# Patient Record
Sex: MALE | Race: WHITE | NOT HISPANIC OR LATINO | Employment: OTHER | URBAN - NONMETROPOLITAN AREA
[De-identification: names, ages, dates, MRNs, and addresses within clinical notes are randomized per-mention and may not be internally consistent; named-entity substitution may affect disease eponyms.]

---

## 2017-01-27 DIAGNOSIS — M15.9 PRIMARY OSTEOARTHRITIS INVOLVING MULTIPLE JOINTS: Chronic | ICD-10-CM

## 2017-01-27 RX ORDER — HYDROCODONE BITARTRATE AND ACETAMINOPHEN 10; 325 MG/1; MG/1
TABLET ORAL
Qty: 120 TABLET | Refills: 0 | Status: SHIPPED | OUTPATIENT
Start: 2017-01-27 | End: 2017-02-28 | Stop reason: SDUPTHER

## 2017-02-22 ENCOUNTER — LAB (OUTPATIENT)
Dept: LAB | Facility: OTHER | Age: 62
End: 2017-02-22

## 2017-02-22 DIAGNOSIS — I10 ESSENTIAL HYPERTENSION: ICD-10-CM

## 2017-02-22 DIAGNOSIS — Z79.899 DRUG THERAPY: ICD-10-CM

## 2017-02-22 LAB
BACTERIA UR QL AUTO: ABNORMAL /HPF
BILIRUB UR QL STRIP: NEGATIVE
CLARITY UR: CLEAR
COLOR UR: YELLOW
GLUCOSE UR STRIP-MCNC: NEGATIVE MG/DL
HGB UR QL STRIP.AUTO: ABNORMAL
HYALINE CASTS UR QL AUTO: ABNORMAL /LPF
KETONES UR QL STRIP: NEGATIVE
LEUKOCYTE ESTERASE UR QL STRIP.AUTO: NEGATIVE
MUCOUS THREADS URNS QL MICRO: ABNORMAL /HPF
NITRITE UR QL STRIP: NEGATIVE
PH UR STRIP.AUTO: 5.5 [PH] (ref 5.5–8)
PROT UR QL STRIP: ABNORMAL
RBC # UR: ABNORMAL /HPF
REF LAB TEST METHOD: ABNORMAL
SP GR UR STRIP: 1.02 (ref 1–1.03)
SQUAMOUS #/AREA URNS HPF: ABNORMAL /HPF
UROBILINOGEN UR QL STRIP: ABNORMAL
WBC UR QL AUTO: ABNORMAL /HPF

## 2017-02-22 PROCEDURE — 81001 URINALYSIS AUTO W/SCOPE: CPT | Performed by: INTERNAL MEDICINE

## 2017-02-22 PROCEDURE — 80307 DRUG TEST PRSMV CHEM ANLYZR: CPT | Performed by: INTERNAL MEDICINE

## 2017-02-23 ENCOUNTER — LAB (OUTPATIENT)
Dept: LAB | Facility: OTHER | Age: 62
End: 2017-02-23

## 2017-02-23 DIAGNOSIS — E78.5 HYPERLIPIDEMIA: Chronic | ICD-10-CM

## 2017-02-23 DIAGNOSIS — Z12.5 SCREENING FOR PROSTATE CANCER: ICD-10-CM

## 2017-02-23 DIAGNOSIS — I10 ESSENTIAL HYPERTENSION: ICD-10-CM

## 2017-02-23 LAB
ALBUMIN SERPL-MCNC: 4.2 G/DL (ref 3.2–5.5)
ALBUMIN/GLOB SERPL: 1.2 G/DL (ref 1–3)
ALP SERPL-CCNC: 90 U/L (ref 15–121)
ALT SERPL W P-5'-P-CCNC: 21 U/L (ref 10–60)
AMPHET+METHAMPHET UR QL: NEGATIVE
ANION GAP SERPL CALCULATED.3IONS-SCNC: 8 MMOL/L (ref 5–15)
AST SERPL-CCNC: 23 U/L (ref 10–60)
BARBITURATES UR QL SCN: NEGATIVE
BASOPHILS # BLD AUTO: 0.02 10*3/MM3 (ref 0–0.2)
BASOPHILS NFR BLD AUTO: 0.1 % (ref 0–2)
BENZODIAZ UR QL SCN: NEGATIVE
BILIRUB SERPL-MCNC: 0.7 MG/DL (ref 0.2–1)
BUN BLD-MCNC: 16 MG/DL (ref 8–25)
BUN/CREAT SERPL: 17.8 (ref 7–25)
CALCIUM SPEC-SCNC: 9.4 MG/DL (ref 8.4–10.8)
CANNABINOIDS SERPL QL: NEGATIVE
CHLORIDE SERPL-SCNC: 107 MMOL/L (ref 100–112)
CHOLEST SERPL-MCNC: 222 MG/DL (ref 150–200)
CO2 SERPL-SCNC: 26 MMOL/L (ref 20–32)
COCAINE UR QL: NEGATIVE
CREAT BLD-MCNC: 0.9 MG/DL (ref 0.4–1.3)
DEPRECATED RDW RBC AUTO: 45.4 FL (ref 35.1–43.9)
EOSINOPHIL # BLD AUTO: 0.3 10*3/MM3 (ref 0–0.7)
EOSINOPHIL NFR BLD AUTO: 2.2 % (ref 0–7)
ERYTHROCYTE [DISTWIDTH] IN BLOOD BY AUTOMATED COUNT: 14.2 % (ref 11.5–14.5)
GFR SERPL CREATININE-BSD FRML MDRD: 86 ML/MIN/1.73 (ref 49–113)
GLOBULIN UR ELPH-MCNC: 3.4 GM/DL (ref 2.5–4.6)
GLUCOSE BLD-MCNC: 109 MG/DL (ref 70–100)
HCT VFR BLD AUTO: 49.4 % (ref 39–49)
HDLC SERPL-MCNC: 36 MG/DL (ref 35–100)
HGB BLD-MCNC: 16.8 G/DL (ref 13.7–17.3)
LDLC SERPL CALC-MCNC: 154 MG/DL
LDLC/HDLC SERPL: 4.27 {RATIO}
LYMPHOCYTES # BLD AUTO: 4.29 10*3/MM3 (ref 0.6–4.2)
LYMPHOCYTES NFR BLD AUTO: 32.1 % (ref 10–50)
MCH RBC QN AUTO: 30 PG (ref 26.5–34)
MCHC RBC AUTO-ENTMCNC: 34 G/DL (ref 31.5–36.3)
MCV RBC AUTO: 88.2 FL (ref 80–98)
METHADONE UR QL SCN: POSITIVE
MONOCYTES # BLD AUTO: 1 10*3/MM3 (ref 0–0.9)
MONOCYTES NFR BLD AUTO: 7.5 % (ref 0–12)
NEUTROPHILS # BLD AUTO: 7.74 10*3/MM3 (ref 2–8.6)
NEUTROPHILS NFR BLD AUTO: 58.1 % (ref 37–80)
OPIATES UR QL: POSITIVE
OXYCODONE UR QL SCN: NEGATIVE
PLATELET # BLD AUTO: 175 10*3/MM3 (ref 150–450)
PMV BLD AUTO: 12.8 FL (ref 8–12)
POTASSIUM BLD-SCNC: 4.4 MMOL/L (ref 3.4–5.4)
PROT SERPL-MCNC: 7.6 G/DL (ref 6.7–8.2)
RBC # BLD AUTO: 5.6 10*6/MM3 (ref 4.37–5.74)
SODIUM BLD-SCNC: 141 MMOL/L (ref 134–146)
TRIGL SERPL-MCNC: 162 MG/DL (ref 35–160)
VLDLC SERPL-MCNC: 32.4 MG/DL
WBC NRBC COR # BLD: 13.35 10*3/MM3 (ref 3.2–9.8)

## 2017-02-23 PROCEDURE — 80061 LIPID PANEL: CPT | Performed by: INTERNAL MEDICINE

## 2017-02-23 PROCEDURE — 80050 GENERAL HEALTH PANEL: CPT | Performed by: INTERNAL MEDICINE

## 2017-02-23 PROCEDURE — 84439 ASSAY OF FREE THYROXINE: CPT | Performed by: INTERNAL MEDICINE

## 2017-02-23 PROCEDURE — G0103 PSA SCREENING: HCPCS | Performed by: INTERNAL MEDICINE

## 2017-02-23 PROCEDURE — 36415 COLL VENOUS BLD VENIPUNCTURE: CPT | Performed by: INTERNAL MEDICINE

## 2017-02-24 LAB
PSA SERPL-MCNC: 2.4 NG/ML (ref 0–4)
T4 FREE SERPL-MCNC: 1.44 NG/DL (ref 0.78–2.19)
TSH SERPL DL<=0.05 MIU/L-ACNC: 1 MIU/ML (ref 0.46–4.68)

## 2017-02-28 DIAGNOSIS — M15.9 PRIMARY OSTEOARTHRITIS INVOLVING MULTIPLE JOINTS: Chronic | ICD-10-CM

## 2017-02-28 RX ORDER — HYDROCODONE BITARTRATE AND ACETAMINOPHEN 10; 325 MG/1; MG/1
TABLET ORAL
Qty: 120 TABLET | Refills: 0 | Status: SHIPPED | OUTPATIENT
Start: 2017-02-28 | End: 2017-03-28 | Stop reason: SDUPTHER

## 2017-03-28 ENCOUNTER — OFFICE VISIT (OUTPATIENT)
Dept: FAMILY MEDICINE CLINIC | Facility: CLINIC | Age: 62
End: 2017-03-28

## 2017-03-28 VITALS
WEIGHT: 188 LBS | BODY MASS INDEX: 26.92 KG/M2 | HEIGHT: 70 IN | HEART RATE: 80 BPM | SYSTOLIC BLOOD PRESSURE: 156 MMHG | DIASTOLIC BLOOD PRESSURE: 80 MMHG

## 2017-03-28 DIAGNOSIS — L82.0 SEBORRHEIC KERATOSES, INFLAMED: ICD-10-CM

## 2017-03-28 DIAGNOSIS — G90.9 PERIPHERAL AUTONOMIC NEUROPATHY: Chronic | ICD-10-CM

## 2017-03-28 DIAGNOSIS — I10 ESSENTIAL HYPERTENSION: Primary | Chronic | ICD-10-CM

## 2017-03-28 DIAGNOSIS — M15.9 PRIMARY OSTEOARTHRITIS INVOLVING MULTIPLE JOINTS: Chronic | ICD-10-CM

## 2017-03-28 DIAGNOSIS — F41.1 GENERALIZED ANXIETY DISORDER: Chronic | ICD-10-CM

## 2017-03-28 DIAGNOSIS — Z11.59 NEED FOR HEPATITIS C SCREENING TEST: ICD-10-CM

## 2017-03-28 DIAGNOSIS — E78.2 MIXED HYPERLIPIDEMIA: Chronic | ICD-10-CM

## 2017-03-28 DIAGNOSIS — G25.81 RESTLESS LEG SYNDROME: Chronic | ICD-10-CM

## 2017-03-28 PROCEDURE — 17110 DESTRUCTION B9 LES UP TO 14: CPT | Performed by: INTERNAL MEDICINE

## 2017-03-28 PROCEDURE — 96372 THER/PROPH/DIAG INJ SC/IM: CPT | Performed by: INTERNAL MEDICINE

## 2017-03-28 PROCEDURE — 99214 OFFICE O/P EST MOD 30 MIN: CPT | Performed by: INTERNAL MEDICINE

## 2017-03-28 RX ORDER — HYDROCODONE BITARTRATE AND ACETAMINOPHEN 10; 325 MG/1; MG/1
TABLET ORAL
Qty: 120 TABLET | Refills: 0 | Status: SHIPPED | OUTPATIENT
Start: 2017-03-28 | End: 2017-04-28 | Stop reason: SDUPTHER

## 2017-03-28 RX ORDER — LISINOPRIL 10 MG/1
10 TABLET ORAL DAILY
Qty: 30 TABLET | Refills: 11 | Status: SHIPPED | OUTPATIENT
Start: 2017-03-28 | End: 2017-06-23 | Stop reason: DRUGHIGH

## 2017-03-28 RX ORDER — METHYLPREDNISOLONE ACETATE 80 MG/ML
80 INJECTION, SUSPENSION INTRA-ARTICULAR; INTRALESIONAL; INTRAMUSCULAR; SOFT TISSUE ONCE
Status: COMPLETED | OUTPATIENT
Start: 2017-03-28 | End: 2017-03-28

## 2017-03-28 RX ORDER — SIMVASTATIN 40 MG
40 TABLET ORAL NIGHTLY
Qty: 30 TABLET | Refills: 11 | Status: SHIPPED | OUTPATIENT
Start: 2017-03-28 | End: 2018-04-05 | Stop reason: SDUPTHER

## 2017-03-28 RX ORDER — CITALOPRAM 20 MG/1
20 TABLET ORAL DAILY
Qty: 30 TABLET | Refills: 11 | Status: SHIPPED | OUTPATIENT
Start: 2017-03-28 | End: 2018-04-05 | Stop reason: SDUPTHER

## 2017-03-28 RX ORDER — LISINOPRIL AND HYDROCHLOROTHIAZIDE 12.5; 1 MG/1; MG/1
1 TABLET ORAL DAILY
Qty: 30 TABLET | Refills: 5 | Status: SHIPPED | OUTPATIENT
Start: 2017-03-28 | End: 2017-11-04 | Stop reason: SDUPTHER

## 2017-03-28 RX ADMIN — METHYLPREDNISOLONE ACETATE 80 MG: 80 INJECTION, SUSPENSION INTRA-ARTICULAR; INTRALESIONAL; INTRAMUSCULAR; SOFT TISSUE at 09:51

## 2017-03-28 NOTE — PROGRESS NOTES
SANDRA#[id number]

## 2017-03-28 NOTE — PROGRESS NOTES
Subjective   Osvaldo Conley . is a 61 y.o. male who presents to the office for follow-up and review of labs. He has hypertension and his blood pressure has been controlled.  He has hyperlipidemia and takes simvastatin daily.  He has restless leg syndrome which is well controlled with Requip.  He has anxiety and takes Celexa daily.  He has osteoarthritis which causes chronic back and joint pain.  He takes Norco 4 times a day which keeps his pain controlled.  He also takes gabapentin 3 times a day for treatment of the neuropathic pain.  He has also recently noticed some increased edema in his ankles.  This usually resolves overnight he also has a couple of skin lesions, one on his back and one on his left hand, which have been itching and irritated.     History of Present Illness     The following portions of the patient's history were reviewed and updated as appropriate: allergies, current medications, past family history, past medical history, past social history, past surgical history and problem list.    Review of Systems   Constitutional: Negative for chills, fatigue and fever.   HENT: Negative for congestion, sneezing, sore throat and trouble swallowing.    Eyes: Negative for visual disturbance.   Respiratory: Negative for cough, chest tightness, shortness of breath and wheezing.    Cardiovascular: Positive for leg swelling. Negative for chest pain and palpitations.   Gastrointestinal: Negative for abdominal pain, constipation, diarrhea, nausea and vomiting.   Genitourinary: Negative for dysuria, frequency and urgency.   Musculoskeletal: Positive for arthralgias and back pain. Negative for neck pain.   Skin: Negative for rash.   Neurological: Negative for dizziness, weakness and headaches.   Psychiatric/Behavioral:        Patient denies any feelings of depression and has not felt down, hopeless or lost interest in any activities.   All other systems reviewed and are negative.      Objective   Physical Exam    Constitutional: He is oriented to person, place, and time. He appears well-developed and well-nourished. No distress.   HENT:   Head: Normocephalic and atraumatic.   Nose: Nose normal.   Mouth/Throat: Oropharynx is clear and moist. No oropharyngeal exudate.   Eyes: Conjunctivae and EOM are normal. Pupils are equal, round, and reactive to light. No scleral icterus.   Neck: Normal range of motion. Neck supple.   Cardiovascular: Normal rate, regular rhythm and normal heart sounds.  Exam reveals no gallop and no friction rub.    No murmur heard.  Pulmonary/Chest: Effort normal. No respiratory distress. He has decreased breath sounds. He has no wheezes. He has no rales.   Abdominal: Soft. Bowel sounds are normal. He exhibits no distension. There is no tenderness. There is no rebound and no guarding.   Musculoskeletal: Normal range of motion. He exhibits no edema.   Lymphadenopathy:     He has no cervical adenopathy.   Neurological: He is alert and oriented to person, place, and time. No cranial nerve deficit.   Skin: Skin is warm and dry. No rash noted.   He has 2 skin lesions, one on the left hand at the base of his thumb, and the other is midline at the middle part of his back.  These appear irritated and are dark in color.   Psychiatric: He has a normal mood and affect. His behavior is normal. Judgment and thought content normal.   Nursing note and vitals reviewed.      Assessment/Plan   Osvaldo was seen today for hypertension, pain, leg swelling and skin lesion.    Diagnoses and all orders for this visit:    Essential hypertension  -     CBC & Differential; Future  -     Comprehensive Metabolic Panel; Future  -     T4, Free; Future  -     TSH; Future  -     Urinalysis With / Culture If Indicated; Future  -     lisinopril (PRINIVIL,ZESTRIL) 10 MG tablet; Take 1 tablet by mouth Daily.  -     lisinopril-hydrochlorothiazide (PRINZIDE,ZESTORETIC) 10-12.5 MG per tablet; Take 1 tablet by mouth Daily.    Mixed  hyperlipidemia  -     Lipid Panel; Future  -     simvastatin (ZOCOR) 40 MG tablet; Take 1 tablet by mouth Every Night.    Peripheral autonomic neuropathy    Primary osteoarthritis involving multiple joints  -     HYDROcodone-acetaminophen (NORCO)  MG per tablet; 1 tablet(s) by mouth 4 times per day for pain.  -     methylPREDNISolone acetate (DEPO-medrol) injection 80 mg; Inject 1 mL into the shoulder, thigh, or buttocks 1 (One) Time.    Generalized anxiety disorder  -     citalopram (CeleXA) 20 MG tablet; Take 1 tablet by mouth Daily.    Restless leg syndrome    Need for hepatitis C screening test  -     Hepatitis Panel, Acute; Future    Seborrheic keratoses, inflamed  -     Cryotherapy, Skin Lesion         Labs are reviewed with patient. His lipids are controlled and he will continue with simvastatin.  His blood pressure is a little elevated today.  I will change his lisinopril to lisinopril HCT.  This will also help with the peripheral edema.  I used cryotherapy for treatment of the skin lesions.    He will continue with Requip for treatment of the RLS.  He will continue with Celexa for his anxiety.  He will continue with gabapentin for the neuropathic pain.  He will also continue to use Norco 4 times a day for treatment of the arthritic pain.    Patient understands the risks associated with this controlled medication, including tolerance and addiction.  Patient also agrees to only obtain this medication from me, and not from a another provider, unless that provider is covering for me in my absence.  Patient also agrees to be compliant in dosing, and not self adjust the dose of medication.  A signed controlled substance agreement is on file, and the patient has received a controlled substance education sheet at this a previous visit.  The patient has also signed a consent for treatment with a controlled substance as per [facility] policy. SANDRA was obtained.    Patients BMI indicates that he is  overweight.  I discussed the importance of weight loss, and have recommended a diet and exercise regimen.    I advised the patient of the risks in continuing to use tobacco, and I provided this patient with smoking cessation educational materials.  I also discussed how to quit smoking and the patient has expressed the willingness to quit.    Cryotherapy, Skin Lesion  Date/Time: 3/28/2017 9:01 AM  Performed by: TESS MESSER  Authorized by: TESS MESSER   Local anesthesia used: no    Anesthesia:  Local anesthesia used: no  Sedation:  Patient sedated: no    Patient tolerance: Patient tolerated the procedure well with no immediate complications  Comments: Cryo 2 lesions, one on the left hand and one on the back, midline.            PHQ-9 Depression Screening 3/28/2017   Little interest or pleasure in doing things 2   Feeling down, depressed, or hopeless 0   Trouble falling or staying asleep, or sleeping too much 1   Feeling tired or having little energy 3   Poor appetite or overeating 2   Feeling bad about yourself - or that you are a failure or have let yourself or your family down 1   Trouble concentrating on things, such as reading the newspaper or watching television 0   Moving or speaking so slowly that other people could have noticed. Or the opposite - being so fidgety or restless that you have been moving around a lot more than usual 0   Thoughts that you would be better off dead, or of hurting yourself in some way 0   PHQ-9 Total Score 9   If you checked off any problems, how difficult have these problems made it for you to do your work, take care of things at home, or get along with other people? Somewhat difficult         Lab on 02/23/2017   Component Date Value Ref Range Status   • Glucose 02/23/2017 109* 70 - 100 mg/dL Final   • BUN 02/23/2017 16  8 - 25 mg/dL Final   • Creatinine 02/23/2017 0.90  0.40 - 1.30 mg/dL Final   • Sodium 02/23/2017 141  134 - 146 mmol/L Final   • Potassium 02/23/2017 4.4   3.4 - 5.4 mmol/L Final   • Chloride 02/23/2017 107  100 - 112 mmol/L Final   • CO2 02/23/2017 26.0  20.0 - 32.0 mmol/L Final   • Calcium 02/23/2017 9.4  8.4 - 10.8 mg/dL Final   • Total Protein 02/23/2017 7.6  6.7 - 8.2 g/dL Final   • Albumin 02/23/2017 4.20  3.20 - 5.50 g/dL Final   • ALT (SGPT) 02/23/2017 21  10 - 60 U/L Final   • AST (SGOT) 02/23/2017 23  10 - 60 U/L Final   • Alkaline Phosphatase 02/23/2017 90  15 - 121 U/L Final   • Total Bilirubin 02/23/2017 0.7  0.2 - 1.0 mg/dL Final   • eGFR Non  Amer 02/23/2017 86  49 - 113 mL/min/1.73 Final   • Globulin 02/23/2017 3.4  2.5 - 4.6 gm/dL Final   • A/G Ratio 02/23/2017 1.2  1.0 - 3.0 g/dL Final   • BUN/Creatinine Ratio 02/23/2017 17.8  7.0 - 25.0 Final   • Anion Gap 02/23/2017 8.0  5.0 - 15.0 mmol/L Final   • Total Cholesterol 02/23/2017 222* 150 - 200 mg/dL Final   • Triglycerides 02/23/2017 162* 35 - 160 mg/dL Final   • HDL Cholesterol 02/23/2017 36  35 - 100 mg/dL Final   • LDL Cholesterol  02/23/2017 154  mg/dL Final   • VLDL Cholesterol 02/23/2017 32.4  mg/dL Final   • LDL/HDL Ratio 02/23/2017 4.27   Final   • PSA 02/23/2017 2.400  0.000 - 4.000 ng/mL Final   • Free T4 02/23/2017 1.44  0.78 - 2.19 ng/dL Final   • TSH 02/23/2017 1.000  0.460 - 4.680 mIU/mL Final   • WBC 02/23/2017 13.35* 3.20 - 9.80 10*3/mm3 Final   • RBC 02/23/2017 5.60  4.37 - 5.74 10*6/mm3 Final   • Hemoglobin 02/23/2017 16.8  13.7 - 17.3 g/dL Final   • Hematocrit 02/23/2017 49.4* 39.0 - 49.0 % Final   • MCV 02/23/2017 88.2  80.0 - 98.0 fL Final   • MCH 02/23/2017 30.0  26.5 - 34.0 pg Final   • MCHC 02/23/2017 34.0  31.5 - 36.3 g/dL Final   • RDW 02/23/2017 14.2  11.5 - 14.5 % Final   • RDW-SD 02/23/2017 45.4* 35.1 - 43.9 fl Final   • MPV 02/23/2017 12.8* 8.0 - 12.0 fL Final   • Platelets 02/23/2017 175  150 - 450 10*3/mm3 Final   • Neutrophil % 02/23/2017 58.1  37.0 - 80.0 % Final   • Lymphocyte % 02/23/2017 32.1  10.0 - 50.0 % Final   • Monocyte % 02/23/2017 7.5  0.0 - 12.0 %  Final   • Eosinophil % 02/23/2017 2.2  0.0 - 7.0 % Final   • Basophil % 02/23/2017 0.1  0.0 - 2.0 % Final   • Neutrophils, Absolute 02/23/2017 7.74  2.00 - 8.60 10*3/mm3 Final   • Lymphocytes, Absolute 02/23/2017 4.29* 0.60 - 4.20 10*3/mm3 Final   • Monocytes, Absolute 02/23/2017 1.00* 0.00 - 0.90 10*3/mm3 Final   • Eosinophils, Absolute 02/23/2017 0.30  0.00 - 0.70 10*3/mm3 Final   • Basophils, Absolute 02/23/2017 0.02  0.00 - 0.20 10*3/mm3 Final   Lab on 02/22/2017   Component Date Value Ref Range Status   • Color, UA 02/22/2017 Yellow  Yellow, Straw Final   • Appearance, UA 02/22/2017 Clear  Clear Final   • pH, UA 02/22/2017 5.5  5.5 - 8.0 Final   • Specific Gravity, UA 02/22/2017 1.025  1.005 - 1.030 Final   • Glucose, UA 02/22/2017 Negative  Negative Final   • Ketones, UA 02/22/2017 Negative  Negative Final   • Bilirubin, UA 02/22/2017 Negative  Negative Final   • Blood, UA 02/22/2017 Large (3+)* Negative Final   • Protein, UA 02/22/2017 30 mg/dL (1+)* Negative Final   • Leuk Esterase, UA 02/22/2017 Negative  Negative Final   • Nitrite, UA 02/22/2017 Negative  Negative Final   • Urobilinogen, UA 02/22/2017 0.2 E.U./dL  0.2 - 1.0 E.U./dL Final   • Amphetamine Screen, Urine 02/22/2017 Negative  Negative Final   • Barbiturates Screen, Urine 02/22/2017 Negative  Negative Final   • Benzodiazepine Screen, Urine 02/22/2017 Negative  Negative Final   • Cocaine Screen, Urine 02/22/2017 Negative  Negative Final   • Methadone Screen, Urine 02/22/2017 Positive* Negative Final   • Opiate Screen 02/22/2017 Positive* Negative Final   • Oxycodone Screen, Urine 02/22/2017 Negative  Negative Final   • THC, Screen, Urine 02/22/2017 Negative  Negative Final   • RBC, UA 02/22/2017 6-12* None Seen /HPF Final   • WBC, UA 02/22/2017 None Seen  None Seen /HPF Final   • Bacteria, UA 02/22/2017 None Seen  None Seen /HPF Final   • Squamous Epithelial Cells, UA 02/22/2017 None Seen  None Seen, 0-2 /HPF Final   • Hyaline Casts, UA  02/22/2017 None Seen  None Seen /LPF Final   • Mucus, UA 02/22/2017 Small/1+* None Seen, Trace /HPF Final   • Methodology 02/22/2017 Manual Light Microscopy   Final   ]

## 2017-03-28 NOTE — PATIENT INSTRUCTIONS
Steps to Quit Smoking   Smoking tobacco can be harmful to your health and can affect almost every organ in your body. Smoking puts you, and those around you, at risk for developing many serious chronic diseases. Quitting smoking is difficult, but it is one of the best things that you can do for your health. It is never too late to quit.  WHAT ARE THE BENEFITS OF QUITTING SMOKING?  When you quit smoking, you lower your risk of developing serious diseases and conditions, such as:  · Lung cancer or lung disease, such as COPD.  · Heart disease.  · Stroke.  · Heart attack.  · Infertility.  · Osteoporosis and bone fractures.  Additionally, symptoms such as coughing, wheezing, and shortness of breath may get better when you quit. You may also find that you get sick less often because your body is stronger at fighting off colds and infections. If you are pregnant, quitting smoking can help to reduce your chances of having a baby of low birth weight.  HOW DO I GET READY TO QUIT?  When you decide to quit smoking, create a plan to make sure that you are successful. Before you quit:  · Pick a date to quit. Set a date within the next two weeks to give you time to prepare.  · Write down the reasons why you are quitting. Keep this list in places where you will see it often, such as on your bathroom mirror or in your car or wallet.  · Identify the people, places, things, and activities that make you want to smoke (triggers) and avoid them. Make sure to take these actions:    Throw away all cigarettes at home, at work, and in your car.    Throw away smoking accessories, such as ashtrays and lighters.    Clean your car and make sure to empty the ashtray.    Clean your home, including curtains and carpets.  · Tell your family, friends, and coworkers that you are quitting. Support from your loved ones can make quitting easier.  · Talk with your health care provider about your options for quitting smoking.  · Find out what treatment  "options are covered by your health insurance.  WHAT STRATEGIES CAN I USE TO QUIT SMOKING?   Talk with your healthcare provider about different strategies to quit smoking. Some strategies include:  · Quitting smoking altogether instead of gradually lessening how much you smoke over a period of time. Research shows that quitting \"cold turkey\" is more successful than gradually quitting.  · Attending in-person counseling to help you build problem-solving skills. You are more likely to have success in quitting if you attend several counseling sessions. Even short sessions of 10 minutes can be effective.  · Finding resources and support systems that can help you to quit smoking and remain smoke-free after you quit. These resources are most helpful when you use them often. They can include:    Online chats with a counselor.    Telephone quitlines.    Printed self-help materials.    Support groups or group counseling.    Text messaging programs.    Mobile phone applications.  · Taking medicines to help you quit smoking. (If you are pregnant or breastfeeding, talk with your health care provider first.) Some medicines contain nicotine and some do not. Both types of medicines help with cravings, but the medicines that include nicotine help to relieve withdrawal symptoms. Your health care provider may recommend:    Nicotine patches, gum, or lozenges.    Nicotine inhalers or sprays.    Non-nicotine medicine that is taken by mouth.  Talk with your health care provider about combining strategies, such as taking medicines while you are also receiving in-person counseling. Using these two strategies together makes you more likely to succeed in quitting than if you used either strategy on its own.  If you are pregnant or breastfeeding, talk with your health care provider about finding counseling or other support strategies to quit smoking. Do not take medicine to help you quit smoking unless told to do so by your health care " provider.  WHAT THINGS CAN I DO TO MAKE IT EASIER TO QUIT?  Quitting smoking might feel overwhelming at first, but there is a lot that you can do to make it easier. Take these important actions:  · Reach out to your family and friends and ask that they support and encourage you during this time. Call telephone quitlines, reach out to support groups, or work with a counselor for support.  · Ask people who smoke to avoid smoking around you.  · Avoid places that trigger you to smoke, such as bars, parties, or smoke-break areas at work.  · Spend time around people who do not smoke.  · Lessen stress in your life, because stress can be a smoking trigger for some people. To lessen stress, try:    Exercising regularly.    Deep-breathing exercises.    Yoga.    Meditating.    Performing a body scan. This involves closing your eyes, scanning your body from head to toe, and noticing which parts of your body are particularly tense. Purposefully relax the muscles in those areas.  · Download or purchase mobile phone or tablet apps (applications) that can help you stick to your quit plan by providing reminders, tips, and encouragement. There are many free apps, such as QuitGuide from the CDC (Centers for Disease Control and Prevention). You can find other support for quitting smoking (smoking cessation) through smokefree.gov and other websites.  HOW WILL I FEEL WHEN I QUIT SMOKING?  Within the first 24 hours of quitting smoking, you may start to feel some withdrawal symptoms. These symptoms are usually most noticeable 2-3 days after quitting, but they usually do not last beyond 2-3 weeks. Changes or symptoms that you might experience include:  · Mood swings.  · Restlessness, anxiety, or irritation.  · Difficulty concentrating.  · Dizziness.  · Strong cravings for sugary foods in addition to nicotine.  · Mild weight gain.  · Constipation.  · Nausea.  · Coughing or a sore throat.  · Changes in how your medicines work in your  body.  · A depressed mood.  · Difficulty sleeping (insomnia).  After the first 2-3 weeks of quitting, you may start to notice more positive results, such as:  · Improved sense of smell and taste.  · Decreased coughing and sore throat.  · Slower heart rate.  · Lower blood pressure.  · Clearer skin.  · The ability to breathe more easily.  · Fewer sick days.  Quitting smoking is very challenging for most people. Do not get discouraged if you are not successful the first time. Some people need to make many attempts to quit before they achieve long-term success. Do your best to stick to your quit plan, and talk with your health care provider if you have any questions or concerns.     This information is not intended to replace advice given to you by your health care provider. Make sure you discuss any questions you have with your health care provider.     Document Released: 12/12/2002 Document Revised: 05/03/2016 Document Reviewed: 05/03/2016  [employer] Interactive Patient Education ©2016 [employer]  Exercising to Lose Weight  Exercising can help you to lose weight. In order to lose weight through exercise, you need to do vigorous-intensity exercise. You can tell that you are exercising with vigorous intensity if you are breathing very hard and fast and cannot hold a conversation while exercising.  Moderate-intensity exercise helps to maintain your current weight. You can tell that you are exercising at a moderate level if you have a higher heart rate and faster breathing, but you are still able to hold a conversation.  HOW OFTEN SHOULD I EXERCISE?  Choose an activity that you enjoy and set realistic goals. Your health care provider can help you to make an activity plan that works for you. Exercise regularly as directed by your health care provider. This may include:  · Doing resistance training twice each week, such as:    Push-ups.    Sit-ups.    Lifting weights.    Using resistance bands.  · Doing a given intensity  of exercise for a given amount of time. Choose from these options:    150 minutes of moderate-intensity exercise every week.    75 minutes of vigorous-intensity exercise every week.    A mix of moderate-intensity and vigorous-intensity exercise every week.  Children, pregnant women, people who are out of shape, people who are overweight, and older adults may need to consult a health care provider for individual recommendations. If you have any sort of medical condition, be sure to consult your health care provider before starting a new exercise program.  WHAT ARE SOME ACTIVITIES THAT CAN HELP ME TO LOSE WEIGHT?   · Walking at a rate of at least 4.5 miles an hour.  · Jogging or running at a rate of 5 miles per hour.  · Biking at a rate of at least 10 miles per hour.  · Lap swimming.  · Roller-skating or in-line skating.  · Cross-country skiing.  · Vigorous competitive sports, such as football, basketball, and soccer.  · Jumping rope.  · Aerobic dancing.  HOW CAN I BE MORE ACTIVE IN MY DAY-TO-DAY ACTIVITIES?  · Use the stairs instead of the elevator.  · Take a walk during your lunch break.  · If you drive, park your car farther away from work or school.  · If you take public transportation, get off one stop early and walk the rest of the way.  · Make all of your phone calls while standing up and walking around.  · Get up, stretch, and walk around every 30 minutes throughout the day.  WHAT GUIDELINES SHOULD I FOLLOW WHILE EXERCISING?  · Do not exercise so much that you hurt yourself, feel dizzy, or get very short of breath.  · Consult your health care provider prior to starting a new exercise program.  · Wear comfortable clothes and shoes with good support.  · Drink plenty of water while you exercise to prevent dehydration or heat stroke. Body water is lost during exercise and must be replaced.  · Work out until you breathe faster and your heart beats faster.     This information is not intended to replace advice given  to you by your health care provider. Make sure you discuss any questions you have with your health care provider.     Document Released: 01/20/2012 Document Revised: 01/08/2016 Document Reviewed: 05/21/2015  [employer] Interactive Patient Education ©2016 [employer]

## 2017-04-28 DIAGNOSIS — M15.9 PRIMARY OSTEOARTHRITIS INVOLVING MULTIPLE JOINTS: Chronic | ICD-10-CM

## 2017-04-28 RX ORDER — HYDROCODONE BITARTRATE AND ACETAMINOPHEN 10; 325 MG/1; MG/1
TABLET ORAL
Qty: 120 TABLET | Refills: 0 | Status: SHIPPED | OUTPATIENT
Start: 2017-04-28 | End: 2017-05-25 | Stop reason: SDUPTHER

## 2017-04-28 NOTE — TELEPHONE ENCOUNTER
Patient is up to date on controlled medication consent, Laz report, and apt from 03/28/2017. Picking up today at 3pm.

## 2017-05-25 DIAGNOSIS — M15.9 PRIMARY OSTEOARTHRITIS INVOLVING MULTIPLE JOINTS: Chronic | ICD-10-CM

## 2017-05-25 RX ORDER — HYDROCODONE BITARTRATE AND ACETAMINOPHEN 10; 325 MG/1; MG/1
TABLET ORAL
Qty: 120 TABLET | Refills: 0 | Status: SHIPPED | OUTPATIENT
Start: 2017-05-25 | End: 2017-06-23 | Stop reason: SDUPTHER

## 2017-06-12 RX ORDER — GABAPENTIN 300 MG/1
300 CAPSULE ORAL 3 TIMES DAILY
Qty: 90 CAPSULE | Refills: 0 | Status: SHIPPED | OUTPATIENT
Start: 2017-06-12 | End: 2017-06-23 | Stop reason: SDUPTHER

## 2017-06-23 ENCOUNTER — OFFICE VISIT (OUTPATIENT)
Dept: FAMILY MEDICINE CLINIC | Facility: CLINIC | Age: 62
End: 2017-06-23

## 2017-06-23 VITALS
HEART RATE: 86 BPM | SYSTOLIC BLOOD PRESSURE: 128 MMHG | DIASTOLIC BLOOD PRESSURE: 78 MMHG | WEIGHT: 189 LBS | HEIGHT: 70 IN | BODY MASS INDEX: 27.06 KG/M2

## 2017-06-23 DIAGNOSIS — G90.9 PERIPHERAL AUTONOMIC NEUROPATHY: Chronic | ICD-10-CM

## 2017-06-23 DIAGNOSIS — R60.9 PERIPHERAL EDEMA: ICD-10-CM

## 2017-06-23 DIAGNOSIS — F41.1 GENERALIZED ANXIETY DISORDER: Chronic | ICD-10-CM

## 2017-06-23 DIAGNOSIS — M15.9 PRIMARY OSTEOARTHRITIS INVOLVING MULTIPLE JOINTS: Chronic | ICD-10-CM

## 2017-06-23 DIAGNOSIS — R73.02 IMPAIRED GLUCOSE TOLERANCE: ICD-10-CM

## 2017-06-23 DIAGNOSIS — I10 ESSENTIAL HYPERTENSION: Primary | Chronic | ICD-10-CM

## 2017-06-23 PROCEDURE — 96372 THER/PROPH/DIAG INJ SC/IM: CPT | Performed by: INTERNAL MEDICINE

## 2017-06-23 PROCEDURE — 99214 OFFICE O/P EST MOD 30 MIN: CPT | Performed by: INTERNAL MEDICINE

## 2017-06-23 RX ORDER — HYDROCODONE BITARTRATE AND ACETAMINOPHEN 10; 325 MG/1; MG/1
TABLET ORAL
Qty: 120 TABLET | Refills: 0 | Status: SHIPPED | OUTPATIENT
Start: 2017-06-23 | End: 2017-07-20 | Stop reason: SDUPTHER

## 2017-06-23 RX ORDER — GABAPENTIN 300 MG/1
300 CAPSULE ORAL 3 TIMES DAILY
Qty: 90 CAPSULE | Refills: 5 | Status: SHIPPED | OUTPATIENT
Start: 2017-06-23 | End: 2017-09-18 | Stop reason: SDUPTHER

## 2017-06-23 RX ORDER — METHYLPREDNISOLONE ACETATE 80 MG/ML
80 INJECTION, SUSPENSION INTRA-ARTICULAR; INTRALESIONAL; INTRAMUSCULAR; SOFT TISSUE ONCE
Status: COMPLETED | OUTPATIENT
Start: 2017-06-23 | End: 2017-06-23

## 2017-06-23 RX ADMIN — METHYLPREDNISOLONE ACETATE 80 MG: 80 INJECTION, SUSPENSION INTRA-ARTICULAR; INTRALESIONAL; INTRAMUSCULAR; SOFT TISSUE at 09:14

## 2017-06-23 NOTE — PROGRESS NOTES
[facility]#[id number]                          .

## 2017-06-23 NOTE — PROGRESS NOTES
Subjective   Osvaldo Conley Sr. is a 61 y.o. male who presents to the office for follow-up. He has hypertension and his blood pressure has been controlled.  He has anxiety and takes Celexa daily.  He has osteoarthritis which causes chronic back and joint pain.  He takes Norco 4 times a day which keeps his pain controlled.  He also takes gabapentin 3 times a day for treatment of the neuropathic pain.  He has COPD and uses an albuterol inhaler as needed he does not use this on a daily basis. He continues to have episodes of edema and the lower extremities. This usually occurs toward the end of the day, and resolves overnight.      History of Present Illness     The following portions of the patient's history were reviewed and updated as appropriate: allergies, current medications, past family history, past medical history, past social history, past surgical history and problem list.    Review of Systems   Constitutional: Negative for chills, fatigue and fever.   HENT: Negative for congestion, sneezing, sore throat and trouble swallowing.    Eyes: Negative for visual disturbance.   Respiratory: Negative for cough, chest tightness, shortness of breath and wheezing.    Cardiovascular: Positive for leg swelling. Negative for chest pain and palpitations.   Gastrointestinal: Negative for abdominal pain, constipation, diarrhea, nausea and vomiting.   Genitourinary: Negative for dysuria, frequency and urgency.   Musculoskeletal: Positive for arthralgias and back pain. Negative for neck pain.   Skin: Negative for rash.   Neurological: Negative for dizziness, weakness and headaches.   Psychiatric/Behavioral:        Patient denies any feelings of depression and has not felt down, hopeless or lost interest in any activities.   All other systems reviewed and are negative.      Objective   Physical Exam   Constitutional: He is oriented to person, place, and time. He appears well-developed and well-nourished. No distress.    HENT:   Head: Normocephalic and atraumatic.   Nose: Nose normal.   Mouth/Throat: Oropharynx is clear and moist. No oropharyngeal exudate.   Eyes: Conjunctivae and EOM are normal. Pupils are equal, round, and reactive to light. No scleral icterus.   Neck: Normal range of motion. Neck supple.   Cardiovascular: Normal rate, regular rhythm and normal heart sounds.  Exam reveals no gallop and no friction rub.    No murmur heard.  Pulmonary/Chest: Effort normal. No respiratory distress. He has decreased breath sounds. He has no wheezes. He has no rales.   Abdominal: Soft. Bowel sounds are normal. He exhibits no distension. There is no tenderness. There is no rebound and no guarding.   Musculoskeletal: Normal range of motion. He exhibits no edema.   Lymphadenopathy:     He has no cervical adenopathy.   Neurological: He is alert and oriented to person, place, and time. No cranial nerve deficit.   Skin: Skin is warm and dry. No rash noted.   Psychiatric: He has a normal mood and affect. His behavior is normal. Judgment and thought content normal.   Nursing note and vitals reviewed.      Assessment/Plan   Osvaldo was seen today for knee pain and leg swelling.    Diagnoses and all orders for this visit:    Essential hypertension    Primary osteoarthritis involving multiple joints  -     HYDROcodone-acetaminophen (NORCO)  MG per tablet; 1 tablet(s) by mouth 4 times per day for pain.  -     methylPREDNISolone acetate (DEPO-medrol) injection 80 mg; Inject 1 mL into the shoulder, thigh, or buttocks 1 (One) Time.    Peripheral autonomic neuropathy  -     gabapentin (NEURONTIN) 300 MG capsule; Take 1 capsule by mouth 3 (Three) Times a Day.    Generalized anxiety disorder    Peripheral edema  Comments:  recommend compression stockings, knee high, during the day    Impaired glucose tolerance  -     Hemoglobin A1c; Future         His blood pressure is controlled, so he will continue with his current blood pressure medication.   I have recommended wearing some compression stockings during the daytime to help with the edema.  He will keep his legs elevated when not ambulating.  He will continue with Celexa for treatment of his anxiety.    He will continue with gabapentin for treatment of the neuropathy.  He will continue with Norco 4 times a day for treatment of the arthritic pain.  He is given Depo-Medrol 80 mg IM to help with the arthritic pain.    Patient understands the risks associated with this controlled medication, including tolerance and addiction.  Patient also agrees to only obtain this medication from me, and not from a another provider, unless that provider is covering for me in my absence.  Patient also agrees to be compliant in dosing, and not self adjust the dose of medication.  A signed controlled substance agreement is on file, and the patient has received a controlled substance education sheet at this a previous visit.  The patient has also signed a consent for treatment with a controlled substance as per [facility] policy. SANDRA was obtained.

## 2017-07-20 DIAGNOSIS — M15.9 PRIMARY OSTEOARTHRITIS INVOLVING MULTIPLE JOINTS: Chronic | ICD-10-CM

## 2017-07-20 NOTE — TELEPHONE ENCOUNTER
Patient is up to date on controlled medication consent, Laz report, and apt from 06/23/2017.   Picking up Monday morning am.

## 2017-07-24 RX ORDER — HYDROCODONE BITARTRATE AND ACETAMINOPHEN 10; 325 MG/1; MG/1
TABLET ORAL
Qty: 120 TABLET | Refills: 0 | Status: SHIPPED | OUTPATIENT
Start: 2017-07-24 | End: 2017-08-22 | Stop reason: SDUPTHER

## 2017-08-22 DIAGNOSIS — M15.9 PRIMARY OSTEOARTHRITIS INVOLVING MULTIPLE JOINTS: Chronic | ICD-10-CM

## 2017-08-22 RX ORDER — ROPINIROLE 0.5 MG/1
TABLET, FILM COATED ORAL
Qty: 30 TABLET | Refills: 5 | Status: SHIPPED | OUTPATIENT
Start: 2017-08-22 | End: 2017-12-14 | Stop reason: SDUPTHER

## 2017-08-22 RX ORDER — HYDROCODONE BITARTRATE AND ACETAMINOPHEN 10; 325 MG/1; MG/1
TABLET ORAL
Qty: 120 TABLET | Refills: 0 | Status: SHIPPED | OUTPATIENT
Start: 2017-08-22 | End: 2017-09-18 | Stop reason: SDUPTHER

## 2017-08-22 NOTE — TELEPHONE ENCOUNTER
Patient is up to date on controlled medication consent, Laz report, and appt from 06/23/2017. Picking up at the front information window Wednesday.

## 2017-09-15 ENCOUNTER — LAB (OUTPATIENT)
Dept: LAB | Facility: OTHER | Age: 62
End: 2017-09-15

## 2017-09-15 DIAGNOSIS — Z11.59 NEED FOR HEPATITIS C SCREENING TEST: ICD-10-CM

## 2017-09-15 DIAGNOSIS — E87.6 HYPOKALEMIA: Primary | ICD-10-CM

## 2017-09-15 DIAGNOSIS — E78.2 MIXED HYPERLIPIDEMIA: Chronic | ICD-10-CM

## 2017-09-15 DIAGNOSIS — R73.02 IMPAIRED GLUCOSE TOLERANCE: ICD-10-CM

## 2017-09-15 DIAGNOSIS — I10 ESSENTIAL HYPERTENSION: ICD-10-CM

## 2017-09-15 LAB
ALBUMIN SERPL-MCNC: 4.2 G/DL (ref 3.2–5.5)
ALBUMIN/GLOB SERPL: 1.2 G/DL (ref 1–3)
ALP SERPL-CCNC: 83 U/L (ref 15–121)
ALT SERPL W P-5'-P-CCNC: 21 U/L (ref 10–60)
ANION GAP SERPL CALCULATED.3IONS-SCNC: 13 MMOL/L (ref 5–15)
AST SERPL-CCNC: 26 U/L (ref 10–60)
BACTERIA UR QL AUTO: ABNORMAL /HPF
BASOPHILS # BLD AUTO: 0.02 10*3/MM3 (ref 0–0.2)
BASOPHILS NFR BLD AUTO: 0.1 % (ref 0–2)
BILIRUB SERPL-MCNC: 1.4 MG/DL (ref 0.2–1)
BILIRUB UR QL STRIP: NEGATIVE
BUN BLD-MCNC: 15 MG/DL (ref 8–25)
BUN/CREAT SERPL: 16.7 (ref 7–25)
CALCIUM SPEC-SCNC: 9.3 MG/DL (ref 8.4–10.8)
CHLORIDE SERPL-SCNC: 98 MMOL/L (ref 100–112)
CHOLEST SERPL-MCNC: 134 MG/DL (ref 150–200)
CLARITY UR: CLEAR
CO2 SERPL-SCNC: 27 MMOL/L (ref 20–32)
COLOR UR: YELLOW
CREAT BLD-MCNC: 0.9 MG/DL (ref 0.4–1.3)
DEPRECATED RDW RBC AUTO: 41.9 FL (ref 35.1–43.9)
EOSINOPHIL # BLD AUTO: 0.11 10*3/MM3 (ref 0–0.7)
EOSINOPHIL NFR BLD AUTO: 0.6 % (ref 0–7)
ERYTHROCYTE [DISTWIDTH] IN BLOOD BY AUTOMATED COUNT: 13.4 % (ref 11.5–14.5)
GFR SERPL CREATININE-BSD FRML MDRD: 86 ML/MIN/1.73 (ref 49–113)
GLOBULIN UR ELPH-MCNC: 3.5 GM/DL (ref 2.5–4.6)
GLUCOSE BLD-MCNC: 115 MG/DL (ref 70–100)
GLUCOSE UR STRIP-MCNC: NEGATIVE MG/DL
HAV IGM SERPL QL IA: NEGATIVE
HBA1C MFR BLD: 5.8 % (ref 4–5.6)
HBV CORE IGM SERPL QL IA: NEGATIVE
HBV SURFACE AG SERPL QL IA: NEGATIVE
HCT VFR BLD AUTO: 40.9 % (ref 39–49)
HCV AB SER DONR QL: NEGATIVE
HDLC SERPL-MCNC: 48 MG/DL (ref 35–100)
HGB BLD-MCNC: 14.4 G/DL (ref 13.7–17.3)
HGB UR QL STRIP.AUTO: ABNORMAL
HYALINE CASTS UR QL AUTO: ABNORMAL /LPF
KETONES UR QL STRIP: ABNORMAL
LDLC SERPL CALC-MCNC: 64 MG/DL
LDLC/HDLC SERPL: 1.34 {RATIO}
LEUKOCYTE ESTERASE UR QL STRIP.AUTO: NEGATIVE
LYMPHOCYTES # BLD AUTO: 3.27 10*3/MM3 (ref 0.6–4.2)
LYMPHOCYTES NFR BLD AUTO: 16.8 % (ref 10–50)
MCH RBC QN AUTO: 31 PG (ref 26.5–34)
MCHC RBC AUTO-ENTMCNC: 35.2 G/DL (ref 31.5–36.3)
MCV RBC AUTO: 88.1 FL (ref 80–98)
MONOCYTES # BLD AUTO: 1.61 10*3/MM3 (ref 0–0.9)
MONOCYTES NFR BLD AUTO: 8.3 % (ref 0–12)
NEUTROPHILS # BLD AUTO: 14.46 10*3/MM3 (ref 2–8.6)
NEUTROPHILS NFR BLD AUTO: 74.2 % (ref 37–80)
NITRITE UR QL STRIP: POSITIVE
PH UR STRIP.AUTO: 6.5 [PH] (ref 5.5–8)
PLATELET # BLD AUTO: 203 10*3/MM3 (ref 150–450)
PMV BLD AUTO: 12.5 FL (ref 8–12)
POTASSIUM BLD-SCNC: 3.1 MMOL/L (ref 3.4–5.4)
PROT SERPL-MCNC: 7.7 G/DL (ref 6.7–8.2)
PROT UR QL STRIP: ABNORMAL
RBC # BLD AUTO: 4.64 10*6/MM3 (ref 4.37–5.74)
RBC # UR: ABNORMAL /HPF
REF LAB TEST METHOD: ABNORMAL
SODIUM BLD-SCNC: 138 MMOL/L (ref 134–146)
SP GR UR STRIP: 1.01 (ref 1–1.03)
SQUAMOUS #/AREA URNS HPF: ABNORMAL /HPF
T4 FREE SERPL-MCNC: 1.55 NG/DL (ref 0.78–2.19)
TRIGL SERPL-MCNC: 109 MG/DL (ref 35–160)
TSH SERPL DL<=0.05 MIU/L-ACNC: 0.11 MIU/ML (ref 0.46–4.68)
UROBILINOGEN UR QL STRIP: ABNORMAL
VLDLC SERPL-MCNC: 21.8 MG/DL
WBC NRBC COR # BLD: 19.47 10*3/MM3 (ref 3.2–9.8)
WBC UR QL AUTO: ABNORMAL /HPF

## 2017-09-15 PROCEDURE — 80061 LIPID PANEL: CPT | Performed by: INTERNAL MEDICINE

## 2017-09-15 PROCEDURE — 80050 GENERAL HEALTH PANEL: CPT | Performed by: INTERNAL MEDICINE

## 2017-09-15 PROCEDURE — 83036 HEMOGLOBIN GLYCOSYLATED A1C: CPT | Performed by: INTERNAL MEDICINE

## 2017-09-15 PROCEDURE — 80074 ACUTE HEPATITIS PANEL: CPT | Performed by: INTERNAL MEDICINE

## 2017-09-15 PROCEDURE — 87086 URINE CULTURE/COLONY COUNT: CPT | Performed by: INTERNAL MEDICINE

## 2017-09-15 PROCEDURE — 81001 URINALYSIS AUTO W/SCOPE: CPT | Performed by: INTERNAL MEDICINE

## 2017-09-15 PROCEDURE — 84439 ASSAY OF FREE THYROXINE: CPT | Performed by: INTERNAL MEDICINE

## 2017-09-15 RX ORDER — POTASSIUM CHLORIDE 20 MEQ/1
20 TABLET, EXTENDED RELEASE ORAL 2 TIMES DAILY
Qty: 10 TABLET | Refills: 0 | Status: SHIPPED | OUTPATIENT
Start: 2017-09-15 | End: 2017-12-14

## 2017-09-17 LAB — BACTERIA SPEC AEROBE CULT: NORMAL

## 2017-09-18 ENCOUNTER — LAB (OUTPATIENT)
Dept: LAB | Facility: OTHER | Age: 62
End: 2017-09-18

## 2017-09-18 ENCOUNTER — OFFICE VISIT (OUTPATIENT)
Dept: FAMILY MEDICINE CLINIC | Facility: CLINIC | Age: 62
End: 2017-09-18

## 2017-09-18 VITALS
DIASTOLIC BLOOD PRESSURE: 60 MMHG | TEMPERATURE: 98.6 F | WEIGHT: 174 LBS | OXYGEN SATURATION: 94 % | SYSTOLIC BLOOD PRESSURE: 112 MMHG | BODY MASS INDEX: 24.97 KG/M2 | HEART RATE: 88 BPM

## 2017-09-18 DIAGNOSIS — R73.02 IMPAIRED GLUCOSE TOLERANCE: Primary | Chronic | ICD-10-CM

## 2017-09-18 DIAGNOSIS — F41.1 GENERALIZED ANXIETY DISORDER: Chronic | ICD-10-CM

## 2017-09-18 DIAGNOSIS — Z79.899 DRUG THERAPY: ICD-10-CM

## 2017-09-18 DIAGNOSIS — G90.9 PERIPHERAL AUTONOMIC NEUROPATHY: Chronic | ICD-10-CM

## 2017-09-18 DIAGNOSIS — M15.9 PRIMARY OSTEOARTHRITIS INVOLVING MULTIPLE JOINTS: Chronic | ICD-10-CM

## 2017-09-18 DIAGNOSIS — E87.6 HYPOKALEMIA: ICD-10-CM

## 2017-09-18 DIAGNOSIS — Z12.5 SCREENING FOR PROSTATE CANCER: ICD-10-CM

## 2017-09-18 DIAGNOSIS — I10 ESSENTIAL HYPERTENSION: Chronic | ICD-10-CM

## 2017-09-18 DIAGNOSIS — J40 BRONCHITIS: ICD-10-CM

## 2017-09-18 DIAGNOSIS — E78.2 MIXED HYPERLIPIDEMIA: Chronic | ICD-10-CM

## 2017-09-18 LAB — POTASSIUM BLD-SCNC: 3.5 MMOL/L (ref 3.4–5.4)

## 2017-09-18 PROCEDURE — 96372 THER/PROPH/DIAG INJ SC/IM: CPT | Performed by: INTERNAL MEDICINE

## 2017-09-18 PROCEDURE — 84132 ASSAY OF SERUM POTASSIUM: CPT | Performed by: INTERNAL MEDICINE

## 2017-09-18 PROCEDURE — 99214 OFFICE O/P EST MOD 30 MIN: CPT | Performed by: INTERNAL MEDICINE

## 2017-09-18 RX ORDER — CEFDINIR 300 MG/1
300 CAPSULE ORAL 2 TIMES DAILY
Qty: 20 CAPSULE | Refills: 0 | Status: SHIPPED | OUTPATIENT
Start: 2017-09-18 | End: 2017-12-14

## 2017-09-18 RX ORDER — HYDROCODONE BITARTRATE AND ACETAMINOPHEN 10; 325 MG/1; MG/1
TABLET ORAL
Qty: 120 TABLET | Refills: 0 | Status: SHIPPED | OUTPATIENT
Start: 2017-09-18 | End: 2017-10-16 | Stop reason: SDUPTHER

## 2017-09-18 RX ORDER — METHYLPREDNISOLONE ACETATE 80 MG/ML
80 INJECTION, SUSPENSION INTRA-ARTICULAR; INTRALESIONAL; INTRAMUSCULAR; SOFT TISSUE ONCE
Status: COMPLETED | OUTPATIENT
Start: 2017-09-18 | End: 2017-09-18

## 2017-09-18 RX ORDER — ALBUTEROL SULFATE 2.5 MG/3ML
2.5 SOLUTION RESPIRATORY (INHALATION) EVERY 6 HOURS PRN
COMMUNITY
End: 2017-09-18 | Stop reason: SDUPTHER

## 2017-09-18 RX ORDER — ALBUTEROL SULFATE 2.5 MG/3ML
2.5 SOLUTION RESPIRATORY (INHALATION) EVERY 6 HOURS PRN
Qty: 100 VIAL | Refills: 0 | Status: SHIPPED | OUTPATIENT
Start: 2017-09-18 | End: 2019-01-09

## 2017-09-18 RX ORDER — GABAPENTIN 300 MG/1
300 CAPSULE ORAL 3 TIMES DAILY
Qty: 90 CAPSULE | Refills: 2 | Status: SHIPPED | OUTPATIENT
Start: 2017-09-18 | End: 2017-12-14 | Stop reason: SDUPTHER

## 2017-09-18 RX ADMIN — METHYLPREDNISOLONE ACETATE 80 MG: 80 INJECTION, SUSPENSION INTRA-ARTICULAR; INTRALESIONAL; INTRAMUSCULAR; SOFT TISSUE at 09:26

## 2017-09-18 NOTE — PROGRESS NOTES
Chief Complaint   Patient presents with   • Pain     chronic   • Cough     thick white sputum     Subjective   Osvaldo Conley Sr. is a 62 y.o. male who presents to the office for follow-up and review of labs. He has hypertension and his blood pressure has been controlled.  He has hyperlipidemia and takes simvastatin daily.  He has restless leg syndrome which is well controlled with Requip.  He has anxiety and takes Celexa daily.  He has osteoarthritis which causes chronic back and joint pain.  He takes Norco 4 times a day which keeps his pain controlled.  He also takes gabapentin 3 times a day for treatment of the neuropathic pain.    While having his labs last week, his potassium was found to be a little low.  He has been taking potassium through the weekend and have this repeated earlier today.  He has been working outside and sweating quite a bit.  He has also complained of a lot of nasal congestion, and cough productive of thick sputum.  He is asking if these could be contributing to the episode of low potassium.  He has no prior history of hypokalemia.    The following portions of the patient's history were reviewed and updated as appropriate: allergies, current medications, past family history, past medical history, past social history, past surgical history and problem list.    Review of Systems   Constitutional: Negative for chills, fatigue and fever.   HENT: Positive for congestion, sinus pressure and sneezing. Negative for sore throat and trouble swallowing.    Eyes: Negative for visual disturbance.   Respiratory: Positive for cough. Negative for chest tightness, shortness of breath and wheezing.    Cardiovascular: Positive for leg swelling. Negative for chest pain and palpitations.   Gastrointestinal: Negative for abdominal pain, constipation, diarrhea, nausea and vomiting.   Genitourinary: Negative for dysuria, frequency and urgency.   Musculoskeletal: Positive for arthralgias and back pain. Negative  for neck pain.   Skin: Negative for rash.   Neurological: Negative for dizziness, weakness and headaches.   Psychiatric/Behavioral:        Patient denies any feelings of depression and has not felt down, hopeless or lost interest in any activities.   All other systems reviewed and are negative.      Objective   Vitals:    09/18/17 0847   BP: 112/60   BP Location: Right arm   Patient Position: Sitting   Cuff Size: Adult   Pulse: 88   Temp: 98.6 °F (37 °C)   TempSrc: Oral   SpO2: 94%   Weight: 174 lb (78.9 kg)     Physical Exam   Constitutional: He is oriented to person, place, and time. He appears well-developed and well-nourished. No distress.   HENT:   Head: Normocephalic and atraumatic.   Nose: Right sinus exhibits maxillary sinus tenderness. Left sinus exhibits maxillary sinus tenderness.   Mouth/Throat: Posterior oropharyngeal erythema present. No oropharyngeal exudate.   Nose is congested   Eyes: Conjunctivae and EOM are normal. Pupils are equal, round, and reactive to light. No scleral icterus.   Neck: Normal range of motion. Neck supple.   Cardiovascular: Normal rate, regular rhythm and normal heart sounds.  Exam reveals no gallop and no friction rub.    No murmur heard.  Pulmonary/Chest: Effort normal. No respiratory distress. He has decreased breath sounds. He has no wheezes. He has no rales.   Abdominal: Soft. Bowel sounds are normal. He exhibits no distension. There is no tenderness. There is no rebound and no guarding.   Musculoskeletal: Normal range of motion. He exhibits no edema.   Lymphadenopathy:     He has no cervical adenopathy.   Neurological: He is alert and oriented to person, place, and time. No cranial nerve deficit.   Skin: Skin is warm and dry. No rash noted.   Psychiatric: He has a normal mood and affect. His behavior is normal. Judgment and thought content normal.   Nursing note and vitals reviewed.      Assessment/Plan   Osvaldo was seen today for pain and cough.    Diagnoses and all  orders for this visit:    Impaired glucose tolerance  -     Hemoglobin A1c; Future    Essential hypertension  -     CBC & Differential; Future  -     Comprehensive Metabolic Panel; Future  -     T4, Free; Future  -     TSH; Future  -     Urinalysis With / Culture If Indicated; Future    Mixed hyperlipidemia  -     Lipid Panel; Future    Primary osteoarthritis involving multiple joints  -     HYDROcodone-acetaminophen (NORCO)  MG per tablet; 1 tablet(s) by mouth 4 times per day for pain.    Peripheral autonomic neuropathy  -     gabapentin (NEURONTIN) 300 MG capsule; Take 1 capsule by mouth 3 (Three) Times a Day.    Generalized anxiety disorder    Hypokalemia  -     Potassium; Future    Bronchitis  -     albuterol (PROVENTIL) (2.5 MG/3ML) 0.083% nebulizer solution; Take 2.5 mg by nebulization Every 6 (Six) Hours As Needed for Wheezing.  -     cefdinir (OMNICEF) 300 MG capsule; Take 1 capsule by mouth 2 (Two) Times a Day.  -     methylPREDNISolone acetate (DEPO-medrol) injection 80 mg; Inject 1 mL into the shoulder, thigh, or buttocks 1 (One) Time.    Drug therapy  -     ToxASSURE Select 13 Discrete; Future  -      Gabapentin, Urine; Future    Screening for prostate cancer  -     PSA Screen; Future         Labs are reviewed with patient.  His lipids are controlled and he will continue with simvastatin.  His blood pressure is controlled, and he will continue with lisinopril HCT.  His potassium level is normal today on recheck.  The HCTZ could be causing some of the decreased potassium.  I will repeat a potassium level in one month.  If it remains low, he may need to take a daily potassium supplement.  He will continue with Requip for treatment of the RLS.  He will continue with Celexa for his anxiety.  He will continue with gabapentin for the neuropathic pain.  He will also continue to use Norco 4 times a day for treatment of the arthritic pain.  He is given Omnicef for treatment of the bronchitis.  He is also  given albuterol Nebules to use as needed for cough and wheezing and shortness of breath.  He is given Depo-Medrol 80 mg IM.      Patient understands the risks associated with this controlled medication, including tolerance and addiction.  Patient also agrees to only obtain this medication from me, and not from a another provider, unless that provider is covering for me in my absence.  Patient also agrees to be compliant in dosing, and not self adjust the dose of medication.  A signed controlled substance agreement is on file, and the patient has received a controlled substance education sheet at this a previous visit.  The patient has also signed a consent for treatment with a controlled substance as per [facility] policy. SANDRA was obtained.    PHQ-2/PHQ-9 Depression Screening 9/18/2017   Little interest or pleasure in doing things 0   Feeling down, depressed, or hopeless 0   Trouble falling or staying asleep, or sleeping too much 1   Feeling tired or having little energy 3   Poor appetite or overeating 1   Feeling bad about yourself - or that you are a failure or have let yourself or your family down 1   Trouble concentrating on things, such as reading the newspaper or watching television 0   Moving or speaking so slowly that other people could have noticed. Or the opposite - being so fidgety or restless that you have been moving around a lot more than usual 0   Thoughts that you would be better off dead, or of hurting yourself in some way 0   Total Score 6   If you checked off any problems, how difficult have these problems made it for you to do your work, take care of things at home, or get along with other people? Somewhat difficult         Lab on 09/18/2017   Component Date Value Ref Range Status   • Potassium 09/18/2017 3.5  3.4 - 5.4 mmol/L Final   Lab on 09/15/2017   Component Date Value Ref Range Status   • Color,  09/15/2017 Yellow  Yellow, Straw Final   • Appearance,  09/15/2017 Clear  Clear  Final   • pH, UA 09/15/2017 6.5  5.5 - 8.0 Final   • Specific Gravity, UA 09/15/2017 1.015  1.005 - 1.030 Final   • Glucose, UA 09/15/2017 Negative  Negative Final   • Ketones, UA 09/15/2017 Trace* Negative Final   • Bilirubin, UA 09/15/2017 Negative  Negative Final   • Blood, UA 09/15/2017 Large (3+)* Negative Final   • Protein, UA 09/15/2017 30 mg/dL (1+)* Negative Final   • Leuk Esterase, UA 09/15/2017 Negative  Negative Final   • Nitrite, UA 09/15/2017 Positive* Negative Final   • Urobilinogen, UA 09/15/2017 1.0 E.U./dL  0.2 - 1.0 E.U./dL Final   • Glucose 09/15/2017 115* 70 - 100 mg/dL Final   • BUN 09/15/2017 15  8 - 25 mg/dL Final   • Creatinine 09/15/2017 0.90  0.40 - 1.30 mg/dL Final   • Sodium 09/15/2017 138  134 - 146 mmol/L Final   • Potassium 09/15/2017 3.1* 3.4 - 5.4 mmol/L Final   • Chloride 09/15/2017 98* 100 - 112 mmol/L Final   • CO2 09/15/2017 27.0  20.0 - 32.0 mmol/L Final   • Calcium 09/15/2017 9.3  8.4 - 10.8 mg/dL Final   • Total Protein 09/15/2017 7.7  6.7 - 8.2 g/dL Final   • Albumin 09/15/2017 4.20  3.20 - 5.50 g/dL Final   • ALT (SGPT) 09/15/2017 21  10 - 60 U/L Final   • AST (SGOT) 09/15/2017 26  10 - 60 U/L Final   • Alkaline Phosphatase 09/15/2017 83  15 - 121 U/L Final   • Total Bilirubin 09/15/2017 1.4* 0.2 - 1.0 mg/dL Final   • eGFR Non African Amer 09/15/2017 86  49 - 113 mL/min/1.73 Final   • Globulin 09/15/2017 3.5  2.5 - 4.6 gm/dL Final   • A/G Ratio 09/15/2017 1.2  1.0 - 3.0 g/dL Final   • BUN/Creatinine Ratio 09/15/2017 16.7  7.0 - 25.0 Final   • Anion Gap 09/15/2017 13.0  5.0 - 15.0 mmol/L Final   • Hepatitis C Ab 09/15/2017 Negative  Negative Final   • Hep A IgM 09/15/2017 Negative  Negative Final   • Hep B C IgM 09/15/2017 Negative  Negative Final   • Hepatitis B Surface Ag 09/15/2017 Negative  Negative Final   • Total Cholesterol 09/15/2017 134* 150 - 200 mg/dL Final   • Triglycerides 09/15/2017 109  35 - 160 mg/dL Final   • HDL Cholesterol 09/15/2017 48  35 - 100 mg/dL  Final   • LDL Cholesterol  09/15/2017 64  mg/dL Final   • VLDL Cholesterol 09/15/2017 21.8  mg/dL Final   • LDL/HDL Ratio 09/15/2017 1.34   Final   • Free T4 09/15/2017 1.55  0.78 - 2.19 ng/dL Final   • TSH 09/15/2017 0.110* 0.460 - 4.680 mIU/mL Final   • Hemoglobin A1C 09/15/2017 5.8* 4 - 5.6 % Final   • WBC 09/15/2017 19.47* 3.20 - 9.80 10*3/mm3 Final   • RBC 09/15/2017 4.64  4.37 - 5.74 10*6/mm3 Final   • Hemoglobin 09/15/2017 14.4  13.7 - 17.3 g/dL Final   • Hematocrit 09/15/2017 40.9  39.0 - 49.0 % Final   • MCV 09/15/2017 88.1  80.0 - 98.0 fL Final   • MCH 09/15/2017 31.0  26.5 - 34.0 pg Final   • MCHC 09/15/2017 35.2  31.5 - 36.3 g/dL Final   • RDW 09/15/2017 13.4  11.5 - 14.5 % Final   • RDW-SD 09/15/2017 41.9  35.1 - 43.9 fl Final   • MPV 09/15/2017 12.5* 8.0 - 12.0 fL Final   • Platelets 09/15/2017 203  150 - 450 10*3/mm3 Final   • Neutrophil % 09/15/2017 74.2  37.0 - 80.0 % Final   • Lymphocyte % 09/15/2017 16.8  10.0 - 50.0 % Final   • Monocyte % 09/15/2017 8.3  0.0 - 12.0 % Final   • Eosinophil % 09/15/2017 0.6  0.0 - 7.0 % Final   • Basophil % 09/15/2017 0.1  0.0 - 2.0 % Final   • Neutrophils, Absolute 09/15/2017 14.46* 2.00 - 8.60 10*3/mm3 Final   • Lymphocytes, Absolute 09/15/2017 3.27  0.60 - 4.20 10*3/mm3 Final   • Monocytes, Absolute 09/15/2017 1.61* 0.00 - 0.90 10*3/mm3 Final   • Eosinophils, Absolute 09/15/2017 0.11  0.00 - 0.70 10*3/mm3 Final   • Basophils, Absolute 09/15/2017 0.02  0.00 - 0.20 10*3/mm3 Final   • RBC, UA 09/15/2017 Too Numerous to Count* None Seen /HPF Final   • WBC, UA 09/15/2017 3-5* None Seen /HPF Final   • Bacteria, UA 09/15/2017 Trace* None Seen /HPF Final   • Squamous Epithelial Cells, UA 09/15/2017 None Seen  None Seen, 0-2 /HPF Final   • Hyaline Casts, UA 09/15/2017 0-2  None Seen /LPF Final   • Methodology 09/15/2017 Manual Light Microscopy   Final   • Urine Culture 09/15/2017 No growth at 24 hours   Final   ]

## 2017-09-18 NOTE — PROGRESS NOTES
SANDRA#  [id number].                       .

## 2017-09-29 DIAGNOSIS — J44.9 COPD, MILD (HCC): Primary | Chronic | ICD-10-CM

## 2017-10-02 RX ORDER — ALBUTEROL SULFATE 90 UG/1
AEROSOL, METERED RESPIRATORY (INHALATION)
Qty: 18 G | Refills: 5 | Status: SHIPPED | OUTPATIENT
Start: 2017-10-02 | End: 2017-10-06 | Stop reason: SDUPTHER

## 2017-10-06 RX ORDER — ALBUTEROL SULFATE 90 UG/1
2 AEROSOL, METERED RESPIRATORY (INHALATION) EVERY 6 HOURS PRN
Qty: 18 G | Refills: 5 | Status: SHIPPED | OUTPATIENT
Start: 2017-10-06 | End: 2018-05-31 | Stop reason: SDUPTHER

## 2017-10-06 NOTE — TELEPHONE ENCOUNTER
Patient phoned regarding refill on Ventolin Inhaler to [facility] in [place], they are telling patient they have not received it. Will resent to [facility] in [place] as requested.

## 2017-10-16 DIAGNOSIS — M15.9 PRIMARY OSTEOARTHRITIS INVOLVING MULTIPLE JOINTS: Chronic | ICD-10-CM

## 2017-10-16 NOTE — TELEPHONE ENCOUNTER
Patient is up to date on controlled medication consent, Laz report, and appt from 09/18/2017. Picking up Tuesday at 3pm.

## 2017-10-17 RX ORDER — HYDROCODONE BITARTRATE AND ACETAMINOPHEN 10; 325 MG/1; MG/1
TABLET ORAL
Qty: 120 TABLET | Refills: 0 | Status: SHIPPED | OUTPATIENT
Start: 2017-10-17 | End: 2017-11-13 | Stop reason: SDUPTHER

## 2017-11-04 DIAGNOSIS — I10 ESSENTIAL HYPERTENSION: Chronic | ICD-10-CM

## 2017-11-08 RX ORDER — LISINOPRIL AND HYDROCHLOROTHIAZIDE 12.5; 1 MG/1; MG/1
TABLET ORAL
Qty: 30 TABLET | Refills: 5 | Status: SHIPPED | OUTPATIENT
Start: 2017-11-08 | End: 2018-03-06

## 2017-11-10 DIAGNOSIS — M15.9 PRIMARY OSTEOARTHRITIS INVOLVING MULTIPLE JOINTS: Chronic | ICD-10-CM

## 2017-11-10 NOTE — TELEPHONE ENCOUNTER
Patient is up to date on controlled medication consent, Laz report, and appt from 09/18/2017. Picking up Wednesday 11/15/2017 at the front information window.

## 2017-11-14 RX ORDER — HYDROCODONE BITARTRATE AND ACETAMINOPHEN 10; 325 MG/1; MG/1
TABLET ORAL
Qty: 120 TABLET | Refills: 0 | Status: SHIPPED | OUTPATIENT
Start: 2017-11-14 | End: 2017-12-14 | Stop reason: SDUPTHER

## 2017-11-15 ENCOUNTER — OFFICE VISIT (OUTPATIENT)
Dept: OTOLARYNGOLOGY | Facility: CLINIC | Age: 62
End: 2017-11-15

## 2017-11-15 VITALS — WEIGHT: 194 LBS | BODY MASS INDEX: 26.28 KG/M2 | HEIGHT: 72 IN | OXYGEN SATURATION: 98 %

## 2017-11-15 DIAGNOSIS — Z86.018 HISTORY OF INVERTED PAPILLOMA: ICD-10-CM

## 2017-11-15 DIAGNOSIS — Z85.828 PERSONAL HISTORY OF MALIGNANT NEOPLASM OF SKIN: Primary | ICD-10-CM

## 2017-11-15 DIAGNOSIS — J34.89 NASAL SEPTAL PERFORATION: ICD-10-CM

## 2017-11-15 DIAGNOSIS — J31.0 CHRONIC RHINITIS, UNSPECIFIED TYPE: ICD-10-CM

## 2017-11-15 PROCEDURE — 31231 NASAL ENDOSCOPY DX: CPT | Performed by: OTOLARYNGOLOGY

## 2017-11-15 PROCEDURE — 99213 OFFICE O/P EST LOW 20 MIN: CPT | Performed by: OTOLARYNGOLOGY

## 2017-11-17 NOTE — PROGRESS NOTES
Subjective   Osvaldo Conley . is a 62 y.o. male.       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 Has a more recent history of resection of a basal cell carcinoma of the left nose that penetrated full thickness and was closed with a transposition flap. Returns after an absence of approximately 14 months with complaints of chronic nasal congestion.  States at times his face feels full in his left cheek will be swollen.  No bleeding.  Is not having a lot of purulent discharge.  Symptoms are present on a daily basis.    The following portions of the patient's history were reviewed and updated as appropriate: allergies, current medications, past family history, past medical history, past social history, past surgical history and problem list.     reports that he has been smoking.  He has been smoking about 1.50 packs per day. He has never used smokeless tobacco. He reports that he drinks alcohol. He reports that he does not use illicit drugs.   Patient is a tobacco user and has been counseled for use of tobacco products      Review of Systems   Constitutional: Negative for fever.           Objective   Physical Exam  General: Well-developed well-nourished male in no acute distress.  Alert and oriented ×3. Head: Normocephalic. Face: Symmetrical strength and appearance. PERRL. EOMI. Voice:Strong. Speech:Fluent  Ears: External ears no deformity, canals no discharge, tympanic membranes intact clear and mobile bilaterally.  Nose: Nares show external deformity consistent with his previous cutaneous resection.  This resulted in some collapse in the valve area on the left.  Intranasally there is a septal perforation present that is well epithelialized and chronic.  Oral cavity: Lips and gums without lesions.  Tongue and floor of mouth without lesions.  Parotid and submandibular ducts unobstructed.  No mucosal lesions on the buccal  mucosa or vestibule of the mouth.  Pharynx: No erythema exudate mass or ulcer  Neck: No lymphadenopathy.  No thyromegaly.  Trachea and larynx midline.  No masses in the parotid or submandibular glands.    Nasal endoscopy is performed.  Olvin-Synephrine and Xylocaine instilled the nares and 0° scope is passed into the nose.  There is a septal perforation along with a posterior septal spur to the right.  No evidence of mass, polyp, purulence in the right middle meatus.  There is a surgical defect in the left medial wall of the maxilla with no evidence of recurrent tumor and some viscous nonpurulent secretions but no evidence of pus or polyp.    Assessment/Plan   Osvaldo was seen today for nasal congestion.    Diagnoses and all orders for this visit:    Personal history of malignant neoplasm of skin    History of inverted papilloma    Chronic rhinitis, unspecified type    Nasal septal perforation      Plan: Explained to the patient that he has several anatomic factors contributing to his chronic nasal construction and that short of a major reconstructive procedure I think the best he can do is utilize nasal saline spray frequently.  He has no evidence of infection that required treatment with antibiotics at this point.  Smoking cessation is also urged as this is likely contributing to his nasal symptoms.  He is reassured there is no evidence of recurrent tumor either on the skin of the nose or the inverted papilloma.  He is advised return in 6 months call sooner for problems.

## 2017-12-14 ENCOUNTER — OFFICE VISIT (OUTPATIENT)
Dept: FAMILY MEDICINE CLINIC | Facility: CLINIC | Age: 62
End: 2017-12-14

## 2017-12-14 VITALS
WEIGHT: 193 LBS | BODY MASS INDEX: 27.02 KG/M2 | HEIGHT: 71 IN | DIASTOLIC BLOOD PRESSURE: 70 MMHG | SYSTOLIC BLOOD PRESSURE: 130 MMHG | HEART RATE: 80 BPM

## 2017-12-14 DIAGNOSIS — Z23 PNEUMOCOCCAL VACCINATION GIVEN: ICD-10-CM

## 2017-12-14 DIAGNOSIS — I10 ESSENTIAL HYPERTENSION: Primary | Chronic | ICD-10-CM

## 2017-12-14 DIAGNOSIS — G25.81 RESTLESS LEG SYNDROME: Chronic | ICD-10-CM

## 2017-12-14 DIAGNOSIS — Z23 INFLUENZA VACCINATION ADMINISTERED AT CURRENT VISIT: ICD-10-CM

## 2017-12-14 DIAGNOSIS — M15.9 PRIMARY OSTEOARTHRITIS INVOLVING MULTIPLE JOINTS: Chronic | ICD-10-CM

## 2017-12-14 DIAGNOSIS — G90.9 PERIPHERAL AUTONOMIC NEUROPATHY: Chronic | ICD-10-CM

## 2017-12-14 PROCEDURE — 90732 PPSV23 VACC 2 YRS+ SUBQ/IM: CPT | Performed by: INTERNAL MEDICINE

## 2017-12-14 PROCEDURE — 99214 OFFICE O/P EST MOD 30 MIN: CPT | Performed by: INTERNAL MEDICINE

## 2017-12-14 PROCEDURE — 90686 IIV4 VACC NO PRSV 0.5 ML IM: CPT | Performed by: INTERNAL MEDICINE

## 2017-12-14 PROCEDURE — 90472 IMMUNIZATION ADMIN EACH ADD: CPT | Performed by: INTERNAL MEDICINE

## 2017-12-14 PROCEDURE — 90471 IMMUNIZATION ADMIN: CPT | Performed by: INTERNAL MEDICINE

## 2017-12-14 RX ORDER — GABAPENTIN 300 MG/1
300 CAPSULE ORAL 3 TIMES DAILY
Qty: 90 CAPSULE | Refills: 2 | Status: SHIPPED | OUTPATIENT
Start: 2017-12-14 | End: 2018-03-06 | Stop reason: SDUPTHER

## 2017-12-14 RX ORDER — FUROSEMIDE 20 MG/1
20 TABLET ORAL DAILY
COMMUNITY
End: 2018-03-06

## 2017-12-14 RX ORDER — ROPINIROLE 0.5 MG/1
TABLET, FILM COATED ORAL
Qty: 30 TABLET | Refills: 5 | Status: SHIPPED | OUTPATIENT
Start: 2017-12-14 | End: 2018-09-11 | Stop reason: SDUPTHER

## 2017-12-14 RX ORDER — HYDROCODONE BITARTRATE AND ACETAMINOPHEN 10; 325 MG/1; MG/1
TABLET ORAL
Qty: 120 TABLET | Refills: 0 | Status: SHIPPED | OUTPATIENT
Start: 2017-12-14 | End: 2018-01-11 | Stop reason: SDUPTHER

## 2017-12-14 RX ORDER — TAMSULOSIN HYDROCHLORIDE 0.4 MG/1
1 CAPSULE ORAL NIGHTLY
COMMUNITY
End: 2018-08-28

## 2017-12-14 NOTE — PATIENT INSTRUCTIONS

## 2017-12-14 NOTE — PROGRESS NOTES
SANDRA# [id number].                        .

## 2017-12-14 NOTE — PROGRESS NOTES
"Chief Complaint   Patient presents with   • Pain     chronic     Subjective   Osvaldo Conley Sr. is a 62 y.o. male who presents to the office for follow-up. He has hypertension and his blood pressure has been controlled.  He has anxiety and takes Celexa daily.  He has osteoarthritis which causes chronic back and joint pain.  He takes Norco 4 times a day which keeps his pain controlled.  He also takes gabapentin 3 times a day for treatment of the neuropathic pain.  He has COPD and uses an albuterol inhaler as needed. He has restless leg syndrome which is doing well with Requip.    The following portions of the patient's history were reviewed and updated as appropriate: allergies, current medications, past family history, past medical history, past social history, past surgical history and problem list.    Review of Systems   Constitutional: Negative for chills, fatigue and fever.   HENT: Negative for congestion, sneezing, sore throat and trouble swallowing.    Eyes: Negative for visual disturbance.   Respiratory: Negative for cough, chest tightness, shortness of breath and wheezing.    Cardiovascular: Negative for chest pain and palpitations.   Gastrointestinal: Negative for abdominal pain, constipation, diarrhea, nausea and vomiting.   Genitourinary: Negative for dysuria, frequency and urgency.   Musculoskeletal: Positive for arthralgias and back pain. Negative for neck pain.   Skin: Negative for rash.   Neurological: Negative for dizziness, weakness and headaches.   Psychiatric/Behavioral:        Patient denies any feelings of depression and has not felt down, hopeless or lost interest in any activities.   All other systems reviewed and are negative.      Objective   Vitals:    12/14/17 1006   BP: 130/70   BP Location: Left arm   Patient Position: Sitting   Cuff Size: Adult   Pulse: 80   Weight: 87.5 kg (193 lb)   Height: 180.3 cm (71\")   PainSc:   5   PainLoc: Back     Physical Exam   Constitutional: He is " oriented to person, place, and time. He appears well-developed and well-nourished. No distress.   HENT:   Head: Normocephalic and atraumatic.   Nose: Nose normal.   Mouth/Throat: Oropharynx is clear and moist. No oropharyngeal exudate.   Eyes: Conjunctivae and EOM are normal. Pupils are equal, round, and reactive to light. No scleral icterus.   Neck: Normal range of motion. Neck supple.   Cardiovascular: Normal rate, regular rhythm and normal heart sounds.  Exam reveals no gallop and no friction rub.    No murmur heard.  Pulmonary/Chest: Effort normal. No respiratory distress. He has decreased breath sounds. He has no wheezes. He has no rales.   Abdominal: Soft. Bowel sounds are normal. He exhibits no distension. There is no tenderness. There is no rebound and no guarding.   Musculoskeletal: Normal range of motion. He exhibits no edema.   Lymphadenopathy:     He has no cervical adenopathy.   Neurological: He is alert and oriented to person, place, and time. No cranial nerve deficit.   Skin: Skin is warm and dry. No rash noted.   Psychiatric: He has a normal mood and affect. His behavior is normal. Judgment and thought content normal.   Nursing note and vitals reviewed.      Assessment/Plan   Osvaldo was seen today for pain.    Diagnoses and all orders for this visit:    Essential hypertension    Peripheral autonomic neuropathy  -     gabapentin (NEURONTIN) 300 MG capsule; Take 1 capsule by mouth 3 (Three) Times a Day.    Primary osteoarthritis involving multiple joints  -     HYDROcodone-acetaminophen (NORCO)  MG per tablet; 1 tablet(s) by mouth 4 times per day for pain.    Restless leg syndrome  -     rOPINIRole (REQUIP) 0.5 MG tablet; Take 1 tablet(s) by mouth one hour before bedtime, as directed.    Influenza vaccination administered at current visit  -     Flu Vaccine Quad PF 3YR+    Pneumococcal vaccination given  -     Pneumococcal Polysaccharide Vaccine 23-Valent Greater Than or Equal To 3yo  Subcutaneous / IM         His blood pressure is controlled, so he will continue with his current blood pressure medication. He will continue with Celexa for treatment of his anxiety.  He will continue with Requip for treatment of the RLS.  He will continue with gabapentin for treatment of the neuropathy.  He will continue with Norco 4 times a day for treatment of the arthritic pain.     Patient understands the risks associated with this controlled medication, including tolerance and addiction.  Patient also agrees to only obtain this medication from me, and not from a another provider, unless that provider is covering for me in my absence.  Patient also agrees to be compliant in dosing, and not self adjust the dose of medication.  A signed controlled substance agreement is on file, and the patient has received a controlled substance education sheet at this a previous visit.  The patient has also signed a consent for treatment with a controlled substance as per [facility] policy. SANDRA was obtained.    Patient is given a flu shot today.  He is also given a Pneumovax 23.    PHQ-2/PHQ-9 Depression Screening 12/14/2017   Little interest or pleasure in doing things 0   Feeling down, depressed, or hopeless 0   Trouble falling or staying asleep, or sleeping too much 0   Feeling tired or having little energy 3   Poor appetite or overeating 0   Feeling bad about yourself - or that you are a failure or have let yourself or your family down 0   Trouble concentrating on things, such as reading the newspaper or watching television 0   Moving or speaking so slowly that other people could have noticed. Or the opposite - being so fidgety or restless that you have been moving around a lot more than usual 0   Thoughts that you would be better off dead, or of hurting yourself in some way 0   Total Score 3   If you checked off any problems, how difficult have these problems made it for you to do your work, take care of things at  home, or get along with other people? Not difficult at all

## 2018-01-08 DIAGNOSIS — M15.9 PRIMARY OSTEOARTHRITIS INVOLVING MULTIPLE JOINTS: Chronic | ICD-10-CM

## 2018-01-08 NOTE — TELEPHONE ENCOUNTER
Patient is up to date on controlled medication consent, Laz report, and appt from 12/14/2017. Picking up today at 3pm, due to the possible Ice on Friday.

## 2018-01-11 RX ORDER — HYDROCODONE BITARTRATE AND ACETAMINOPHEN 10; 325 MG/1; MG/1
TABLET ORAL
Qty: 120 TABLET | Refills: 0 | Status: SHIPPED | OUTPATIENT
Start: 2018-01-11 | End: 2018-02-06 | Stop reason: SDUPTHER

## 2018-02-06 DIAGNOSIS — M15.9 PRIMARY OSTEOARTHRITIS INVOLVING MULTIPLE JOINTS: Chronic | ICD-10-CM

## 2018-02-06 RX ORDER — HYDROCODONE BITARTRATE AND ACETAMINOPHEN 10; 325 MG/1; MG/1
TABLET ORAL
Qty: 120 TABLET | Refills: 0 | Status: SHIPPED | OUTPATIENT
Start: 2018-02-06 | End: 2018-03-06 | Stop reason: SDUPTHER

## 2018-02-06 NOTE — TELEPHONE ENCOUNTER
Patient is up to date on controlled medication consent, Laz report, and appt from 12/14/2017. Picking up Wednesday at the Front Information Window.

## 2018-02-26 ENCOUNTER — LAB (OUTPATIENT)
Dept: LAB | Facility: OTHER | Age: 63
End: 2018-02-26

## 2018-02-26 DIAGNOSIS — E78.2 MIXED HYPERLIPIDEMIA: Chronic | ICD-10-CM

## 2018-02-26 DIAGNOSIS — Z79.899 DRUG THERAPY: ICD-10-CM

## 2018-02-26 DIAGNOSIS — I10 ESSENTIAL HYPERTENSION: ICD-10-CM

## 2018-02-26 DIAGNOSIS — R73.02 IMPAIRED GLUCOSE TOLERANCE: Chronic | ICD-10-CM

## 2018-02-26 DIAGNOSIS — Z12.5 SCREENING FOR PROSTATE CANCER: ICD-10-CM

## 2018-02-26 LAB
ALBUMIN SERPL-MCNC: 4.7 G/DL (ref 3.2–5.5)
ALBUMIN/GLOB SERPL: 1.4 G/DL (ref 1–3)
ALP SERPL-CCNC: 71 U/L (ref 15–121)
ALT SERPL W P-5'-P-CCNC: 24 U/L (ref 10–60)
ANION GAP SERPL CALCULATED.3IONS-SCNC: 11 MMOL/L (ref 5–15)
AST SERPL-CCNC: 28 U/L (ref 10–60)
BACTERIA UR QL AUTO: ABNORMAL /HPF
BASOPHILS # BLD AUTO: 0.03 10*3/MM3 (ref 0–0.2)
BASOPHILS NFR BLD AUTO: 0.2 % (ref 0–2)
BILIRUB SERPL-MCNC: 1.1 MG/DL (ref 0.2–1)
BILIRUB UR QL STRIP: NEGATIVE
BUN BLD-MCNC: 14 MG/DL (ref 8–25)
BUN/CREAT SERPL: 17.5 (ref 7–25)
CALCIUM SPEC-SCNC: 9.7 MG/DL (ref 8.4–10.8)
CHLORIDE SERPL-SCNC: 102 MMOL/L (ref 100–112)
CHOLEST SERPL-MCNC: 170 MG/DL (ref 150–200)
CLARITY UR: CLEAR
CO2 SERPL-SCNC: 25 MMOL/L (ref 20–32)
COLOR UR: YELLOW
CREAT BLD-MCNC: 0.8 MG/DL (ref 0.4–1.3)
DEPRECATED RDW RBC AUTO: 41.9 FL (ref 35.1–43.9)
EOSINOPHIL # BLD AUTO: 0.12 10*3/MM3 (ref 0–0.7)
EOSINOPHIL NFR BLD AUTO: 0.9 % (ref 0–7)
ERYTHROCYTE [DISTWIDTH] IN BLOOD BY AUTOMATED COUNT: 13.4 % (ref 11.5–14.5)
GFR SERPL CREATININE-BSD FRML MDRD: 98 ML/MIN/1.73 (ref 49–113)
GLOBULIN UR ELPH-MCNC: 3.3 GM/DL (ref 2.5–4.6)
GLUCOSE BLD-MCNC: 119 MG/DL (ref 70–100)
GLUCOSE UR STRIP-MCNC: NEGATIVE MG/DL
HCT VFR BLD AUTO: 47.7 % (ref 39–49)
HDLC SERPL-MCNC: 35 MG/DL (ref 35–100)
HGB BLD-MCNC: 17.1 G/DL (ref 13.7–17.3)
HGB UR QL STRIP.AUTO: ABNORMAL
HYALINE CASTS UR QL AUTO: ABNORMAL /LPF
KETONES UR QL STRIP: NEGATIVE
LDLC SERPL CALC-MCNC: 91 MG/DL
LDLC/HDLC SERPL: 2.6 {RATIO}
LEUKOCYTE ESTERASE UR QL STRIP.AUTO: NEGATIVE
LYMPHOCYTES # BLD AUTO: 3.68 10*3/MM3 (ref 0.6–4.2)
LYMPHOCYTES NFR BLD AUTO: 28.5 % (ref 10–50)
MCH RBC QN AUTO: 30.9 PG (ref 26.5–34)
MCHC RBC AUTO-ENTMCNC: 35.8 G/DL (ref 31.5–36.3)
MCV RBC AUTO: 86.1 FL (ref 80–98)
MONOCYTES # BLD AUTO: 0.99 10*3/MM3 (ref 0–0.9)
MONOCYTES NFR BLD AUTO: 7.7 % (ref 0–12)
NEUTROPHILS # BLD AUTO: 8.08 10*3/MM3 (ref 2–8.6)
NEUTROPHILS NFR BLD AUTO: 62.7 % (ref 37–80)
NITRITE UR QL STRIP: NEGATIVE
PH UR STRIP.AUTO: 8 [PH] (ref 5.5–8)
PLATELET # BLD AUTO: 245 10*3/MM3 (ref 150–450)
PMV BLD AUTO: 12.2 FL (ref 8–12)
POTASSIUM BLD-SCNC: 3.8 MMOL/L (ref 3.4–5.4)
PROT SERPL-MCNC: 8 G/DL (ref 6.7–8.2)
PROT UR QL STRIP: NEGATIVE
PSA SERPL-MCNC: 2.99 NG/ML (ref 0–4)
RBC # BLD AUTO: 5.54 10*6/MM3 (ref 4.37–5.74)
RBC # UR: ABNORMAL /HPF
REF LAB TEST METHOD: ABNORMAL
SODIUM BLD-SCNC: 138 MMOL/L (ref 134–146)
SP GR UR STRIP: 1.02 (ref 1–1.03)
SQUAMOUS #/AREA URNS HPF: ABNORMAL /HPF
T4 FREE SERPL-MCNC: 1.34 NG/DL (ref 0.78–2.19)
TRIGL SERPL-MCNC: 220 MG/DL (ref 35–160)
TSH SERPL DL<=0.05 MIU/L-ACNC: 0.32 MIU/ML (ref 0.46–4.68)
UROBILINOGEN UR QL STRIP: ABNORMAL
VLDLC SERPL-MCNC: 44 MG/DL
WBC NRBC COR # BLD: 12.9 10*3/MM3 (ref 3.2–9.8)
WBC UR QL AUTO: ABNORMAL /HPF

## 2018-02-26 PROCEDURE — G0480 DRUG TEST DEF 1-7 CLASSES: HCPCS | Performed by: INTERNAL MEDICINE

## 2018-02-26 PROCEDURE — 84439 ASSAY OF FREE THYROXINE: CPT | Performed by: INTERNAL MEDICINE

## 2018-02-26 PROCEDURE — 80307 DRUG TEST PRSMV CHEM ANLYZR: CPT | Performed by: INTERNAL MEDICINE

## 2018-02-26 PROCEDURE — G0481 DRUG TEST DEF 8-14 CLASSES: HCPCS | Performed by: INTERNAL MEDICINE

## 2018-02-26 PROCEDURE — 83036 HEMOGLOBIN GLYCOSYLATED A1C: CPT | Performed by: INTERNAL MEDICINE

## 2018-02-26 PROCEDURE — G0103 PSA SCREENING: HCPCS | Performed by: INTERNAL MEDICINE

## 2018-02-26 PROCEDURE — 80061 LIPID PANEL: CPT | Performed by: INTERNAL MEDICINE

## 2018-02-26 PROCEDURE — 80050 GENERAL HEALTH PANEL: CPT | Performed by: INTERNAL MEDICINE

## 2018-02-26 PROCEDURE — 81001 URINALYSIS AUTO W/SCOPE: CPT | Performed by: INTERNAL MEDICINE

## 2018-02-27 LAB — HBA1C MFR BLD: 5.8 % (ref 4–5.6)

## 2018-02-28 LAB — GABAPENTIN UR-MCNC: NEGATIVE UG/ML

## 2018-03-04 LAB
7-AMINOCLONAZEPAM UR: NOT DETECTED NG/MG CREAT
A-OH ALPRAZ/CREAT UR: NOT DETECTED NG/MG CREAT
ALFENTANIL UR QL: NOT DETECTED NG/MG CREAT
ALPHA-HYDROXYTRIAZOLAM, URINE: NOT DETECTED NG/MG CREAT
AMOBARBITAL UR: NOT DETECTED
AMPHET UR QL CFM: NOT DETECTED NG/MG CREAT
AMPHETAMINES UR QL SCN: NEGATIVE
BARBITAL UR QL CFM: NOT DETECTED
BARBITURATES UR QL SCN: NEGATIVE
BENZODIAZ UR QL SCN: NEGATIVE
BENZOYLECGONINE UR: NOT DETECTED NG/MG CREAT
BUPRENORPHINE UR QL: NEGATIVE
BUPRENORPHINE UR QL: NOT DETECTED NG/MG CREAT
BUTABARBITAL UR QL: NOT DETECTED
BUTALBITAL UR QL: NOT DETECTED
CANNABINOIDS UR QL CFM: NEGATIVE
CLONAZEPAM UR QL: NOT DETECTED NG/MG CREAT
COCAETHYLENE UR QL CFM: NOT DETECTED NG/MG CREAT
COCAINE UR QL CFM: NEGATIVE
CODEINE UR QL: NOT DETECTED NG/MG CREAT
CONV COCAINE, UR: NOT DETECTED NG/MG CREAT
CONV REPORT SUMMARY: NORMAL
CREAT 24H UR-MCNC: 116 MG/DL
DESALKYLFLURAZ/CREAT UR: NOT DETECTED NG/MG CREAT
DIAZEPAM UR-MCNC: NOT DETECTED NG/MG CREAT
DIHYDROCODEINE UR: 149 NG/MG CREAT
EDDP SERPL QL: NOT DETECTED NG/MG CREAT
ETHANOL SCREEN URINE (REF): NEGATIVE
ETHANOL UR-MCNC: NOT DETECTED G/DL
FENTANYL UR QL: NEGATIVE
FENTANYL+NORFENTANYL UR QL SCN: NOT DETECTED NG/MG CREAT
HX OF MEDICATION USE: NORMAL
HYDROCODONE UR QL: 239 NG/MG CREAT
HYDROMORPHONE UR QL: 58 NG/MG CREAT
LEVEL OF DETECTION:: NORMAL
LORAZEPAM UR-MCNC: NOT DETECTED NG/MG CREAT
LORAZEPAM/CREAT UR: NOT DETECTED NG/MG CREAT
MDA SERPLBLD-MCNC: NOT DETECTED NG/MG CREAT
MDMA UR QL SCN: NOT DETECTED NG/MG CREAT
MEPHOBARBITAL UR QL CFM: NOT DETECTED
METHADONE BLD QL SCN: NEGATIVE
METHADONE, URINE: NOT DETECTED NG/MG CREAT
METHAMPHETAMINE UR: NOT DETECTED NG/MG CREAT
MIDAZOLAM UR-MCNC: NOT DETECTED NG/MG CREAT
MORPHINE UR QL: NOT DETECTED NG/MG CREAT
N-DESMETHYLTRAMADOL, U: NOT DETECTED
NORBUPRENORPHINE SERPLBLD-MCNC: NOT DETECTED NG/MG CREAT
NORCODEINE UR-MCNC: NOT DETECTED NG/MG CREAT
NORDIAZEPAM SERPL CFM-MCNC: NOT DETECTED NG/MG CREAT
NORFENTANYL UR: NOT DETECTED NG/MG CREAT
NOROXYMORPHONE: NOT DETECTED NG/MG CREAT
O-DESMETHYLTRAMADOL, UR: NOT DETECTED
OPIATES UR QL CFM: NORMAL
OPIATES, URINE, NORHYDROCODONE: 788 NG/MG CREAT
OPIATES, URINE, NOROXYCODONE: NOT DETECTED NG/MG CREAT
OXAZEPAM UR QL: NOT DETECTED NG/MG CREAT
OXYCODONE UR QL: NEGATIVE
OXYCODONE UR: NOT DETECTED NG/MG CREAT
OXYMORPHONE UR: NOT DETECTED NG/MG CREAT
PENTOBARBITAL UR: NOT DETECTED
PHENOBARB UR QL: NOT DETECTED
SECOBARBITAL UR QL: NOT DETECTED
SUFENTANIL UR QL: NOT DETECTED NG/MG CREAT
TAPENTADOL SERPLBLD-MCNC: NEGATIVE NG/ML
TAPENTADOL UR-MCNC: NOT DETECTED NG/MG CREAT
TEMAZEPAM UR QL CFM: NOT DETECTED NG/MG CREAT
THC UR CFM-MCNC: NOT DETECTED NG/MG CREAT
THIOPENTAL UR QL CFM: NOT DETECTED
TRAMADOL & METABOLITE: NEGATIVE
TRAMADOL UR: NOT DETECTED

## 2018-03-06 ENCOUNTER — OFFICE VISIT (OUTPATIENT)
Dept: FAMILY MEDICINE CLINIC | Facility: CLINIC | Age: 63
End: 2018-03-06

## 2018-03-06 VITALS
HEART RATE: 76 BPM | TEMPERATURE: 97.4 F | HEIGHT: 71 IN | BODY MASS INDEX: 28 KG/M2 | DIASTOLIC BLOOD PRESSURE: 74 MMHG | WEIGHT: 200 LBS | SYSTOLIC BLOOD PRESSURE: 136 MMHG

## 2018-03-06 DIAGNOSIS — F41.1 GENERALIZED ANXIETY DISORDER: Chronic | ICD-10-CM

## 2018-03-06 DIAGNOSIS — G90.9 PERIPHERAL AUTONOMIC NEUROPATHY: Chronic | ICD-10-CM

## 2018-03-06 DIAGNOSIS — R73.02 IMPAIRED GLUCOSE TOLERANCE: Primary | Chronic | ICD-10-CM

## 2018-03-06 DIAGNOSIS — Z79.899 DRUG THERAPY: ICD-10-CM

## 2018-03-06 DIAGNOSIS — G25.81 RESTLESS LEG SYNDROME: Chronic | ICD-10-CM

## 2018-03-06 DIAGNOSIS — M15.9 PRIMARY OSTEOARTHRITIS INVOLVING MULTIPLE JOINTS: Chronic | ICD-10-CM

## 2018-03-06 DIAGNOSIS — I10 ESSENTIAL HYPERTENSION: Chronic | ICD-10-CM

## 2018-03-06 DIAGNOSIS — E78.2 MIXED HYPERLIPIDEMIA: Chronic | ICD-10-CM

## 2018-03-06 PROCEDURE — 99214 OFFICE O/P EST MOD 30 MIN: CPT | Performed by: INTERNAL MEDICINE

## 2018-03-06 RX ORDER — LOSARTAN POTASSIUM 50 MG/1
50 TABLET ORAL DAILY
Qty: 30 TABLET | Refills: 5 | Status: SHIPPED | OUTPATIENT
Start: 2018-03-06 | End: 2018-09-11 | Stop reason: SDUPTHER

## 2018-03-06 RX ORDER — HYDROCODONE BITARTRATE AND ACETAMINOPHEN 10; 325 MG/1; MG/1
TABLET ORAL
Qty: 120 TABLET | Refills: 0 | Status: SHIPPED | OUTPATIENT
Start: 2018-03-06 | End: 2018-04-03 | Stop reason: SDUPTHER

## 2018-03-06 RX ORDER — HYDROCHLOROTHIAZIDE 25 MG/1
25 TABLET ORAL DAILY
Qty: 30 TABLET | Refills: 1 | Status: SHIPPED | OUTPATIENT
Start: 2018-03-06 | End: 2018-05-04 | Stop reason: SDUPTHER

## 2018-03-06 RX ORDER — GABAPENTIN 300 MG/1
300 CAPSULE ORAL 3 TIMES DAILY
Qty: 90 CAPSULE | Refills: 2 | Status: SHIPPED | OUTPATIENT
Start: 2018-03-06 | End: 2018-05-29 | Stop reason: SDUPTHER

## 2018-03-06 NOTE — PROGRESS NOTES
Chief Complaint   Patient presents with   • Pain     chronic, area of numbness in upper left leg   • Hypertension   • Hyperlipidemia     Subjective   Osvaldo Conley Sr. is a 62 y.o. male who presents to the office for follow-up and review of labs.  He has hypertension and his blood pressure has been controlled.  He has hyperlipidemia and takes simvastatin daily.  He has restless leg syndrome which is well controlled with Requip.  He has anxiety and takes Celexa daily.  He has osteoarthritis which causes chronic back and joint pain.  He takes Norco 4 times a day which keeps his pain controlled.  He also takes gabapentin 3 times a day for treatment of the neuropathic pain. He has recently been having increased edema in his legs.  The edema usually resolves overnight.  He has also been having increased pain in his legs.    The following portions of the patient's history were reviewed and updated as appropriate: allergies, current medications, past family history, past medical history, past social history, past surgical history and problem list.    Review of Systems   Constitutional: Negative for chills, fatigue and fever.   HENT: Negative for congestion, sneezing, sore throat and trouble swallowing.    Eyes: Negative for visual disturbance.   Respiratory: Negative for cough, chest tightness, shortness of breath and wheezing.    Cardiovascular: Negative for chest pain and palpitations.   Gastrointestinal: Negative for abdominal pain, constipation, diarrhea, nausea and vomiting.   Genitourinary: Negative for dysuria, frequency and urgency.   Musculoskeletal: Positive for arthralgias and back pain. Negative for neck pain.   Skin: Negative for rash.   Neurological: Negative for dizziness, weakness and headaches.   Psychiatric/Behavioral:        Patient denies any feelings of depression and has not felt down, hopeless or lost interest in any activities.   All other systems reviewed and are negative.      Objective  "  Vitals:    03/06/18 0835   BP: 136/74   BP Location: Left arm   Patient Position: Sitting   Cuff Size: Adult   Pulse: 76   Temp: 97.4 °F (36.3 °C)   TempSrc: Oral   Weight: 90.7 kg (200 lb)   Height: 180.3 cm (71\")   PainSc: 5  Comment: legs   PainLoc: Leg     Physical Exam   Constitutional: He is oriented to person, place, and time. He appears well-developed and well-nourished. No distress.   HENT:   Head: Normocephalic and atraumatic.   Nose: Nose normal.   Mouth/Throat: Oropharynx is clear and moist. No oropharyngeal exudate.   Eyes: Conjunctivae and EOM are normal. Pupils are equal, round, and reactive to light. No scleral icterus.   Neck: Normal range of motion. Neck supple.   Cardiovascular: Normal rate, regular rhythm and normal heart sounds.  Exam reveals no gallop and no friction rub.    No murmur heard.  Pulmonary/Chest: Effort normal. No respiratory distress. He has decreased breath sounds. He has no wheezes. He has no rales.   Abdominal: Soft. Bowel sounds are normal. He exhibits no distension. There is no tenderness. There is no rebound and no guarding.   Musculoskeletal: Normal range of motion. He exhibits no edema.   Lymphadenopathy:     He has no cervical adenopathy.   Neurological: He is alert and oriented to person, place, and time. No cranial nerve deficit.   Skin: Skin is warm and dry. No rash noted.   Psychiatric: He has a normal mood and affect. His behavior is normal. Judgment and thought content normal.   Nursing note and vitals reviewed.      Assessment/Plan   Osvaldo was seen today for pain, hypertension and hyperlipidemia.    Diagnoses and all orders for this visit:    Impaired glucose tolerance  -     Hemoglobin A1c; Future    Essential hypertension  -     CBC & Differential; Future  -     Comprehensive Metabolic Panel; Future  -     T4, Free; Future  -     TSH; Future  -     Urinalysis With / Culture If Indicated - Urine, Clean Catch; Future  -     losartan (COZAAR) 50 MG tablet; Take " 1 tablet by mouth Daily.  -     hydrochlorothiazide (HYDRODIURIL) 25 MG tablet; Take 1 tablet by mouth Daily.    Mixed hyperlipidemia  -     Lipid Panel; Future    Peripheral autonomic neuropathy  -     gabapentin (NEURONTIN) 300 MG capsule; Take 1 capsule by mouth 3 (Three) Times a Day.    Primary osteoarthritis involving multiple joints  -     HYDROcodone-acetaminophen (NORCO)  MG per tablet; 1 tablet(s) by mouth 4 times per day for pain.    Restless leg syndrome    Generalized anxiety disorder    Drug therapy  -     Gabapentin, Urine -; Future         Labs are reviewed with patient.  His triglycerides are elevated, but the remainder of his lipids are controlled. He will continue with simvastatin.  His blood pressure is controlled.  Since he is having edema and pain in the legs, I will discontinue lisinopril HCT.  I will start him on losartan 50 mg daily and hydrochlorothiazide 25 mg daily.  He will continue with Requip for treatment of the RLS.  He will continue with Celexa for his anxiety.  He will continue with gabapentin for the neuropathic pain.  He will also continue to use Norco 4 times a day for treatment of the arthritic pain.  In review of his SANDRA, he has been getting his pain medication one or 2 days early each month.  I have advised him that we need to maintain a more strict count on the number of doses that he is taking.    Patient understands the risks associated with this controlled medication, including tolerance and addiction.  Patient also agrees to only obtain this medication from me, and not from a another provider, unless that provider is covering for me in my absence.  Patient also agrees to be compliant in dosing, and not self adjust the dose of medication.  A signed controlled substance agreement is on file, and the patient has received a controlled substance education sheet at this a previous visit.  The patient has also signed a consent for treatment with a controlled substance  as per [facility] policy. SANDRA was obtained.    PHQ-2/PHQ-9 Depression Screening 3/6/2018   Little interest or pleasure in doing things 0   Feeling down, depressed, or hopeless 0   Trouble falling or staying asleep, or sleeping too much 3   Feeling tired or having little energy 3   Poor appetite or overeating 2   Feeling bad about yourself - or that you are a failure or have let yourself or your family down 0   Trouble concentrating on things, such as reading the newspaper or watching television 0   Moving or speaking so slowly that other people could have noticed. Or the opposite - being so fidgety or restless that you have been moving around a lot more than usual 0   Thoughts that you would be better off dead, or of hurting yourself in some way 0   Total Score 8   If you checked off any problems, how difficult have these problems made it for you to do your work, take care of things at home, or get along with other people? Not difficult at all         Lab on 02/26/2018   Component Date Value Ref Range Status   • Glucose 02/26/2018 119* 70 - 100 mg/dL Final   • BUN 02/26/2018 14  8 - 25 mg/dL Final   • Creatinine 02/26/2018 0.80  0.40 - 1.30 mg/dL Final   • Sodium 02/26/2018 138  134 - 146 mmol/L Final   • Potassium 02/26/2018 3.8  3.4 - 5.4 mmol/L Final   • Chloride 02/26/2018 102  100 - 112 mmol/L Final   • CO2 02/26/2018 25.0  20.0 - 32.0 mmol/L Final   • Calcium 02/26/2018 9.7  8.4 - 10.8 mg/dL Final   • Total Protein 02/26/2018 8.0  6.7 - 8.2 g/dL Final   • Albumin 02/26/2018 4.70  3.20 - 5.50 g/dL Final   • ALT (SGPT) 02/26/2018 24  10 - 60 U/L Final   • AST (SGOT) 02/26/2018 28  10 - 60 U/L Final   • Alkaline Phosphatase 02/26/2018 71  15 - 121 U/L Final   • Total Bilirubin 02/26/2018 1.1* 0.2 - 1.0 mg/dL Final   • eGFR Non African Amer 02/26/2018 98  49 - 113 mL/min/1.73 Final   • Globulin 02/26/2018 3.3  2.5 - 4.6 gm/dL Final   • A/G Ratio 02/26/2018 1.4  1.0 - 3.0 g/dL Final   • BUN/Creatinine Ratio  02/26/2018 17.5  7.0 - 25.0 Final   • Anion Gap 02/26/2018 11.0  5.0 - 15.0 mmol/L Final   • Hemoglobin A1C 02/26/2018 5.8* 4 - 5.6 % Final   • Total Cholesterol 02/26/2018 170  150 - 200 mg/dL Final   • Triglycerides 02/26/2018 220* 35 - 160 mg/dL Final   • HDL Cholesterol 02/26/2018 35  35 - 100 mg/dL Final   • LDL Cholesterol  02/26/2018 91  mg/dL Final   • VLDL Cholesterol 02/26/2018 44  mg/dL Final   • LDL/HDL Ratio 02/26/2018 2.60   Final   • Free T4 02/26/2018 1.34  0.78 - 2.19 ng/dL Final   • TSH 02/26/2018 0.320* 0.460 - 4.680 mIU/mL Final   • Report Summary 02/26/2018 FINAL   Final    Comment: ====================================================================  [facility] SELECT 13 JAKE-DIS  ====================================================================  Test                             Result       Flag       Units  Drug Present    Hydrocodone                    239                     ng/mg creat    Hydromorphone                  58                      ng/mg creat    Dihydrocodeine                 149                     ng/mg creat    Norhydrocodone                 788                     ng/mg creat     Sources of hydrocodone include scheduled prescription     medications. Hydromorphone, dihydrocodeine and norhydrocodone are     expected metabolites of hydrocodone. Hydromorphone and     dihydrocodeine are also available as scheduled prescription     medications.  ====================================================================  Test                      Result    Flag   Units      Ref Range    Creatinine              116              mg/dL                                 >=20  ====================================================================  Declared Medications:   Medication list was not provided.  ====================================================================  For clinical consultation, please call [phone].  ====================================================================   • Ethanol Screen Urine (Ref) 02/26/2018 Negative   Final   • Ethanol, Urine 02/26/2018 Not Detected  g/dL Final   • Amphetamine, Urine Qual 02/26/2018 Negative   Final   • Methamphetamine, Urine 02/26/2018 Not Detected  ng/mg creat Final   • Amphetamine, Urine 02/26/2018 Not Detected  ng/mg creat Final   • MDMA URINE 02/26/2018 Not Detected  ng/mg creat Final   • MDA 02/26/2018 Not Detected  ng/mg creat Final   • Benzodiazepine Screen, Urine 02/26/2018 Negative   Final   • DIAZEPAM URINE QUANT (REF) 02/26/2018 Not Detected  ng/mg creat Final   • Desmethyldiazepam 02/26/2018 Not Detected  ng/mg creat Final   • Oxazepam, urine 02/26/2018 Not Detected  ng/mg creat Final   • Temazepam, urine 02/26/2018 Not Detected  ng/mg creat Final    Expected metabolism of benzodiazepine class drugs:   Parent Drug       Detected Metabolites   -----------       --------------------   Diazepam:         Desmethyldiazepam, Temazepam, Oxazepam   Chlordiazepoxide: Desmethyldiazepam, Oxazepam   Clorazepate:      Desmethyldiazepam, Oxazepam   Halazepam:        Desmethyldiazepam, Oxazepam   Temazepam:        Oxazepam   Oxazepam:         None   • ALPRAZOLAM 02/26/2018 Not Detected  ng/mg creat Final   • ALPHA-HYDROXYALPRAZOLAM UR, QT (RE* 02/26/2018 Not Detected  ng/mg creat Final   • DESALKLFLURAZEPAM UR QUANT (REF) 02/26/2018 Not Detected  ng/mg creat Final   • LORAZEPAM URINE QUANT (REF) 02/26/2018 Not Detected  ng/mg creat Final   • Alpha-hydroxytriazolam, Urine 02/26/2018 Not Detected  ng/mg creat Final   • Clonazepam Urine 02/26/2018 Not Detected  ng/mg creat Final   • 7-Aminoclonazepam Urine 02/26/2018 Not Detected  ng/mg creat Final   • MIDAZOLAM UR, QUANT (REF) 02/26/2018 Not Detected  ng/mg creat Final   • Cocaine & Metabolite 02/26/2018 Negative   Final   • Cocaine Screen, Urine 02/26/2018 Not Detected  ng/mg creat Final   • Benzoylecgonine, Urine  02/26/2018 Not Detected  ng/mg creat Final   • Cocaethylene Ur 02/26/2018 Not Detected  ng/mg creat Final   • THC, Urine 02/26/2018 Negative   Final   • Carboxy THC, Urine 02/26/2018 Not Detected  ng/mg creat Final   • Opiate Class 02/26/2018 +POSITIVE+   Final   • Codeine, Urine 02/26/2018 Not Detected  ng/mg creat Final   • Morphine, Urine 02/26/2018 Not Detected  ng/mg creat Final   • Norcodeine Ur 02/26/2018 Not Detected  ng/mg creat Final   • Hydrocodone UR 02/26/2018 239  ng/mg creat Final   • Hydromorphone Urine 02/26/2018 58  ng/mg creat Final   • Dihydrocodeine, Urine 02/26/2018 149  ng/mg creat Final   • Opiates, Norhydrocodone, Urine 02/26/2018 788  ng/mg creat Final    Expected metabolism of opiate class drugs:   Parent Drug       Detected Metabolites   -----------       --------------------   Codeine:          Major:  Morphine, Norcodeine                     Minor:  Hydrocodone, Hydromorphone,                             Dihydrocodeine, Norhydrocodone   Morphine:         Minor:  Hydromorphone   Hydrocodone:      Hydromorphone, Dihydrocodeine,                     Norhydrocodone   Hydromorphone:    None   Dihydrocodeine:   None   Heroin:           6-Acetylmorphine (if included),                     Morphine                     Codeine, in small amounts in comparison                      to morphine, is often detected when                      heroin is the source drug.   • Oxycodone Class Ur 02/26/2018 Negative   Final   • Oxycodone, Confirmation, Urine 02/26/2018 Not Detected  ng/mg creat Final   • Oxymorphone UR 02/26/2018 Not Detected  ng/mg creat Final   • Opiates, Noroxycodone, Urine 02/26/2018 Not Detected  ng/mg creat Final   • Noroxymorphone 02/26/2018 Not Detected  ng/mg creat Final    Expected metabolism of oxycodone class drugs:   Parent Drug       Detected Metabolites   -----------       --------------------   Oxycodone:        Oxymorphone, Noroxycodone, Noroxymorphone   Oxymorphone:       Noroxymorphone   • Methadone 02/26/2018 Negative   Final   • Methadone, Quantitative 02/26/2018 Not Detected  ng/mg creat Final   • EDDP 02/26/2018 Not Detected  ng/mg creat Final   • Fentanyl and Analogues, Ur 02/26/2018 Negative   Final   • Fentanyl, Urine 02/26/2018 Not Detected  ng/mg creat Final   • Norfentanyl Urine 02/26/2018 Not Detected  ng/mg creat Final   • Sufentanil, Ur 02/26/2018 Not Detected  ng/mg creat Final   • Alfentanil, Ur 02/26/2018 Not Detected  ng/mg creat Final   • BUPRENORPHINE 02/26/2018 Negative   Final   • Buprenorphine, Urine 02/26/2018 Not Detected  ng/mg creat Final   • Norbuprenorphine 02/26/2018 Not Detected  ng/mg creat Final   • BARBITURATES, URINE 02/26/2018 Negative   Final   • Amobarbital, Urine 02/26/2018 Not Detected   Final   • Barbital 02/26/2018 Not Detected   Final   • Butabarbital, Ur 02/26/2018 Not Detected   Final   • Butalbital Screen, Urine 02/26/2018 Not Detected   Final   • Mephobarbital Urine 02/26/2018 Not Detected   Final   • Pentobarbital UR 02/26/2018 Not Detected   Final   • Phenobarbital 02/26/2018 Not Detected   Final   • Secobarbital, urine 02/26/2018 Not Detected   Final   • Thiopental, Ur 02/26/2018 Not Detected   Final   • Tapentadol 02/26/2018 Negative   Final   • Tapentadol Ur 02/26/2018 Not Detected  ng/mg creat Final   • Tramadol & Metabolite 02/26/2018 Negative   Final   • Tramadol, Urine 02/26/2018 Not Detected   Final   • O-desmethyltramadol, Ur 02/26/2018 Not Detected   Final   • N-Desmethyltramadol, U 02/26/2018 Not Detected   Final   • Creatinine, Urine 02/26/2018 116  mg/dL Final    REFERENCE RANGE: Ref Range>=20   • Declared Medications: 02/26/2018 Comment   Final    Not Provided  Medication list was not provided.   • Level of Detection: 02/26/2018 Comment   Final    Level of detection:  TESTING THRESHOLDS ARE AS FOLLOWS:  AMPHETAMINES: 50 ng/mL  BENZODIAZEPINES: 50 ng/mL  COCAINE / METABOLITE: 50 ng/mL  ALCOHOL, ETHYL: 0.020 gm/dL  FENTANYL  / ANALOGUES: fentanyl-1.0 ng/mL, others-5.0 ng/mL  BUPRENORPHINE: buprenorphine-1.0 ng/mL,                 norbuprenorphine-5 ng/mL  TAPENTADOL: 50 ng/mL  CANNABINOIDS: total-20 ng/mL, carboxy-THC-2 ng/mL  METHADONE: 50 ng/mL  OPIATE CLASS: 50 ng/mL  OXYCODONE CLASS: 50 ng/mL  BARBITURATES: 200 ng/mL  TRAMADOL: 50 ng/mL  This test was developed and its performance characteristics  determined by [facility].  It has not been cleared or approved  by the Food and Drug Administration.   • Color, UA 02/26/2018 Yellow  Yellow, Straw Final   • Appearance, UA 02/26/2018 Clear  Clear Final   • pH, UA 02/26/2018 8.0  5.5 - 8.0 Final   • Specific Gravity, UA 02/26/2018 1.020  1.005 - 1.030 Final   • Glucose, UA 02/26/2018 Negative  Negative Final   • Ketones, UA 02/26/2018 Negative  Negative Final   • Bilirubin, UA 02/26/2018 Negative  Negative Final   • Blood, UA 02/26/2018 Moderate (2+)* Negative Final   • Protein, UA 02/26/2018 Negative  Negative Final   • Leuk Esterase, UA 02/26/2018 Negative  Negative Final   • Nitrite, UA 02/26/2018 Negative  Negative Final   • Urobilinogen, UA 02/26/2018 0.2 E.U./dL  0.2 - 1.0 E.U./dL Final   • PSA 02/26/2018 2.990  0.000 - 4.000 ng/mL Final   • Gabapentin, Ur 02/26/2018 Negative  ug/mL Final    REFERENCE RANGE: NOT ESTABLISHED   • WBC 02/26/2018 12.90* 3.20 - 9.80 10*3/mm3 Final   • RBC 02/26/2018 5.54  4.37 - 5.74 10*6/mm3 Final   • Hemoglobin 02/26/2018 17.1  13.7 - 17.3 g/dL Final   • Hematocrit 02/26/2018 47.7  39.0 - 49.0 % Final   • MCV 02/26/2018 86.1  80.0 - 98.0 fL Final   • MCH 02/26/2018 30.9  26.5 - 34.0 pg Final   • MCHC 02/26/2018 35.8  31.5 - 36.3 g/dL Final   • RDW 02/26/2018 13.4  11.5 - 14.5 % Final   • RDW-SD 02/26/2018 41.9  35.1 - 43.9 fl Final   • MPV 02/26/2018 12.2* 8.0 - 12.0 fL Final   • Platelets 02/26/2018 245  150 - 450 10*3/mm3 Final   • Neutrophil % 02/26/2018 62.7  37.0 - 80.0 % Final   • Lymphocyte % 02/26/2018 28.5  10.0 - 50.0 % Final   • Monocyte %  02/26/2018 7.7  0.0 - 12.0 % Final   • Eosinophil % 02/26/2018 0.9  0.0 - 7.0 % Final   • Basophil % 02/26/2018 0.2  0.0 - 2.0 % Final   • Neutrophils, Absolute 02/26/2018 8.08  2.00 - 8.60 10*3/mm3 Final   • Lymphocytes, Absolute 02/26/2018 3.68  0.60 - 4.20 10*3/mm3 Final   • Monocytes, Absolute 02/26/2018 0.99* 0.00 - 0.90 10*3/mm3 Final   • Eosinophils, Absolute 02/26/2018 0.12  0.00 - 0.70 10*3/mm3 Final   • Basophils, Absolute 02/26/2018 0.03  0.00 - 0.20 10*3/mm3 Final   • RBC, UA 02/26/2018 3-5* None Seen /HPF Final   • WBC, UA 02/26/2018 0-2* None Seen /HPF Final   • Bacteria, UA 02/26/2018 None Seen  None Seen /HPF Final   • Squamous Epithelial Cells, UA 02/26/2018 None Seen  None Seen, 0-2 /HPF Final   • Hyaline Casts, UA 02/26/2018 None Seen  None Seen /LPF Final   • Methodology 02/26/2018 Manual Light Microscopy   Final   ]

## 2018-04-03 DIAGNOSIS — M15.9 PRIMARY OSTEOARTHRITIS INVOLVING MULTIPLE JOINTS: Chronic | ICD-10-CM

## 2018-04-03 RX ORDER — HYDROCODONE BITARTRATE AND ACETAMINOPHEN 10; 325 MG/1; MG/1
TABLET ORAL
Qty: 120 TABLET | Refills: 0 | Status: SHIPPED | OUTPATIENT
Start: 2018-04-03 | End: 2018-05-01 | Stop reason: SDUPTHER

## 2018-04-05 DIAGNOSIS — E78.2 MIXED HYPERLIPIDEMIA: Chronic | ICD-10-CM

## 2018-04-05 DIAGNOSIS — F41.1 GENERALIZED ANXIETY DISORDER: Chronic | ICD-10-CM

## 2018-04-05 RX ORDER — SIMVASTATIN 40 MG
40 TABLET ORAL NIGHTLY
Qty: 30 TABLET | Refills: 5 | Status: SHIPPED | OUTPATIENT
Start: 2018-04-05 | End: 2018-10-12 | Stop reason: SDUPTHER

## 2018-04-05 RX ORDER — CITALOPRAM 20 MG/1
20 TABLET ORAL DAILY
Qty: 30 TABLET | Refills: 5 | Status: SHIPPED | OUTPATIENT
Start: 2018-04-05 | End: 2018-10-12 | Stop reason: SDUPTHER

## 2018-04-30 DIAGNOSIS — M15.9 PRIMARY OSTEOARTHRITIS INVOLVING MULTIPLE JOINTS: Chronic | ICD-10-CM

## 2018-05-01 RX ORDER — HYDROCODONE BITARTRATE AND ACETAMINOPHEN 10; 325 MG/1; MG/1
TABLET ORAL
Qty: 120 TABLET | Refills: 0 | Status: SHIPPED | OUTPATIENT
Start: 2018-05-01 | End: 2018-05-29 | Stop reason: SDUPTHER

## 2018-05-04 DIAGNOSIS — I10 ESSENTIAL HYPERTENSION: Chronic | ICD-10-CM

## 2018-05-04 RX ORDER — HYDROCHLOROTHIAZIDE 25 MG/1
25 TABLET ORAL DAILY
Qty: 30 TABLET | Refills: 2 | Status: SHIPPED | OUTPATIENT
Start: 2018-05-04 | End: 2018-07-03 | Stop reason: SDUPTHER

## 2018-05-16 ENCOUNTER — OFFICE VISIT (OUTPATIENT)
Dept: OTOLARYNGOLOGY | Facility: CLINIC | Age: 63
End: 2018-05-16

## 2018-05-16 VITALS — HEIGHT: 71 IN | BODY MASS INDEX: 27.3 KG/M2 | WEIGHT: 195 LBS | TEMPERATURE: 97.5 F

## 2018-05-16 DIAGNOSIS — Z86.018 HISTORY OF INVERTED PAPILLOMA: ICD-10-CM

## 2018-05-16 DIAGNOSIS — J34.89 NASAL SEPTAL PERFORATION: ICD-10-CM

## 2018-05-16 DIAGNOSIS — R51.9 LEFT FACIAL PAIN: Primary | ICD-10-CM

## 2018-05-16 DIAGNOSIS — R51.9 LEFT FACIAL PAIN: ICD-10-CM

## 2018-05-16 DIAGNOSIS — Z85.828 PERSONAL HISTORY OF MALIGNANT NEOPLASM OF SKIN: ICD-10-CM

## 2018-05-16 DIAGNOSIS — Z86.018 HISTORY OF INVERTED PAPILLOMA: Primary | ICD-10-CM

## 2018-05-16 PROCEDURE — 31231 NASAL ENDOSCOPY DX: CPT | Performed by: OTOLARYNGOLOGY

## 2018-05-16 PROCEDURE — 99214 OFFICE O/P EST MOD 30 MIN: CPT | Performed by: OTOLARYNGOLOGY

## 2018-05-20 NOTE — PROGRESS NOTES
Subjective   Osvaldo Conley Sr. is a 62 y.o. male.       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  Has a large defect in the left medial maxilla as well as a nasal septal perforation.  Is also status post excision of an extensive basal cell carcinoma of the left nose that required a full-thickness excision into the nasal vestibule that was closed with a flap.  Returns today for reevaluation complaining of persistent left maxillary pain for several months.  This is not been associated with any purulent rhinorrhea or any particular inciting factor.  Is edentulous in this area but says at times the pain feels like a toothache.  Symptoms are present daily and have been for months.      The following portions of the patient's history were reviewed and updated as appropriate: allergies, current medications, past family history, past medical history, past social history, past surgical history and problem list.     reports that he has been smoking.  He has been smoking about 1.50 packs per day. He has never used smokeless tobacco. He reports that he drinks alcohol. He reports that he does not use drugs.   Patient is a tobacco user and has been counseled for use of tobacco products      Review of Systems   Constitutional: Negative for fever.   HENT: Negative for nosebleeds.            Objective   Physical Exam  General: Well-developed well-nourished male in no acute distress.  Alert and oriented ×3. Head: Normocephalic. Face: Symmetrical strength and appearance. PERRL. EOMI. Voice:Strong. Speech:Fluent  Ears: External ears no deformity, canals no discharge, tympanic membranes intact clear and mobile bilaterally.  Nose: Nares show no discharge mass polyp or purulence.  Boggy mucosa is present.  External deformity on the left related to previous resection with no evidence of recurrent skin lesion.  Septum: Midline sizable septal perforation  that is chronic  Oral cavity: Lips without lesions.  Tender on the face of the left maxilla in the area of the canine fossa, although he is edentulous.  Tongue and floor of mouth without lesions.  Parotid and submandibular ducts unobstructed.  No mucosal lesions on the buccal mucosa or vestibule of the mouth.  Pharynx: No erythema exudate mass or ulcer  Neck: No lymphadenopathy.  No thyromegaly.  Trachea and larynx midline.  No masses in the parotid or submandibular glands.    Nasal endoscopy is performed: Olvin-Synephrine and Xylocaine are instilled nares.  0° scope is passed into each nostril.  The inferior, middle, and superior turbinates are examined as is the nasal septum and nasopharynx.  Pertinent findings include no abnormalities on the right.  Large chronic well epithelialized septal perforation is present.  There is a large defect in the medial maxilla on the left.  No evidence of tumor or bleeding however exam of the anterior maxilla is not anatomically possible.    Assessment/Plan   Osvaldo was seen today for follow-up.    Diagnoses and all orders for this visit:    History of inverted papilloma    Left facial pain    Nasal septal perforation    Personal history of malignant neoplasm of skin        Plan: Given the persistent pain and tenderness in the area of the anterior maxilla on the left I think his CT scan is indicated.  It's possible he has recurrent inverted papilloma that cannot be visualized endoscopically due to location.  This of be obtained in the near future and the patient be seen afterwards.

## 2018-05-24 ENCOUNTER — OFFICE VISIT (OUTPATIENT)
Dept: OTOLARYNGOLOGY | Facility: CLINIC | Age: 63
End: 2018-05-24

## 2018-05-24 ENCOUNTER — HOSPITAL ENCOUNTER (OUTPATIENT)
Dept: CT IMAGING | Facility: HOSPITAL | Age: 63
Discharge: HOME | End: 2018-05-24
Admitting: OTOLARYNGOLOGY

## 2018-05-24 VITALS — HEART RATE: 73 BPM | OXYGEN SATURATION: 97 % | WEIGHT: 195 LBS | BODY MASS INDEX: 27.3 KG/M2 | HEIGHT: 71 IN

## 2018-05-24 DIAGNOSIS — Z86.018 HISTORY OF INVERTED PAPILLOMA: ICD-10-CM

## 2018-05-24 DIAGNOSIS — J34.89 NASAL SEPTAL PERFORATION: ICD-10-CM

## 2018-05-24 DIAGNOSIS — R51.9 LEFT FACIAL PAIN: ICD-10-CM

## 2018-05-24 DIAGNOSIS — R51.9 LEFT FACIAL PAIN: Primary | ICD-10-CM

## 2018-05-24 PROCEDURE — 99213 OFFICE O/P EST LOW 20 MIN: CPT | Performed by: OTOLARYNGOLOGY

## 2018-05-24 PROCEDURE — 70486 CT MAXILLOFACIAL W/O DYE: CPT

## 2018-05-24 NOTE — PROGRESS NOTES
Subjective   Osvaldo Conley . is a 62 y.o. male.       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  Also has a septal perforation.  Has a history of cutaneous carcinoma the left nose that was full-thickness and penetrated into the nasal cavity resected and closed with a flap. Was seen recently with a several month history of left-sided facial pain.  Was noted to have a bony prominence of the left alveolus.  CT scan was ordered to evaluate for possible recurrent inverted papilloma or else some sort of bony disease of the alveolus.  CT is personally reviewed (radiologic report is pending).  I see no evidence of recurrent tumor.  There is a bony prominence on the left alveolus but no evidence of retained tooth root or infection in the bone.  Patient reports the pain is still present.      The following portions of the patient's history were reviewed and updated as appropriate: allergies, current medications, past family history, past medical history, past social history, past surgical history and problem list.     reports that he has been smoking.  He has been smoking about 1.50 packs per day. He has never used smokeless tobacco. He reports that he drinks alcohol. He reports that he does not use drugs.   Patient is a tobacco user and has been counseled for use of tobacco products      Review of Systems   Constitutional: Negative for fever.           Objective   Physical Exam  Nose: External deformity from previous resection with depression of the left nasal dorsum.  This is unchanged from previous.  Septal perforation is present.  Oral cavity: Lips without lesions.  Bony prominence on the left maxillary alveolus unchanged from previous.  Subjectively tender along the face of the maxilla.  Tongue and floor of mouth without lesions.  Parotid and submandibular ducts unobstructed.  No mucosal lesions on the buccal mucosa or vestibule of  the mouth.      Assessment/Plan   Osvaldo was seen today for results.    Diagnoses and all orders for this visit:    Left facial pain    History of inverted papilloma    Nasal septal perforation      Plan: I suspect this left-sided facial pain is a manifestation of neuralgia of the infraorbital nerve.  Patient already is utilizing gabapentin for other neurologic pain.  I told the patient to discuss this further with Dr. Mims to see if he had any additional recommendation.  Otherwise just recommend observation as I don't think there is any surgery that indicated at this point.  See me again in 6 months.

## 2018-05-29 ENCOUNTER — OFFICE VISIT (OUTPATIENT)
Dept: FAMILY MEDICINE CLINIC | Facility: CLINIC | Age: 63
End: 2018-05-29

## 2018-05-29 VITALS
HEART RATE: 78 BPM | WEIGHT: 196 LBS | BODY MASS INDEX: 27.44 KG/M2 | DIASTOLIC BLOOD PRESSURE: 72 MMHG | SYSTOLIC BLOOD PRESSURE: 122 MMHG | HEIGHT: 71 IN

## 2018-05-29 DIAGNOSIS — F41.1 GENERALIZED ANXIETY DISORDER: Chronic | ICD-10-CM

## 2018-05-29 DIAGNOSIS — I10 ESSENTIAL HYPERTENSION: Primary | Chronic | ICD-10-CM

## 2018-05-29 DIAGNOSIS — M15.9 PRIMARY OSTEOARTHRITIS INVOLVING MULTIPLE JOINTS: Chronic | ICD-10-CM

## 2018-05-29 DIAGNOSIS — G90.9 PERIPHERAL AUTONOMIC NEUROPATHY: Chronic | ICD-10-CM

## 2018-05-29 PROCEDURE — 96372 THER/PROPH/DIAG INJ SC/IM: CPT | Performed by: INTERNAL MEDICINE

## 2018-05-29 PROCEDURE — 99214 OFFICE O/P EST MOD 30 MIN: CPT | Performed by: INTERNAL MEDICINE

## 2018-05-29 RX ORDER — METHYLPREDNISOLONE ACETATE 80 MG/ML
80 INJECTION, SUSPENSION INTRA-ARTICULAR; INTRALESIONAL; INTRAMUSCULAR; SOFT TISSUE ONCE
Status: COMPLETED | OUTPATIENT
Start: 2018-05-29 | End: 2018-05-29

## 2018-05-29 RX ORDER — HYDROCODONE BITARTRATE AND ACETAMINOPHEN 10; 325 MG/1; MG/1
TABLET ORAL
Qty: 120 TABLET | Refills: 0 | Status: SHIPPED | OUTPATIENT
Start: 2018-05-29 | End: 2018-06-26 | Stop reason: SDUPTHER

## 2018-05-29 RX ORDER — GABAPENTIN 300 MG/1
300 CAPSULE ORAL 3 TIMES DAILY
Qty: 90 CAPSULE | Refills: 2 | Status: SHIPPED | OUTPATIENT
Start: 2018-05-29 | End: 2018-08-28 | Stop reason: SDUPTHER

## 2018-05-29 RX ADMIN — METHYLPREDNISOLONE ACETATE 80 MG: 80 INJECTION, SUSPENSION INTRA-ARTICULAR; INTRALESIONAL; INTRAMUSCULAR; SOFT TISSUE at 11:32

## 2018-05-29 NOTE — PROGRESS NOTES
Chief Complaint   Patient presents with   • Osteoarthritis   • Med Refill     Subjective   Osvaldo Conley Sr. is a 62 y.o. male who presents to the office for follow-up. He has hypertension and his blood pressure has been controlled.  He has anxiety and takes Celexa daily.  He has osteoarthritis which causes chronic back and joint pain.  He takes Norco 4 times a day which keeps his pain controlled.  He also takes gabapentin 3 times a day for treatment of the neuropathic pain.  He has COPD and uses an albuterol inhaler as needed. He has restless leg syndrome which is doing well with Requip. He is requesting a shot of Depo-Medrol to help with the increased arthritic pain in his back.    The following portions of the patient's history were reviewed and updated as appropriate: allergies, current medications, past family history, past medical history, past social history, past surgical history and problem list.    Review of Systems   Constitutional: Negative for chills, fatigue and fever.   HENT: Negative for congestion, sneezing, sore throat and trouble swallowing.    Eyes: Negative for visual disturbance.   Respiratory: Negative for cough, chest tightness, shortness of breath and wheezing.    Cardiovascular: Negative for chest pain and palpitations.   Gastrointestinal: Negative for abdominal pain, constipation, diarrhea, nausea and vomiting.   Genitourinary: Negative for dysuria, frequency and urgency.   Musculoskeletal: Positive for arthralgias and back pain. Negative for neck pain.   Skin: Negative for rash.   Neurological: Negative for dizziness, weakness and headaches.   Psychiatric/Behavioral:        Patient denies any feelings of depression and has not felt down, hopeless or lost interest in any activities.   All other systems reviewed and are negative.      Objective   Vitals:    05/29/18 1102   BP: 122/72   BP Location: Left arm   Patient Position: Sitting   Cuff Size: Adult   Pulse: 78   Weight: 88.9 kg  "(196 lb)   Height: 180.3 cm (71\")   PainSc:   4   PainLoc: Knee  Comment: Bilat     Physical Exam   Constitutional: He is oriented to person, place, and time. He appears well-developed and well-nourished. No distress.   HENT:   Head: Normocephalic and atraumatic.   Nose: Nose normal.   Mouth/Throat: Oropharynx is clear and moist. No oropharyngeal exudate.   Eyes: Conjunctivae and EOM are normal. Pupils are equal, round, and reactive to light. No scleral icterus.   Neck: Normal range of motion. Neck supple.   Cardiovascular: Normal rate, regular rhythm and normal heart sounds.  Exam reveals no gallop and no friction rub.    No murmur heard.  Pulmonary/Chest: Effort normal. No respiratory distress. He has decreased breath sounds. He has no wheezes. He has no rales.   Abdominal: Soft. Bowel sounds are normal. He exhibits no distension. There is no tenderness. There is no rebound and no guarding.   Musculoskeletal: Normal range of motion. He exhibits no edema.   Lymphadenopathy:     He has no cervical adenopathy.   Neurological: He is alert and oriented to person, place, and time. No cranial nerve deficit.   Skin: Skin is warm and dry. No rash noted.   Psychiatric: He has a normal mood and affect. His behavior is normal. Judgment and thought content normal.   Nursing note and vitals reviewed.      Assessment/Plan   Osvaldo was seen today for osteoarthritis and med refill.    Diagnoses and all orders for this visit:    Essential hypertension    Primary osteoarthritis involving multiple joints  -     HYDROcodone-acetaminophen (NORCO)  MG per tablet; 1 tablet(s) by mouth 4 times per day for pain.  -     methylPREDNISolone acetate (DEPO-medrol) injection 80 mg; Inject 1 mL into the shoulder, thigh, or buttocks 1 (One) Time.    Peripheral autonomic neuropathy  -     gabapentin (NEURONTIN) 300 MG capsule; Take 1 capsule by mouth 3 (Three) Times a Day.    Generalized anxiety disorder         His blood pressure is " controlled, so he will continue with his current blood pressure medication. He will continue with Celexa for treatment of his anxiety.  He will continue with Requip for treatment of the RLS.  He will continue with gabapentin for treatment of the neuropathy.  He will continue with Norco 4 times a day for treatment of the arthritic pain.  He is given Depo-Medrol 80 mg IM.    Patient understands the risks associated with this controlled medication, including tolerance and addiction.  Patient also agrees to only obtain this medication from me, and not from a another provider, unless that provider is covering for me in my absence.  Patient also agrees to be compliant in dosing, and not self adjust the dose of medication.  A signed controlled substance agreement is on file, and the patient has received a controlled substance education sheet at this a previous visit.  The patient has also signed a consent for treatment with a controlled substance as per [facility] policy. SANDRA was obtained.      PHQ-2/PHQ-9 Depression Screening 5/29/2018   Little interest or pleasure in doing things 0   Feeling down, depressed, or hopeless 0   Trouble falling or staying asleep, or sleeping too much -   Feeling tired or having little energy -   Poor appetite or overeating -   Feeling bad about yourself - or that you are a failure or have let yourself or your family down -   Trouble concentrating on things, such as reading the newspaper or watching television -   Moving or speaking so slowly that other people could have noticed. Or the opposite - being so fidgety or restless that you have been moving around a lot more than usual -   Thoughts that you would be better off dead, or of hurting yourself in some way -   Total Score 0   If you checked off any problems, how difficult have these problems made it for you to do your work, take care of things at home, or get along with other people? -

## 2018-05-31 DIAGNOSIS — J44.9 COPD, MILD (HCC): Chronic | ICD-10-CM

## 2018-05-31 RX ORDER — ALBUTEROL SULFATE 90 UG/1
AEROSOL, METERED RESPIRATORY (INHALATION)
Qty: 18 G | Refills: 5 | Status: SHIPPED | OUTPATIENT
Start: 2018-05-31 | End: 2019-01-09

## 2018-06-25 DIAGNOSIS — M15.9 PRIMARY OSTEOARTHRITIS INVOLVING MULTIPLE JOINTS: Chronic | ICD-10-CM

## 2018-06-25 NOTE — TELEPHONE ENCOUNTER
Patient is up to date on controlled medication consent, Laz report, and appt from 05/29/2018. Will  Wednesday at 3pm at [facility] in [place].

## 2018-06-26 RX ORDER — HYDROCODONE BITARTRATE AND ACETAMINOPHEN 10; 325 MG/1; MG/1
TABLET ORAL
Qty: 120 TABLET | Refills: 0 | Status: SHIPPED | OUTPATIENT
Start: 2018-06-26 | End: 2018-07-24 | Stop reason: SDUPTHER

## 2018-07-03 DIAGNOSIS — I10 ESSENTIAL HYPERTENSION: Chronic | ICD-10-CM

## 2018-07-03 RX ORDER — HYDROCHLOROTHIAZIDE 25 MG/1
25 TABLET ORAL DAILY
Qty: 30 TABLET | Refills: 2 | Status: SHIPPED | OUTPATIENT
Start: 2018-07-03 | End: 2018-11-07 | Stop reason: SDUPTHER

## 2018-07-23 DIAGNOSIS — M15.9 PRIMARY OSTEOARTHRITIS INVOLVING MULTIPLE JOINTS: Chronic | ICD-10-CM

## 2018-07-23 NOTE — TELEPHONE ENCOUNTER
Patient is up to date on controlled medication consent, Laz report, and appt from 05/29/2018. Will  at [facility] in [place] on Wednesday.

## 2018-07-24 RX ORDER — HYDROCODONE BITARTRATE AND ACETAMINOPHEN 10; 325 MG/1; MG/1
TABLET ORAL
Qty: 120 TABLET | Refills: 0 | Status: SHIPPED | OUTPATIENT
Start: 2018-07-24 | End: 2018-08-28 | Stop reason: SDUPTHER

## 2018-08-20 ENCOUNTER — LAB (OUTPATIENT)
Dept: LAB | Facility: OTHER | Age: 63
End: 2018-08-20

## 2018-08-20 DIAGNOSIS — I10 ESSENTIAL HYPERTENSION: ICD-10-CM

## 2018-08-20 DIAGNOSIS — E78.2 MIXED HYPERLIPIDEMIA: Chronic | ICD-10-CM

## 2018-08-20 DIAGNOSIS — R73.02 IMPAIRED GLUCOSE TOLERANCE: Chronic | ICD-10-CM

## 2018-08-20 DIAGNOSIS — Z79.899 DRUG THERAPY: ICD-10-CM

## 2018-08-20 LAB
ALBUMIN SERPL-MCNC: 4.4 G/DL (ref 3.5–5)
ALBUMIN/GLOB SERPL: 1.3 G/DL (ref 1.1–1.8)
ALP SERPL-CCNC: 91 U/L (ref 38–126)
ALT SERPL W P-5'-P-CCNC: 39 U/L
ANION GAP SERPL CALCULATED.3IONS-SCNC: 6 MMOL/L (ref 5–15)
AST SERPL-CCNC: 43 U/L (ref 17–59)
BACTERIA UR QL AUTO: ABNORMAL /HPF
BASOPHILS # BLD AUTO: 0.03 10*3/MM3 (ref 0–0.2)
BASOPHILS NFR BLD AUTO: 0.3 % (ref 0–2)
BILIRUB SERPL-MCNC: 0.8 MG/DL (ref 0.2–1.3)
BILIRUB UR QL STRIP: NEGATIVE
BUN BLD-MCNC: 13 MG/DL (ref 9–20)
BUN/CREAT SERPL: 15.7 (ref 7–25)
CALCIUM SPEC-SCNC: 9.4 MG/DL (ref 8.4–10.2)
CHLORIDE SERPL-SCNC: 110 MMOL/L (ref 98–107)
CHOLEST SERPL-MCNC: 195 MG/DL (ref 150–200)
CLARITY UR: CLEAR
CO2 SERPL-SCNC: 27 MMOL/L (ref 22–30)
COLOR UR: YELLOW
CREAT BLD-MCNC: 0.83 MG/DL (ref 0.66–1.25)
DEPRECATED RDW RBC AUTO: 44.9 FL (ref 35.1–43.9)
EOSINOPHIL # BLD AUTO: 0.33 10*3/MM3 (ref 0–0.7)
EOSINOPHIL NFR BLD AUTO: 2.8 % (ref 0–7)
ERYTHROCYTE [DISTWIDTH] IN BLOOD BY AUTOMATED COUNT: 13.6 % (ref 11.5–14.5)
GFR SERPL CREATININE-BSD FRML MDRD: 94 ML/MIN/1.73 (ref 49–113)
GLOBULIN UR ELPH-MCNC: 3.3 GM/DL (ref 2.3–3.5)
GLUCOSE BLD-MCNC: 127 MG/DL (ref 74–99)
GLUCOSE UR STRIP-MCNC: NEGATIVE MG/DL
HBA1C MFR BLD: 5.8 % (ref 4–5.6)
HCT VFR BLD AUTO: 47.9 % (ref 39–49)
HDLC SERPL-MCNC: 33 MG/DL (ref 40–59)
HGB BLD-MCNC: 16.6 G/DL (ref 13.7–17.3)
HGB UR QL STRIP.AUTO: ABNORMAL
HYALINE CASTS UR QL AUTO: ABNORMAL /LPF
KETONES UR QL STRIP: NEGATIVE
LDLC SERPL CALC-MCNC: 83 MG/DL
LDLC/HDLC SERPL: 2.5 {RATIO} (ref 0–3.55)
LEUKOCYTE ESTERASE UR QL STRIP.AUTO: NEGATIVE
LYMPHOCYTES # BLD AUTO: 3.68 10*3/MM3 (ref 0.6–4.2)
LYMPHOCYTES NFR BLD AUTO: 31.3 % (ref 10–50)
MCH RBC QN AUTO: 31.1 PG (ref 26.5–34)
MCHC RBC AUTO-ENTMCNC: 34.7 G/DL (ref 31.5–36.3)
MCV RBC AUTO: 89.9 FL (ref 80–98)
MONOCYTES # BLD AUTO: 0.76 10*3/MM3 (ref 0–0.9)
MONOCYTES NFR BLD AUTO: 6.5 % (ref 0–12)
NEUTROPHILS # BLD AUTO: 6.97 10*3/MM3 (ref 2–8.6)
NEUTROPHILS NFR BLD AUTO: 59.1 % (ref 37–80)
NITRITE UR QL STRIP: NEGATIVE
PH UR STRIP.AUTO: 8.5 [PH] (ref 5.5–8)
PLATELET # BLD AUTO: 194 10*3/MM3 (ref 150–450)
PMV BLD AUTO: 12.5 FL (ref 8–12)
POTASSIUM BLD-SCNC: 3.8 MMOL/L (ref 3.4–5)
PROT SERPL-MCNC: 7.7 G/DL (ref 6.3–8.2)
PROT UR QL STRIP: NEGATIVE
RBC # BLD AUTO: 5.33 10*6/MM3 (ref 4.37–5.74)
RBC # UR: ABNORMAL /HPF
REF LAB TEST METHOD: ABNORMAL
SODIUM BLD-SCNC: 143 MMOL/L (ref 137–145)
SP GR UR STRIP: 1.01 (ref 1–1.03)
SQUAMOUS #/AREA URNS HPF: ABNORMAL /HPF
T4 FREE SERPL-MCNC: 1.06 NG/DL (ref 0.78–2.19)
TRIGL SERPL-MCNC: 397 MG/DL
TSH SERPL DL<=0.05 MIU/L-ACNC: 0.27 MIU/ML (ref 0.46–4.68)
UROBILINOGEN UR QL STRIP: ABNORMAL
VLDLC SERPL-MCNC: 79.4 MG/DL
WBC NRBC COR # BLD: 11.77 10*3/MM3 (ref 3.2–9.8)
WBC UR QL AUTO: ABNORMAL /HPF

## 2018-08-20 PROCEDURE — 83036 HEMOGLOBIN GLYCOSYLATED A1C: CPT | Performed by: INTERNAL MEDICINE

## 2018-08-20 PROCEDURE — 84439 ASSAY OF FREE THYROXINE: CPT | Performed by: INTERNAL MEDICINE

## 2018-08-20 PROCEDURE — 80050 GENERAL HEALTH PANEL: CPT | Performed by: INTERNAL MEDICINE

## 2018-08-20 PROCEDURE — 80061 LIPID PANEL: CPT | Performed by: INTERNAL MEDICINE

## 2018-08-20 PROCEDURE — 81001 URINALYSIS AUTO W/SCOPE: CPT | Performed by: INTERNAL MEDICINE

## 2018-08-20 PROCEDURE — G0480 DRUG TEST DEF 1-7 CLASSES: HCPCS | Performed by: INTERNAL MEDICINE

## 2018-08-23 LAB — GABAPENTIN UR-MCNC: 183.2 UG/ML

## 2018-08-28 ENCOUNTER — OFFICE VISIT (OUTPATIENT)
Dept: FAMILY MEDICINE CLINIC | Facility: CLINIC | Age: 63
End: 2018-08-28

## 2018-08-28 VITALS
WEIGHT: 191 LBS | HEART RATE: 89 BPM | OXYGEN SATURATION: 98 % | BODY MASS INDEX: 26.74 KG/M2 | DIASTOLIC BLOOD PRESSURE: 68 MMHG | TEMPERATURE: 97.9 F | HEIGHT: 71 IN | SYSTOLIC BLOOD PRESSURE: 128 MMHG

## 2018-08-28 DIAGNOSIS — F41.1 GENERALIZED ANXIETY DISORDER: Chronic | ICD-10-CM

## 2018-08-28 DIAGNOSIS — G90.9 PERIPHERAL AUTONOMIC NEUROPATHY: Chronic | ICD-10-CM

## 2018-08-28 DIAGNOSIS — E78.2 MIXED HYPERLIPIDEMIA: Chronic | ICD-10-CM

## 2018-08-28 DIAGNOSIS — R73.02 IMPAIRED GLUCOSE TOLERANCE: Primary | Chronic | ICD-10-CM

## 2018-08-28 DIAGNOSIS — Z12.5 SCREENING FOR PROSTATE CANCER: ICD-10-CM

## 2018-08-28 DIAGNOSIS — I10 ESSENTIAL HYPERTENSION: Chronic | ICD-10-CM

## 2018-08-28 DIAGNOSIS — M15.9 PRIMARY OSTEOARTHRITIS INVOLVING MULTIPLE JOINTS: Chronic | ICD-10-CM

## 2018-08-28 DIAGNOSIS — Z79.899 DRUG THERAPY: ICD-10-CM

## 2018-08-28 PROCEDURE — 99214 OFFICE O/P EST MOD 30 MIN: CPT | Performed by: INTERNAL MEDICINE

## 2018-08-28 RX ORDER — GABAPENTIN 300 MG/1
300 CAPSULE ORAL 3 TIMES DAILY
Qty: 90 CAPSULE | Refills: 2 | Status: SHIPPED | OUTPATIENT
Start: 2018-08-28 | End: 2018-11-26 | Stop reason: SDUPTHER

## 2018-08-28 RX ORDER — HYDROCODONE BITARTRATE AND ACETAMINOPHEN 10; 325 MG/1; MG/1
TABLET ORAL
Qty: 120 TABLET | Refills: 0 | Status: SHIPPED | OUTPATIENT
Start: 2018-08-28 | End: 2018-09-25 | Stop reason: SDUPTHER

## 2018-08-28 NOTE — PROGRESS NOTES
Chief Complaint   Patient presents with   • Osteoarthritis   • Peripheral Neuropathy     Subjective   Osvaldo Conley Sr. is a 63 y.o. male who presents to the office for follow-up and review of labs.  He has hypertension and his blood pressure has been controlled.  He has hyperlipidemia and takes simvastatin daily.  He has restless leg syndrome which is well controlled with Requip.  He has anxiety and takes Celexa daily.  He has osteoarthritis which causes chronic back and joint pain.  He takes Norco 4 times a day which keeps his pain controlled.  He also takes gabapentin 3 times a day for treatment of the neuropathic pain.  He has been having increased discomfort in his upper legs.    The following portions of the patient's history were reviewed and updated as appropriate: allergies, current medications, past family history, past medical history, past social history, past surgical history and problem list.    Review of Systems   Constitutional: Negative for chills, fatigue and fever.   HENT: Negative for congestion, sneezing, sore throat and trouble swallowing.    Eyes: Negative for visual disturbance.   Respiratory: Negative for cough, chest tightness, shortness of breath and wheezing.    Cardiovascular: Negative for chest pain and palpitations.   Gastrointestinal: Negative for abdominal pain, constipation, diarrhea, nausea and vomiting.   Genitourinary: Negative for dysuria, frequency and urgency.   Musculoskeletal: Positive for arthralgias and back pain. Negative for neck pain.   Skin: Negative for rash.   Neurological: Negative for dizziness, weakness and headaches.   Psychiatric/Behavioral:        Patient denies any feelings of depression and has not felt down, hopeless or lost interest in any activities.   All other systems reviewed and are negative.      Objective   Vitals:    08/28/18 0922   BP: 128/68   BP Location: Left arm   Patient Position: Sitting   Cuff Size: Adult   Pulse: 89   Temp: 97.9 °F  "(36.6 °C)   TempSrc: Oral   SpO2: 98%   Weight: 86.6 kg (191 lb)   Height: 180.3 cm (71\")   PainSc: 5  Comment: left leg   PainLoc: Leg     Physical Exam   Constitutional: He is oriented to person, place, and time. He appears well-developed and well-nourished. No distress.   HENT:   Head: Normocephalic and atraumatic.   Nose: Nose normal.   Mouth/Throat: Oropharynx is clear and moist. No oropharyngeal exudate.   Eyes: Pupils are equal, round, and reactive to light. Conjunctivae and EOM are normal. No scleral icterus.   Neck: Normal range of motion. Neck supple.   Cardiovascular: Normal rate, regular rhythm and normal heart sounds.  Exam reveals no gallop and no friction rub.    No murmur heard.  Pulmonary/Chest: Effort normal. No respiratory distress. He has decreased breath sounds. He has no wheezes. He has no rales.   Abdominal: Soft. Bowel sounds are normal. He exhibits no distension. There is no tenderness. There is no rebound and no guarding.   Musculoskeletal: Normal range of motion. He exhibits no edema.   Lymphadenopathy:     He has no cervical adenopathy.   Neurological: He is alert and oriented to person, place, and time. No cranial nerve deficit.   Skin: Skin is warm and dry. No rash noted.   Psychiatric: He has a normal mood and affect. His behavior is normal. Judgment and thought content normal.   Nursing note and vitals reviewed.      Assessment/Plan   Osvaldo was seen today for osteoarthritis and peripheral neuropathy.    Diagnoses and all orders for this visit:    Impaired glucose tolerance  -     Hemoglobin A1c; Future    Essential hypertension  -     CBC & Differential; Future  -     Comprehensive Metabolic Panel; Future  -     T4, Free; Future  -     TSH; Future  -     Urinalysis With Culture If Indicated - Urine, Clean Catch; Future    Mixed hyperlipidemia  -     LDL Cholesterol, Direct; Future  -     Triglycerides; Future    Primary osteoarthritis involving multiple joints  -     " HYDROcodone-acetaminophen (NORCO)  MG per tablet; 1 tablet(s) by mouth 4 times per day for pain.    Peripheral autonomic neuropathy  -     gabapentin (NEURONTIN) 300 MG capsule; Take 1 capsule by mouth 3 (Three) Times a Day.    Generalized anxiety disorder    Drug therapy  -     ToxASSURE Select 13 Discrete -; Future    Screening for prostate cancer  -     PSA Screen; Future         Labs are reviewed with patient. Patient's glucose is slightly elevated.  Patient will continue to watch diet to help maintain control of the glucose.  Patient understands that there is an increased risk of developing diabetes in the future. His triglycerides are elevated, but the remainder of his lipids are controlled. He will continue with simvastatin.  His blood pressure is controlled and he will continue with his current blood pressure medication. He will continue with Requip for treatment of the RLS.  He will continue with Celexa for his anxiety.  He will continue with gabapentin for the neuropathic pain.  He will also continue to use Norco 4 times a day for treatment of the arthritic pain.  I have recommended adding over-the-counter coenzyme every 10 to see if this will help with the leg discomfort.    Patient understands the risks associated with this controlled medication, including tolerance and addiction.  Patient also agrees to only obtain this medication from me, and not from a another provider, unless that provider is covering for me in my absence.  Patient also agrees to be compliant in dosing, and not self adjust the dose of medication.  A signed controlled substance agreement is on file, and the patient has received a controlled substance education sheet at this a previous visit.  The patient has also signed a consent for treatment with a controlled substance as per [facility] policy. SANDRA was obtained.    PHQ-2/PHQ-9 Depression Screening 8/28/2018   Little interest or pleasure in doing things 0   Feeling down,  depressed, or hopeless 0   Trouble falling or staying asleep, or sleeping too much -   Feeling tired or having little energy -   Poor appetite or overeating -   Feeling bad about yourself - or that you are a failure or have let yourself or your family down -   Trouble concentrating on things, such as reading the newspaper or watching television -   Moving or speaking so slowly that other people could have noticed. Or the opposite - being so fidgety or restless that you have been moving around a lot more than usual -   Thoughts that you would be better off dead, or of hurting yourself in some way -   Total Score 0   If you checked off any problems, how difficult have these problems made it for you to do your work, take care of things at home, or get along with other people? -         Lab on 08/20/2018   Component Date Value Ref Range Status   • Color, UA 08/20/2018 Yellow  Yellow, Straw Final   • Appearance, UA 08/20/2018 Clear  Clear Final   • pH, UA 08/20/2018 8.5* 5.5 - 8.0 Final   • Specific Gravity, UA 08/20/2018 1.015  1.005 - 1.030 Final   • Glucose, UA 08/20/2018 Negative  Negative Final   • Ketones, UA 08/20/2018 Negative  Negative Final   • Bilirubin, UA 08/20/2018 Negative  Negative Final   • Blood, UA 08/20/2018 Moderate (2+)* Negative Final   • Protein, UA 08/20/2018 Negative  Negative Final   • Leuk Esterase, UA 08/20/2018 Negative  Negative Final   • Nitrite, UA 08/20/2018 Negative  Negative Final   • Urobilinogen, UA 08/20/2018 1.0 E.U./dL  0.2 - 1.0 E.U./dL Final   • Gabapentin, Ur 08/20/2018 183.2  ug/mL Final    REFERENCE RANGE: NOT ESTABLISHED   • Glucose 08/20/2018 127* 74 - 99 mg/dL Final   • BUN 08/20/2018 13  9 - 20 mg/dL Final   • Creatinine 08/20/2018 0.83  0.66 - 1.25 mg/dL Final   • Sodium 08/20/2018 143  137 - 145 mmol/L Final   • Potassium 08/20/2018 3.8  3.4 - 5.0 mmol/L Final   • Chloride 08/20/2018 110* 98 - 107 mmol/L Final   • CO2 08/20/2018 27.0  22.0 - 30.0 mmol/L Final   •  Calcium 08/20/2018 9.4  8.4 - 10.2 mg/dL Final   • Total Protein 08/20/2018 7.7  6.3 - 8.2 g/dL Final   • Albumin 08/20/2018 4.40  3.50 - 5.00 g/dL Final   • ALT (SGPT) 08/20/2018 39  <=50 U/L Final   • AST (SGOT) 08/20/2018 43  17 - 59 U/L Final   • Alkaline Phosphatase 08/20/2018 91  38 - 126 U/L Final   • Total Bilirubin 08/20/2018 0.8  0.2 - 1.3 mg/dL Final   • eGFR Non  Amer 08/20/2018 94  49 - 113 mL/min/1.73 Final   • Globulin 08/20/2018 3.3  2.3 - 3.5 gm/dL Final   • A/G Ratio 08/20/2018 1.3  1.1 - 1.8 g/dL Final   • BUN/Creatinine Ratio 08/20/2018 15.7  7.0 - 25.0 Final   • Anion Gap 08/20/2018 6.0  5.0 - 15.0 mmol/L Final   • Hemoglobin A1C 08/20/2018 5.8* 4 - 5.6 % Final   • Total Cholesterol 08/20/2018 195  150 - 200 mg/dL Final   • Triglycerides 08/20/2018 397* <=150 mg/dL Final   • HDL Cholesterol 08/20/2018 33* 40 - 59 mg/dL Final   • LDL Cholesterol  08/20/2018 83  <=100 mg/dL Final   • VLDL Cholesterol 08/20/2018 79.4  mg/dL Final   • LDL/HDL Ratio 08/20/2018 2.50  0.00 - 3.55 Final   • Free T4 08/20/2018 1.06  0.78 - 2.19 ng/dL Final   • TSH 08/20/2018 0.270* 0.460 - 4.680 mIU/mL Final   • WBC 08/20/2018 11.77* 3.20 - 9.80 10*3/mm3 Final   • RBC 08/20/2018 5.33  4.37 - 5.74 10*6/mm3 Final   • Hemoglobin 08/20/2018 16.6  13.7 - 17.3 g/dL Final   • Hematocrit 08/20/2018 47.9  39.0 - 49.0 % Final   • MCV 08/20/2018 89.9  80.0 - 98.0 fL Final   • MCH 08/20/2018 31.1  26.5 - 34.0 pg Final   • MCHC 08/20/2018 34.7  31.5 - 36.3 g/dL Final   • RDW 08/20/2018 13.6  11.5 - 14.5 % Final   • RDW-SD 08/20/2018 44.9* 35.1 - 43.9 fl Final   • MPV 08/20/2018 12.5* 8.0 - 12.0 fL Final   • Platelets 08/20/2018 194  150 - 450 10*3/mm3 Final   • Neutrophil % 08/20/2018 59.1  37.0 - 80.0 % Final   • Lymphocyte % 08/20/2018 31.3  10.0 - 50.0 % Final   • Monocyte % 08/20/2018 6.5  0.0 - 12.0 % Final   • Eosinophil % 08/20/2018 2.8  0.0 - 7.0 % Final   • Basophil % 08/20/2018 0.3  0.0 - 2.0 % Final   • Neutrophils,  Absolute 08/20/2018 6.97  2.00 - 8.60 10*3/mm3 Final   • Lymphocytes, Absolute 08/20/2018 3.68  0.60 - 4.20 10*3/mm3 Final   • Monocytes, Absolute 08/20/2018 0.76  0.00 - 0.90 10*3/mm3 Final   • Eosinophils, Absolute 08/20/2018 0.33  0.00 - 0.70 10*3/mm3 Final   • Basophils, Absolute 08/20/2018 0.03  0.00 - 0.20 10*3/mm3 Final   • RBC, UA 08/20/2018 6-12* None Seen /HPF Final   • WBC, UA 08/20/2018 None Seen  None Seen /HPF Final   • Bacteria, UA 08/20/2018 Trace* None Seen /HPF Final   • Squamous Epithelial Cells, UA 08/20/2018 None Seen  None Seen, 0-2 /HPF Final   • Hyaline Casts, UA 08/20/2018 None Seen  None Seen /LPF Final   • Methodology 08/20/2018 Manual Light Microscopy   Final   ]

## 2018-08-28 NOTE — PROGRESS NOTES
[facility]# [id number].

## 2018-09-11 DIAGNOSIS — I10 ESSENTIAL HYPERTENSION: Chronic | ICD-10-CM

## 2018-09-11 DIAGNOSIS — G25.81 RESTLESS LEG SYNDROME: Chronic | ICD-10-CM

## 2018-09-11 RX ORDER — LOSARTAN POTASSIUM 50 MG/1
50 TABLET ORAL DAILY
Qty: 30 TABLET | Refills: 1 | Status: SHIPPED | OUTPATIENT
Start: 2018-09-11 | End: 2018-11-07 | Stop reason: SDUPTHER

## 2018-09-11 RX ORDER — ROPINIROLE 0.5 MG/1
TABLET, FILM COATED ORAL
Qty: 30 TABLET | Refills: 1 | Status: SHIPPED | OUTPATIENT
Start: 2018-09-11 | End: 2018-11-07 | Stop reason: SDUPTHER

## 2018-09-18 ENCOUNTER — OFFICE VISIT (OUTPATIENT)
Dept: OTOLARYNGOLOGY | Facility: CLINIC | Age: 63
End: 2018-09-18

## 2018-09-18 VITALS — BODY MASS INDEX: 27.35 KG/M2 | WEIGHT: 195.4 LBS | HEIGHT: 71 IN | RESPIRATION RATE: 18 BRPM

## 2018-09-18 DIAGNOSIS — Z85.828 PERSONAL HISTORY OF MALIGNANT NEOPLASM OF SKIN: ICD-10-CM

## 2018-09-18 DIAGNOSIS — L71.8: Primary | ICD-10-CM

## 2018-09-18 PROCEDURE — 99213 OFFICE O/P EST LOW 20 MIN: CPT | Performed by: OTOLARYNGOLOGY

## 2018-09-18 RX ORDER — METRONIDAZOLE 10 MG/G
GEL TOPICAL
Qty: 60 G | Refills: 1 | Status: SHIPPED | OUTPATIENT
Start: 2018-09-18 | End: 2021-06-16

## 2018-09-20 NOTE — PROGRESS NOTES
Subjective   Osvaldo Conley . is a 63 y.o. male.       History of Present Illness   Patient has a history of resection of a basal cell carcinoma the left nose that was full-thickness and penetrated into the nasal cavity and was resected and closed with a flap.  Also has a history of left-sided medial maxillectomy due to inverted papilloma.  Returns today stating that his nose looks somewhat different on the left side any wanted to make sure this is not a cancer.  He is noted for about the last 2-3 weeks.  Is not bleeding or painful.  Nothing in particular brought this on.      The following portions of the patient's history were reviewed and updated as appropriate: allergies, current medications, past family history, past medical history, past social history, past surgical history and problem list.     reports that he has been smoking.  He has been smoking about 1.50 packs per day. He has never used smokeless tobacco. He reports that he drinks alcohol. He reports that he does not use drugs.   Patient is a tobacco user and has been counseled for use of tobacco products      Review of Systems   Constitutional: Negative for fever.           Objective   Physical Exam  Nose: Evidence of previous surgery but nothing that looks like recurrent neoplasm is present.  The area of concern is the left nasal tip which appears to have sebaceous glandular hyperplasia and erythema consistent with acne rosacea.      Assessment/Plan   Osvaldo was seen today for skin lesion.    Diagnoses and all orders for this visit:    Acne rosacea, glandular hyperplastic type    Personal history of malignant neoplasm of skin    Other orders  -     metroNIDAZOLE (METROGEL) 1 % gel; Apply to nose twice a day        Plan: Prescribed MetroGel to be used twice a day.  Reevaluate in 3 weeks, call sooner for problems.

## 2018-09-25 DIAGNOSIS — M15.9 PRIMARY OSTEOARTHRITIS INVOLVING MULTIPLE JOINTS: Chronic | ICD-10-CM

## 2018-09-25 RX ORDER — HYDROCODONE BITARTRATE AND ACETAMINOPHEN 10; 325 MG/1; MG/1
TABLET ORAL
Qty: 120 TABLET | Refills: 0 | Status: SHIPPED | OUTPATIENT
Start: 2018-09-25 | End: 2018-10-25 | Stop reason: SDUPTHER

## 2018-09-25 NOTE — TELEPHONE ENCOUNTER
Patient is up to date on controlled medication consent, Laz report, and appt from 08/28/2018. Refill on Hydrocodone to [facility].

## 2018-10-09 ENCOUNTER — OFFICE VISIT (OUTPATIENT)
Dept: OTOLARYNGOLOGY | Facility: CLINIC | Age: 63
End: 2018-10-09

## 2018-10-09 VITALS — RESPIRATION RATE: 18 BRPM | BODY MASS INDEX: 27.3 KG/M2 | HEIGHT: 71 IN | WEIGHT: 195 LBS

## 2018-10-09 DIAGNOSIS — Z86.018 HISTORY OF INVERTED PAPILLOMA: ICD-10-CM

## 2018-10-09 DIAGNOSIS — L71.8: Primary | ICD-10-CM

## 2018-10-09 DIAGNOSIS — Z85.828 PERSONAL HISTORY OF MALIGNANT NEOPLASM OF SKIN: ICD-10-CM

## 2018-10-09 DIAGNOSIS — J34.89 NASAL SEPTAL PERFORATION: ICD-10-CM

## 2018-10-09 PROCEDURE — 31231 NASAL ENDOSCOPY DX: CPT | Performed by: OTOLARYNGOLOGY

## 2018-10-09 PROCEDURE — 99213 OFFICE O/P EST LOW 20 MIN: CPT | Performed by: OTOLARYNGOLOGY

## 2018-10-11 NOTE — PROGRESS NOTES
Subjective   Osvaldo Conley . is a 63 y.o. male.       History of Present Illness   Patient has a history of resection of basal cell carcinoma the left nose that was a full-thickness lesion penetrated into the nasal cavity and was resected and closed with a flap.  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  Has left-sided facial pain that stopped be neuropathic in origin.  Most recently had been noted to have cutaneous changes of the tip of his nose there were thought to likely represent acne rosacea.  Patient was treated with topical metronidazole and returns for reevaluation stating that he thinks his nose is better.  He is not having any nasal bleeding.  Still has some nasal congestion.  Unfortunately continues to smoke.      The following portions of the patient's history were reviewed and updated as appropriate: allergies, current medications, past family history, past medical history, past social history, past surgical history and problem list.     reports that he has been smoking.  He has been smoking about 1.50 packs per day. He has never used smokeless tobacco. He reports that he drinks alcohol. He reports that he does not use drugs.   Patient is not a tobacco user and has not been counseled for use of tobacco products      Review of Systems   Constitutional: Negative for fever.           Objective   Physical Exam  Nose: The skin of the nose shows resolution of the previously noted erythema.  Intranasally there is crusting anteriorly.  There is an external nasal defect that is unchanged from previous consistent with his previous resection.    Nasal endoscopy is performed: Olvin-Synephrine and Xylocaine are instilled the nares bilaterally.  0° scope is passed into each nostril.  The inferior, middle, and superior turbinates as well as nasal septum and nasopharynx are examined.  Pertinent findings include: Large septal perforation with  well epithelialized edges and no evidence of recurrent lesion.  Left medial maxillectomy defect with no evidence of recurrent tumor, no blood, no purulence.      Assessment/Plan   Osvaldo was seen today for follow-up.    Diagnoses and all orders for this visit:    Acne rosacea, glandular hyperplastic type    Personal history of malignant neoplasm of skin    History of inverted papilloma    Nasal septal perforation      Plan: Reassured the patient he appeared to be at baseline.  He may use the metronidazole topically as needed when he notices changes in the skin of his nose.  Otherwise he is to continue saline nasal spray frequently.  I have again strongly urged the patient to discontinue cigarette smoking.  He may cancel his scheduled 6 month appointment for November and rescheduled for 6 months from now and call sooner for problems.

## 2018-10-12 DIAGNOSIS — F41.1 GENERALIZED ANXIETY DISORDER: Chronic | ICD-10-CM

## 2018-10-12 DIAGNOSIS — E78.2 MIXED HYPERLIPIDEMIA: Chronic | ICD-10-CM

## 2018-10-12 RX ORDER — CITALOPRAM 20 MG/1
20 TABLET ORAL DAILY
Qty: 30 TABLET | Refills: 1 | Status: SHIPPED | OUTPATIENT
Start: 2018-10-12 | End: 2018-12-07 | Stop reason: SDUPTHER

## 2018-10-12 RX ORDER — SIMVASTATIN 40 MG
40 TABLET ORAL NIGHTLY
Qty: 30 TABLET | Refills: 1 | Status: SHIPPED | OUTPATIENT
Start: 2018-10-12 | End: 2018-12-07 | Stop reason: SDUPTHER

## 2018-10-22 DIAGNOSIS — M15.9 PRIMARY OSTEOARTHRITIS INVOLVING MULTIPLE JOINTS: Chronic | ICD-10-CM

## 2018-10-22 NOTE — TELEPHONE ENCOUNTER
Rachael Armstrong Quin'Dee Marie, LPN  Phone Number: [phone]         Needs refill on  HYDROcodone-acetaminophen (NORCO)  [facility], [facility] [place].

## 2018-10-25 RX ORDER — HYDROCODONE BITARTRATE AND ACETAMINOPHEN 10; 325 MG/1; MG/1
TABLET ORAL
Qty: 120 TABLET | Refills: 0 | Status: SHIPPED | OUTPATIENT
Start: 2018-10-25 | End: 2018-11-26 | Stop reason: SDUPTHER

## 2018-11-07 DIAGNOSIS — G25.81 RESTLESS LEG SYNDROME: Chronic | ICD-10-CM

## 2018-11-07 DIAGNOSIS — I10 ESSENTIAL HYPERTENSION: Chronic | ICD-10-CM

## 2018-11-08 RX ORDER — ROPINIROLE 0.5 MG/1
TABLET, FILM COATED ORAL
Qty: 30 TABLET | Refills: 0 | Status: SHIPPED | OUTPATIENT
Start: 2018-11-08 | End: 2018-12-07 | Stop reason: SDUPTHER

## 2018-11-08 RX ORDER — HYDROCHLOROTHIAZIDE 25 MG/1
25 TABLET ORAL DAILY
Qty: 30 TABLET | Refills: 0 | Status: SHIPPED | OUTPATIENT
Start: 2018-11-08 | End: 2018-12-07 | Stop reason: SDUPTHER

## 2018-11-08 RX ORDER — LOSARTAN POTASSIUM 50 MG/1
50 TABLET ORAL DAILY
Qty: 30 TABLET | Refills: 0 | Status: SHIPPED | OUTPATIENT
Start: 2018-11-08 | End: 2018-12-07 | Stop reason: SDUPTHER

## 2018-11-26 ENCOUNTER — OFFICE VISIT (OUTPATIENT)
Dept: FAMILY MEDICINE CLINIC | Facility: CLINIC | Age: 63
End: 2018-11-26

## 2018-11-26 VITALS
WEIGHT: 197 LBS | BODY MASS INDEX: 27.58 KG/M2 | TEMPERATURE: 97.4 F | HEART RATE: 81 BPM | SYSTOLIC BLOOD PRESSURE: 122 MMHG | DIASTOLIC BLOOD PRESSURE: 70 MMHG | HEIGHT: 71 IN

## 2018-11-26 DIAGNOSIS — M15.9 PRIMARY OSTEOARTHRITIS INVOLVING MULTIPLE JOINTS: Chronic | ICD-10-CM

## 2018-11-26 DIAGNOSIS — I10 ESSENTIAL HYPERTENSION: Primary | Chronic | ICD-10-CM

## 2018-11-26 DIAGNOSIS — J44.9 COPD, MILD (HCC): ICD-10-CM

## 2018-11-26 DIAGNOSIS — M17.0 PRIMARY OSTEOARTHRITIS OF BOTH KNEES: Chronic | ICD-10-CM

## 2018-11-26 DIAGNOSIS — G90.9 PERIPHERAL AUTONOMIC NEUROPATHY: Chronic | ICD-10-CM

## 2018-11-26 DIAGNOSIS — F41.1 GENERALIZED ANXIETY DISORDER: Chronic | ICD-10-CM

## 2018-11-26 PROCEDURE — 99214 OFFICE O/P EST MOD 30 MIN: CPT | Performed by: INTERNAL MEDICINE

## 2018-11-26 PROCEDURE — 96372 THER/PROPH/DIAG INJ SC/IM: CPT | Performed by: INTERNAL MEDICINE

## 2018-11-26 RX ORDER — METHYLPREDNISOLONE ACETATE 80 MG/ML
80 INJECTION, SUSPENSION INTRA-ARTICULAR; INTRALESIONAL; INTRAMUSCULAR; SOFT TISSUE ONCE
Status: COMPLETED | OUTPATIENT
Start: 2018-11-26 | End: 2018-11-26

## 2018-11-26 RX ORDER — GABAPENTIN 300 MG/1
300 CAPSULE ORAL 3 TIMES DAILY
Qty: 90 CAPSULE | Refills: 2 | Status: SHIPPED | OUTPATIENT
Start: 2018-11-26 | End: 2019-01-24 | Stop reason: SDUPTHER

## 2018-11-26 RX ORDER — HYDROCODONE BITARTRATE AND ACETAMINOPHEN 10; 325 MG/1; MG/1
TABLET ORAL
Qty: 120 TABLET | Refills: 0 | Status: SHIPPED | OUTPATIENT
Start: 2018-11-26 | End: 2018-12-21 | Stop reason: SDUPTHER

## 2018-11-26 RX ADMIN — METHYLPREDNISOLONE ACETATE 80 MG: 80 INJECTION, SUSPENSION INTRA-ARTICULAR; INTRALESIONAL; INTRAMUSCULAR; SOFT TISSUE at 09:41

## 2018-11-26 NOTE — PROGRESS NOTES
[facility]#  [id number].

## 2018-11-26 NOTE — PROGRESS NOTES
Chief Complaint   Patient presents with   • Osteoarthritis     Subjective   Osvaldo Conley . is a 63 y.o. male who presents to the office for follow-up. He has hypertension and his blood pressure has been controlled.  He has anxiety and takes Celexa daily.  He has osteoarthritis which causes chronic back and joint pain.  He takes Norco 4 times a day which keeps his pain controlled.  He also takes gabapentin 3 times a day for treatment of the neuropathic pain.  He also complains of progressive arthritic pain in his knees.  We have discussed orthopedic referral in the past, and he is ready to proceed with this.  He has COPD and uses an albuterol inhaler as needed.  He has recently been having increased cough and congestion.  He started using Mucinex a couple of days ago, and this has helped with his symptoms.    The following portions of the patient's history were reviewed and updated as appropriate: allergies, current medications, past family history, past medical history, past social history, past surgical history and problem list.    Review of Systems   Constitutional: Negative for chills, fatigue and fever.   HENT: Positive for congestion. Negative for sneezing, sore throat and trouble swallowing.    Eyes: Negative for visual disturbance.   Respiratory: Positive for cough. Negative for chest tightness, shortness of breath and wheezing.    Cardiovascular: Negative for chest pain and palpitations.   Gastrointestinal: Negative for abdominal pain, constipation, diarrhea, nausea and vomiting.   Genitourinary: Negative for dysuria, frequency and urgency.   Musculoskeletal: Positive for arthralgias and back pain. Negative for neck pain.   Skin: Negative for rash.   Neurological: Negative for dizziness, weakness and headaches.   Psychiatric/Behavioral:        Patient denies any feelings of depression and has not felt down, hopeless or lost interest in any activities.   All other systems reviewed and are  "negative.      Objective   Vitals:    11/26/18 0922   BP: 122/70   BP Location: Left arm   Patient Position: Sitting   Cuff Size: Adult   Pulse: 81   Temp: 97.4 °F (36.3 °C)   TempSrc: Oral   Weight: 89.4 kg (197 lb)   Height: 180.3 cm (71\")   PainSc:   5   PainLoc: Knee  Comment: Bilat knee     Physical Exam   Constitutional: He is oriented to person, place, and time. He appears well-developed and well-nourished. No distress.   HENT:   Head: Normocephalic and atraumatic.   Nose: Nose normal.   Mouth/Throat: Oropharynx is clear and moist. No oropharyngeal exudate.   Eyes: Conjunctivae and EOM are normal. Pupils are equal, round, and reactive to light. No scleral icterus.   Neck: Normal range of motion. Neck supple.   Cardiovascular: Normal rate, regular rhythm and normal heart sounds. Exam reveals no gallop and no friction rub.   No murmur heard.  Pulmonary/Chest: Effort normal. No respiratory distress. He has decreased breath sounds. He has no wheezes. He has no rales.   Abdominal: Soft. Bowel sounds are normal. He exhibits no distension. There is no tenderness. There is no rebound and no guarding.   Musculoskeletal: Normal range of motion. He exhibits no edema.   Lymphadenopathy:     He has no cervical adenopathy.   Neurological: He is alert and oriented to person, place, and time. No cranial nerve deficit.   Skin: Skin is warm and dry. No rash noted.   Psychiatric: He has a normal mood and affect. His behavior is normal. Judgment and thought content normal.   Nursing note and vitals reviewed.      Assessment/Plan   Osvaldo was seen today for osteoarthritis.    Diagnoses and all orders for this visit:    Essential hypertension    Primary osteoarthritis involving multiple joints  -     HYDROcodone-acetaminophen (NORCO)  MG per tablet; 1 tablet(s) by mouth 4 times per day for pain.  -     methylPREDNISolone acetate (DEPO-medrol) injection 80 mg; Inject 1 mL into the appropriate muscle as directed by prescriber " 1 (One) Time.    Peripheral autonomic neuropathy  -     gabapentin (NEURONTIN) 300 MG capsule; Take 1 capsule by mouth 3 (Three) Times a Day.    Generalized anxiety disorder    COPD, mild (CMS/HCC)  -     methylPREDNISolone acetate (DEPO-medrol) injection 80 mg; Inject 1 mL into the appropriate muscle as directed by prescriber 1 (One) Time.    Primary osteoarthritis of both knees  -     Ambulatory Referral to Orthopedic Surgery         His blood pressure is controlled, so he will continue with his current blood pressure medication. He will continue with Celexa for treatment of his anxiety. He will continue with gabapentin for treatment of the neuropathy.  He will continue with Norco 4 times a day for treatment of the arthritic pain.  I will refer him to orthopedics for evaluation of the osteoarthritis in his knees.  He is given Depo-Medrol 80 mg IM to help with the COPD/bronchitis symptoms.  This will also help with his increased arthritic pain.  He will continue with the albuterol inhaler as needed.  He will also continue with Mucinex for the congestion.  No antibiotics are currently indicated, but he will let me know if symptoms worsen or fail to improve.    Patient understands the risks associated with this controlled medication, including tolerance and addiction.  Patient also agrees to only obtain this medication from me, and not from a another provider, unless that provider is covering for me in my absence.  Patient also agrees to be compliant in dosing, and not self adjust the dose of medication.  A signed controlled substance agreement is on file, and the patient has received a controlled substance education sheet at this a previous visit.  The patient has also signed a consent for treatment with a controlled substance as per [facility] policy. SANDRA was obtained.      PHQ-2/PHQ-9 Depression Screening 11/26/2018   Little interest or pleasure in doing things 0   Feeling down, depressed, or hopeless 0    Trouble falling or staying asleep, or sleeping too much -   Feeling tired or having little energy -   Poor appetite or overeating -   Feeling bad about yourself - or that you are a failure or have let yourself or your family down -   Trouble concentrating on things, such as reading the newspaper or watching television -   Moving or speaking so slowly that other people could have noticed. Or the opposite - being so fidgety or restless that you have been moving around a lot more than usual -   Thoughts that you would be better off dead, or of hurting yourself in some way -   Total Score 0   If you checked off any problems, how difficult have these problems made it for you to do your work, take care of things at home, or get along with other people? -

## 2018-12-07 DIAGNOSIS — F41.1 GENERALIZED ANXIETY DISORDER: Chronic | ICD-10-CM

## 2018-12-07 DIAGNOSIS — G25.81 RESTLESS LEG SYNDROME: Chronic | ICD-10-CM

## 2018-12-07 DIAGNOSIS — E78.2 MIXED HYPERLIPIDEMIA: Chronic | ICD-10-CM

## 2018-12-07 DIAGNOSIS — I10 ESSENTIAL HYPERTENSION: Chronic | ICD-10-CM

## 2018-12-07 RX ORDER — HYDROCHLOROTHIAZIDE 25 MG/1
TABLET ORAL
Qty: 30 TABLET | Refills: 2 | Status: SHIPPED | OUTPATIENT
Start: 2018-12-07 | End: 2019-01-09

## 2018-12-07 RX ORDER — ROPINIROLE 0.5 MG/1
TABLET, FILM COATED ORAL
Qty: 30 TABLET | Refills: 2 | Status: SHIPPED | OUTPATIENT
Start: 2018-12-07 | End: 2019-02-22 | Stop reason: SDUPTHER

## 2018-12-07 RX ORDER — CITALOPRAM 20 MG/1
20 TABLET ORAL DAILY
Qty: 30 TABLET | Refills: 2 | Status: SHIPPED | OUTPATIENT
Start: 2018-12-07 | End: 2019-02-22 | Stop reason: SDUPTHER

## 2018-12-07 RX ORDER — LOSARTAN POTASSIUM 50 MG/1
TABLET ORAL
Qty: 30 TABLET | Refills: 2 | Status: SHIPPED | OUTPATIENT
Start: 2018-12-07 | End: 2019-02-22 | Stop reason: SDUPTHER

## 2018-12-07 RX ORDER — SIMVASTATIN 40 MG
40 TABLET ORAL NIGHTLY
Qty: 30 TABLET | Refills: 2 | Status: SHIPPED | OUTPATIENT
Start: 2018-12-07 | End: 2019-02-22 | Stop reason: SDUPTHER

## 2018-12-21 DIAGNOSIS — M15.9 PRIMARY OSTEOARTHRITIS INVOLVING MULTIPLE JOINTS: Chronic | ICD-10-CM

## 2018-12-21 RX ORDER — HYDROCODONE BITARTRATE AND ACETAMINOPHEN 10; 325 MG/1; MG/1
TABLET ORAL
Qty: 120 TABLET | Refills: 0 | Status: SHIPPED | OUTPATIENT
Start: 2018-12-21 | End: 2019-01-22 | Stop reason: SDUPTHER

## 2019-01-08 DIAGNOSIS — R52 PAIN: Primary | ICD-10-CM

## 2019-01-09 ENCOUNTER — OFFICE VISIT (OUTPATIENT)
Dept: ORTHOPEDIC SURGERY | Facility: CLINIC | Age: 64
End: 2019-01-09

## 2019-01-09 VITALS — BODY MASS INDEX: 26.28 KG/M2 | WEIGHT: 194 LBS | HEIGHT: 72 IN

## 2019-01-09 DIAGNOSIS — G89.29 CHRONIC PAIN OF BOTH KNEES: Primary | ICD-10-CM

## 2019-01-09 DIAGNOSIS — F17.210 HEAVY SMOKER (MORE THAN 20 CIGARETTES PER DAY): ICD-10-CM

## 2019-01-09 DIAGNOSIS — J44.9 COPD, MILD (HCC): ICD-10-CM

## 2019-01-09 DIAGNOSIS — M25.562 CHRONIC PAIN OF BOTH KNEES: Primary | ICD-10-CM

## 2019-01-09 DIAGNOSIS — F41.1 GENERALIZED ANXIETY DISORDER: ICD-10-CM

## 2019-01-09 DIAGNOSIS — I10 ESSENTIAL HYPERTENSION: ICD-10-CM

## 2019-01-09 DIAGNOSIS — M25.561 CHRONIC PAIN OF BOTH KNEES: Primary | ICD-10-CM

## 2019-01-09 PROCEDURE — 99203 OFFICE O/P NEW LOW 30 MIN: CPT | Performed by: ORTHOPAEDIC SURGERY

## 2019-01-09 PROCEDURE — 99406 BEHAV CHNG SMOKING 3-10 MIN: CPT | Performed by: ORTHOPAEDIC SURGERY

## 2019-01-09 PROCEDURE — 20610 DRAIN/INJ JOINT/BURSA W/O US: CPT | Performed by: ORTHOPAEDIC SURGERY

## 2019-01-09 RX ADMIN — TRIAMCINOLONE ACETONIDE 40 MG: 40 INJECTION, SUSPENSION INTRA-ARTICULAR; INTRAMUSCULAR at 11:04

## 2019-01-09 RX ADMIN — LIDOCAINE HYDROCHLORIDE 2 ML: 20 INJECTION, SOLUTION INFILTRATION; PERINEURAL at 11:05

## 2019-01-09 RX ADMIN — TRIAMCINOLONE ACETONIDE 40 MG: 40 INJECTION, SUSPENSION INTRA-ARTICULAR; INTRAMUSCULAR at 11:05

## 2019-01-09 RX ADMIN — LIDOCAINE HYDROCHLORIDE 2 ML: 20 INJECTION, SOLUTION INFILTRATION; PERINEURAL at 11:04

## 2019-01-09 NOTE — PROGRESS NOTES
Osvaldo Conley . is a 63 y.o. male   Primary provider:  Ruiz Ayon MD       Chief Complaint   Patient presents with   • Right Knee - Knee Pain   • Left Knee - Knee Pain       HISTORY OF PRESENT ILLNESS: mk knee pain started about 2 years ago has gotten worse over the last year. xrays done today. No previous treatment.   No specific injury.  Has pain and swelling in both knees.  Mostly dull ache, some sharp pains.  Pain with activity  Worsening pain over last several months.  No numbness or tingling.  No steroid injections in knees  No NSAIDS    Knee Pain    The incident occurred more than 1 week ago. There was no injury mechanism. The pain is present in the left knee and right knee. The quality of the pain is described as aching, burning and stabbing. The pain is moderate. The pain has been intermittent since onset. Associated symptoms comments: Redness, clicking, swelling. He reports no foreign bodies present. Exacerbated by: standing, walking, driving, sitting,  He has tried NSAIDs for the symptoms.        CONCURRENT MEDICAL HISTORY:    Past Medical History:   Diagnosis Date   • Actinic keratosis    • Basal cell carcinoma     nose   • Chronic pain    • Essential hypertension    • Generalized anxiety disorder    • Hyperlipidemia    • Kidney stone    • Malignant melanoma of skin (CMS/HCC)    • Peripheral autonomic neuropathy    • Primary osteoarthritis involving multiple joints    • Prostatitis    • Restless leg syndrome    • Skin lesion    • Thyroid nodule    • Tick bite     without infection          Allergies   Allergen Reactions   • Codeine GI Intolerance         Current Outpatient Medications:   •  citalopram (CeleXA) 20 MG tablet, TAKE 1 TABLET BY MOUTH DAILY., Disp: 30 tablet, Rfl: 2  •  gabapentin (NEURONTIN) 300 MG capsule, Take 1 capsule by mouth 3 (Three) Times a Day., Disp: 90 capsule, Rfl: 2  •  HYDROcodone-acetaminophen (NORCO)  MG per tablet, 1 tablet(s) by mouth 4 times per day for  pain., Disp: 120 tablet, Rfl: 0  •  losartan (COZAAR) 50 MG tablet, TAKE 1 TABLET BY MOUTH EVERY DAY, Disp: 30 tablet, Rfl: 2  •  metroNIDAZOLE (METROGEL) 1 % gel, Apply to nose twice a day, Disp: 60 g, Rfl: 1  •  rOPINIRole (REQUIP) 0.5 MG tablet, TAKE 1 TABLET BY MOUTH 1 HOUR BEFORE BEDTIME AS DIRECTED, Disp: 30 tablet, Rfl: 2  •  simvastatin (ZOCOR) 40 MG tablet, TAKE 1 TABLET BY MOUTH EVERY NIGHT., Disp: 30 tablet, Rfl: 2    Past Surgical History:   Procedure Laterality Date   • BASAL CELL CARCINOMA EXCISION  07/25/2016    left nose, 1.4 cm margin with rhombic transposition flap closure.   • COLONOSCOPY     • CRYOTHERAPY  03/03/2016    Destruction of Premalignant Lesion (1st) 21821 (Actinic keratosis)    • KIDNEY STONE SURGERY     • NOSE SURGERY      Revision of nose    • OTHER SURGICAL HISTORY      Reconstruction of midface    • SKIN CANCER DESTRUCTION     • SKIN CANCER EXCISION      melanoma from back   • STEROID INJECTION  03/03/2016    Depo Medrol (Methylprednisone) 80mg (Osteoarthritis) (10)       Family History   Problem Relation Age of Onset   • Coronary artery disease Mother    • Lung cancer Father    • Cancer Father         bone   • Hypertension Sister    • COPD Sister    • Hypertension Sister        Social History     Socioeconomic History   • Marital status:      Spouse name: Not on file   • Number of children: Not on file   • Years of education: Not on file   • Highest education level: Not on file   Social Needs   • Financial resource strain: Not on file   • Food insecurity - worry: Not on file   • Food insecurity - inability: Not on file   • Transportation needs - medical: Not on file   • Transportation needs - non-medical: Not on file   Occupational History   • Not on file   Tobacco Use   • Smoking status: Current Every Day Smoker     Packs/day: 1.00   • Smokeless tobacco: Never Used   Substance and Sexual Activity   • Alcohol use: Yes     Comment: occasional beer- 2or 3 per month   • Drug  "use: No   • Sexual activity: Defer   Other Topics Concern   • Not on file   Social History Narrative   • Not on file        Review of Systems   Respiratory: Positive for cough.    Skin: Positive for rash.   Neurological: Positive for headaches.   Psychiatric/Behavioral: Positive for sleep disturbance.   All other systems reviewed and are negative.      PHYSICAL EXAMINATION:       Ht 181.6 cm (71.5\")   Wt 88 kg (194 lb)   BMI 26.68 kg/m²     Physical Exam   Constitutional: He is oriented to person, place, and time. He appears well-developed and well-nourished.   Neurological: He is alert and oriented to person, place, and time.   Psychiatric: He has a normal mood and affect. His behavior is normal. Judgment and thought content normal.         GAIT:     []  Normal  [x]  Antalgic    Assistive device: [x]  None  []  Walker     []  Crutches  []  Cane     []  Wheelchair  []  Stretcher    Right Knee Exam     Muscle Strength   The patient has normal right knee strength.    Tenderness   Right knee tenderness location: diffuse.    Range of Motion   Extension: -5   Flexion: 110     Tests   Varus: negative Valgus: negative  Drawer:  Anterior - negative    Posterior - negative    Other   Sensation: normal  Pulse: present  Swelling: mild    Comments:  Crepitation on motion.  Mild to moderate pain through arc of motion      Left Knee Exam     Muscle Strength   The patient has normal left knee strength.    Tenderness   Left knee tenderness location: diffuse.    Range of Motion   Extension: -5   Flexion: 110     Tests   Varus: negative Valgus: negative  Drawer:  Anterior - negative     Posterior - negative    Other   Sensation: normal  Pulse: present  Swelling: none    Comments:  Crepitation with motion  Mild to moderate pain through arc of motion                  Xr Knee Bilateral Ap Standing    Result Date: 1/9/2019  Narrative: Ordering Provider:  Jamarcus Weems MD Ordering Diagnosis/Indication:  Pain Procedure:  XR " KNEE BILATERAL AP STANDING Exam Date:  1/9/19 COMPARISON:  Not applicable, no relevant images available.     Impression:  AP bilateral standing of the knees with lateral of each knee shows moderate to severe osteophytic change in the right knee with varus positioning and essentially bone on bone collapse of the medial compartment.  There are marginal osteophytes in both medial and lateral aspects of the knee.  There appears to be an arthritic cyst just under the tibial spine of the proximal tibia as well.  No acute findings are noted.  Left knee shows mild to moderate arthritic change with medial joint space narrowing and marginal osteophytes on both sides of the knee.  Each lateral view shows moderate to severe patellofemoral arthritic change and mild posterior osteophyte formation as well. Jamarcus Weems MD 1/9/19     Xr Knee 1 Or 2 View Bilateral    Result Date: 1/9/2019  Narrative: Ordering Provider:  Jamarcus Weems MD Ordering Diagnosis/Indication:  Pain Procedure:  XR KNEE 1 OR 2 VW BILATERAL Exam Date:  1/9/19 COMPARISON:  Not applicable, no relevant images available.     Impression:  AP bilateral standing of the knees with lateral of each knee shows moderate to severe osteophytic change in the right knee with varus positioning and essentially bone on bone collapse of the medial compartment.  There are marginal osteophytes in both medial and lateral aspects of the knee.  There appears to be an arthritic cyst just under the tibial spine of the proximal tibia as well.  No acute findings are noted.  Left knee shows mild to moderate arthritic change with medial joint space narrowing and marginal osteophytes on both sides of the knee.  Each lateral view shows moderate to severe patellofemoral arthritic change and mild posterior osteophyte formation as well. Jamarcus Weems MD 1/9/19           ASSESSMENT:    Diagnoses and all orders for this visit:    Chronic pain of both knees  -     Large  Joint Arthrocentesis: R knee  -     Large Joint Arthrocentesis: L knee    Essential hypertension  -     Large Joint Arthrocentesis: R knee  -     Large Joint Arthrocentesis: L knee    COPD, mild (CMS/HCC)  -     Large Joint Arthrocentesis: R knee  -     Large Joint Arthrocentesis: L knee    Generalized anxiety disorder  -     Large Joint Arthrocentesis: R knee  -     Large Joint Arthrocentesis: L knee    Heavy smoker (more than 20 cigarettes per day)  -     Large Joint Arthrocentesis: R knee  -     Large Joint Arthrocentesis: L knee          PLAN    Discussed steroid injection in both knees  Mobilize as tolerated.  Strength and conditioning exercises.  Discussed eventual TKA as possibility.    I advised Osvaldo of the risks of continuing to use tobacco.     During this visit, I spent 5 minutes counseling the patient regarding tobacco cessation.      Large Joint Arthrocentesis: R knee  Date/Time: 1/9/2019 11:04 AM  Consent given by: patient  Site marked: site marked  Timeout: Immediately prior to procedure a time out was called to verify the correct patient, procedure, equipment, support staff and site/side marked as required   Supporting Documentation  Indications: pain   Procedure Details  Location: knee - R knee  Preparation: Patient was prepped and draped in the usual sterile fashion  Needle size: 22 G  Approach: anteromedial  Medications administered: 40 mg triamcinolone acetonide 40 MG/ML; 2 mL lidocaine 2%  Patient tolerance: patient tolerated the procedure well with no immediate complications    Large Joint Arthrocentesis: L knee  Date/Time: 1/9/2019 11:05 AM  Consent given by: patient  Site marked: site marked  Timeout: Immediately prior to procedure a time out was called to verify the correct patient, procedure, equipment, support staff and site/side marked as required   Supporting Documentation  Indications: pain   Procedure Details  Location: knee - L knee  Preparation: Patient was prepped and draped in  the usual sterile fashion  Needle size: 22 G  Approach: anteromedial  Medications administered: 40 mg triamcinolone acetonide 40 MG/ML; 2 mL lidocaine 2%  Patient tolerance: patient tolerated the procedure well with no immediate complications          Return in about 3 months (around 4/9/2019) for recheck.    Jamarcus Weems MD

## 2019-01-10 RX ORDER — LIDOCAINE HYDROCHLORIDE 20 MG/ML
2 INJECTION, SOLUTION INFILTRATION; PERINEURAL
Status: COMPLETED | OUTPATIENT
Start: 2019-01-09 | End: 2019-01-09

## 2019-01-10 RX ORDER — TRIAMCINOLONE ACETONIDE 40 MG/ML
40 INJECTION, SUSPENSION INTRA-ARTICULAR; INTRAMUSCULAR
Status: COMPLETED | OUTPATIENT
Start: 2019-01-09 | End: 2019-01-09

## 2019-01-22 DIAGNOSIS — M15.9 PRIMARY OSTEOARTHRITIS INVOLVING MULTIPLE JOINTS: Chronic | ICD-10-CM

## 2019-01-22 RX ORDER — HYDROCODONE BITARTRATE AND ACETAMINOPHEN 10; 325 MG/1; MG/1
TABLET ORAL
Qty: 120 TABLET | Refills: 0 | Status: SHIPPED | OUTPATIENT
Start: 2019-01-22 | End: 2019-02-22 | Stop reason: SDUPTHER

## 2019-01-22 NOTE — TELEPHONE ENCOUNTER
Rachael Armstrong Quin'Dee Marie, LPN  Phone Number: [phone]          Needs refill on HYDROcodone-acetaminophen (NORCO)  [facility], [facility] [place]

## 2019-01-24 DIAGNOSIS — G90.9 PERIPHERAL AUTONOMIC NEUROPATHY: Chronic | ICD-10-CM

## 2019-01-24 RX ORDER — GABAPENTIN 300 MG/1
CAPSULE ORAL
Qty: 90 CAPSULE | Refills: 0 | Status: SHIPPED | OUTPATIENT
Start: 2019-01-24 | End: 2019-02-22 | Stop reason: SDUPTHER

## 2019-01-24 NOTE — TELEPHONE ENCOUNTER
Last office visit, SANDRA and Controlled Consent was 11-26-18.  Patient has an appointment scheduled for 2-22-19.  Prescription was last sent on 11-26-18 with 2 refills.

## 2019-02-11 ENCOUNTER — LAB (OUTPATIENT)
Dept: LAB | Facility: OTHER | Age: 64
End: 2019-02-11

## 2019-02-11 DIAGNOSIS — R73.02 IMPAIRED GLUCOSE TOLERANCE: Chronic | ICD-10-CM

## 2019-02-11 DIAGNOSIS — E78.2 MIXED HYPERLIPIDEMIA: Chronic | ICD-10-CM

## 2019-02-11 DIAGNOSIS — Z79.899 DRUG THERAPY: ICD-10-CM

## 2019-02-11 DIAGNOSIS — Z12.5 SCREENING FOR PROSTATE CANCER: ICD-10-CM

## 2019-02-11 DIAGNOSIS — I10 ESSENTIAL HYPERTENSION: ICD-10-CM

## 2019-02-11 LAB
ALBUMIN SERPL-MCNC: 5 G/DL (ref 3.5–5)
ALBUMIN/GLOB SERPL: 1.5 G/DL (ref 1.1–1.8)
ALP SERPL-CCNC: 96 U/L (ref 38–126)
ALT SERPL W P-5'-P-CCNC: 50 U/L
ANION GAP SERPL CALCULATED.3IONS-SCNC: 13 MMOL/L (ref 5–15)
ARTICHOKE IGE QN: 117 MG/DL (ref 1–129)
AST SERPL-CCNC: 37 U/L (ref 17–59)
BACTERIA UR QL AUTO: ABNORMAL /HPF
BASOPHILS # BLD AUTO: 0.02 10*3/MM3 (ref 0–0.2)
BASOPHILS NFR BLD AUTO: 0.1 % (ref 0–2)
BILIRUB SERPL-MCNC: 1 MG/DL (ref 0.2–1.3)
BILIRUB UR QL STRIP: ABNORMAL
BUN BLD-MCNC: 15 MG/DL (ref 9–20)
BUN/CREAT SERPL: 15.8 (ref 7–25)
CALCIUM SPEC-SCNC: 9.5 MG/DL (ref 8.4–10.2)
CHLORIDE SERPL-SCNC: 104 MMOL/L (ref 98–107)
CLARITY UR: ABNORMAL
CO2 SERPL-SCNC: 25 MMOL/L (ref 22–30)
COLOR UR: ABNORMAL
CREAT BLD-MCNC: 0.95 MG/DL (ref 0.66–1.25)
DEPRECATED RDW RBC AUTO: 46.1 FL (ref 35.1–43.9)
EOSINOPHIL # BLD AUTO: 0.12 10*3/MM3 (ref 0–0.7)
EOSINOPHIL NFR BLD AUTO: 0.7 % (ref 0–7)
ERYTHROCYTE [DISTWIDTH] IN BLOOD BY AUTOMATED COUNT: 14.7 % (ref 11.5–14.5)
GFR SERPL CREATININE-BSD FRML MDRD: 80 ML/MIN/1.73 (ref 49–113)
GLOBULIN UR ELPH-MCNC: 3.3 GM/DL (ref 2.3–3.5)
GLUCOSE BLD-MCNC: 127 MG/DL (ref 74–99)
GLUCOSE UR STRIP-MCNC: NEGATIVE MG/DL
HBA1C MFR BLD: 5.9 % (ref 4–5.6)
HCT VFR BLD AUTO: 50.9 % (ref 39–49)
HGB BLD-MCNC: 17.7 G/DL (ref 13.7–17.3)
HGB UR QL STRIP.AUTO: ABNORMAL
HYALINE CASTS UR QL AUTO: ABNORMAL /LPF
KETONES UR QL STRIP: ABNORMAL
LEUKOCYTE ESTERASE UR QL STRIP.AUTO: NEGATIVE
LYMPHOCYTES # BLD AUTO: 4.82 10*3/MM3 (ref 0.6–4.2)
LYMPHOCYTES NFR BLD AUTO: 28.8 % (ref 10–50)
MCH RBC QN AUTO: 30.2 PG (ref 26.5–34)
MCHC RBC AUTO-ENTMCNC: 34.8 G/DL (ref 31.5–36.3)
MCV RBC AUTO: 86.9 FL (ref 80–98)
MONOCYTES # BLD AUTO: 0.81 10*3/MM3 (ref 0–0.9)
MONOCYTES NFR BLD AUTO: 4.8 % (ref 0–12)
MUCOUS THREADS URNS QL MICRO: ABNORMAL /HPF
NEUTROPHILS # BLD AUTO: 10.96 10*3/MM3 (ref 2–8.6)
NEUTROPHILS NFR BLD AUTO: 65.6 % (ref 37–80)
NITRITE UR QL STRIP: NEGATIVE
PH UR STRIP.AUTO: 8 [PH] (ref 5.5–8)
PLATELET # BLD AUTO: 260 10*3/MM3 (ref 150–450)
PMV BLD AUTO: 11.5 FL (ref 8–12)
POTASSIUM BLD-SCNC: 3.8 MMOL/L (ref 3.4–5)
PROT SERPL-MCNC: 8.3 G/DL (ref 6.3–8.2)
PROT UR QL STRIP: ABNORMAL
PSA SERPL-MCNC: 2.81 NG/ML (ref 0–4)
RBC # BLD AUTO: 5.86 10*6/MM3 (ref 4.37–5.74)
RBC # UR: ABNORMAL /HPF
REF LAB TEST METHOD: ABNORMAL
SODIUM BLD-SCNC: 142 MMOL/L (ref 137–145)
SP GR UR STRIP: 1.01 (ref 1–1.03)
SQUAMOUS #/AREA URNS HPF: ABNORMAL /HPF
T4 FREE SERPL-MCNC: 1.18 NG/DL (ref 0.78–2.19)
TRIGL SERPL-MCNC: 297 MG/DL
TSH SERPL DL<=0.05 MIU/L-ACNC: 0.41 MIU/ML (ref 0.46–4.68)
UROBILINOGEN UR QL STRIP: ABNORMAL
WBC NRBC COR # BLD: 16.73 10*3/MM3 (ref 3.2–9.8)
WBC UR QL AUTO: ABNORMAL /HPF

## 2019-02-11 PROCEDURE — 83036 HEMOGLOBIN GLYCOSYLATED A1C: CPT | Performed by: INTERNAL MEDICINE

## 2019-02-11 PROCEDURE — 83721 ASSAY OF BLOOD LIPOPROTEIN: CPT | Performed by: INTERNAL MEDICINE

## 2019-02-11 PROCEDURE — 80050 GENERAL HEALTH PANEL: CPT | Performed by: INTERNAL MEDICINE

## 2019-02-11 PROCEDURE — 84439 ASSAY OF FREE THYROXINE: CPT | Performed by: INTERNAL MEDICINE

## 2019-02-11 PROCEDURE — 84478 ASSAY OF TRIGLYCERIDES: CPT | Performed by: INTERNAL MEDICINE

## 2019-02-11 PROCEDURE — G0481 DRUG TEST DEF 8-14 CLASSES: HCPCS | Performed by: INTERNAL MEDICINE

## 2019-02-11 PROCEDURE — G0103 PSA SCREENING: HCPCS | Performed by: INTERNAL MEDICINE

## 2019-02-11 PROCEDURE — 80307 DRUG TEST PRSMV CHEM ANLYZR: CPT | Performed by: INTERNAL MEDICINE

## 2019-02-11 PROCEDURE — 81001 URINALYSIS AUTO W/SCOPE: CPT | Performed by: INTERNAL MEDICINE

## 2019-02-17 LAB
7-AMINOCLONAZEPAM UR: NOT DETECTED NG/MG CREAT
A-OH ALPRAZ/CREAT UR: NOT DETECTED NG/MG CREAT
ALFENTANIL UR QL: NOT DETECTED NG/MG CREAT
ALPHA-HYDROXYTRIAZOLAM, URINE: NOT DETECTED NG/MG CREAT
AMOBARBITAL UR: NOT DETECTED
AMPHET UR QL CFM: NOT DETECTED NG/MG CREAT
AMPHETAMINES UR QL SCN: NEGATIVE
BARBITAL UR QL CFM: NOT DETECTED
BARBITURATES UR QL SCN: NEGATIVE
BENZODIAZ UR QL SCN: NEGATIVE
BENZOYLECGONINE UR: NOT DETECTED NG/MG CREAT
BUPRENORPHINE UR QL: NEGATIVE
BUPRENORPHINE UR QL: NOT DETECTED NG/MG CREAT
BUTABARBITAL UR QL: NOT DETECTED
BUTALBITAL UR QL: NOT DETECTED
CANNABINOIDS UR QL CFM: NEGATIVE
CLONAZEPAM UR QL: NOT DETECTED NG/MG CREAT
COCAETHYLENE UR QL CFM: NOT DETECTED NG/MG CREAT
COCAINE UR QL CFM: NEGATIVE
CODEINE UR QL: NOT DETECTED NG/MG CREAT
CONV COCAINE, UR: NOT DETECTED NG/MG CREAT
CONV REPORT SUMMARY: NORMAL
CREAT 24H UR-MCNC: 403 MG/DL
DESALKYLFLURAZ/CREAT UR: NOT DETECTED NG/MG CREAT
DIAZEPAM UR-MCNC: NOT DETECTED NG/MG CREAT
DIHYDROCODEINE UR: 95 NG/MG CREAT
EDDP SERPL QL: NOT DETECTED NG/MG CREAT
ETHANOL SCREEN URINE (REF): NEGATIVE
ETHANOL UR-MCNC: NOT DETECTED G/DL
FENTANYL UR QL: NEGATIVE
FENTANYL+NORFENTANYL UR QL SCN: NOT DETECTED NG/MG CREAT
HX OF MEDICATION USE: NORMAL
HYDROCODONE UR QL: 115 NG/MG CREAT
HYDROMORPHONE UR QL: 17 NG/MG CREAT
LEVEL OF DETECTION:: NORMAL
LORAZEPAM UR-MCNC: NOT DETECTED NG/MG CREAT
LORAZEPAM/CREAT UR: NOT DETECTED NG/MG CREAT
MDA SERPLBLD-MCNC: NOT DETECTED NG/MG CREAT
MDMA UR QL SCN: NOT DETECTED NG/MG CREAT
MEPHOBARBITAL UR QL CFM: NOT DETECTED
METHADONE BLD QL SCN: NEGATIVE
METHADONE, URINE: NOT DETECTED NG/MG CREAT
METHAMPHETAMINE UR: NOT DETECTED NG/MG CREAT
MIDAZOLAM UR-MCNC: NOT DETECTED NG/MG CREAT
MORPHINE UR QL: NOT DETECTED NG/MG CREAT
N-DESMETHYLTRAMADOL, U: NOT DETECTED NG/MG CREAT
NARCOTICS UR: NEGATIVE
NORBUPRENORPHINE SERPLBLD-MCNC: NOT DETECTED NG/MG CREAT
NORCODEINE UR-MCNC: NOT DETECTED NG/MG CREAT
NORDIAZEPAM SERPL CFM-MCNC: NOT DETECTED NG/MG CREAT
NORFENTANYL UR: NOT DETECTED NG/MG CREAT
NOROXYMORPHONE: NOT DETECTED NG/MG CREAT
O-DESMETHYLTRAMADOL, UR: NOT DETECTED NG/MG CREAT
OPIATES UR QL CFM: NORMAL
OPIATES, URINE, NORHYDROCODONE: 1130 NG/MG CREAT
OPIATES, URINE, NOROXYCODONE: NOT DETECTED NG/MG CREAT
OXAZEPAM UR QL: NOT DETECTED NG/MG CREAT
OXYCODONE UR QL: NEGATIVE
OXYCODONE UR: NOT DETECTED NG/MG CREAT
OXYMORPHONE UR: NOT DETECTED NG/MG CREAT
PENTOBARBITAL UR: NOT DETECTED
PHENOBARB UR QL: NOT DETECTED
SECOBARBITAL UR QL: NOT DETECTED
SUFENTANIL UR QL: NOT DETECTED NG/MG CREAT
TAPENTADOL SERPLBLD-MCNC: NEGATIVE NG/ML
TAPENTADOL UR-MCNC: NOT DETECTED NG/MG CREAT
TEMAZEPAM UR QL CFM: NOT DETECTED NG/MG CREAT
THC UR CFM-MCNC: NOT DETECTED NG/MG CREAT
THIOPENTAL UR QL CFM: NOT DETECTED
TRAMADOL UR: NOT DETECTED NG/MG CREAT

## 2019-02-22 ENCOUNTER — OFFICE VISIT (OUTPATIENT)
Dept: FAMILY MEDICINE CLINIC | Facility: CLINIC | Age: 64
End: 2019-02-22

## 2019-02-22 VITALS
SYSTOLIC BLOOD PRESSURE: 130 MMHG | DIASTOLIC BLOOD PRESSURE: 76 MMHG | HEIGHT: 72 IN | HEART RATE: 64 BPM | WEIGHT: 189 LBS | TEMPERATURE: 98 F | BODY MASS INDEX: 25.6 KG/M2

## 2019-02-22 DIAGNOSIS — E78.2 MIXED HYPERLIPIDEMIA: Chronic | ICD-10-CM

## 2019-02-22 DIAGNOSIS — G25.81 RESTLESS LEG SYNDROME: Chronic | ICD-10-CM

## 2019-02-22 DIAGNOSIS — L02.429 BOIL, LEG: ICD-10-CM

## 2019-02-22 DIAGNOSIS — M15.9 PRIMARY OSTEOARTHRITIS INVOLVING MULTIPLE JOINTS: Chronic | ICD-10-CM

## 2019-02-22 DIAGNOSIS — F41.1 GENERALIZED ANXIETY DISORDER: Chronic | ICD-10-CM

## 2019-02-22 DIAGNOSIS — R73.02 IMPAIRED GLUCOSE TOLERANCE: Primary | Chronic | ICD-10-CM

## 2019-02-22 DIAGNOSIS — G90.9 PERIPHERAL AUTONOMIC NEUROPATHY: Chronic | ICD-10-CM

## 2019-02-22 DIAGNOSIS — I10 ESSENTIAL HYPERTENSION: Chronic | ICD-10-CM

## 2019-02-22 PROCEDURE — 99214 OFFICE O/P EST MOD 30 MIN: CPT | Performed by: INTERNAL MEDICINE

## 2019-02-22 RX ORDER — HYDROCODONE BITARTRATE AND ACETAMINOPHEN 10; 325 MG/1; MG/1
TABLET ORAL
Qty: 120 TABLET | Refills: 0 | Status: SHIPPED | OUTPATIENT
Start: 2019-02-22 | End: 2019-03-21 | Stop reason: SDUPTHER

## 2019-02-22 RX ORDER — SULFAMETHOXAZOLE AND TRIMETHOPRIM 800; 160 MG/1; MG/1
1 TABLET ORAL 2 TIMES DAILY
Qty: 20 TABLET | Refills: 0 | Status: SHIPPED | OUTPATIENT
Start: 2019-02-22 | End: 2019-03-04

## 2019-02-22 RX ORDER — GABAPENTIN 300 MG/1
300 CAPSULE ORAL 3 TIMES DAILY
Qty: 90 CAPSULE | Refills: 2 | Status: SHIPPED | OUTPATIENT
Start: 2019-02-22 | End: 2019-05-21 | Stop reason: SDUPTHER

## 2019-02-22 RX ORDER — LOSARTAN POTASSIUM 50 MG/1
50 TABLET ORAL DAILY
Qty: 30 TABLET | Refills: 11 | Status: SHIPPED | OUTPATIENT
Start: 2019-02-22 | End: 2020-02-14 | Stop reason: SDUPTHER

## 2019-02-22 RX ORDER — CITALOPRAM 20 MG/1
20 TABLET ORAL DAILY
Qty: 30 TABLET | Refills: 11 | Status: SHIPPED | OUTPATIENT
Start: 2019-02-22 | End: 2020-02-14 | Stop reason: SDUPTHER

## 2019-02-22 RX ORDER — SIMVASTATIN 40 MG
40 TABLET ORAL NIGHTLY
Qty: 30 TABLET | Refills: 11 | Status: SHIPPED | OUTPATIENT
Start: 2019-02-22 | End: 2020-02-14 | Stop reason: SDUPTHER

## 2019-02-22 RX ORDER — ROPINIROLE 0.5 MG/1
0.5 TABLET, FILM COATED ORAL NIGHTLY
Qty: 30 TABLET | Refills: 11 | Status: SHIPPED | OUTPATIENT
Start: 2019-02-22 | End: 2020-02-14 | Stop reason: SDUPTHER

## 2019-02-22 NOTE — PROGRESS NOTES
Chief Complaint   Patient presents with   • Osteoarthritis     3 mo med refill     Subjective   Osvaldo Conley . is a 63 y.o. male who presents to the office for follow-up and review of labs.  He has impaired glucose tolerance, and has been monitoring his dietary intake of sugar.  He has hypertension and his blood pressure has been controlled.  He has hyperlipidemia and takes simvastatin daily.  He has restless leg syndrome which is well controlled with Requip.  He has anxiety and takes Celexa daily.  He has osteoarthritis which causes chronic back and joint pain.  He takes Norco 4 times a day which keeps his pain controlled.  He also takes gabapentin 3 times a day for treatment of the neuropathic pain.  At the last visit I referred him to orthopedics secondary to increased pain in his knees.  He continues to follow with orthopedics.    He complains of a boil on the left hip/thigh area.  This has been present for the past week.  It has been increasing in size and causing quite a bit of discomfort.  There has been no drainage.    The following portions of the patient's history were reviewed and updated as appropriate: allergies, current medications, past family history, past medical history, past social history, past surgical history and problem list.    Review of Systems   Constitutional: Negative for chills, fatigue and fever.   HENT: Negative for congestion, sneezing, sore throat and trouble swallowing.    Eyes: Negative for visual disturbance.   Respiratory: Negative for cough, chest tightness, shortness of breath and wheezing.    Cardiovascular: Negative for chest pain and palpitations.   Gastrointestinal: Negative for abdominal pain, constipation, diarrhea, nausea and vomiting.   Genitourinary: Negative for dysuria, frequency and urgency.   Musculoskeletal: Positive for arthralgias and back pain. Negative for neck pain.   Skin: Negative for rash.   Neurological: Negative for dizziness, weakness and  "headaches.   Psychiatric/Behavioral:        Patient denies any feelings of depression and has not felt down, hopeless or lost interest in any activities.   All other systems reviewed and are negative.      Objective   Vitals:    02/22/19 0902   BP: 130/76   BP Location: Left arm   Patient Position: Sitting   Cuff Size: Adult   Pulse: 64   Temp: 98 °F (36.7 °C)   TempSrc: Oral   Weight: 85.7 kg (189 lb)   Height: 181.6 cm (71.5\")   PainSc:   6   PainLoc: Knee  Comment: bilat     Physical Exam   Constitutional: He is oriented to person, place, and time. He appears well-developed and well-nourished. No distress.   HENT:   Head: Normocephalic and atraumatic.   Nose: Nose normal.   Mouth/Throat: Oropharynx is clear and moist. No oropharyngeal exudate.   Eyes: Conjunctivae and EOM are normal. Pupils are equal, round, and reactive to light. No scleral icterus.   Neck: Normal range of motion. Neck supple.   Cardiovascular: Normal rate, regular rhythm and normal heart sounds. Exam reveals no gallop and no friction rub.   No murmur heard.  Pulmonary/Chest: Effort normal. No respiratory distress. He has decreased breath sounds. He has no wheezes. He has no rales.   Abdominal: Soft. Bowel sounds are normal. He exhibits no distension. There is no tenderness. There is no rebound and no guarding.   Musculoskeletal: Normal range of motion. He exhibits no edema.   Lymphadenopathy:     He has no cervical adenopathy.   Neurological: He is alert and oriented to person, place, and time. No cranial nerve deficit.   Skin: Skin is warm and dry. No rash noted.   He has a quarter sized erythematous cystic appearing area present on the left outer upper thigh/hip area.  There is increased warmth.  There is no drainage noted.   Psychiatric: He has a normal mood and affect. His behavior is normal. Judgment and thought content normal.   Nursing note and vitals reviewed.      Assessment/Plan   Osvaldo was seen today for " osteoarthritis.    Diagnoses and all orders for this visit:    Impaired glucose tolerance  -     Hemoglobin A1c; Future    Essential hypertension  -     CBC & Differential; Future  -     Comprehensive Metabolic Panel; Future  -     T4, Free; Future  -     TSH; Future  -     Urinalysis With Culture If Indicated - Urine, Clean Catch; Future  -     losartan (COZAAR) 50 MG tablet; Take 1 tablet by mouth Daily.    Mixed hyperlipidemia  -     Lipid Panel; Future  -     simvastatin (ZOCOR) 40 MG tablet; Take 1 tablet by mouth Every Night.    Peripheral autonomic neuropathy  -     gabapentin (NEURONTIN) 300 MG capsule; Take 1 capsule by mouth 3 (Three) Times a Day.    Primary osteoarthritis involving multiple joints  -     HYDROcodone-acetaminophen (NORCO)  MG per tablet; 1 tablet(s) by mouth 4 times per day for pain.    Restless leg syndrome  -     rOPINIRole (REQUIP) 0.5 MG tablet; Take 1 tablet by mouth Every Night. Take 1 hour before bedtime.    Generalized anxiety disorder  -     citalopram (CeleXA) 20 MG tablet; Take 1 tablet by mouth Daily.    Boil, leg  Comments:  left thigh area  Orders:  -     sulfamethoxazole-trimethoprim (BACTRIM DS,SEPTRA DS) 800-160 MG per tablet; Take 1 tablet by mouth 2 (Two) Times a Day for 10 days.         Labs are reviewed with patient. Patient's glucose is slightly elevated.  Patient will continue to watch diet to help maintain control of the glucose.  Patient understands that there is an increased risk of developing diabetes in the future. His triglycerides are elevated, but have improved from the previous visit.  His LDL is slightly above goal at 117. He will continue with simvastatin.  His blood pressure is controlled and he will continue with his current blood pressure medication. He will continue with Requip for treatment of the RLS.  He will continue with Celexa for his anxiety.  He will continue with gabapentin for the neuropathic pain.  He will also continue to use Norco 4  times a day for treatment of the arthritic pain.      He is given Bactrim DS for treatment of the boil on his left leg.  If this does not resolve after completion of the antibiotic, he will need surgical referral for removal.    Patient understands the risks associated with this controlled medication, including tolerance and addiction.  Patient also agrees to only obtain this medication from me, and not from a another provider, unless that provider is covering for me in my absence.  Patient also agrees to be compliant in dosing, and not self adjust the dose of medication.  A signed controlled substance agreement is on file, and the patient has received a controlled substance education sheet at this a previous visit.  The patient has also signed a consent for treatment with a controlled substance as per [facility] policy. SANDRA was obtained.    PHQ-2/PHQ-9 Depression Screening 2/22/2019   Little interest or pleasure in doing things 0   Feeling down, depressed, or hopeless 0   Trouble falling or staying asleep, or sleeping too much -   Feeling tired or having little energy -   Poor appetite or overeating -   Feeling bad about yourself - or that you are a failure or have let yourself or your family down -   Trouble concentrating on things, such as reading the newspaper or watching television -   Moving or speaking so slowly that other people could have noticed. Or the opposite - being so fidgety or restless that you have been moving around a lot more than usual -   Thoughts that you would be better off dead, or of hurting yourself in some way -   Total Score 0   If you checked off any problems, how difficult have these problems made it for you to do your work, take care of things at home, or get along with other people? -         Lab on 02/11/2019   Component Date Value Ref Range Status   • Glucose 02/11/2019 127* 74 - 99 mg/dL Final   • BUN 02/11/2019 15  9 - 20 mg/dL Final   • Creatinine 02/11/2019 0.95  0.66 -  1.25 mg/dL Final   • Sodium 02/11/2019 142  137 - 145 mmol/L Final   • Potassium 02/11/2019 3.8  3.4 - 5.0 mmol/L Final   • Chloride 02/11/2019 104  98 - 107 mmol/L Final   • CO2 02/11/2019 25.0  22.0 - 30.0 mmol/L Final   • Calcium 02/11/2019 9.5  8.4 - 10.2 mg/dL Final   • Total Protein 02/11/2019 8.3* 6.3 - 8.2 g/dL Final   • Albumin 02/11/2019 5.00  3.50 - 5.00 g/dL Final   • ALT (SGPT) 02/11/2019 50  <=50 U/L Final   • AST (SGOT) 02/11/2019 37  17 - 59 U/L Final   • Alkaline Phosphatase 02/11/2019 96  38 - 126 U/L Final   • Total Bilirubin 02/11/2019 1.0  0.2 - 1.3 mg/dL Final   • eGFR Non African Amer 02/11/2019 80  49 - 113 mL/min/1.73 Final   • Globulin 02/11/2019 3.3  2.3 - 3.5 gm/dL Final   • A/G Ratio 02/11/2019 1.5  1.1 - 1.8 g/dL Final   • BUN/Creatinine Ratio 02/11/2019 15.8  7.0 - 25.0 Final   • Anion Gap 02/11/2019 13.0  5.0 - 15.0 mmol/L Final   • Hemoglobin A1C 02/11/2019 5.9* 4 - 5.6 % Final   • LDL Cholesterol  02/11/2019 117  1 - 129 mg/dL Final   • Triglycerides 02/11/2019 297* <=150 mg/dL Final   • PSA 02/11/2019 2.810  0.000 - 4.000 ng/mL Final   • Free T4 02/11/2019 1.18  0.78 - 2.19 ng/dL Final   • TSH 02/11/2019 0.410* 0.460 - 4.680 mIU/mL Final   • Color, UA 02/11/2019 Dark Yellow* Yellow, Straw Final   • Appearance, UA 02/11/2019 Slightly Cloudy* Clear Final   • pH, UA 02/11/2019 8.0  5.5 - 8.0 Final   • Specific Gravity, UA 02/11/2019 1.015  1.005 - 1.030 Final   • Glucose, UA 02/11/2019 Negative  Negative Final   • Ketones, UA 02/11/2019 Trace* Negative Final   • Bilirubin, UA 02/11/2019 Small (1+)* Negative Final   • Blood, UA 02/11/2019 Small (1+)* Negative Final   • Protein, UA 02/11/2019 30 mg/dL (1+)* Negative Final   • Leuk Esterase, UA 02/11/2019 Negative  Negative Final   • Nitrite, UA 02/11/2019 Negative  Negative Final   • Urobilinogen, UA 02/11/2019 0.2 E.U./dL  0.2 - 1.0 E.U./dL Final   • Report Summary 02/11/2019 FINAL   Final    Comment:  ====================================================================  TOXASSURE SELECT 13 JAKE-DIS  ====================================================================  Test                             Result       Flag       Units  Drug Present    Hydrocodone                    115                     ng/mg creat    Hydromorphone                  17                      ng/mg creat    Dihydrocodeine                 95                      ng/mg creat    Norhydrocodone                 1130                    ng/mg creat     Sources of hydrocodone include scheduled prescription     medications. Hydromorphone, dihydrocodeine and norhydrocodone are     expected metabolites of hydrocodone. Hydromorphone and     dihydrocodeine are also available as scheduled prescription     medications.  ====================================================================  Test                      Result    Flag   Units      Ref Range    Creatinine              403              mg/dL                                 >=20  ====================================================================  Declared Medications:   Medication list was not provided.  ====================================================================  For clinical consultation, please call [phone].  ====================================================================   • Ethanol Screen Urine (Ref) 02/11/2019 Negative   Final   • Ethanol, Urine 02/11/2019 Not Detected  g/dL Final   • Amphetamine, Urine Qual 02/11/2019 Negative   Final   • Methamphetamine, Urine 02/11/2019 Not Detected  ng/mg creat Final   • Amphetamine, Urine 02/11/2019 Not Detected  ng/mg creat Final   • MDMA URINE 02/11/2019 Not Detected  ng/mg creat Final   • MDA 02/11/2019 Not Detected  ng/mg creat Final   • Benzodiazepine Screen, Urine 02/11/2019 Negative   Final   • DIAZEPAM URINE QUANT (REF) 02/11/2019 Not Detected  ng/mg creat Final   • Desmethyldiazepam 02/11/2019 Not Detected  ng/mg creat  Final   • Oxazepam, urine 02/11/2019 Not Detected  ng/mg creat Final   • Temazepam, urine 02/11/2019 Not Detected  ng/mg creat Final    Expected metabolism of benzodiazepine class drugs:   Parent Drug       Detected Metabolites   -----------       --------------------   Diazepam:         Desmethyldiazepam, Temazepam, Oxazepam   Chlordiazepoxide: Desmethyldiazepam, Oxazepam   Clorazepate:      Desmethyldiazepam, Oxazepam   Halazepam:        Desmethyldiazepam, Oxazepam   Temazepam:        Oxazepam   Oxazepam:         None   • ALPRAZOLAM 02/11/2019 Not Detected  ng/mg creat Final   • ALPHA-HYDROXYALPRAZOLAM UR, QT (RE* 02/11/2019 Not Detected  ng/mg creat Final   • DESALKLFLURAZEPAM UR QUANT (REF) 02/11/2019 Not Detected  ng/mg creat Final   • LORAZEPAM URINE QUANT (REF) 02/11/2019 Not Detected  ng/mg creat Final   • Alpha-hydroxytriazolam, Urine 02/11/2019 Not Detected  ng/mg creat Final   • Clonazepam Urine 02/11/2019 Not Detected  ng/mg creat Final   • 7-Aminoclonazepam Urine 02/11/2019 Not Detected  ng/mg creat Final   • MIDAZOLAM UR, QUANT (REF) 02/11/2019 Not Detected  ng/mg creat Final   • Cocaine & Metabolite 02/11/2019 Negative   Final   • Cocaine Screen, Urine 02/11/2019 Not Detected  ng/mg creat Final   • Benzoylecgonine, Urine 02/11/2019 Not Detected  ng/mg creat Final   • Cocaethylene Ur 02/11/2019 Not Detected  ng/mg creat Final   • THC, Urine 02/11/2019 Negative   Final   • Carboxy THC, Urine 02/11/2019 Not Detected  ng/mg creat Final   • Opiate Class 02/11/2019 +POSITIVE+   Final   • Codeine, Urine 02/11/2019 Not Detected  ng/mg creat Final   • Morphine, Urine 02/11/2019 Not Detected  ng/mg creat Final   • Norcodeine Ur 02/11/2019 Not Detected  ng/mg creat Final   • Hydrocodone UR 02/11/2019 115  ng/mg creat Final   • Hydromorphone Urine 02/11/2019 17  ng/mg creat Final   • Dihydrocodeine, Urine 02/11/2019 95  ng/mg creat Final   • Opiates, Norhydrocodone, Urine 02/11/2019 1130  ng/mg creat Final     Expected metabolism of opiate class drugs:   Parent Drug       Detected Metabolites   -----------       --------------------   Codeine:          Major:  Morphine, Norcodeine                     Minor:  Hydrocodone, Hydromorphone,                             Dihydrocodeine, Norhydrocodone   Morphine:         Minor:  Hydromorphone   Hydrocodone:      Hydromorphone, Dihydrocodeine,                     Norhydrocodone   Hydromorphone:    None   Dihydrocodeine:   None   Heroin:           6-Acetylmorphine (if included),                     Morphine                     Codeine, in small amounts in comparison                      to morphine, is often detected when                      heroin is the source drug.   • Oxycodone Class Ur 02/11/2019 Negative   Final   • Oxycodone, Confirmation, Urine 02/11/2019 Not Detected  ng/mg creat Final   • Oxymorphone UR 02/11/2019 Not Detected  ng/mg creat Final   • Opiates, Noroxycodone, Urine 02/11/2019 Not Detected  ng/mg creat Final   • Noroxymorphone 02/11/2019 Not Detected  ng/mg creat Final    Expected metabolism of oxycodone class drugs:   Parent Drug       Detected Metabolites   -----------       --------------------   Oxycodone:        Oxymorphone, Noroxycodone, Noroxymorphone   Oxymorphone:      Noroxymorphone   • Methadone 02/11/2019 Negative   Final   • Methadone, Quantitative 02/11/2019 Not Detected  ng/mg creat Final   • EDDP 02/11/2019 Not Detected  ng/mg creat Final   • Fentanyl and Analogues, Ur 02/11/2019 Negative   Final   • Fentanyl, Urine 02/11/2019 Not Detected  ng/mg creat Final   • Norfentanyl Urine 02/11/2019 Not Detected  ng/mg creat Final   • Sufentanil, Ur 02/11/2019 Not Detected  ng/mg creat Final   • Alfentanil, Ur 02/11/2019 Not Detected  ng/mg creat Final   • BUPRENORPHINE 02/11/2019 Negative   Final   • Buprenorphine, Urine 02/11/2019 Not Detected  ng/mg creat Final   • Norbuprenorphine 02/11/2019 Not Detected  ng/mg creat Final   • BARBITURATES, URINE  02/11/2019 Negative   Final   • Amobarbital, Urine 02/11/2019 Not Detected   Final   • Barbital 02/11/2019 Not Detected   Final   • Butabarbital, Ur 02/11/2019 Not Detected   Final   • Butalbital Screen, Urine 02/11/2019 Not Detected   Final   • Mephobarbital Urine 02/11/2019 Not Detected   Final   • Pentobarbital UR 02/11/2019 Not Detected   Final   • Phenobarbital 02/11/2019 Not Detected   Final   • Secobarbital, urine 02/11/2019 Not Detected   Final   • Thiopental, Ur 02/11/2019 Not Detected   Final   • Tapentadol 02/11/2019 Negative   Final   • Tapentadol Ur 02/11/2019 Not Detected  ng/mg creat Final   • Opoidss other, UR 02/11/2019 Negative   Final   • Tramadol, Urine 02/11/2019 Not Detected  ng/mg creat Final   • O-desmethyltramadol, Ur 02/11/2019 Not Detected  ng/mg creat Final   • N-Desmethyltramadol, U 02/11/2019 Not Detected  ng/mg creat Final   • Creatinine, Urine 02/11/2019 403  mg/dL Final    REFERENCE RANGE: Ref Range>=20   • Declared Medications: 02/11/2019 Comment   Final    Not Provided  Medication list was not provided.   • Level of Detection: 02/11/2019 Comment   Final    Level of detection:  TESTING THRESHOLDS ARE AS FOLLOWS:  AMPHETAMINES: 50 ng/mL  BENZODIAZEPINES: 20 ng/mL  COCAINE / METABOLITE: 50 ng/mL  ALCOHOL, ETHYL: 0.020 gm/dL  FENTANYL / ANALOGUES: fentanyl-1.0 ng/mL, others-5.0 ng/mL  BUPRENORPHINE: buprenorphine-1.0 ng/mL,                 norbuprenorphine-5 ng/mL  TAPENTADOL: 50 ng/mL  CANNABINOIDS: total-20 ng/mL, carboxy-THC-2 ng/mL  METHADONE: 50 ng/mL  OPIATE CLASS: 50 ng/mL  OXYCODONE CLASS: 50 ng/mL  BARBITURATES: 200 ng/mL  TRAMADOL: 50 ng/mL  This test was developed and its performance characteristics  determined by [facility].  It has not been cleared or approved  by the Food and Drug Administration.   • WBC 02/11/2019 16.73* 3.20 - 9.80 10*3/mm3 Final   • RBC 02/11/2019 5.86* 4.37 - 5.74 10*6/mm3 Final   • Hemoglobin 02/11/2019 17.7* 13.7 - 17.3 g/dL Final   • Hematocrit  02/11/2019 50.9* 39.0 - 49.0 % Final   • MCV 02/11/2019 86.9  80.0 - 98.0 fL Final   • MCH 02/11/2019 30.2  26.5 - 34.0 pg Final   • MCHC 02/11/2019 34.8  31.5 - 36.3 g/dL Final   • RDW 02/11/2019 14.7* 11.5 - 14.5 % Final   • RDW-SD 02/11/2019 46.1* 35.1 - 43.9 fl Final   • MPV 02/11/2019 11.5  8.0 - 12.0 fL Final   • Platelets 02/11/2019 260  150 - 450 10*3/mm3 Final   • Neutrophil % 02/11/2019 65.6  37.0 - 80.0 % Final   • Lymphocyte % 02/11/2019 28.8  10.0 - 50.0 % Final   • Monocyte % 02/11/2019 4.8  0.0 - 12.0 % Final   • Eosinophil % 02/11/2019 0.7  0.0 - 7.0 % Final   • Basophil % 02/11/2019 0.1  0.0 - 2.0 % Final   • Neutrophils, Absolute 02/11/2019 10.96* 2.00 - 8.60 10*3/mm3 Final   • Lymphocytes, Absolute 02/11/2019 4.82* 0.60 - 4.20 10*3/mm3 Final   • Monocytes, Absolute 02/11/2019 0.81  0.00 - 0.90 10*3/mm3 Final   • Eosinophils, Absolute 02/11/2019 0.12  0.00 - 0.70 10*3/mm3 Final   • Basophils, Absolute 02/11/2019 0.02  0.00 - 0.20 10*3/mm3 Final   • RBC, UA 02/11/2019 6-12* None Seen /HPF Final   • WBC, UA 02/11/2019 3-5* None Seen /HPF Final   • Bacteria, UA 02/11/2019 Trace* None Seen /HPF Final   • Squamous Epithelial Cells, UA 02/11/2019 0-2  None Seen, 0-2 /HPF Final   • Hyaline Casts, UA 02/11/2019 None Seen  None Seen /LPF Final   • Mucus, UA 02/11/2019 Small/1+* None Seen, Trace /HPF Final   • Methodology 02/11/2019 Manual Light Microscopy   Final   ]

## 2019-02-22 NOTE — PROGRESS NOTES
[facility]# [id number].

## 2019-03-21 DIAGNOSIS — M15.9 PRIMARY OSTEOARTHRITIS INVOLVING MULTIPLE JOINTS: Chronic | ICD-10-CM

## 2019-03-21 RX ORDER — HYDROCODONE BITARTRATE AND ACETAMINOPHEN 10; 325 MG/1; MG/1
TABLET ORAL
Qty: 120 TABLET | Refills: 0 | Status: SHIPPED | OUTPATIENT
Start: 2019-03-21 | End: 2019-04-18 | Stop reason: SDUPTHER

## 2019-03-23 DIAGNOSIS — I10 ESSENTIAL HYPERTENSION: Chronic | ICD-10-CM

## 2019-03-25 RX ORDER — HYDROCHLOROTHIAZIDE 25 MG/1
TABLET ORAL
Qty: 30 TABLET | Refills: 5 | Status: SHIPPED | OUTPATIENT
Start: 2019-03-25 | End: 2019-09-11

## 2019-04-18 DIAGNOSIS — M15.9 PRIMARY OSTEOARTHRITIS INVOLVING MULTIPLE JOINTS: Chronic | ICD-10-CM

## 2019-04-18 NOTE — TELEPHONE ENCOUNTER
Patient is UTD from office visit, SANDRA, and controlled consent from 2-22-19. Last refill was on 3-21-19. Next office visit is scheduled for 5-21-19.

## 2019-04-18 NOTE — TELEPHONE ENCOUNTER
----- Message from Rachael Armstrong sent at 4/16/2019 10:05 AM CDT -----  Regarding: med refill  Contact: [phone]  Needs refill on HYDROcodone-acetaminophen (NORCO)  [facility], [facility] [place].

## 2019-04-19 ENCOUNTER — OFFICE VISIT (OUTPATIENT)
Dept: OTOLARYNGOLOGY | Facility: CLINIC | Age: 64
End: 2019-04-19

## 2019-04-19 VITALS — HEIGHT: 72 IN | RESPIRATION RATE: 18 BRPM | WEIGHT: 185 LBS | BODY MASS INDEX: 25.06 KG/M2

## 2019-04-19 DIAGNOSIS — Z85.828 PERSONAL HISTORY OF MALIGNANT NEOPLASM OF SKIN: Primary | ICD-10-CM

## 2019-04-19 DIAGNOSIS — Z86.018 HISTORY OF INVERTED PAPILLOMA: ICD-10-CM

## 2019-04-19 DIAGNOSIS — J34.89 NASAL SEPTAL PERFORATION: ICD-10-CM

## 2019-04-19 PROCEDURE — 99213 OFFICE O/P EST LOW 20 MIN: CPT | Performed by: OTOLARYNGOLOGY

## 2019-04-19 PROCEDURE — 31575 DIAGNOSTIC LARYNGOSCOPY: CPT | Performed by: OTOLARYNGOLOGY

## 2019-04-19 RX ORDER — HYDROCODONE BITARTRATE AND ACETAMINOPHEN 10; 325 MG/1; MG/1
TABLET ORAL
Qty: 120 TABLET | Refills: 0 | Status: SHIPPED | OUTPATIENT
Start: 2019-04-19 | End: 2019-05-21 | Stop reason: SDUPTHER

## 2019-04-22 NOTE — PROGRESS NOTES
Subjective   Osvaldo Conley . is a 63 y.o. male.       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  Has a resultant large nasal septal perforation.  Also has a history of a basal cell carcinoma of the left nose that was full-thickness and penetrated into the nasal cavity and was closed with a flap.  Has left-sided facial pain fault to be neuropathic in origin.  Returns today for reevaluation.  States he has not noticed any new skin lesions and the previously noted area that was thought to be acne rosacea has improved.  No bleeding from the nose.      The following portions of the patient's history were reviewed and updated as appropriate: allergies, current medications, past family history, past medical history, past social history, past surgical history and problem list.     reports that he has been smoking.  He has been smoking about 1.00 pack per day. He has never used smokeless tobacco. He reports that he drinks alcohol. He reports that he does not use drugs.   Patient is a tobacco user and has been counseled for use of tobacco products      Review of Systems   Constitutional: Negative for fever.           Objective   Physical Exam  General: Well-developed well-nourished male in no acute distress.  Alert and oriented x3. H Voice:Strong. Speech:Fluent  Ears: External ears no deformity, canals no discharge, tympanic membranes intact clear and mobile bilaterally.  Nose: Nares show external deformity consistent with previous resection.  However the skin shows no evidence of recurrent lesion.  Intranasally there is a large septal perforation present.  Oral cavity: Lips and gums without lesions.  Tongue and floor of mouth without lesions.  Parotid and submandibular ducts unobstructed.  No mucosal lesions on the buccal mucosa or vestibule of the mouth.  Pharynx: No erythema exudate mass or  ulcer  Neck: No lymphadenopathy.  No thyromegaly.  Trachea and larynx midline.  No masses in the parotid or submandibular glands.  Nasal endoscopy is performed: Olvin-Synephrine and Xylocaine are instilled the nares bilaterally.  0° scope is passed into each nostril.  The inferior, middle, and superior turbinates as well as nasal septum and nasopharynx are examined.  Pertinent findings include: Large septal perforation with well epithelialized edges.  Large defect in the medial maxilla with no evidence of recurrent neoplasm.  No crusting or purulence to any significant extent.    Assessment/Plan   Osvaldo was seen today for follow-up.    Diagnoses and all orders for this visit:    Personal history of malignant neoplasm of skin    History of inverted papilloma    Nasal septal perforation        Plan: Advised continued use of nasal saline spray.  Again recommended smoking cessation.  Return in 6 months, call sooner for problems.

## 2019-04-24 ENCOUNTER — OFFICE VISIT (OUTPATIENT)
Dept: ORTHOPEDIC SURGERY | Facility: CLINIC | Age: 64
End: 2019-04-24

## 2019-04-24 VITALS — WEIGHT: 182 LBS | HEIGHT: 72 IN | BODY MASS INDEX: 24.65 KG/M2

## 2019-04-24 DIAGNOSIS — M25.562 CHRONIC PAIN OF BOTH KNEES: Primary | ICD-10-CM

## 2019-04-24 DIAGNOSIS — M25.561 CHRONIC PAIN OF BOTH KNEES: Primary | ICD-10-CM

## 2019-04-24 DIAGNOSIS — M17.0 PRIMARY OSTEOARTHRITIS OF BOTH KNEES: ICD-10-CM

## 2019-04-24 DIAGNOSIS — G89.29 CHRONIC PAIN OF BOTH KNEES: Primary | ICD-10-CM

## 2019-04-24 PROCEDURE — 20610 DRAIN/INJ JOINT/BURSA W/O US: CPT | Performed by: ORTHOPAEDIC SURGERY

## 2019-04-24 RX ADMIN — LIDOCAINE HYDROCHLORIDE 2 ML: 20 INJECTION, SOLUTION INFILTRATION; PERINEURAL at 09:23

## 2019-04-24 RX ADMIN — LIDOCAINE HYDROCHLORIDE 2 ML: 20 INJECTION, SOLUTION INFILTRATION; PERINEURAL at 09:22

## 2019-04-24 RX ADMIN — TRIAMCINOLONE ACETONIDE 40 MG: 40 INJECTION, SUSPENSION INTRA-ARTICULAR; INTRAMUSCULAR at 09:22

## 2019-04-24 RX ADMIN — TRIAMCINOLONE ACETONIDE 40 MG: 40 INJECTION, SUSPENSION INTRA-ARTICULAR; INTRAMUSCULAR at 09:23

## 2019-04-24 NOTE — PROGRESS NOTES
"Osvaldo Conley Sr. is a 63 y.o. male returns for     Chief Complaint   Patient presents with   • Right Knee - Follow-up, Pain   • Left Knee - Follow-up, Pain       HISTORY OF PRESENT ILLNESS: f/u bilateral knee pain. Steroid injections done on 1/9/2019 helped, patient states injections is beginning to wear off.        CONCURRENT MEDICAL HISTORY:    The following portions of the patient's history were reviewed and updated as appropriate: allergies, current medications, past family history, past medical history, past social history, past surgical history and problem list.     ROS  No fevers or chills.  No chest pain or shortness of air.  No GI or  disturbances.    PHYSICAL EXAMINATION:       Ht 181.6 cm (71.5\")   Wt 82.6 kg (182 lb)   BMI 25.03 kg/m²     Physical Exam   Constitutional: He is oriented to person, place, and time. He appears well-developed and well-nourished.   Neurological: He is alert and oriented to person, place, and time.   Psychiatric: He has a normal mood and affect. His behavior is normal. Judgment and thought content normal.           Large Joint Arthrocentesis: R knee  Date/Time: 4/24/2019 9:22 AM  Consent given by: patient  Site marked: site marked  Timeout: Immediately prior to procedure a time out was called to verify the correct patient, procedure, equipment, support staff and site/side marked as required   Supporting Documentation  Indications: pain   Procedure Details  Location: knee - R knee  Preparation: Patient was prepped and draped in the usual sterile fashion  Needle size: 22 G  Approach: anteromedial  Medications administered: 40 mg triamcinolone acetonide 40 MG/ML; 2 mL lidocaine 2%  Patient tolerance: patient tolerated the procedure well with no immediate complications    Large Joint Arthrocentesis: L knee  Date/Time: 4/24/2019 9:23 AM  Consent given by: patient  Site marked: site marked  Timeout: Immediately prior to procedure a time out was called to verify the correct " patient, procedure, equipment, support staff and site/side marked as required   Supporting Documentation  Indications: pain   Procedure Details  Location: knee - L knee  Preparation: Patient was prepped and draped in the usual sterile fashion  Needle size: 22 G  Approach: anteromedial  Medications administered: 40 mg triamcinolone acetonide 40 MG/ML; 2 mL lidocaine 2%  Patient tolerance: patient tolerated the procedure well with no immediate complications              ASSESSMENT:    Diagnoses and all orders for this visit:    Chronic pain of both knees  -     Large Joint Arthrocentesis: R knee  -     Large Joint Arthrocentesis: L knee    Primary osteoarthritis of both knees  -     Large Joint Arthrocentesis: R knee  -     Large Joint Arthrocentesis: L knee          PLAN    Repeat steroid injection in both knees today.  Activity as tolerated.  Slowly progress motion and activity as pain allows.    I advised Osvaldo of the risks of continuing to use tobacco    During this visit, I spent 2 minutes counseling the patient regarding tobacco cessation.      Patient's Body mass index is 25.03 kg/m². BMI is within normal parameters. No follow-up required..      Return if symptoms worsen or fail to improve, for recheck.    Jamarcus Weems MD

## 2019-04-25 RX ORDER — LIDOCAINE HYDROCHLORIDE 20 MG/ML
2 INJECTION, SOLUTION INFILTRATION; PERINEURAL
Status: COMPLETED | OUTPATIENT
Start: 2019-04-24 | End: 2019-04-24

## 2019-04-25 RX ORDER — TRIAMCINOLONE ACETONIDE 40 MG/ML
40 INJECTION, SUSPENSION INTRA-ARTICULAR; INTRAMUSCULAR
Status: COMPLETED | OUTPATIENT
Start: 2019-04-24 | End: 2019-04-24

## 2019-05-21 ENCOUNTER — OFFICE VISIT (OUTPATIENT)
Dept: FAMILY MEDICINE CLINIC | Facility: CLINIC | Age: 64
End: 2019-05-21

## 2019-05-21 VITALS
SYSTOLIC BLOOD PRESSURE: 116 MMHG | TEMPERATURE: 97.5 F | HEART RATE: 88 BPM | WEIGHT: 176 LBS | HEIGHT: 72 IN | BODY MASS INDEX: 23.84 KG/M2 | DIASTOLIC BLOOD PRESSURE: 74 MMHG

## 2019-05-21 DIAGNOSIS — G63 POLYNEUROPATHY ASSOCIATED WITH UNDERLYING DISEASE (HCC): Chronic | ICD-10-CM

## 2019-05-21 DIAGNOSIS — F41.1 GENERALIZED ANXIETY DISORDER: Chronic | ICD-10-CM

## 2019-05-21 DIAGNOSIS — I10 ESSENTIAL HYPERTENSION: Primary | Chronic | ICD-10-CM

## 2019-05-21 DIAGNOSIS — Z72.0 TOBACCO USE: ICD-10-CM

## 2019-05-21 DIAGNOSIS — L98.9 SKIN LESIONS: ICD-10-CM

## 2019-05-21 DIAGNOSIS — M15.9 PRIMARY OSTEOARTHRITIS INVOLVING MULTIPLE JOINTS: Chronic | ICD-10-CM

## 2019-05-21 PROBLEM — J44.9 COPD, MILD (HCC): Chronic | Status: ACTIVE | Noted: 2018-11-26

## 2019-05-21 PROCEDURE — 99214 OFFICE O/P EST MOD 30 MIN: CPT | Performed by: INTERNAL MEDICINE

## 2019-05-21 RX ORDER — GABAPENTIN 300 MG/1
300 CAPSULE ORAL 3 TIMES DAILY
Qty: 90 CAPSULE | Refills: 2 | Status: SHIPPED | OUTPATIENT
Start: 2019-05-21 | End: 2019-08-19 | Stop reason: SDUPTHER

## 2019-05-21 RX ORDER — HYDROCODONE BITARTRATE AND ACETAMINOPHEN 10; 325 MG/1; MG/1
TABLET ORAL
Qty: 120 TABLET | Refills: 0 | Status: SHIPPED | OUTPATIENT
Start: 2019-05-21 | End: 2019-06-21 | Stop reason: SDUPTHER

## 2019-05-21 NOTE — PROGRESS NOTES
Chief Complaint   Patient presents with   • Osteoarthritis     3 mo f/u     Subjective   Osvaldo Conley Sr. is a 63 y.o. male who presents to the office for follow-up. He has hypertension and his blood pressure has been controlled. He has restless leg syndrome which is well controlled with Requip.  He has anxiety and takes Celexa daily.  He has osteoarthritis which causes chronic back and joint pain.  His arthritis also affects his knees, and causes chronic pain in the knees.  He is seeing orthopedics for this.  He takes Norco 4 times a day which keeps his pain controlled.  He takes gabapentin 3 times a day for treatment of the neuropathic pain.     He has several skin lesions on his face and neck which are itching and have been bothering him for the past several months.  He also has a small knot/lesion on his posterior right shoulder which has been present for a few months, and he is wanting to see someone to have this removed.  It does not bother him.  There has been no drainage from the lesion.    He continues to smoke cigarettes and would like to try Chantix for smoking cessation.    The following portions of the patient's history were reviewed and updated as appropriate: allergies, current medications, past family history, past medical history, past social history, past surgical history and problem list.    Review of Systems   Constitutional: Negative for chills, fatigue and fever.   HENT: Negative for congestion, sneezing, sore throat and trouble swallowing.    Eyes: Negative for visual disturbance.   Respiratory: Negative for cough, chest tightness, shortness of breath and wheezing.    Cardiovascular: Negative for chest pain and palpitations.   Gastrointestinal: Negative for abdominal pain, constipation, diarrhea, nausea and vomiting.   Genitourinary: Negative for dysuria, frequency and urgency.   Musculoskeletal: Positive for arthralgias and back pain. Negative for neck pain.   Skin: Negative for rash.  "  Neurological: Negative for dizziness, weakness and headaches.   Psychiatric/Behavioral:        Patient denies any feelings of depression and has not felt down, hopeless or lost interest in any activities.   All other systems reviewed and are negative.      Objective   Vitals:    05/21/19 0903   BP: 116/74   BP Location: Left arm   Patient Position: Sitting   Cuff Size: Adult   Pulse: 88   Temp: 97.5 °F (36.4 °C)   TempSrc: Oral   Weight: 79.8 kg (176 lb)   Height: 181.6 cm (71.5\")   PainSc:   5   PainLoc: Knee  Comment: Bilat knee     Physical Exam   Constitutional: He is oriented to person, place, and time. He appears well-developed and well-nourished. No distress.   HENT:   Head: Normocephalic and atraumatic.   Nose: Nose normal.   Mouth/Throat: Oropharynx is clear and moist. No oropharyngeal exudate.   Eyes: Conjunctivae and EOM are normal. Pupils are equal, round, and reactive to light. No scleral icterus.   Neck: Normal range of motion. Neck supple.   Cardiovascular: Normal rate, regular rhythm and normal heart sounds. Exam reveals no gallop and no friction rub.   No murmur heard.  Pulmonary/Chest: Effort normal. No respiratory distress. He has decreased breath sounds. He has no wheezes. He has no rales.   Abdominal: Soft. Bowel sounds are normal. He exhibits no distension. There is no tenderness. There is no rebound and no guarding.   Musculoskeletal: Normal range of motion. He exhibits no edema.   Lymphadenopathy:     He has no cervical adenopathy.   Neurological: He is alert and oriented to person, place, and time. No cranial nerve deficit.   Skin: Skin is warm and dry. No rash noted.   He has a small papular cystic appearing lesion on the posterior right shoulder.  This is approximately 2 cm in diameter.  It is nontender.  There is no erythema noted.    He has several flat erythematous appearing skin lesions present on his face and neck.   Psychiatric: He has a normal mood and affect. His behavior is " normal. Judgment and thought content normal.   Nursing note and vitals reviewed.      Assessment/Plan   Osvaldo was seen today for osteoarthritis.    Diagnoses and all orders for this visit:    Essential hypertension    Primary osteoarthritis involving multiple joints  -     HYDROcodone-acetaminophen (NORCO)  MG per tablet; 1 tablet(s) by mouth 4 times per day for pain.    Generalized anxiety disorder    Polyneuropathy associated with underlying disease (CMS/HCC)  -     gabapentin (NEURONTIN) 300 MG capsule; Take 1 capsule by mouth 3 (Three) Times a Day.    Skin lesions  Comments:  face and R shoulder  Orders:  -     Ambulatory Referral to Dermatology    Tobacco use  -     varenicline (CHANTIX STARTING MONTH PAK) 0.5 MG X 11 & 1 MG X 42 tablet; Take 0.5 mg one daily on days 1-2 and 0.5 mg twice daily on days 4-7. Then 1 mg twice daily for a total of 12 weeks.         His blood pressure is controlled and he will continue with his current blood pressure medication. He will continue with Requip for treatment of the RLS.  He will continue with Celexa for his anxiety.  He will continue with gabapentin for the neuropathic pain.  He will also continue to use Norco 4 times a day for treatment of the arthritic pain.      I will refer him to dermatology for a skin examination and evaluation of the lesions described above.    He is given Chantix for smoking cessation.    Patient understands the risks associated with this controlled medication, including tolerance and addiction.  Patient also agrees to only obtain this medication from me, and not from a another provider, unless that provider is covering for me in my absence.  Patient also agrees to be compliant in dosing, and not self adjust the dose of medication.  A signed controlled substance agreement is on file, and the patient has received a controlled substance education sheet at this a previous visit.  The patient has also signed a consent for treatment with a  controlled substance as per [facility] policy. SANDRA was obtained.    PHQ-2/PHQ-9 Depression Screening 5/21/2019   Little interest or pleasure in doing things 0   Feeling down, depressed, or hopeless 0   Trouble falling or staying asleep, or sleeping too much -   Feeling tired or having little energy -   Poor appetite or overeating -   Feeling bad about yourself - or that you are a failure or have let yourself or your family down -   Trouble concentrating on things, such as reading the newspaper or watching television -   Moving or speaking so slowly that other people could have noticed. Or the opposite - being so fidgety or restless that you have been moving around a lot more than usual -   Thoughts that you would be better off dead, or of hurting yourself in some way -   Total Score 0   If you checked off any problems, how difficult have these problems made it for you to do your work, take care of things at home, or get along with other people? -

## 2019-06-20 DIAGNOSIS — M15.9 PRIMARY OSTEOARTHRITIS INVOLVING MULTIPLE JOINTS: Chronic | ICD-10-CM

## 2019-06-20 NOTE — TELEPHONE ENCOUNTER
Patient is UTD from office visit, SANDRA and controlled consent on 5-21-19. Next office visit is scheduled for 8-19-19. Last refill on Hydrocodone was on 5-21-19.

## 2019-06-20 NOTE — TELEPHONE ENCOUNTER
----- Message from Rachael Armstrong sent at 6/20/2019  9:17 AM CDT -----  Regarding: MED REFILL  Contact: [phone]   Needs refill on HYDROcodone-acetaminophen (NORCO)  MG, due tomorrow to [facility] [place].

## 2019-06-21 ENCOUNTER — TELEPHONE (OUTPATIENT)
Dept: FAMILY MEDICINE CLINIC | Facility: CLINIC | Age: 64
End: 2019-06-21

## 2019-06-21 RX ORDER — HYDROCODONE BITARTRATE AND ACETAMINOPHEN 10; 325 MG/1; MG/1
TABLET ORAL
Qty: 120 TABLET | Refills: 0 | Status: SHIPPED | OUTPATIENT
Start: 2019-06-21 | End: 2019-07-18 | Stop reason: SDUPTHER

## 2019-07-18 DIAGNOSIS — M15.9 PRIMARY OSTEOARTHRITIS INVOLVING MULTIPLE JOINTS: Chronic | ICD-10-CM

## 2019-07-18 NOTE — TELEPHONE ENCOUNTER
Rachael Armstrong Quin'Dee Marie, LPN  Phone Number: [phone]          Needs refill on HYDROcodone-acetaminophen (NORCO)  MG, due Friday to [facility] [place].

## 2019-07-19 RX ORDER — HYDROCODONE BITARTRATE AND ACETAMINOPHEN 10; 325 MG/1; MG/1
TABLET ORAL
Qty: 120 TABLET | Refills: 0 | Status: SHIPPED | OUTPATIENT
Start: 2019-07-19 | End: 2019-08-19 | Stop reason: SDUPTHER

## 2019-08-14 ENCOUNTER — LAB (OUTPATIENT)
Dept: LAB | Facility: OTHER | Age: 64
End: 2019-08-14

## 2019-08-14 DIAGNOSIS — I10 ESSENTIAL HYPERTENSION: ICD-10-CM

## 2019-08-14 DIAGNOSIS — E78.2 MIXED HYPERLIPIDEMIA: Chronic | ICD-10-CM

## 2019-08-14 DIAGNOSIS — R73.02 IMPAIRED GLUCOSE TOLERANCE: Chronic | ICD-10-CM

## 2019-08-14 LAB
ALBUMIN SERPL-MCNC: 4.4 G/DL (ref 3.5–5)
ALBUMIN/GLOB SERPL: 1.2 G/DL (ref 1.1–1.8)
ALP SERPL-CCNC: 116 U/L (ref 38–126)
ALT SERPL W P-5'-P-CCNC: 28 U/L
ANION GAP SERPL CALCULATED.3IONS-SCNC: 10 MMOL/L (ref 5–15)
AST SERPL-CCNC: 35 U/L (ref 17–59)
BACTERIA UR QL AUTO: ABNORMAL /HPF
BASOPHILS # BLD AUTO: 0.03 10*3/MM3 (ref 0–0.2)
BASOPHILS NFR BLD AUTO: 0.2 % (ref 0–1.5)
BILIRUB SERPL-MCNC: 0.8 MG/DL (ref 0.2–1.3)
BILIRUB UR QL STRIP: NEGATIVE
BUN BLD-MCNC: 10 MG/DL (ref 7–23)
BUN/CREAT SERPL: 12.5 (ref 7–25)
CALCIUM SPEC-SCNC: 9.8 MG/DL (ref 8.4–10.2)
CHLORIDE SERPL-SCNC: 104 MMOL/L (ref 101–112)
CHOLEST SERPL-MCNC: 175 MG/DL (ref 150–200)
CLARITY UR: CLEAR
CO2 SERPL-SCNC: 29 MMOL/L (ref 22–30)
COLOR UR: YELLOW
CREAT BLD-MCNC: 0.8 MG/DL (ref 0.7–1.3)
DEPRECATED RDW RBC AUTO: 42.2 FL (ref 37–54)
EOSINOPHIL # BLD AUTO: 0.18 10*3/MM3 (ref 0–0.4)
EOSINOPHIL NFR BLD AUTO: 0.9 % (ref 0.3–6.2)
ERYTHROCYTE [DISTWIDTH] IN BLOOD BY AUTOMATED COUNT: 13.6 % (ref 12.3–15.4)
GFR SERPL CREATININE-BSD FRML MDRD: 97 ML/MIN/1.73 (ref 49–113)
GLOBULIN UR ELPH-MCNC: 3.7 GM/DL (ref 2.3–3.5)
GLUCOSE BLD-MCNC: 126 MG/DL (ref 70–99)
GLUCOSE UR STRIP-MCNC: NEGATIVE MG/DL
HCT VFR BLD AUTO: 48.5 % (ref 37.5–51)
HDLC SERPL-MCNC: 52 MG/DL (ref 40–59)
HGB BLD-MCNC: 17.4 G/DL (ref 13–17.7)
HGB UR QL STRIP.AUTO: ABNORMAL
HYALINE CASTS UR QL AUTO: ABNORMAL /LPF
KETONES UR QL STRIP: NEGATIVE
LDLC SERPL CALC-MCNC: 86 MG/DL
LDLC/HDLC SERPL: 1.66 {RATIO} (ref 0–3.55)
LEUKOCYTE ESTERASE UR QL STRIP.AUTO: NEGATIVE
LYMPHOCYTES # BLD AUTO: 4.75 10*3/MM3 (ref 0.7–3.1)
LYMPHOCYTES NFR BLD AUTO: 24.9 % (ref 19.6–45.3)
MCH RBC QN AUTO: 30.9 PG (ref 26.6–33)
MCHC RBC AUTO-ENTMCNC: 35.9 G/DL (ref 31.5–35.7)
MCV RBC AUTO: 86.1 FL (ref 79–97)
MONOCYTES # BLD AUTO: 0.94 10*3/MM3 (ref 0.1–0.9)
MONOCYTES NFR BLD AUTO: 4.9 % (ref 5–12)
NEUTROPHILS # BLD AUTO: 13.18 10*3/MM3 (ref 1.7–7)
NEUTROPHILS NFR BLD AUTO: 69.1 % (ref 42.7–76)
NITRITE UR QL STRIP: NEGATIVE
PH UR STRIP.AUTO: 8.5 [PH] (ref 5.5–8)
PLATELET # BLD AUTO: 394 10*3/MM3 (ref 140–450)
PMV BLD AUTO: 10.9 FL (ref 6–12)
POTASSIUM BLD-SCNC: 3.6 MMOL/L (ref 3.4–5)
PROT SERPL-MCNC: 8.1 G/DL (ref 6.3–8.6)
PROT UR QL STRIP: NEGATIVE
RBC # BLD AUTO: 5.63 10*6/MM3 (ref 4.14–5.8)
RBC # UR: ABNORMAL /HPF
REF LAB TEST METHOD: ABNORMAL
SODIUM BLD-SCNC: 143 MMOL/L (ref 137–145)
SP GR UR STRIP: 1.01 (ref 1–1.03)
SQUAMOUS #/AREA URNS HPF: ABNORMAL /HPF
TRIGL SERPL-MCNC: 183 MG/DL
UROBILINOGEN UR QL STRIP: ABNORMAL
VLDLC SERPL-MCNC: 36.6 MG/DL
WBC NRBC COR # BLD: 19.08 10*3/MM3 (ref 3.4–10.8)
WBC UR QL AUTO: ABNORMAL /HPF

## 2019-08-14 PROCEDURE — 80061 LIPID PANEL: CPT | Performed by: INTERNAL MEDICINE

## 2019-08-14 PROCEDURE — 80050 GENERAL HEALTH PANEL: CPT | Performed by: INTERNAL MEDICINE

## 2019-08-14 PROCEDURE — 83036 HEMOGLOBIN GLYCOSYLATED A1C: CPT | Performed by: INTERNAL MEDICINE

## 2019-08-14 PROCEDURE — 84439 ASSAY OF FREE THYROXINE: CPT | Performed by: INTERNAL MEDICINE

## 2019-08-14 PROCEDURE — 81001 URINALYSIS AUTO W/SCOPE: CPT | Performed by: INTERNAL MEDICINE

## 2019-08-15 LAB
HBA1C MFR BLD: 5.5 % (ref 4.8–5.6)
T4 FREE SERPL-MCNC: 1.59 NG/DL (ref 0.93–1.7)
TSH SERPL DL<=0.05 MIU/L-ACNC: 0.45 MIU/ML (ref 0.27–4.2)

## 2019-08-19 ENCOUNTER — OFFICE VISIT (OUTPATIENT)
Dept: FAMILY MEDICINE CLINIC | Facility: CLINIC | Age: 64
End: 2019-08-19

## 2019-08-19 VITALS
SYSTOLIC BLOOD PRESSURE: 132 MMHG | TEMPERATURE: 97.8 F | BODY MASS INDEX: 25.19 KG/M2 | HEART RATE: 94 BPM | HEIGHT: 72 IN | WEIGHT: 186 LBS | DIASTOLIC BLOOD PRESSURE: 74 MMHG

## 2019-08-19 DIAGNOSIS — E78.2 MIXED HYPERLIPIDEMIA: Chronic | ICD-10-CM

## 2019-08-19 DIAGNOSIS — Z12.5 SCREENING FOR PROSTATE CANCER: ICD-10-CM

## 2019-08-19 DIAGNOSIS — R73.02 IMPAIRED GLUCOSE TOLERANCE: Primary | Chronic | ICD-10-CM

## 2019-08-19 DIAGNOSIS — M15.9 PRIMARY OSTEOARTHRITIS INVOLVING MULTIPLE JOINTS: Chronic | ICD-10-CM

## 2019-08-19 DIAGNOSIS — G25.81 RESTLESS LEG SYNDROME: Chronic | ICD-10-CM

## 2019-08-19 DIAGNOSIS — J44.9 COPD, MILD (HCC): Chronic | ICD-10-CM

## 2019-08-19 DIAGNOSIS — D72.829 LEUKOCYTOSIS, UNSPECIFIED TYPE: ICD-10-CM

## 2019-08-19 DIAGNOSIS — G63 POLYNEUROPATHY ASSOCIATED WITH UNDERLYING DISEASE (HCC): Chronic | ICD-10-CM

## 2019-08-19 DIAGNOSIS — F41.1 GENERALIZED ANXIETY DISORDER: Chronic | ICD-10-CM

## 2019-08-19 DIAGNOSIS — Z79.899 DRUG THERAPY: ICD-10-CM

## 2019-08-19 DIAGNOSIS — I10 ESSENTIAL HYPERTENSION: Chronic | ICD-10-CM

## 2019-08-19 PROCEDURE — 99214 OFFICE O/P EST MOD 30 MIN: CPT | Performed by: INTERNAL MEDICINE

## 2019-08-19 RX ORDER — HYDROCODONE BITARTRATE AND ACETAMINOPHEN 10; 325 MG/1; MG/1
TABLET ORAL
Qty: 120 TABLET | Refills: 0 | Status: SHIPPED | OUTPATIENT
Start: 2019-08-19 | End: 2019-09-19 | Stop reason: SDUPTHER

## 2019-08-19 RX ORDER — GABAPENTIN 300 MG/1
300 CAPSULE ORAL 3 TIMES DAILY
Qty: 90 CAPSULE | Refills: 2 | Status: SHIPPED | OUTPATIENT
Start: 2019-08-19 | End: 2019-11-19 | Stop reason: SDUPTHER

## 2019-09-06 ENCOUNTER — APPOINTMENT (OUTPATIENT)
Dept: ONCOLOGY | Facility: CLINIC | Age: 64
End: 2019-09-06

## 2019-09-06 ENCOUNTER — APPOINTMENT (OUTPATIENT)
Dept: ONCOLOGY | Facility: HOSPITAL | Age: 64
End: 2019-09-06

## 2019-09-10 ENCOUNTER — LAB (OUTPATIENT)
Dept: ONCOLOGY | Facility: HOSPITAL | Age: 64
End: 2019-09-10

## 2019-09-10 ENCOUNTER — CONSULT (OUTPATIENT)
Dept: ONCOLOGY | Facility: CLINIC | Age: 64
End: 2019-09-10

## 2019-09-10 VITALS
BODY MASS INDEX: 24.95 KG/M2 | DIASTOLIC BLOOD PRESSURE: 65 MMHG | RESPIRATION RATE: 20 BRPM | SYSTOLIC BLOOD PRESSURE: 137 MMHG | OXYGEN SATURATION: 98 % | HEART RATE: 62 BPM | TEMPERATURE: 98 F | HEIGHT: 71 IN | WEIGHT: 178.2 LBS

## 2019-09-10 DIAGNOSIS — G63 POLYNEUROPATHY ASSOCIATED WITH UNDERLYING DISEASE (HCC): Chronic | ICD-10-CM

## 2019-09-10 DIAGNOSIS — D75.1 POLYCYTHEMIA: ICD-10-CM

## 2019-09-10 DIAGNOSIS — Z71.6 ENCOUNTER FOR TOBACCO USE CESSATION COUNSELING: ICD-10-CM

## 2019-09-10 DIAGNOSIS — D72.829 LEUKOCYTOSIS, UNSPECIFIED TYPE: ICD-10-CM

## 2019-09-10 DIAGNOSIS — I10 ESSENTIAL HYPERTENSION: Chronic | ICD-10-CM

## 2019-09-10 DIAGNOSIS — D72.829 LEUKOCYTOSIS, UNSPECIFIED TYPE: Primary | ICD-10-CM

## 2019-09-10 LAB
BASOPHILS # BLD AUTO: 0.04 10*3/MM3 (ref 0–0.2)
BASOPHILS NFR BLD AUTO: 0.4 % (ref 0–1.5)
DEPRECATED RDW RBC AUTO: 39.8 FL (ref 37–54)
EOSINOPHIL # BLD AUTO: 0.27 10*3/MM3 (ref 0–0.4)
EOSINOPHIL NFR BLD AUTO: 2.7 % (ref 0.3–6.2)
ERYTHROCYTE [DISTWIDTH] IN BLOOD BY AUTOMATED COUNT: 13 % (ref 12.3–15.4)
HCT VFR BLD AUTO: 42.9 % (ref 37.5–51)
HGB BLD-MCNC: 14.9 G/DL (ref 13–17.7)
IMM GRANULOCYTES # BLD AUTO: 0.02 10*3/MM3 (ref 0–0.05)
IMM GRANULOCYTES NFR BLD AUTO: 0.2 % (ref 0–0.5)
LYMPHOCYTES # BLD AUTO: 3.23 10*3/MM3 (ref 0.7–3.1)
LYMPHOCYTES NFR BLD AUTO: 32.1 % (ref 19.6–45.3)
MCH RBC QN AUTO: 29.4 PG (ref 26.6–33)
MCHC RBC AUTO-ENTMCNC: 34.7 G/DL (ref 31.5–35.7)
MCV RBC AUTO: 84.6 FL (ref 79–97)
MONOCYTES # BLD AUTO: 0.69 10*3/MM3 (ref 0.1–0.9)
MONOCYTES NFR BLD AUTO: 6.9 % (ref 5–12)
NEUTROPHILS # BLD AUTO: 5.8 10*3/MM3 (ref 1.7–7)
NEUTROPHILS NFR BLD AUTO: 57.7 % (ref 42.7–76)
NRBC BLD AUTO-RTO: 0 /100 WBC (ref 0–0.2)
PLATELET # BLD AUTO: 209 10*3/MM3 (ref 140–450)
PMV BLD AUTO: 11.5 FL (ref 6–12)
RBC # BLD AUTO: 5.07 10*6/MM3 (ref 4.14–5.8)
WBC NRBC COR # BLD: 10.05 10*3/MM3 (ref 3.4–10.8)

## 2019-09-10 PROCEDURE — G0463 HOSPITAL OUTPT CLINIC VISIT: HCPCS | Performed by: INTERNAL MEDICINE

## 2019-09-10 PROCEDURE — 99204 OFFICE O/P NEW MOD 45 MIN: CPT | Performed by: INTERNAL MEDICINE

## 2019-09-10 PROCEDURE — 85025 COMPLETE CBC W/AUTO DIFF WBC: CPT | Performed by: INTERNAL MEDICINE

## 2019-09-10 PROCEDURE — 99406 BEHAV CHNG SMOKING 3-10 MIN: CPT | Performed by: INTERNAL MEDICINE

## 2019-09-10 PROCEDURE — 82668 ASSAY OF ERYTHROPOIETIN: CPT | Performed by: INTERNAL MEDICINE

## 2019-09-10 NOTE — PROGRESS NOTES
Osvaldo Martino Hope Sr.  [id number]  1955      REASON FOR CONSULTATION: Leukocytosis, polycythemia  Provide an opinion on any further workup or treatment                             REQUESTING PHYSICIAN:  Ruiz Ayon MD     RECORDS OBTAINED:  Records of the patients history including those obtained from the referring provider were reviewed and summarized in detail.      History of Present Illness     This is a pleasant 64-year-old male who was seen in consultation at the request of Dr Ayon for evaluation of leukocytosis and polycythemia.  Patient has past medical history of tobacco abuse, hypertension, osteoarthritis, peripheral neuropathy.  Patient unfortunately is a very poor historian and most of the history was obtained by reviewing old medical records.  Patient had routine laboratory testing performed on August 14, 2019 which incidentally showed leukocytosis with white blood cell count of 19.08.  He also had neutrophilia, lymphocytosis and monocytosis.  His eosinophils and basophils were within normal limits.  In addition he was also noticed to have polycythemia with hemoglobin of 17.4.  I have reviewed his old medical records.  It does appear that patient has chronic leukocytosis which certainly has not gotten worse recently.  In addition, his polycythemia is also been new issue as in the past his hemoglobin has been normal.    He does smoke 1 PPD. Smoked for 50 years.   Denies diagnosis of COPD/Emphysema, sleep apnea. Denies use of hormonal supplements.   Denies any unintentional weight loss, drenching night sweats or high fever.   Patient denies any prior history of hematological disorder.   No prior history of anemia.   No prior history of arterial or venous thrombosis.   No family history of hematological disorder.   No new medications.   No history of autoimmune disease.      Past Medical History:   Diagnosis Date   • Actinic keratosis    • Basal cell carcinoma     nose   • Chronic pain    •  Essential hypertension    • Generalized anxiety disorder    • Hyperlipidemia    • Kidney stone    • Malignant melanoma of skin (CMS/HCC)    • Peripheral autonomic neuropathy    • Primary osteoarthritis involving multiple joints    • Prostatitis    • Restless leg syndrome    • Skin lesion    • Thyroid nodule    • Tick bite     without infection           Past Surgical History:   Procedure Laterality Date   • BASAL CELL CARCINOMA EXCISION  07/25/2016    left nose, 1.4 cm margin with rhombic transposition flap closure.   • COLONOSCOPY     • CRYOTHERAPY  03/03/2016    Destruction of Premalignant Lesion (1st) 81435 (Actinic keratosis)    • KIDNEY STONE SURGERY     • NOSE SURGERY      Revision of nose    • OTHER SURGICAL HISTORY      Reconstruction of midface    • SKIN CANCER DESTRUCTION     • SKIN CANCER EXCISION      melanoma from back   • STEROID INJECTION  03/03/2016    Depo Medrol (Methylprednisone) 80mg (Osteoarthritis) (10)        Current Outpatient Medications on File Prior to Visit   Medication Sig Dispense Refill   • gabapentin (NEURONTIN) 300 MG capsule Take 1 capsule by mouth 3 (Three) Times a Day. 90 capsule 2   • HYDROcodone-acetaminophen (NORCO)  MG per tablet 1 tablet(s) by mouth 4 times per day for pain. 120 tablet 0   • losartan (COZAAR) 50 MG tablet Take 1 tablet by mouth Daily. 30 tablet 11   • metroNIDAZOLE (METROGEL) 1 % gel Apply to nose twice a day 60 g 1   • rOPINIRole (REQUIP) 0.5 MG tablet Take 1 tablet by mouth Every Night. Take 1 hour before bedtime. 30 tablet 11   • simvastatin (ZOCOR) 40 MG tablet Take 1 tablet by mouth Every Night. 30 tablet 11   • citalopram (CeleXA) 20 MG tablet Take 1 tablet by mouth Daily. 30 tablet 11   • hydrochlorothiazide (HYDRODIURIL) 25 MG tablet TAKE 1 TABLET BY MOUTH EVERY DAY 30 tablet 5   • varenicline (CHANTIX STARTING MONTH PAK) 0.5 MG X 11 & 1 MG X 42 tablet Take 0.5 mg one daily on days 1-2 and 0.5 mg twice daily on days 4-7. Then 1 mg twice daily  for a total of 12 weeks. 53 tablet 0     No current facility-administered medications on file prior to visit.         ALLERGIES:    Allergies   Allergen Reactions   • Codeine GI Intolerance        Social History     Socioeconomic History   • Marital status:      Spouse name: Not on file   • Number of children: Not on file   • Years of education: Not on file   • Highest education level: Not on file   Tobacco Use   • Smoking status: Current Every Day Smoker     Packs/day: 1.00   • Smokeless tobacco: Never Used   • Tobacco comment: pt stated he has chantix at home, but hasn't taken it; resources offered   Substance and Sexual Activity   • Alcohol use: Yes     Comment: occasional beer- 2or 3 per month   • Drug use: No   • Sexual activity: Defer        Family History   Problem Relation Age of Onset   • Coronary artery disease Mother    • Lung cancer Father    • Cancer Father         bone   • Hypertension Sister    • COPD Sister    • Hypertension Sister         Review of Systems       CONSTITUTIONAL: fatigue + weakness + No weight loss, fever, chills.  HEENT: Eyes: No visual loss, blurred vision, double vision or yellow sclerae. Ears, Nose, Throat: No hearing loss, sneezing, congestion, runny nose or sore throat.  SKIN: No rash or itching.  CARDIOVASCULAR: No chest pain, chest pressure or chest discomfort. No palpitations or edema.  RESPIRATORY: No shortness of breath, cough or sputum.  GASTROINTESTINAL: No anorexia, nausea, vomiting or diarrhea. No abdominal pain or blood.  GENITOURINARY: Negative for urgency, frequency or dysuria.   NEUROLOGICAL: numbness or tingling in the extremities + No headache, dizziness, syncope, paralysis, ataxia. No change in bowel or bladder control.  MUSCULOSKELETAL: chronic joint pain + back pain +   HEMATOLOGIC: No anemia, bleeding or bruising.  LYMPHATICS: No enlarged nodes. No history of splenectomy.  PSYCHIATRIC: No history of depression or anxiety.  ENDOCRINOLOGIC: No reports of  "sweating, cold or heat intolerance. No polyuria or polydipsia.  ALLERGIES: No history of asthma, hives, eczema or rhinitis.      Objective     Vitals:    09/10/19 0909   BP: 137/65   Pulse: 62   Resp: 20   Temp: 98 °F (36.7 °C)   TempSrc: Temporal   SpO2: 98%   Weight: 80.8 kg (178 lb 3.2 oz)   Height: 180.3 cm (71\")   PainSc: 0-No pain     Current Status 9/10/2019   ECOG score 0       Physical Exam      GENERAL: Alert, awake, oriented.  Well dressed.  Not in apparent distress. Vitals as above.   HEAD: Normocephalic, atraumatic.   EYES: PERRL, EOMI.vision is grossly intact.  NECK: Supple, no adenopathy or thyromegaly.   THROAT: Normal oral cavity and pharynx. No inflammation, swelling, exudate, or lesions.  CARDIAC: Normal S1 and S2. No S3, S4 or murmurs. Rhythm is regular.  Extremities are warm and well perfused.   LUNGS: Clear to auscultation and percussion without rales, rhonchi, wheezing or diminished breath sounds.  ABDOMEN: Positive bowel sounds. Soft, nondistended, nontender. No guarding or rebound. No masses.  BACK:  No bony tenderness.   EXTREMITIES: Osteoarthritis involving multiple joints +  Peripheral pulses intact. No varicosities.  SKIN: No rash or bruising.  NEUROLOGICAL: Grossly non-focal exam. No focal weakness. Gait: Normal.   PSYCH: Mood and affect normal. No hallucination or suicidal thoughts.   LYMPHATIC: No cervical, supraclavicular or axillary adenopathy.       RECENT LABS: Independently reviewed and summarized  Hematology WBC   Date Value Ref Range Status   08/14/2019 19.08 (H) 3.40 - 10.80 10*3/mm3 Final     RBC   Date Value Ref Range Status   08/14/2019 5.63 4.14 - 5.80 10*6/mm3 Final     Hemoglobin   Date Value Ref Range Status   08/14/2019 17.4 13.0 - 17.7 g/dL Final     Hematocrit   Date Value Ref Range Status   08/14/2019 48.5 37.5 - 51.0 % Final     Platelets   Date Value Ref Range Status   08/14/2019 394 140 - 450 10*3/mm3 Final        Imaging (independently reviewed and summarized): "   CT abdomen pelvis from June 30, 2016 reviewed.  Showed no evidence of hepatosplenomegaly or lymphadenopathy.  No prior comparison available.    I have reviewed old records and summarized them in HPI as well as assessment and plan section of this note.     Diagnosis:   (1) leukocytosis  (2) polycythemia  (3) hypertension  (4) peripheral neuropathy  (5) encounter for tobacco use cessation counseling    All are new diagnosis/problems for me.     Assessment/Plan     (1) leukocytosis: Patient with chronic leukocytosis going back to at least 2014. This was detected incidentally. No B symptoms - no unintentional weight loss or high fevers or drenching night sweats. No recurrent infections requiring antibiotics. No lymphadenopathy or hepatosplenomegaly. No anemia.No thrombocytopenia or thrombocytosis. Overall suspicious for primary hematological disorder is low and likely these abnormalities are reactive in nature. However, we will review peripheral smear and check for flow cytometry.        (2) polycythemia: Likely secondary.  He does not have any history of arterial venous thrombosis.  No splenomegaly palpable.  We will check epo level to confirm.     (3) hypertension: Blood pressure is well controlled on 50 mg of losartan.    (4) peripheral neuropathy: Stable on Neurontin.    (5) encounter for tobacco use cessation counseling: I had 5-7 minutes discussion with the patient about smoking cessation. Problems with continued smoking including respiratory, cardiovascular and oncological problems were discussion. Advice on smoking cessation was reiterated including methods of quitting and different medications and support system available for smoking cessation was discussed.     Thank you for involving me in Mr Conley's care.     Please call us if any questions/concerns.     Jonathan Cruz MD   Hematology Oncology

## 2019-09-11 ENCOUNTER — OFFICE VISIT (OUTPATIENT)
Dept: ORTHOPEDIC SURGERY | Facility: CLINIC | Age: 64
End: 2019-09-11

## 2019-09-11 VITALS — BODY MASS INDEX: 24.92 KG/M2 | HEIGHT: 71 IN | WEIGHT: 178 LBS

## 2019-09-11 DIAGNOSIS — M25.561 CHRONIC PAIN OF BOTH KNEES: Primary | ICD-10-CM

## 2019-09-11 DIAGNOSIS — M25.562 CHRONIC PAIN OF BOTH KNEES: Primary | ICD-10-CM

## 2019-09-11 DIAGNOSIS — M23.92 INTERNAL DERANGEMENT OF LEFT KNEE: ICD-10-CM

## 2019-09-11 DIAGNOSIS — G89.29 CHRONIC PAIN OF BOTH KNEES: Primary | ICD-10-CM

## 2019-09-11 DIAGNOSIS — I10 ESSENTIAL HYPERTENSION: ICD-10-CM

## 2019-09-11 DIAGNOSIS — M17.0 PRIMARY OSTEOARTHRITIS OF BOTH KNEES: ICD-10-CM

## 2019-09-11 DIAGNOSIS — F17.210 HEAVY CIGARETTE SMOKER (20-39 PER DAY): ICD-10-CM

## 2019-09-11 LAB — ETHNIC BACKGROUND STATED: 8.8 MIU/ML (ref 2.6–18.5)

## 2019-09-11 PROCEDURE — 99213 OFFICE O/P EST LOW 20 MIN: CPT | Performed by: ORTHOPAEDIC SURGERY

## 2019-09-11 NOTE — PROGRESS NOTES
"Osvaldo Conley Sr. is a 64 y.o. male returns for     Chief Complaint   Patient presents with   • Right Knee - Follow-up, Pain   • Left Knee - Follow-up, Pain       HISTORY OF PRESENT ILLNESS: f/u bilateral knee pain. Steroid injections done on 4/24/2019 helped but did not last long, lasted about one week.   He is complaining of pain that is worse in his left knee than his right.  He has pain with pivoting and twisting and pain with deep knee bends.  No new injury.  No numbness or tingling.  Mostly it is a dull ache except for occasional sharp stabbing pains with pivoting and twisting.       CONCURRENT MEDICAL HISTORY:    The following portions of the patient's history were reviewed and updated as appropriate: allergies, current medications, past family history, past medical history, past social history, past surgical history and problem list.     ROS  No fevers or chills.  No chest pain or shortness of air.  No GI or  disturbances.    PHYSICAL EXAMINATION:       Ht 180.3 cm (71\")   Wt 80.7 kg (178 lb)   BMI 24.83 kg/m²     Physical Exam   Constitutional: He is oriented to person, place, and time. He appears well-developed and well-nourished.   Neurological: He is alert and oriented to person, place, and time.   Psychiatric: He has a normal mood and affect. His behavior is normal. Judgment and thought content normal.       GAIT:     [x]  Normal  []  Antalgic    Assistive device: [x]  None  []  Walker     []  Crutches  []  Cane     []  Wheelchair  []  Stretcher    Right Knee Exam     Muscle Strength   The patient has normal right knee strength.    Tenderness   Right knee tenderness location: diffuse.    Range of Motion   Extension: -5   Flexion: 120     Tests   Varus: negative Valgus: negative  Drawer:  Anterior - negative    Posterior - negative    Other   Sensation: normal  Pulse: present  Swelling: mild    Comments:  Crepitation on motion.  Mild to moderate pain through arc of motion      Left Knee Exam "     Muscle Strength   The patient has normal left knee strength.    Tenderness   The patient is experiencing tenderness in the medial joint line.    Range of Motion   Extension: 0   Flexion: 120     Tests   Varus: negative Valgus: negative  Drawer:  Anterior - negative         Other   Erythema: absent  Sensation: normal  Pulse: present  Swelling: none                Ordering Provider:  Jamarcus Weems MD  Ordering Diagnosis/Indication:  Pain     Procedure:  XR KNEE 1 OR 2 VW BILATERAL  Exam Date:  1/9/19     COMPARISON:  Not applicable, no relevant images available.     IMPRESSION: AP bilateral standing of the knees with lateral of each knee shows moderate to severe osteophytic change in the right knee with varus positioning and essentially bone on bone collapse of the medial compartment.  There are marginal osteophytes in both medial and lateral aspects of the knee.  There appears to be an arthritic cyst just under the tibial spine of the proximal tibia as well.  No acute findings are noted.  Left knee shows mild to moderate arthritic change with medial joint space narrowing and marginal osteophytes on both sides of the knee.  Each lateral view shows moderate to severe patellofemoral arthritic change and mild posterior osteophyte formation as well.        Jamarcus Weems MD  1/9/19        ASSESSMENT:    Diagnoses and all orders for this visit:    Chronic pain of both knees    Primary osteoarthritis of both knees    Internal derangement of left knee  -     MRI Knee Left Without Contrast; Future    Essential hypertension    Heavy cigarette smoker (20-39 per day)          NORBERT    Osvaldo Martino Yael Zandra is a current cigarettes user.  He currently smokes 1 pack of cigarettes per day.    I advised him to quit and he is not willing to quit.     I spent 2 minutes counseling the patient.      Patient has also arthritic change in the right knee and the eventual intervention will be a total knee arthroplasty at  some point.  We can continue with injections and possibly Visco supplementation intermittently as effective.  His left knee has mild arthritic change on x-ray but he is having mechanical symptoms.  We discussed proceeding with an MRI of his left knee to assess for internal derangement.  He has previously had home exercise program and activity modification.  He has had multiple injections in the left knee.  Follow-up after the MRI to discuss further treatment options.      Patient's Body mass index is 24.83 kg/m². BMI is within normal parameters. No follow-up required..    Return for recheck for MRI results.    Jamarcus Weems MD

## 2019-09-16 LAB — REF LAB TEST METHOD: NORMAL

## 2019-09-19 DIAGNOSIS — M15.9 PRIMARY OSTEOARTHRITIS INVOLVING MULTIPLE JOINTS: Chronic | ICD-10-CM

## 2019-09-19 RX ORDER — HYDROCODONE BITARTRATE AND ACETAMINOPHEN 10; 325 MG/1; MG/1
TABLET ORAL
Qty: 120 TABLET | Refills: 0 | Status: SHIPPED | OUTPATIENT
Start: 2019-09-19 | End: 2019-10-17 | Stop reason: SDUPTHER

## 2019-09-19 NOTE — TELEPHONE ENCOUNTER
----- Message from Rachael Armstrong sent at 9/17/2019  1:21 PM CDT -----  Regarding: MED REFILL  Contact: [phone]   Needs refill on HYDROcodone-acetaminophen (NORCO)  MG, [facility] [place].

## 2019-09-19 NOTE — TELEPHONE ENCOUNTER
Patient is UTD from office visit, SANDRA and controlled consent on 8-19-19. Next office visit isscheduled for 11-19-19. Last refill of Hydrocodone wasa on 8-19-19.

## 2019-09-25 ENCOUNTER — APPOINTMENT (OUTPATIENT)
Dept: MRI IMAGING | Facility: HOSPITAL | Age: 64
End: 2019-09-25

## 2019-10-01 ENCOUNTER — HOSPITAL ENCOUNTER (OUTPATIENT)
Dept: MRI IMAGING | Facility: HOSPITAL | Age: 64
Discharge: HOME | End: 2019-10-01
Admitting: ORTHOPAEDIC SURGERY

## 2019-10-01 DIAGNOSIS — M23.92 INTERNAL DERANGEMENT OF LEFT KNEE: ICD-10-CM

## 2019-10-01 PROCEDURE — 73721 MRI JNT OF LWR EXTRE W/O DYE: CPT

## 2019-10-12 DIAGNOSIS — I10 ESSENTIAL HYPERTENSION: Chronic | ICD-10-CM

## 2019-10-14 RX ORDER — HYDROCHLOROTHIAZIDE 25 MG/1
TABLET ORAL
Qty: 30 TABLET | Refills: 5 | Status: SHIPPED | OUTPATIENT
Start: 2019-10-14 | End: 2020-02-14 | Stop reason: SDUPTHER

## 2019-10-14 NOTE — TELEPHONE ENCOUNTER
Showing this medication was D/C on 9- at Dr. Bazzi's office due to Therapy Completed. No proof of this in the chart note.   Please advise.

## 2019-10-16 ENCOUNTER — TELEPHONE (OUTPATIENT)
Dept: ONCOLOGY | Facility: CLINIC | Age: 64
End: 2019-10-16

## 2019-10-17 DIAGNOSIS — M15.9 PRIMARY OSTEOARTHRITIS INVOLVING MULTIPLE JOINTS: Chronic | ICD-10-CM

## 2019-10-17 RX ORDER — HYDROCODONE BITARTRATE AND ACETAMINOPHEN 10; 325 MG/1; MG/1
TABLET ORAL
Qty: 120 TABLET | Refills: 0 | Status: SHIPPED | OUTPATIENT
Start: 2019-10-17 | End: 2019-11-19 | Stop reason: SDUPTHER

## 2019-10-17 NOTE — TELEPHONE ENCOUNTER
----- Message from Rachael Armstrong sent at 10/15/2019  1:55 PM CDT -----  Regarding: MED REFILL  Contact: [phone]   Needs refill on HYDROcodone-acetaminophen (NORCO)  MG, [facility] Friday [facility] [place].

## 2019-10-17 NOTE — TELEPHONE ENCOUNTER
Patient is UTD from office visit, SANDRA and controlled consent on 8-19-19. Next office visit is scheduled for 11-19-19.

## 2019-10-18 ENCOUNTER — OFFICE VISIT (OUTPATIENT)
Dept: OTOLARYNGOLOGY | Facility: CLINIC | Age: 64
End: 2019-10-18

## 2019-10-18 VITALS — WEIGHT: 179 LBS | OXYGEN SATURATION: 98 % | BODY MASS INDEX: 25.06 KG/M2 | HEIGHT: 71 IN

## 2019-10-18 DIAGNOSIS — M95.0 ACQUIRED NASAL DEFORMITY: ICD-10-CM

## 2019-10-18 DIAGNOSIS — Z86.018 HISTORY OF INVERTED PAPILLOMA: ICD-10-CM

## 2019-10-18 DIAGNOSIS — R51.9 LEFT FACIAL PAIN: ICD-10-CM

## 2019-10-18 DIAGNOSIS — R22.0 INTRANASAL MASS: Primary | ICD-10-CM

## 2019-10-18 DIAGNOSIS — J34.89 NASAL SEPTAL PERFORATION: ICD-10-CM

## 2019-10-18 DIAGNOSIS — Z85.828 PERSONAL HISTORY OF MALIGNANT NEOPLASM OF SKIN: ICD-10-CM

## 2019-10-18 PROCEDURE — 99214 OFFICE O/P EST MOD 30 MIN: CPT | Performed by: OTOLARYNGOLOGY

## 2019-10-18 PROCEDURE — 31231 NASAL ENDOSCOPY DX: CPT | Performed by: OTOLARYNGOLOGY

## 2019-10-18 NOTE — PROGRESS NOTES
Subjective   Osvaldo Conley Sr. is a 64 y.o. male.       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  Also has a large nasal septal perforation as result of that surgery.  More recently (2016) underwent excision of a basal cell carcinoma of the left nose that extended full-thickness into the nasal cavity.  This was reconstructed with a flap.  Subsequently was left with external nasal deformity due to loss of alar cartilage.  Furthermore has left-sided facial pain thought to be neuropathic.  Pain is still present despite taking gabapentin.  Returns for reevaluation stating he feels like his left nostril is more obstructed than it has been previously.  He is not having any bleeding or purulent rhinorrhea.  Nothing in particular brought this on.      The following portions of the patient's history were reviewed and updated as appropriate: allergies, current medications, past family history, past medical history, past social history, past surgical history and problem list.     reports that he has been smoking.  He has been smoking about 1.00 pack per day. He has never used smokeless tobacco. He reports that he drinks alcohol. He reports that he does not use drugs.   Patient is a tobacco user and has been counseled for use of tobacco products      Review of Systems   Constitutional: Negative for fever.   Neurological: Positive for headaches.           Objective   Physical Exam  The nasal dorsum shows collapse of the left lateral nose but with no obvious external lesion that would suggest recurrent skin cancer.  Intranasally the septal perforation is noted.  Nasal endoscopy is performed: Olvin-Synephrine and Xylocaine are instilled the nares bilaterally.  0° scope is passed into each nostril.  The inferior, middle, and superior turbinates as well as nasal septum and nasopharynx are examined.  Pertinent  findings include: Crusted secretions on a granular appearing mass in the left naris.  This appears to be in the area of what should be the middle turbinate but appears larger than I recall on previous exams and more granular in appearance.  This is worrisome for neoplasm.  Large defect in the medial maxilla with no evidence of overt recurrence within the maxillary cavity.  Large septal perforation with well epithelialized margin      Assessment/Plan   Osvaldo was seen today for follow-up.    Diagnoses and all orders for this visit:    Intranasal mass    History of inverted papilloma    Acquired nasal deformity    Nasal septal perforation    Personal history of malignant neoplasm of skin    Left facial pain      Plan: I am concerned that this may be recurrence of his inverted papilloma or possibly even a new neoplasm.  I have recommended intranasal biopsy under local anesthesia to be performed at my office next week.  Risks include bleeding, nondiagnostic result, and possible need for further treatment.  Benefit is establishing a definitive diagnosis.  The alternative is observation which may leave a neoplasm undiagnosed.  Patient voices understanding and agreement.  This will be done in the office next week.  If this is negative and just inflamed but otherwise normal mucosa, I discussed with him the possibility of getting a consultation from Dr. Patel at the [facility] to see if something could be done about his nasal anatomy.

## 2019-10-23 ENCOUNTER — PROCEDURE VISIT (OUTPATIENT)
Dept: OTOLARYNGOLOGY | Facility: CLINIC | Age: 64
End: 2019-10-23

## 2019-10-23 VITALS — BODY MASS INDEX: 25.06 KG/M2 | WEIGHT: 179 LBS | HEIGHT: 71 IN | RESPIRATION RATE: 18 BRPM

## 2019-10-23 DIAGNOSIS — R22.0 INTRANASAL MASS: Primary | ICD-10-CM

## 2019-10-23 PROCEDURE — 88305 TISSUE EXAM BY PATHOLOGIST: CPT | Performed by: OTOLARYNGOLOGY

## 2019-10-23 PROCEDURE — 31237 NSL/SINS NDSC SURG BX POLYPC: CPT | Performed by: OTOLARYNGOLOGY

## 2019-10-23 PROCEDURE — 88305 TISSUE EXAM BY PATHOLOGIST: CPT | Performed by: PATHOLOGY

## 2019-10-23 NOTE — PROGRESS NOTES
Procedure note    Preop diagnosis: Left intranasal mass    Postop diagnosis: Same    Procedure: Nasal endoscopy with biopsy of left intranasal mass    Surgeon: Dr. Fontanez    Anesthesia: 1% Xylocaine plain    Description of procedure: After again explaining the nature of the procedure to the patient including risks of bleeding and possible need for further treatment depending on pathology as well as the benefit of establishing a definitive diagnosis and the alternative of observation informed consent was confirmed.  Olvin-Synephrine and Xylocaine were instilled in the nares first Synephrine and Xylocaine were placed in the left naris on cotton and allowed to remain for approximately 5 minutes.  Upon removal the 0 degree scope was passed into the left naris and the mass along the anterior aspect of the middle turbinate remnant was infiltrated with 1% Xylocaine plain.  A 3 mm punch was used to obtain 2 cores of tissue both of which were sent for permanent pathology.  Hemostasis was obtained at the biopsy site using topical application of silver nitrate.  Once hemostasis was obtained the procedure was terminated and the patient tolerated the procedure well.  He is advised to use nasal saline spray frequently and call Monday for biopsy results and further treatment discussion.

## 2019-10-24 LAB
LAB AP CASE REPORT: NORMAL
PATH REPORT.FINAL DX SPEC: NORMAL
PATH REPORT.GROSS SPEC: NORMAL

## 2019-10-25 ENCOUNTER — OFFICE VISIT (OUTPATIENT)
Dept: ORTHOPEDIC SURGERY | Facility: CLINIC | Age: 64
End: 2019-10-25

## 2019-10-25 VITALS — BODY MASS INDEX: 24.92 KG/M2 | WEIGHT: 178 LBS | HEIGHT: 71 IN

## 2019-10-25 DIAGNOSIS — M17.11 PRIMARY OSTEOARTHRITIS OF RIGHT KNEE: ICD-10-CM

## 2019-10-25 DIAGNOSIS — M25.562 CHRONIC PAIN OF BOTH KNEES: ICD-10-CM

## 2019-10-25 DIAGNOSIS — M23.252 DEGENERATIVE TEAR OF POSTERIOR HORN OF LATERAL MENISCUS OF LEFT KNEE: Primary | ICD-10-CM

## 2019-10-25 DIAGNOSIS — G89.29 CHRONIC PAIN OF BOTH KNEES: ICD-10-CM

## 2019-10-25 DIAGNOSIS — M25.561 CHRONIC PAIN OF BOTH KNEES: ICD-10-CM

## 2019-10-25 DIAGNOSIS — M23.92 INTERNAL DERANGEMENT OF LEFT KNEE: ICD-10-CM

## 2019-10-25 DIAGNOSIS — I10 ESSENTIAL HYPERTENSION: ICD-10-CM

## 2019-10-25 PROCEDURE — 20610 DRAIN/INJ JOINT/BURSA W/O US: CPT | Performed by: ORTHOPAEDIC SURGERY

## 2019-10-25 PROCEDURE — 99213 OFFICE O/P EST LOW 20 MIN: CPT | Performed by: ORTHOPAEDIC SURGERY

## 2019-10-25 RX ADMIN — TRIAMCINOLONE ACETONIDE 40 MG: 40 INJECTION, SUSPENSION INTRA-ARTICULAR; INTRAMUSCULAR at 08:38

## 2019-10-25 RX ADMIN — LIDOCAINE HYDROCHLORIDE 2 ML: 10 INJECTION, SOLUTION EPIDURAL; INFILTRATION; INTRACAUDAL; PERINEURAL at 08:38

## 2019-10-27 RX ORDER — TRIAMCINOLONE ACETONIDE 40 MG/ML
40 INJECTION, SUSPENSION INTRA-ARTICULAR; INTRAMUSCULAR
Status: COMPLETED | OUTPATIENT
Start: 2019-10-25 | End: 2019-10-25

## 2019-10-27 RX ORDER — LIDOCAINE HYDROCHLORIDE 10 MG/ML
2 INJECTION, SOLUTION EPIDURAL; INFILTRATION; INTRACAUDAL; PERINEURAL
Status: COMPLETED | OUTPATIENT
Start: 2019-10-25 | End: 2019-10-25

## 2019-10-29 ENCOUNTER — TELEPHONE (OUTPATIENT)
Dept: OTOLARYNGOLOGY | Facility: CLINIC | Age: 64
End: 2019-10-29

## 2019-10-30 ENCOUNTER — DOCUMENTATION (OUTPATIENT)
Dept: OTOLARYNGOLOGY | Facility: CLINIC | Age: 64
End: 2019-10-30

## 2019-10-30 ENCOUNTER — TELEPHONE (OUTPATIENT)
Dept: OTOLARYNGOLOGY | Facility: CLINIC | Age: 64
End: 2019-10-30

## 2019-10-30 DIAGNOSIS — M95.0 ACQUIRED NASAL DEFORMITY: Primary | ICD-10-CM

## 2019-10-30 NOTE — TELEPHONE ENCOUNTER
----- Message from Grazyna Peralta sent at 10/29/2019  1:34 PM CDT -----  Contact: [phone]  CALLING FOR BX RESULTS    Inform patient of benign biopsy results, just granulation tissue no evidence of recurrent inverted papilloma.  This is likely due to dryness and cigarette smoking.  He would like to proceed with a consultation with  to see if there are any options for reconstructing his nose to improve his nasal airflow and appearance.  We will request this evaluation.  Patient will also continue to see me at six-month intervals for surveillance.

## 2019-10-30 NOTE — PROGRESS NOTES
Biopsy of the patient's middle turbinate was consistent with granulation tissue.  Therefore with no evidence of recurrent inverted papilloma I think it would be reasonable to seek a consultation from Dr. Patel at the [facility] for possible nasal reconstruction.  This will be requested and I will continue to see the patient at 6-month intervals.

## 2019-11-14 ENCOUNTER — TELEPHONE (OUTPATIENT)
Dept: OTOLARYNGOLOGY | Facility: CLINIC | Age: 64
End: 2019-11-14

## 2019-11-19 ENCOUNTER — OFFICE VISIT (OUTPATIENT)
Dept: FAMILY MEDICINE CLINIC | Facility: CLINIC | Age: 64
End: 2019-11-19

## 2019-11-19 VITALS
HEIGHT: 71 IN | HEART RATE: 71 BPM | SYSTOLIC BLOOD PRESSURE: 118 MMHG | DIASTOLIC BLOOD PRESSURE: 72 MMHG | TEMPERATURE: 97.8 F | WEIGHT: 186 LBS | BODY MASS INDEX: 26.04 KG/M2

## 2019-11-19 DIAGNOSIS — F41.1 GENERALIZED ANXIETY DISORDER: Chronic | ICD-10-CM

## 2019-11-19 DIAGNOSIS — Z23 INFLUENZA VACCINATION ADMINISTERED AT CURRENT VISIT: ICD-10-CM

## 2019-11-19 DIAGNOSIS — I10 ESSENTIAL HYPERTENSION: Primary | Chronic | ICD-10-CM

## 2019-11-19 DIAGNOSIS — D75.1 POLYCYTHEMIA: Chronic | ICD-10-CM

## 2019-11-19 DIAGNOSIS — G25.81 RESTLESS LEG SYNDROME: Chronic | ICD-10-CM

## 2019-11-19 DIAGNOSIS — G63 POLYNEUROPATHY ASSOCIATED WITH UNDERLYING DISEASE (HCC): Chronic | ICD-10-CM

## 2019-11-19 DIAGNOSIS — M15.9 PRIMARY OSTEOARTHRITIS INVOLVING MULTIPLE JOINTS: Chronic | ICD-10-CM

## 2019-11-19 DIAGNOSIS — D72.829 LEUKOCYTOSIS, UNSPECIFIED TYPE: Chronic | ICD-10-CM

## 2019-11-19 PROCEDURE — 90674 CCIIV4 VAC NO PRSV 0.5 ML IM: CPT | Performed by: INTERNAL MEDICINE

## 2019-11-19 PROCEDURE — 90471 IMMUNIZATION ADMIN: CPT | Performed by: INTERNAL MEDICINE

## 2019-11-19 PROCEDURE — 99214 OFFICE O/P EST MOD 30 MIN: CPT | Performed by: INTERNAL MEDICINE

## 2019-11-19 RX ORDER — GABAPENTIN 300 MG/1
300 CAPSULE ORAL 3 TIMES DAILY
Qty: 90 CAPSULE | Refills: 2 | Status: SHIPPED | OUTPATIENT
Start: 2019-11-19 | End: 2020-02-14 | Stop reason: SDUPTHER

## 2019-11-19 RX ORDER — HYDROCODONE BITARTRATE AND ACETAMINOPHEN 10; 325 MG/1; MG/1
TABLET ORAL
Qty: 120 TABLET | Refills: 0 | Status: SHIPPED | OUTPATIENT
Start: 2019-11-19 | End: 2019-12-17 | Stop reason: SDUPTHER

## 2019-11-19 NOTE — PROGRESS NOTES
Chief Complaint   Patient presents with   • Osteoarthritis     3 mo f/u     Subjective   Osvaldo Conley Sr. is a 64 y.o. male who presents to the office for follow-up. He has hypertension and his blood pressure has been controlled. He has restless leg syndrome which is well controlled with Requip.  He has anxiety and takes Celexa daily.  He has osteoarthritis which causes chronic back and joint pain.  He takes Norco 10/325 mg 4 times a day for treatment of the chronic pain.  He also takes gabapentin 3 times a day for treatment of the neuropathic pain.  At the last visit, he was having increased joint pain and was asking about changing his Norco to Percocet.  I discussed pain management referral, but he wanted to hold off at that time.  He has continued to take Norco over the past few months.    Also at the last visit, I referred him to hematology for evaluation of an elevated white blood cell count.  It was determined that he had a chronic leukocytosis and polycythemia, but no pathologic diagnosis was found.  This is being monitored for now, and he has a follow-up with hematology in March 2020.    The following portions of the patient's history were reviewed and updated as appropriate: allergies, current medications, past family history, past medical history, past social history, past surgical history and problem list.    Review of Systems   Constitutional: Negative for chills, fatigue and fever.   HENT: Negative for congestion, sneezing, sore throat and trouble swallowing.    Eyes: Negative for visual disturbance.   Respiratory: Negative for cough, chest tightness, shortness of breath and wheezing.    Cardiovascular: Negative for chest pain and palpitations.   Gastrointestinal: Negative for abdominal pain, constipation, diarrhea, nausea and vomiting.   Genitourinary: Negative for dysuria, frequency and urgency.   Musculoskeletal: Positive for arthralgias and back pain. Negative for neck pain.   Skin: Negative  "for rash.   Neurological: Negative for dizziness, weakness and headaches.   Psychiatric/Behavioral:        Patient denies any feelings of depression and has not felt down, hopeless or lost interest in any activities.   All other systems reviewed and are negative.      Objective   Vitals:    11/19/19 0826   BP: 118/72   BP Location: Left arm   Patient Position: Sitting   Cuff Size: Adult   Pulse: 71   Temp: 97.8 °F (36.6 °C)   TempSrc: Oral   Weight: 84.4 kg (186 lb)   Height: 180.3 cm (71\")   PainSc:   4   PainLoc: Knee  Comment: bilat knee      Body mass index is 25.94 kg/m².    Physical Exam   Constitutional: He is oriented to person, place, and time. He appears well-developed and well-nourished. No distress.   HENT:   Head: Normocephalic and atraumatic.   Nose: Nose normal.   Mouth/Throat: Oropharynx is clear and moist. No oropharyngeal exudate.   Eyes: Conjunctivae and EOM are normal. Pupils are equal, round, and reactive to light. No scleral icterus.   Neck: Normal range of motion. Neck supple.   Cardiovascular: Normal rate, regular rhythm and normal heart sounds. Exam reveals no gallop and no friction rub.   No murmur heard.  Pulmonary/Chest: Effort normal. No respiratory distress. He has decreased breath sounds. He has no wheezes. He has no rales.   Abdominal: Soft. Bowel sounds are normal. He exhibits no distension. There is no tenderness. There is no rebound and no guarding.   Musculoskeletal: Normal range of motion. He exhibits no edema.   Lymphadenopathy:     He has no cervical adenopathy.   Neurological: He is alert and oriented to person, place, and time. No cranial nerve deficit.   Skin: Skin is warm and dry. No rash noted.   Psychiatric: He has a normal mood and affect. His behavior is normal. Judgment and thought content normal.   Nursing note and vitals reviewed.      Assessment/Plan   Osvaldo was seen today for osteoarthritis.    Diagnoses and all orders for this visit:    Essential " hypertension    Primary osteoarthritis involving multiple joints  -     HYDROcodone-acetaminophen (NORCO)  MG per tablet; 1 tablet(s) by mouth 4 times per day for pain.    Generalized anxiety disorder    Restless leg syndrome    Polycythemia  Comments:  Chronic    Leukocytosis, unspecified type    Polyneuropathy associated with underlying disease (CMS/HCC)  -     gabapentin (NEURONTIN) 300 MG capsule; Take 1 capsule by mouth 3 (Three) Times a Day.    Influenza vaccination administered at current visit  -     Flucelvax Quad=>4Years (PFS)         He will follow-up with hematology as scheduled secondary to the chronic leukocytosis and polycythemia.  His blood pressure is controlled and he will continue with his current blood pressure medication. He will continue with Requip for treatment of the RLS.  He will continue with Celexa for his anxiety.  He will continue with gabapentin for the neuropathic pain.  He will also continue to use Norco 10/325 mg 4 times a day for treatment of the arthritic pain.    Patient understands the risks associated with this controlled medication, including tolerance and addiction.  Patient also agrees to only obtain this medication from me, and not from a another provider, unless that provider is covering for me in my absence.  Patient also agrees to be compliant in dosing, and not self adjust the dose of medication.  A signed controlled substance agreement is on file, and the patient has received a controlled substance education sheet at this a previous visit.  The patient has also signed a consent for treatment with a controlled substance as per [facility] policy. SANDRA was obtained.    Patient is given a flu shot today.    Patient's Body mass index is 25.94 kg/m². BMI is above normal parameters. Recommendations include: educational material.    CONTROLLED SUBSTANCE TRACKING 8/19/2019 11/19/2019   Burke Nesbitt 8/19/2019 11/19/2019   Report Number [id number]   Last  Controlled Substance Agreement 8/19/2019 11/19/2019       PHQ-2/PHQ-9 Depression Screening 9/10/2019   Little interest or pleasure in doing things 2   Feeling down, depressed, or hopeless 1   Trouble falling or staying asleep, or sleeping too much 0   Feeling tired or having little energy 3   Poor appetite or overeating 1   Feeling bad about yourself - or that you are a failure or have let yourself or your family down 1   Trouble concentrating on things, such as reading the newspaper or watching television 0   Moving or speaking so slowly that other people could have noticed. Or the opposite - being so fidgety or restless that you have been moving around a lot more than usual 0   Thoughts that you would be better off dead, or of hurting yourself in some way 0   Total Score 8   If you checked off any problems, how difficult have these problems made it for you to do your work, take care of things at home, or get along with other people? Somewhat difficult

## 2019-11-19 NOTE — PATIENT INSTRUCTIONS

## 2019-11-20 ENCOUNTER — TELEPHONE (OUTPATIENT)
Dept: OTOLARYNGOLOGY | Facility: CLINIC | Age: 64
End: 2019-11-20

## 2019-11-20 NOTE — TELEPHONE ENCOUNTER
this # is not reacheable, also shelly contact # is not reacheable called to get in touch with pt about his appt in [place]

## 2019-12-02 ENCOUNTER — TELEPHONE (OUTPATIENT)
Dept: OTOLARYNGOLOGY | Facility: CLINIC | Age: 64
End: 2019-12-02

## 2019-12-17 DIAGNOSIS — M15.9 PRIMARY OSTEOARTHRITIS INVOLVING MULTIPLE JOINTS: Chronic | ICD-10-CM

## 2019-12-17 RX ORDER — HYDROCODONE BITARTRATE AND ACETAMINOPHEN 10; 325 MG/1; MG/1
TABLET ORAL
Qty: 120 TABLET | Refills: 0 | Status: SHIPPED | OUTPATIENT
Start: 2019-12-17 | End: 2020-01-17 | Stop reason: SDUPTHER

## 2019-12-17 NOTE — TELEPHONE ENCOUNTER
Patient states his Hydrocodone will be due Thursday the 19th. States he will be going out of town Wednesday to visit family for [place] and is wanting to know if he may pick his prescription up one day early.  Please advise.      CONTROLLED SUBSTANCE TRACKING 8/19/2019 11/19/2019   Last Laz 8/19/2019 11/19/2019   Report Number [id number]   Last Controlled Substance Agreement 8/19/2019 11/19/2019       Hydrocodone script it attached.

## 2020-01-15 DIAGNOSIS — M25.562 CHRONIC PAIN OF BOTH KNEES: Primary | ICD-10-CM

## 2020-01-15 DIAGNOSIS — M25.561 CHRONIC PAIN OF BOTH KNEES: Primary | ICD-10-CM

## 2020-01-15 DIAGNOSIS — G89.29 CHRONIC PAIN OF BOTH KNEES: Primary | ICD-10-CM

## 2020-01-16 ENCOUNTER — OFFICE VISIT (OUTPATIENT)
Dept: ORTHOPEDIC SURGERY | Facility: CLINIC | Age: 65
End: 2020-01-16

## 2020-01-16 VITALS — WEIGHT: 191 LBS | HEIGHT: 71 IN | BODY MASS INDEX: 26.74 KG/M2

## 2020-01-16 DIAGNOSIS — G89.29 CHRONIC PAIN OF BOTH KNEES: ICD-10-CM

## 2020-01-16 DIAGNOSIS — F17.210 HEAVY CIGARETTE SMOKER (20-39 PER DAY): ICD-10-CM

## 2020-01-16 DIAGNOSIS — M23.252 DEGENERATIVE TEAR OF POSTERIOR HORN OF LATERAL MENISCUS OF LEFT KNEE: ICD-10-CM

## 2020-01-16 DIAGNOSIS — M23.92 INTERNAL DERANGEMENT OF LEFT KNEE: ICD-10-CM

## 2020-01-16 DIAGNOSIS — M25.561 CHRONIC PAIN OF BOTH KNEES: ICD-10-CM

## 2020-01-16 DIAGNOSIS — M25.562 CHRONIC PAIN OF BOTH KNEES: ICD-10-CM

## 2020-01-16 DIAGNOSIS — M17.0 PRIMARY OSTEOARTHRITIS OF BOTH KNEES: Primary | ICD-10-CM

## 2020-01-16 PROCEDURE — 20610 DRAIN/INJ JOINT/BURSA W/O US: CPT | Performed by: ORTHOPAEDIC SURGERY

## 2020-01-16 PROCEDURE — 99213 OFFICE O/P EST LOW 20 MIN: CPT | Performed by: ORTHOPAEDIC SURGERY

## 2020-01-16 RX ADMIN — LIDOCAINE HYDROCHLORIDE 2 ML: 10 INJECTION, SOLUTION EPIDURAL; INFILTRATION; INTRACAUDAL; PERINEURAL at 11:50

## 2020-01-16 RX ADMIN — TRIAMCINOLONE ACETONIDE 40 MG: 40 INJECTION, SUSPENSION INTRA-ARTICULAR; INTRAMUSCULAR at 11:50

## 2020-01-16 NOTE — PROGRESS NOTES
"Osvaldo Conley . is a 64 y.o. male returns for     Chief Complaint   Patient presents with   • Left Knee - Follow-up, Pain   • Right Knee - Pain, Follow-up       HISTORY OF PRESENT ILLNESS: f/u bilateral knee pain, steroid injection left knee done on 10/25/2019.   Bilateral knee xrays done today.      CONCURRENT MEDICAL HISTORY:    The following portions of the patient's history were reviewed and updated as appropriate: allergies, current medications, past family history, past medical history, past social history, past surgical history and problem list.     ROS  No fevers or chills.  No chest pain or shortness of air.  No GI or  disturbances.    PHYSICAL EXAMINATION:       Ht 180.3 cm (71\")   Wt 86.6 kg (191 lb)   BMI 26.64 kg/m²     Physical Exam   Constitutional: He is oriented to person, place, and time. He appears well-developed and well-nourished.   Neurological: He is alert and oriented to person, place, and time.   Psychiatric: He has a normal mood and affect. His behavior is normal. Judgment and thought content normal.       GAIT:     []  Normal  [x]  Antalgic    Assistive device: []  None  []  Walker     []  Crutches  [x]  Cane     []  Wheelchair  []  Stretcher    Right Knee Exam     Muscle Strength   The patient has normal right knee strength.    Tenderness   Right knee tenderness location: diffuse.    Range of Motion   Extension: 0   Flexion: 120     Tests   Varus: negative Valgus: negative  Drawer:  Anterior - negative    Posterior - negative    Other   Sensation: normal  Pulse: present  Swelling: mild    Comments:  Crepitation on motion.  Mild pain through arc of motion      Left Knee Exam     Muscle Strength   The patient has normal left knee strength.    Tenderness   Left knee tenderness location: diffuse.    Range of Motion   Extension: 0   Flexion: 120     Tests   Varus: negative Valgus: negative  Drawer:  Anterior - negative     Posterior - negative    Other   Sensation: normal  Pulse: " present  Swelling: none    Comments:  Crepitation with motion  Mild pain through arc of motion              Xr Knee Bilateral Ap Standing    Result Date: 1/16/2020  Narrative: Ordering Provider:  Jamarcus Weems MD Ordering Diagnosis/Indication:  Chronic pain of both knees Procedure:  XR KNEE BILATERAL AP STANDING Exam Date:  1/16/20 COMPARISON:  Todays X-rays were compared to previous images dated January 9, 2019.     Impression:  AP bilateral standing of the knees with lateral of each knee shows medial joint space narrowing with bone-on-bone findings and complete joint space collapse on the right knee.  Marginal osteophytes are present on both sides of the knee.  Mild lateral subluxation of the tibia is noted with varus positioning.  Moderate patellofemoral joint arthritic change noted on the lateral view.  Left knee shows tricompartmental osteoarthritic change with mild medial joint space narrowing and irregularity of the joint surface.  Mild to moderate osteoarthritic change noted in the patellofemoral compartment as well.  Overall, mild progressive worsening in comparison to prior x-ray. Jaamrcus Weems MD 1/16/20     Xr Knee 1 Or 2 View Bilateral    Result Date: 1/16/2020  Narrative: Ordering Provider:  Jamarcus Weems MD Ordering Diagnosis/Indication:  Chronic pain of both knees Procedure:  XR KNEE 1 OR 2 VW BILATERAL Exam Date:  1/16/20 COMPARISON:  Todays X-rays were compared to previous images dated January 9, 2019.     Impression:  AP bilateral standing of the knees with lateral of each knee shows medial joint space narrowing with bone-on-bone findings and complete joint space collapse on the right knee.  Marginal osteophytes are present on both sides of the knee.  Mild lateral subluxation of the tibia is noted with varus positioning.  Moderate patellofemoral joint arthritic change noted on the lateral view.  Left knee shows tricompartmental osteoarthritic change with mild medial  joint space narrowing and irregularity of the joint surface.  Mild to moderate osteoarthritic change noted in the patellofemoral compartment as well.  Overall, mild progressive worsening in comparison to prior x-ray. Jamarcus Weems MD 1/16/20         Large Joint Arthrocentesis: R knee  Date/Time: 1/16/2020 11:50 AM  Consent given by: patient  Site marked: site marked  Timeout: Immediately prior to procedure a time out was called to verify the correct patient, procedure, equipment, support staff and site/side marked as required   Supporting Documentation  Indications: pain   Procedure Details  Location: knee - R knee  Preparation: Patient was prepped and draped in the usual sterile fashion  Needle size: 22 G  Approach: anteromedial  Medications administered: 40 mg triamcinolone acetonide 40 MG/ML; 2 mL lidocaine PF 1% 1 %  Patient tolerance: patient tolerated the procedure well with no immediate complications          ASSESSMENT:    Diagnoses and all orders for this visit:    Primary osteoarthritis of both knees  -     Large Joint Arthrocentesis: R knee    Chronic pain of both knees  -     Large Joint Arthrocentesis: R knee    Internal derangement of left knee    Degenerative tear of posterior horn of lateral meniscus of left knee    Heavy cigarette smoker (20-39 per day)          PLAN    We disussed eventual TKA.  Strength and conditioning exercises as tolerated  Steroid injection today  We also discussed decreased tobacco usage and decreasing narcotic usage.  We discussed difficulty of TKA with chronic narcotic usage and improved outcome with decreased narcotics.    Osvaldo Martino Yael Sr.  reports that he has been smoking. He has been smoking about 1.50 packs per day. He has never used smokeless tobacco.     I advised him to quit and he is not willing to quit.    I spent 2 minutes counseling the patient.           Patient's Body mass index is 26.64 kg/m². BMI is above normal parameters. Recommendations  include: exercise counseling and nutrition counseling.    Return in about 6 weeks (around 2/27/2020) for recheck.    Jamarcus Weems MD

## 2020-01-17 DIAGNOSIS — M15.9 PRIMARY OSTEOARTHRITIS INVOLVING MULTIPLE JOINTS: Chronic | ICD-10-CM

## 2020-01-17 RX ORDER — HYDROCODONE BITARTRATE AND ACETAMINOPHEN 10; 325 MG/1; MG/1
TABLET ORAL
Qty: 120 TABLET | Refills: 0 | Status: SHIPPED | OUTPATIENT
Start: 2020-01-17 | End: 2020-02-14 | Stop reason: SDUPTHER

## 2020-01-17 NOTE — TELEPHONE ENCOUNTER
CONTROLLED SUBSTANCE TRACKING 8/19/2019 11/19/2019   Last Laz 8/19/2019 11/19/2019   Report Number [id number]   Last Controlled Substance Agreement 8/19/2019 11/19/2019     Last office visit 11-19-19. Next office visit is scheduled for 2-14-20.

## 2020-01-17 NOTE — TELEPHONE ENCOUNTER
----- Message from Rachael Armstrong sent at 1/16/2020  2:47 PM CST -----  Regarding: MED REFILL  Contact: [phone]   Needs refill on HYDROcodone-acetaminophen (NORCO)  [facility], due [facility] to [facility] [place].

## 2020-01-18 RX ORDER — LIDOCAINE HYDROCHLORIDE 10 MG/ML
2 INJECTION, SOLUTION EPIDURAL; INFILTRATION; INTRACAUDAL; PERINEURAL
Status: COMPLETED | OUTPATIENT
Start: 2020-01-16 | End: 2020-01-16

## 2020-01-18 RX ORDER — TRIAMCINOLONE ACETONIDE 40 MG/ML
40 INJECTION, SUSPENSION INTRA-ARTICULAR; INTRAMUSCULAR
Status: COMPLETED | OUTPATIENT
Start: 2020-01-16 | End: 2020-01-16

## 2020-02-04 ENCOUNTER — LAB (OUTPATIENT)
Dept: LAB | Facility: OTHER | Age: 65
End: 2020-02-04

## 2020-02-04 DIAGNOSIS — Z79.899 DRUG THERAPY: ICD-10-CM

## 2020-02-04 DIAGNOSIS — R73.02 IMPAIRED GLUCOSE TOLERANCE: Chronic | ICD-10-CM

## 2020-02-04 DIAGNOSIS — Z12.5 SCREENING FOR PROSTATE CANCER: ICD-10-CM

## 2020-02-04 DIAGNOSIS — E78.2 MIXED HYPERLIPIDEMIA: Chronic | ICD-10-CM

## 2020-02-04 DIAGNOSIS — I10 ESSENTIAL HYPERTENSION: ICD-10-CM

## 2020-02-04 LAB
ALBUMIN SERPL-MCNC: 4.6 G/DL (ref 3.5–5)
ALBUMIN/GLOB SERPL: 1.2 G/DL (ref 1.1–1.8)
ALP SERPL-CCNC: 112 U/L (ref 38–126)
ALT SERPL W P-5'-P-CCNC: 28 U/L
ANION GAP SERPL CALCULATED.3IONS-SCNC: 6 MMOL/L (ref 5–15)
ARTICHOKE IGE QN: 107 MG/DL (ref 0–100)
AST SERPL-CCNC: 34 U/L (ref 17–59)
BACTERIA UR QL AUTO: ABNORMAL /HPF
BASOPHILS # BLD AUTO: 0.04 10*3/MM3 (ref 0–0.2)
BASOPHILS NFR BLD AUTO: 0.4 % (ref 0–1.5)
BILIRUB SERPL-MCNC: 0.9 MG/DL (ref 0.2–1.3)
BILIRUB UR QL STRIP: ABNORMAL
BUN BLD-MCNC: 16 MG/DL (ref 7–23)
BUN/CREAT SERPL: 19.5 (ref 7–25)
CALCIUM SPEC-SCNC: 9.7 MG/DL (ref 8.4–10.2)
CHLORIDE SERPL-SCNC: 108 MMOL/L (ref 101–112)
CLARITY UR: ABNORMAL
CO2 SERPL-SCNC: 27 MMOL/L (ref 22–30)
COLOR UR: ABNORMAL
CREAT BLD-MCNC: 0.82 MG/DL (ref 0.7–1.3)
DEPRECATED RDW RBC AUTO: 41.4 FL (ref 37–54)
EOSINOPHIL # BLD AUTO: 0.14 10*3/MM3 (ref 0–0.4)
EOSINOPHIL NFR BLD AUTO: 1.3 % (ref 0.3–6.2)
ERYTHROCYTE [DISTWIDTH] IN BLOOD BY AUTOMATED COUNT: 13.4 % (ref 12.3–15.4)
GFR SERPL CREATININE-BSD FRML MDRD: 95 ML/MIN/1.73 (ref 49–113)
GLOBULIN UR ELPH-MCNC: 3.9 GM/DL (ref 2.3–3.5)
GLUCOSE BLD-MCNC: 123 MG/DL (ref 70–99)
GLUCOSE UR STRIP-MCNC: NEGATIVE MG/DL
HBA1C MFR BLD: 6 % (ref 4.8–5.6)
HCT VFR BLD AUTO: 46.2 % (ref 37.5–51)
HGB BLD-MCNC: 16 G/DL (ref 13–17.7)
HGB UR QL STRIP.AUTO: ABNORMAL
HYALINE CASTS UR QL AUTO: ABNORMAL /LPF
KETONES UR QL STRIP: ABNORMAL
LEUKOCYTE ESTERASE UR QL STRIP.AUTO: NEGATIVE
LYMPHOCYTES # BLD AUTO: 2.79 10*3/MM3 (ref 0.7–3.1)
LYMPHOCYTES NFR BLD AUTO: 26.1 % (ref 19.6–45.3)
MCH RBC QN AUTO: 30 PG (ref 26.6–33)
MCHC RBC AUTO-ENTMCNC: 34.6 G/DL (ref 31.5–35.7)
MCV RBC AUTO: 86.5 FL (ref 79–97)
MONOCYTES # BLD AUTO: 0.8 10*3/MM3 (ref 0.1–0.9)
MONOCYTES NFR BLD AUTO: 7.5 % (ref 5–12)
MUCOUS THREADS URNS QL MICRO: ABNORMAL /HPF
NEUTROPHILS # BLD AUTO: 6.91 10*3/MM3 (ref 1.7–7)
NEUTROPHILS NFR BLD AUTO: 64.7 % (ref 42.7–76)
NITRITE UR QL STRIP: NEGATIVE
PH UR STRIP.AUTO: 5.5 [PH] (ref 5.5–8)
PLATELET # BLD AUTO: 309 10*3/MM3 (ref 140–450)
PMV BLD AUTO: 10.9 FL (ref 6–12)
POTASSIUM BLD-SCNC: 4.4 MMOL/L (ref 3.4–5)
PROT SERPL-MCNC: 8.5 G/DL (ref 6.3–8.6)
PROT UR QL STRIP: ABNORMAL
PSA SERPL-MCNC: 3.5 NG/ML (ref 0–4)
RBC # BLD AUTO: 5.34 10*6/MM3 (ref 4.14–5.8)
RBC # UR: ABNORMAL /HPF
REF LAB TEST METHOD: ABNORMAL
SODIUM BLD-SCNC: 141 MMOL/L (ref 137–145)
SP GR UR STRIP: 1.02 (ref 1–1.03)
SQUAMOUS #/AREA URNS HPF: ABNORMAL /HPF
T4 FREE SERPL-MCNC: 1.34 NG/DL (ref 0.93–1.7)
TRIGL SERPL-MCNC: 225 MG/DL
TSH SERPL DL<=0.05 MIU/L-ACNC: 0.35 UIU/ML (ref 0.27–4.2)
UROBILINOGEN UR QL STRIP: ABNORMAL
WBC NRBC COR # BLD: 10.68 10*3/MM3 (ref 3.4–10.8)
WBC UR QL AUTO: ABNORMAL /HPF

## 2020-02-04 PROCEDURE — 83721 ASSAY OF BLOOD LIPOPROTEIN: CPT | Performed by: INTERNAL MEDICINE

## 2020-02-04 PROCEDURE — 81001 URINALYSIS AUTO W/SCOPE: CPT | Performed by: INTERNAL MEDICINE

## 2020-02-04 PROCEDURE — 84439 ASSAY OF FREE THYROXINE: CPT | Performed by: INTERNAL MEDICINE

## 2020-02-04 PROCEDURE — 84478 ASSAY OF TRIGLYCERIDES: CPT | Performed by: INTERNAL MEDICINE

## 2020-02-04 PROCEDURE — 80050 GENERAL HEALTH PANEL: CPT | Performed by: INTERNAL MEDICINE

## 2020-02-04 PROCEDURE — G0481 DRUG TEST DEF 8-14 CLASSES: HCPCS | Performed by: INTERNAL MEDICINE

## 2020-02-04 PROCEDURE — G0103 PSA SCREENING: HCPCS | Performed by: INTERNAL MEDICINE

## 2020-02-04 PROCEDURE — 80307 DRUG TEST PRSMV CHEM ANLYZR: CPT | Performed by: INTERNAL MEDICINE

## 2020-02-04 PROCEDURE — 83036 HEMOGLOBIN GLYCOSYLATED A1C: CPT | Performed by: INTERNAL MEDICINE

## 2020-02-09 LAB
6-ACETYLMORPHINE: NEGATIVE
6MAM SERPLBLD-MCNC: NOT DETECTED NG/MG CREAT
7-AMINOCLONAZEPAM UR: NOT DETECTED NG/MG CREAT
A-OH ALPRAZ/CREAT UR: NOT DETECTED NG/MG CREAT
ALFENTANIL UR QL: NOT DETECTED NG/MG CREAT
ALPHA-HYDROXYMIDAZOLAM, URINE: NOT DETECTED NG/MG CREAT
ALPHA-HYDROXYTRIAZOLAM, URINE: NOT DETECTED NG/MG CREAT
AMOBARBITAL UR: NOT DETECTED
AMPHET UR QL CFM: NOT DETECTED NG/MG CREAT
AMPHETAMINES UR QL SCN: NORMAL
BARBITAL UR QL CFM: NOT DETECTED
BARBITURATES UR QL SCN: NEGATIVE
BENZODIAZ UR QL SCN: NEGATIVE
BENZOYLECGONINE UR: NOT DETECTED NG/MG CREAT
BUPRENORPHINE UR QL: 4 NG/MG CREAT
BUPRENORPHINE UR QL: NORMAL
BUTABARBITAL UR QL: NOT DETECTED
BUTALBITAL UR QL: NOT DETECTED
CANNABINOIDS UR QL CFM: NEGATIVE
CLONAZEPAM UR QL: NOT DETECTED NG/MG CREAT
COCAETHYLENE UR QL CFM: NOT DETECTED NG/MG CREAT
COCAINE UR QL CFM: NEGATIVE
CODEINE UR QL: NOT DETECTED NG/MG CREAT
CONV COCAINE, UR: NOT DETECTED NG/MG CREAT
CONV REPORT SUMMARY: NORMAL
CREAT 24H UR-MCNC: 375 MG/DL
DESALKYLFLURAZ/CREAT UR: NOT DETECTED NG/MG CREAT
DESMETHYLFLUNITRAZEPAM: NOT DETECTED NG/MG CREAT
DIAZEPAM UR-MCNC: NOT DETECTED NG/MG CREAT
DIHYDROCODEINE UR: 334 NG/MG CREAT
EDDP SERPL QL: NOT DETECTED NG/MG CREAT
ETHANOL SCREEN URINE (REF): NEGATIVE
ETHANOL UR-MCNC: NOT DETECTED G/DL
FENTANYL UR QL: NEGATIVE
FENTANYL+NORFENTANYL UR QL SCN: NOT DETECTED NG/MG CREAT
FLUNITRAZEPAM SERPLBLD-MCNC: NOT DETECTED NG/MG CREAT
HYDROCODONE UR QL: >2667 NG/MG CREAT
HYDROMORPHONE UR QL: 480 NG/MG CREAT
LEVEL OF DETECTION:: NORMAL
LORAZEPAM UR-MCNC: NOT DETECTED NG/MG CREAT
LORAZEPAM/CREAT UR: NOT DETECTED NG/MG CREAT
MDA SERPLBLD-MCNC: NOT DETECTED NG/MG CREAT
MDMA UR QL SCN: NOT DETECTED NG/MG CREAT
MEPHOBARBITAL UR QL CFM: NOT DETECTED
METHADONE BLD QL SCN: NEGATIVE
METHADONE, URINE: NOT DETECTED NG/MG CREAT
METHAMPHETAMINE UR: 14 NG/MG CREAT
MIDAZOLAM UR-MCNC: NOT DETECTED NG/MG CREAT
MORPHINE UR QL: NOT DETECTED NG/MG CREAT
N-DESMETHYLTRAMADOL, U: NOT DETECTED NG/MG CREAT
NARCOTICS UR: NEGATIVE
NORBUPRENORPHINE SERPLBLD-MCNC: 11 NG/MG CREAT
NORCODEINE UR-MCNC: NOT DETECTED NG/MG CREAT
NORDIAZEPAM SERPL CFM-MCNC: NOT DETECTED NG/MG CREAT
NORFENTANYL UR: NOT DETECTED NG/MG CREAT
NORMORPHINE: NOT DETECTED NG/MG CREAT
NOROXYMORPHONE: 16 NG/MG CREAT
O-DESMETHYLTRAMADOL, UR: NOT DETECTED NG/MG CREAT
OPIATES UR QL CFM: NORMAL
OPIATES, URINE, NORHYDROCODONE: >1333 NG/MG CREAT
OPIATES, URINE, NOROXYCODONE: NOT DETECTED NG/MG CREAT
OXAZEPAM UR QL: NOT DETECTED NG/MG CREAT
OXYCODONE UR QL: NORMAL
OXYCODONE UR: 22 NG/MG CREAT
OXYMORPHONE UR: 29 NG/MG CREAT
PENTOBARBITAL UR: NOT DETECTED
PHENOBARB UR QL: NOT DETECTED
SECOBARBITAL UR QL: NOT DETECTED
SUFENTANIL UR QL: NOT DETECTED NG/MG CREAT
TAPENTADOL SERPLBLD-MCNC: NEGATIVE NG/ML
TAPENTADOL UR-MCNC: NOT DETECTED NG/MG CREAT
TEMAZEPAM UR QL CFM: NOT DETECTED NG/MG CREAT
THC UR CFM-MCNC: NOT DETECTED NG/MG CREAT
THIOPENTAL UR QL CFM: NOT DETECTED
TRAMADOL UR: NOT DETECTED NG/MG CREAT

## 2020-02-14 ENCOUNTER — OFFICE VISIT (OUTPATIENT)
Dept: FAMILY MEDICINE CLINIC | Facility: CLINIC | Age: 65
End: 2020-02-14

## 2020-02-14 VITALS
WEIGHT: 187.8 LBS | OXYGEN SATURATION: 97 % | TEMPERATURE: 97.3 F | DIASTOLIC BLOOD PRESSURE: 78 MMHG | HEIGHT: 71 IN | SYSTOLIC BLOOD PRESSURE: 120 MMHG | HEART RATE: 71 BPM | BODY MASS INDEX: 26.29 KG/M2

## 2020-02-14 DIAGNOSIS — M17.0 PRIMARY OSTEOARTHRITIS OF BOTH KNEES: Chronic | ICD-10-CM

## 2020-02-14 DIAGNOSIS — Z87.891 PERSONAL HISTORY OF TOBACCO USE, PRESENTING HAZARDS TO HEALTH: ICD-10-CM

## 2020-02-14 DIAGNOSIS — J44.9 COPD, MILD (HCC): Chronic | ICD-10-CM

## 2020-02-14 DIAGNOSIS — I10 ESSENTIAL HYPERTENSION: Chronic | ICD-10-CM

## 2020-02-14 DIAGNOSIS — Z72.0 TOBACCO ABUSE: ICD-10-CM

## 2020-02-14 DIAGNOSIS — G25.81 RESTLESS LEG SYNDROME: Chronic | ICD-10-CM

## 2020-02-14 DIAGNOSIS — E78.2 MIXED HYPERLIPIDEMIA: Chronic | ICD-10-CM

## 2020-02-14 DIAGNOSIS — G63 POLYNEUROPATHY ASSOCIATED WITH UNDERLYING DISEASE (HCC): Chronic | ICD-10-CM

## 2020-02-14 DIAGNOSIS — R73.02 IMPAIRED GLUCOSE TOLERANCE: Primary | Chronic | ICD-10-CM

## 2020-02-14 DIAGNOSIS — M15.9 PRIMARY OSTEOARTHRITIS INVOLVING MULTIPLE JOINTS: Chronic | ICD-10-CM

## 2020-02-14 DIAGNOSIS — F41.1 GENERALIZED ANXIETY DISORDER: Chronic | ICD-10-CM

## 2020-02-14 PROCEDURE — 99214 OFFICE O/P EST MOD 30 MIN: CPT | Performed by: INTERNAL MEDICINE

## 2020-02-14 RX ORDER — CITALOPRAM 20 MG/1
20 TABLET ORAL DAILY
Qty: 30 TABLET | Refills: 11 | Status: SHIPPED | OUTPATIENT
Start: 2020-02-14 | End: 2021-02-02 | Stop reason: SDUPTHER

## 2020-02-14 RX ORDER — LOSARTAN POTASSIUM 50 MG/1
50 TABLET ORAL DAILY
Qty: 30 TABLET | Refills: 11 | Status: SHIPPED | OUTPATIENT
Start: 2020-02-14 | End: 2021-03-04

## 2020-02-14 RX ORDER — GABAPENTIN 300 MG/1
300 CAPSULE ORAL 3 TIMES DAILY
Qty: 90 CAPSULE | Refills: 0 | Status: SHIPPED | OUTPATIENT
Start: 2020-02-14 | End: 2020-03-12

## 2020-02-14 RX ORDER — SIMVASTATIN 40 MG
40 TABLET ORAL NIGHTLY
Qty: 30 TABLET | Refills: 11 | Status: SHIPPED | OUTPATIENT
Start: 2020-02-14 | End: 2021-03-04

## 2020-02-14 RX ORDER — ROPINIROLE 0.5 MG/1
0.5 TABLET, FILM COATED ORAL NIGHTLY
Qty: 30 TABLET | Refills: 11 | Status: SHIPPED | OUTPATIENT
Start: 2020-02-14 | End: 2021-03-04

## 2020-02-14 RX ORDER — HYDROCODONE BITARTRATE AND ACETAMINOPHEN 10; 325 MG/1; MG/1
TABLET ORAL
Qty: 120 TABLET | Refills: 0 | Status: SHIPPED | OUTPATIENT
Start: 2020-02-14 | End: 2020-03-12 | Stop reason: SDUPTHER

## 2020-02-14 RX ORDER — HYDROCHLOROTHIAZIDE 25 MG/1
25 TABLET ORAL DAILY
Qty: 30 TABLET | Refills: 11 | Status: SHIPPED | OUTPATIENT
Start: 2020-02-14 | End: 2021-03-04

## 2020-02-14 NOTE — PROGRESS NOTES
Chief Complaint   Patient presents with   • Hypertension     3 month FU with labs   • Hyperlipidemia     Subjective   Osvaldo Conley Sr. is a 64 y.o. male who presents to the office for follow-up and review of labs.  He has impaired glucose tolerance, and has been monitoring his dietary intake of sugar.  He has hypertension and his blood pressure has been controlled.  He has hyperlipidemia and takes simvastatin daily.  He has restless leg syndrome which is well controlled with Requip.  He has anxiety and takes Celexa daily.  He has osteoarthritis which causes chronic back and joint pain.  He takes Norco 10/325 mg 4 times a day for treatment of the chronic pain.  He also takes gabapentin 3 times a day for treatment of the neuropathic pain.  He sees orthopedics related to his knee pain.  He has received steroid injections in both knees.      The following portions of the patient's history were reviewed and updated as appropriate: allergies, current medications, past family history, past medical history, past social history, past surgical history and problem list.    Review of Systems   Constitutional: Negative for chills, fatigue and fever.   HENT: Negative for congestion, sneezing, sore throat and trouble swallowing.    Eyes: Negative for visual disturbance.   Respiratory: Negative for cough, chest tightness, shortness of breath and wheezing.    Cardiovascular: Negative for chest pain and palpitations.   Gastrointestinal: Negative for abdominal pain, constipation, diarrhea, nausea and vomiting.   Genitourinary: Negative for dysuria, frequency and urgency.   Musculoskeletal: Positive for arthralgias and back pain. Negative for neck pain.   Skin: Negative for rash.   Neurological: Negative for dizziness, weakness and headaches.   Psychiatric/Behavioral:        Patient denies any feelings of depression and has not felt down, hopeless or lost interest in any activities.   All other systems reviewed and are  "negative.      Objective   Vitals:    02/14/20 0837   BP: 120/78   BP Location: Left arm   Patient Position: Sitting   Cuff Size: Adult   Pulse: 71   Temp: 97.3 °F (36.3 °C)   TempSrc: Oral   SpO2: 97%   Weight: 85.2 kg (187 lb 12.8 oz)   Height: 180.3 cm (71\")   PainSc:   5   PainLoc: Knee  Comment: right      Body mass index is 26.19 kg/m².    Physical Exam   Constitutional: He is oriented to person, place, and time. He appears well-developed and well-nourished. No distress.   HENT:   Head: Normocephalic and atraumatic.   Nose: Nose normal.   Mouth/Throat: Oropharynx is clear and moist. No oropharyngeal exudate.   Eyes: Pupils are equal, round, and reactive to light. Conjunctivae and EOM are normal. No scleral icterus.   Neck: Normal range of motion. Neck supple.   Cardiovascular: Normal rate, regular rhythm and normal heart sounds. Exam reveals no gallop and no friction rub.   No murmur heard.  Pulmonary/Chest: Effort normal. No respiratory distress. He has decreased breath sounds. He has no wheezes. He has no rales.   Abdominal: Soft. Bowel sounds are normal. He exhibits no distension. There is no tenderness. There is no rebound and no guarding.   Musculoskeletal: Normal range of motion. He exhibits no edema.   Lymphadenopathy:     He has no cervical adenopathy.   Neurological: He is alert and oriented to person, place, and time. No cranial nerve deficit.   Skin: Skin is warm and dry. No rash noted.   Psychiatric: He has a normal mood and affect. His behavior is normal. Judgment and thought content normal.   Nursing note and vitals reviewed.      Assessment/Plan   Osvaldo was seen today for hypertension and hyperlipidemia.    Diagnoses and all orders for this visit:    Impaired glucose tolerance  -     Hemoglobin A1c; Future    Essential hypertension  -     CBC & Differential; Future  -     Comprehensive Metabolic Panel; Future  -     T4, Free; Future  -     TSH; Future  -     Urinalysis With Culture If " Indicated -; Future  -     hydroCHLOROthiazide (HYDRODIURIL) 25 MG tablet; Take 1 tablet by mouth Daily.  -     losartan (COZAAR) 50 MG tablet; Take 1 tablet by mouth Daily.    Mixed hyperlipidemia  -     Lipid Panel; Future  -     simvastatin (ZOCOR) 40 MG tablet; Take 1 tablet by mouth Every Night.    COPD, mild (CMS/HCC)    Polyneuropathy associated with underlying disease (CMS/HCC)  -     gabapentin (NEURONTIN) 300 MG capsule; Take 1 capsule by mouth 3 (Three) Times a Day.    Primary osteoarthritis involving multiple joints  -     HYDROcodone-acetaminophen (NORCO)  MG per tablet; 1 tablet(s) by mouth 4 times per day for pain.    Restless leg syndrome  -     rOPINIRole (REQUIP) 0.5 MG tablet; Take 1 tablet by mouth Every Night. Take 1 hour before bedtime.    Generalized anxiety disorder  -     citalopram (CeleXA) 20 MG tablet; Take 1 tablet by mouth Daily.    Primary osteoarthritis of both knees    Tobacco abuse  -     CT Chest Low Dose Wo; Future    Personal history of tobacco use, presenting hazards to health  -     CT Chest Low Dose Wo; Future         Labs are reviewed with patient. Patient's glucose is slightly elevated at 123.  His hemoglobin A1c shows a nondiabetic state at 6.0.  Patient will continue to watch diet to help maintain control of the glucose.  Patient understands that there is an increased risk of developing diabetes in the future. His triglycerides are slightly elevated at 225.  His LDL is 107.  He will continue with simvastatin.  His blood pressure is controlled and he will continue with his current blood pressure medication. He will continue with Requip for treatment of the RLS.  He will continue with Celexa for his anxiety.  He will continue with gabapentin for the neuropathic pain.  He will also continue to use Norco 10/325 mg 4 times a day for treatment of the arthritic pain.    His urine drug screen is positive for methamphetamine, hydrocodone, oxycodone, and buprenorphine.  He  admits to taking a couple of his brothers Percocet due to increased shoulder pain.  He denies the use of any amphetamines.  He denies any recent use of Suboxone, although he did take a couple of Suboxone over a year ago because a friend asked him to try it.  I had a detailed discussion with him today regarding his controlled substance contract and the fact that this constitutes a violation of the contract.  I have seen him long-term, and he has never had any aberrant behavior in the past.  He does have significant chronic pain, and I am hesitant to abruptly discontinue his pain medication.  He will likely be having a knee replacement surgery in the future.  I will continue to prescribe Norco for now.  I informed him that he will have to be seen for an office visit on a monthly basis.  He will have to bring his Norco and gabapentin with him to each appointment for pill count.  He is also subject to random urine drug screens.  Any further aberrancies on the drug screen will result in discontinuation of his controlled substance medications.  He voices understanding of this.    He has a 50-pack-year history of smoking cigarettes.  I discussed low-dose CT lung cancer screening with him today.  He would like to have this done and participated in shared decision making.  He continues to smoke 1 pack of cigarettes per day.  I will order a low-dose CT lung cancer screening.    Low-Dose CT: Lung Cancer Screening  Criteria:  • Age 55-77 years of age (CMS) , 55-80 years of age (Commercial) and;  o Patient Age: 64 y.o.  • 30 pack-year smoking history (# yrs smoked X avg #ppd = pack/yrs); if  pt has quit smoking it must be within <15yrs and;  • Asymptomatic for lung cancer  ICD-10 Codes:  • History of smoking  • Tobacco abuse/addiction  ? Z72.0 (current smoker)  ? Z87.891 (personal history of smoking/nicotine dependence) use with CMS   • Patient Smoking History  Social History     Tobacco Use   Smoking Status Current Every Day  Smoker   • Packs/day: 1.00   • Years: 50.00   • Pack years: 50.00   Tobacco Comment    pt stated he has chantix at home, but hasn't taken it; resources offered     CPT Codes:  • 45495 low dose (must say low dose otherwise is CT without contrast)  / (for patients with [facility] and CMS)    Patient understands the risks associated with this controlled medication, including tolerance and addiction.  Patient also agrees to only obtain this medication from me, and not from a another provider, unless that provider is covering for me in my absence.  Patient also agrees to be compliant in dosing, and not self adjust the dose of medication.  A signed controlled substance agreement is on file, and the patient has received a controlled substance education sheet at this a previous visit.  The patient has also signed a consent for treatment with a controlled substance as per [facility] policy. SANDRA was obtained.    ACP discussion was held with the patient during this visit. Patient does not have an advance directive, declines further assistance.      CONTROLLED SUBSTANCE TRACKING 8/19/2019 11/19/2019 2/14/2020   Last Sandra 8/19/2019 11/19/2019 2/14/2020   Report Number [id number]   Last UDS - - 2/4/2020   Last Controlled Substance Agreement 8/19/2019 11/19/2019 2/14/2020   Prior UDT result - - Aberrant   Comments - - Visits changed to monthly due to urine drug screen       PHQ-2/PHQ-9 Depression Screening 2/14/2020   Little interest or pleasure in doing things 0   Feeling down, depressed, or hopeless 0   Trouble falling or staying asleep, or sleeping too much -   Feeling tired or having little energy -   Poor appetite or overeating -   Feeling bad about yourself - or that you are a failure or have let yourself or your family down -   Trouble concentrating on things, such as reading the newspaper or watching television -   Moving or speaking so slowly that other people could have noticed.  Or the opposite - being so fidgety or restless that you have been moving around a lot more than usual -   Thoughts that you would be better off dead, or of hurting yourself in some way -   Total Score 0   If you checked off any problems, how difficult have these problems made it for you to do your work, take care of things at home, or get along with other people? -         Lab on 02/04/2020   Component Date Value Ref Range Status   • Glucose 02/04/2020 123* 70 - 99 mg/dL Final   • BUN 02/04/2020 16  7 - 23 mg/dL Final   • Creatinine 02/04/2020 0.82  0.70 - 1.30 mg/dL Final   • Sodium 02/04/2020 141  137 - 145 mmol/L Final   • Potassium 02/04/2020 4.4  3.4 - 5.0 mmol/L Final   • Chloride 02/04/2020 108  101 - 112 mmol/L Final   • CO2 02/04/2020 27.0  22.0 - 30.0 mmol/L Final   • Calcium 02/04/2020 9.7  8.4 - 10.2 mg/dL Final   • Total Protein 02/04/2020 8.5  6.3 - 8.6 g/dL Final   • Albumin 02/04/2020 4.60  3.50 - 5.00 g/dL Final   • ALT (SGPT) 02/04/2020 28  <=50 U/L Final   • AST (SGOT) 02/04/2020 34  17 - 59 U/L Final   • Alkaline Phosphatase 02/04/2020 112  38 - 126 U/L Final   • Total Bilirubin 02/04/2020 0.9  0.2 - 1.3 mg/dL Final   • eGFR Non African Amer 02/04/2020 95  49 - 113 mL/min/1.73 Final   • Globulin 02/04/2020 3.9* 2.3 - 3.5 gm/dL Final   • A/G Ratio 02/04/2020 1.2  1.1 - 1.8 g/dL Final   • BUN/Creatinine Ratio 02/04/2020 19.5  7.0 - 25.0 Final   • Anion Gap 02/04/2020 6.0  5.0 - 15.0 mmol/L Final   • Free T4 02/04/2020 1.34  0.93 - 1.70 ng/dL Final   • TSH 02/04/2020 0.352  0.270 - 4.200 uIU/mL Final   • Color, UA 02/04/2020 Carmela* Yellow, Straw Final   • Appearance, UA 02/04/2020 Slightly Cloudy* Clear Final   • pH, UA 02/04/2020 5.5  5.5 - 8.0 Final   • Specific Gravity, UA 02/04/2020 1.025  1.005 - 1.030 Final   • Glucose, UA 02/04/2020 Negative  Negative Final   • Ketones, UA 02/04/2020 Trace* Negative Final   • Bilirubin, UA 02/04/2020 Small (1+)* Negative Final   • Blood, UA 02/04/2020 Large  (3+)* Negative Final   • Protein, UA 02/04/2020 30 mg/dL (1+)* Negative Final   • Leuk Esterase, UA 02/04/2020 Negative  Negative Final   • Nitrite, UA 02/04/2020 Negative  Negative Final   • Urobilinogen, UA 02/04/2020 1.0 E.U./dL  0.2 - 1.0 E.U./dL Final   • Hemoglobin A1C 02/04/2020 6.00* 4.80 - 5.60 % Final   • LDL Cholesterol  02/04/2020 107* 0 - 100 mg/dL Final   • Triglycerides 02/04/2020 225* <=150 mg/dL Final   • PSA 02/04/2020 3.500  0.000 - 4.000 ng/mL Final   • Report Summary 02/04/2020 FINAL   Final    Comment: ====================================================================  [facility] SELECT 13 JAKE-DIS  ====================================================================  Test                             Result       Flag       Units  Drug Present    Methamphetamine                14                      ng/mg creat     Sources of methamphetamine include illicit sources, as a     scheduled prescription medication, as a metabolite of some     prescription drugs, or use of an l-methamphetamine inhaler.    Hydrocodone                    >2667                   ng/mg creat    Hydromorphone                  480                     ng/mg creat    Dihydrocodeine                 334                     ng/mg creat    Norhydrocodone                 >1333                   ng/mg creat     Sources of hydrocodone include scheduled prescription     medications. Hydromorphone, dihydrocodeine and norhydrocodone are     expected metabolites of hydrocodone. Hydromorphone and     dihydrocodeine are also                            available as scheduled prescription     medications.    Oxycodone                      22                      ng/mg creat    Oxymorphone                    29                      ng/mg creat    Noroxymorphone                 16                      ng/mg creat     Sources of oxycodone include scheduled prescription medications.     Oxymorphone and noroxymorphone are expected metabolites  of     oxycodone. Oxymorphone is also available as a scheduled     prescription medication.    Buprenorphine                  4                       ng/mg creat    Norbuprenorphine               11                      ng/mg creat     Source of buprenorphine is a scheduled prescription medication.     Norbuprenorphine is an expected metabolite of buprenorphine.  ====================================================================  Test                      Result    Flag   Units      Ref Range    Creatinine              375              mg/dL                                 >=20  ====================================================================  Declared Medications:   Medication list was not provided.  ====================================================================  For clinical consultation, please call [phone].  ====================================================================   • Ethanol Screen Urine (Ref) 02/04/2020 Negative   Final   • Ethanol, Urine 02/04/2020 Not Detected  g/dL Final   • Amphetamine, Urine Qual 02/04/2020 +POSITIVE+   Final   • Methamphetamine, Urine 02/04/2020 14  ng/mg creat Final   • Amphetamine, Urine 02/04/2020 Not Detected  ng/mg creat Final   • MDMA URINE 02/04/2020 Not Detected  ng/mg creat Final   • MDA 02/04/2020 Not Detected  ng/mg creat Final   • Benzodiazepine Screen, Urine 02/04/2020 Negative   Final   • DIAZEPAM URINE QUANT (REF) 02/04/2020 Not Detected  ng/mg creat Final   • Desmethyldiazepam 02/04/2020 Not Detected  ng/mg creat Final   • Oxazepam, urine 02/04/2020 Not Detected  ng/mg creat Final   • Temazepam, urine 02/04/2020 Not Detected  ng/mg creat Final    Expected metabolism of benzodiazepine class drugs:   Parent Drug       Detected Metabolites   -----------       --------------------   Diazepam:         Desmethyldiazepam, Temazepam, Oxazepam   Chlordiazepoxide: Desmethyldiazepam, Oxazepam   Clorazepate:      Desmethyldiazepam, Oxazepam   Halazepam:         Desmethyldiazepam, Oxazepam   Temazepam:        Oxazepam   Oxazepam:         None   • ALPRAZOLAM 02/04/2020 Not Detected  ng/mg creat Final   • ALPHA-HYDROXYALPRAZOLAM UR, QT (RE* 02/04/2020 Not Detected  ng/mg creat Final   • DESALKLFLURAZEPAM UR QUANT (REF) 02/04/2020 Not Detected  ng/mg creat Final   • LORAZEPAM URINE QUANT (REF) 02/04/2020 Not Detected  ng/mg creat Final   • Alpha-hydroxytriazolam, Urine 02/04/2020 Not Detected  ng/mg creat Final   • Clonazepam Urine 02/04/2020 Not Detected  ng/mg creat Final   • 7-Aminoclonazepam Urine 02/04/2020 Not Detected  ng/mg creat Final   • MIDAZOLAM UR, QUANT (REF) 02/04/2020 Not Detected  ng/mg creat Final   • Alpha-hydroxymidazolam, Urine 02/04/2020 Not Detected  ng/mg creat Final   • Flunitrazepam 02/04/2020 Not Detected  ng/mg creat Final   • DESMETHYLFLUNITRAZEPAM 02/04/2020 Not Detected  ng/mg creat Final   • Cocaine & Metabolite 02/04/2020 Negative   Final   • Cocaine Screen, Urine 02/04/2020 Not Detected  ng/mg creat Final   • Benzoylecgonine, Urine 02/04/2020 Not Detected  ng/mg creat Final   • Cocaethylene Ur 02/04/2020 Not Detected  ng/mg creat Final   • THC, Urine 02/04/2020 Negative   Final   • Carboxy THC, Urine 02/04/2020 Not Detected  ng/mg creat Final   • 6-ACETYLMORPHINE 02/04/2020 Negative   Final   • 6-acetylmorphine 02/04/2020 Not Detected  ng/mg creat Final   • Opiate Class 02/04/2020 +POSITIVE+   Final   • Codeine, Urine 02/04/2020 Not Detected  ng/mg creat Final   • Morphine, Urine 02/04/2020 Not Detected  ng/mg creat Final   • normorphine 02/04/2020 Not Detected  ng/mg creat Final   • Norcodeine Ur 02/04/2020 Not Detected  ng/mg creat Final   • Hydrocodone UR 02/04/2020 >2667  ng/mg creat Final   • Hydromorphone Urine 02/04/2020 480  ng/mg creat Final   • Dihydrocodeine, Urine 02/04/2020 334  ng/mg creat Final   • Opiates, Norhydrocodone, Urine 02/04/2020 >1333  ng/mg creat Final    Expected metabolism of opiate class drugs:  Parent Drug        Detected Metabolites   -----------       --------------------   Codeine:          Major:  Morphine, Norcodeine                     Minor:  Hydrocodone, Hydromorphone,                             Dihydrocodeine, Norhydrocodone,                             Normorphine   Morphine:         Major:  Normorphine                     Minor:  Hydromorphone   Hydrocodone:      Hydromorphone, Dihydrocodeine,                     Norhydrocodone   Hydromorphone:    None   Dihydrocodeine:   None   Heroin:           6-Acetylmorphine (if included),                     Morphine, Normorphine                     Codeine, in small amounts in comparison                      to morphine, is often detected when                      heroin is the source drug.   • Oxycodone Class Ur 02/04/2020 +POSITIVE+   Final   • Oxycodone, Confirmation, Urine 02/04/2020 22  ng/mg creat Final   • Oxymorphone UR 02/04/2020 29  ng/mg creat Final   • Opiates, Noroxycodone, Urine 02/04/2020 Not Detected  ng/mg creat Final   • Noroxymorphone 02/04/2020 16  ng/mg creat Final    Expected metabolism of oxycodone class drugs:   Parent Drug       Detected Metabolites   -----------       --------------------   Oxycodone:        Oxymorphone, Noroxycodone, Noroxymorphone   Oxymorphone:      Noroxymorphone   • Methadone 02/04/2020 Negative   Final   • Methadone, Quantitative 02/04/2020 Not Detected  ng/mg creat Final   • EDDP 02/04/2020 Not Detected  ng/mg creat Final   • Fentanyl and Analogues, Ur 02/04/2020 Negative   Final   • Fentanyl, Urine 02/04/2020 Not Detected  ng/mg creat Final   • Norfentanyl Urine 02/04/2020 Not Detected  ng/mg creat Final   • Sufentanil, Ur 02/04/2020 Not Detected  ng/mg creat Final   • Alfentanil, Ur 02/04/2020 Not Detected  ng/mg creat Final   • BUPRENORPHINE 02/04/2020 +POSITIVE+   Final   • Buprenorphine, Urine 02/04/2020 4  ng/mg creat Final   • Norbuprenorphine 02/04/2020 11  ng/mg creat Final   • Tapentadol 02/04/2020 Negative    Final   • Tapentadol Ur 02/04/2020 Not Detected  ng/mg creat Final   • Opoidss other, UR 02/04/2020 Negative   Final   • Tramadol, Urine 02/04/2020 Not Detected  ng/mg creat Final   • O-desmethyltramadol, Ur 02/04/2020 Not Detected  ng/mg creat Final   • N-Desmethyltramadol, U 02/04/2020 Not Detected  ng/mg creat Final   • BARBITURATES, URINE 02/04/2020 Negative   Final   • Amobarbital, Urine 02/04/2020 Not Detected   Final   • Barbital 02/04/2020 Not Detected   Final   • Butabarbital, Ur 02/04/2020 Not Detected   Final   • Butalbital Screen, Urine 02/04/2020 Not Detected   Final   • Mephobarbital Urine 02/04/2020 Not Detected   Final   • Pentobarbital UR 02/04/2020 Not Detected   Final   • Phenobarbital 02/04/2020 Not Detected   Final   • Secobarbital, urine 02/04/2020 Not Detected   Final   • Thiopental, Ur 02/04/2020 Not Detected   Final   • Creatinine, Urine 02/04/2020 375  mg/dL Final    REFERENCE RANGE: Ref Range>=20   • Level of Detection: 02/04/2020 Comment   Final    TESTING THRESHOLDS ARE AS FOLLOWS:  AMPHETAMINES: 50 ng/mL  BENZODIAZEPINES: 20 ng/mL  COCAINE / METABOLITE: 50 ng/mL  ALCOHOL, ETHYL: 0.020 gm/dL  CANNABINOIDS: total-20 ng/mL, carboxy-THC-2 ng/mL  6-ACETYLMORPHINE: 10 ng/mL  OPIATE CLASS: 50 ng/mL/mL  OXYCODONE CLASS: 50 ng/mL  METHADONE: 50 ng/mL/mL  BUPRENORPHINE: buprenorphine-1.0 ng/mL,                 norbuprenorphine-5 ng/mL  FENTANYL / ANALOGUES: fentanyl-1.0 ng/mL, others-5.0 ng/mL  TAPENTADOL: 50 ng/mL  TRAMADOL: 50 ng/mL  BARBITURATES: 200 ng/mL  This test was developed and its performance characteristics  determined by [facility].  It has not been cleared or approved  by the Food and Drug Administration.   • WBC 02/04/2020 10.68  3.40 - 10.80 10*3/mm3 Final   • RBC 02/04/2020 5.34  4.14 - 5.80 10*6/mm3 Final   • Hemoglobin 02/04/2020 16.0  13.0 - 17.7 g/dL Final   • Hematocrit 02/04/2020 46.2  37.5 - 51.0 % Final   • MCV 02/04/2020 86.5  79.0 - 97.0 fL Final   • MCH 02/04/2020 30.0   26.6 - 33.0 pg Final   • MCHC 02/04/2020 34.6  31.5 - 35.7 g/dL Final   • RDW 02/04/2020 13.4  12.3 - 15.4 % Final   • RDW-SD 02/04/2020 41.4  37.0 - 54.0 fl Final   • MPV 02/04/2020 10.9  6.0 - 12.0 fL Final   • Platelets 02/04/2020 309  140 - 450 10*3/mm3 Final   • Neutrophil % 02/04/2020 64.7  42.7 - 76.0 % Final   • Lymphocyte % 02/04/2020 26.1  19.6 - 45.3 % Final   • Monocyte % 02/04/2020 7.5  5.0 - 12.0 % Final   • Eosinophil % 02/04/2020 1.3  0.3 - 6.2 % Final   • Basophil % 02/04/2020 0.4  0.0 - 1.5 % Final   • Neutrophils, Absolute 02/04/2020 6.91  1.70 - 7.00 10*3/mm3 Final   • Lymphocytes, Absolute 02/04/2020 2.79  0.70 - 3.10 10*3/mm3 Final   • Monocytes, Absolute 02/04/2020 0.80  0.10 - 0.90 10*3/mm3 Final   • Eosinophils, Absolute 02/04/2020 0.14  0.00 - 0.40 10*3/mm3 Final   • Basophils, Absolute 02/04/2020 0.04  0.00 - 0.20 10*3/mm3 Final   • RBC, UA 02/04/2020 21-30* None Seen /HPF Final   • WBC, UA 02/04/2020 0-2* None Seen /HPF Final   • Bacteria, UA 02/04/2020 Trace* None Seen /HPF Final   • Squamous Epithelial Cells, UA 02/04/2020 0-2  None Seen, 0-2 /HPF Final   • Hyaline Casts, UA 02/04/2020 None Seen  None Seen /LPF Final   • Mucus, UA 02/04/2020 Small/1+* None Seen, Trace /HPF Final   • Methodology 02/04/2020 Manual Light Microscopy   Final   ]

## 2020-02-20 DIAGNOSIS — R91.8 LUNG INFILTRATE: Primary | ICD-10-CM

## 2020-02-27 ENCOUNTER — OFFICE VISIT (OUTPATIENT)
Dept: ORTHOPEDIC SURGERY | Facility: CLINIC | Age: 65
End: 2020-02-27

## 2020-02-27 VITALS — BODY MASS INDEX: 26.33 KG/M2 | HEIGHT: 71 IN | WEIGHT: 188.1 LBS

## 2020-02-27 DIAGNOSIS — M25.562 CHRONIC PAIN OF BOTH KNEES: ICD-10-CM

## 2020-02-27 DIAGNOSIS — G89.29 CHRONIC PAIN OF BOTH KNEES: ICD-10-CM

## 2020-02-27 DIAGNOSIS — I10 ESSENTIAL HYPERTENSION: ICD-10-CM

## 2020-02-27 DIAGNOSIS — M23.92 INTERNAL DERANGEMENT OF LEFT KNEE: ICD-10-CM

## 2020-02-27 DIAGNOSIS — M25.561 CHRONIC PAIN OF BOTH KNEES: ICD-10-CM

## 2020-02-27 DIAGNOSIS — M17.0 PRIMARY OSTEOARTHRITIS OF BOTH KNEES: Primary | ICD-10-CM

## 2020-02-27 DIAGNOSIS — M23.252 DEGENERATIVE TEAR OF POSTERIOR HORN OF LATERAL MENISCUS OF LEFT KNEE: ICD-10-CM

## 2020-02-27 PROCEDURE — 99214 OFFICE O/P EST MOD 30 MIN: CPT | Performed by: ORTHOPAEDIC SURGERY

## 2020-02-27 RX ORDER — OXYCODONE HCL 10 MG/1
10 TABLET, FILM COATED, EXTENDED RELEASE ORAL ONCE
Status: CANCELLED | OUTPATIENT
Start: 2020-02-27 | End: 2020-02-27

## 2020-02-27 RX ORDER — ACETAMINOPHEN 325 MG/1
1000 TABLET ORAL ONCE
Status: CANCELLED | OUTPATIENT
Start: 2020-02-27 | End: 2020-02-27

## 2020-02-27 RX ORDER — BUPIVACAINE HCL/0.9 % NACL/PF 0.1 %
2 PLASTIC BAG, INJECTION (ML) EPIDURAL ONCE
Status: CANCELLED | OUTPATIENT
Start: 2020-06-01 | End: 2020-02-27

## 2020-02-27 RX ORDER — PREGABALIN 75 MG/1
75 CAPSULE ORAL ONCE
Status: CANCELLED | OUTPATIENT
Start: 2020-02-27 | End: 2020-02-27

## 2020-02-27 RX ORDER — MELOXICAM 7.5 MG/1
15 TABLET ORAL ONCE
Status: CANCELLED | OUTPATIENT
Start: 2020-02-27 | End: 2020-02-27

## 2020-02-27 NOTE — PROGRESS NOTES
Osvaldo Conley . is a 64 y.o. male returns for     Chief Complaint   Patient presents with   • Left Knee - Follow-up   • Right Knee - Follow-up       HISTORY OF PRESENT ILLNESS: Patient being seen for bilateral knee follow up. Left knee injection given 10/25/2019. Right knee injection given 01/16/2020. Reports right knee is worse than left. Right knee injection helped for a couple of days.   He has pain with activities of daily living.  Mostly has dull aches but he has occasional sharp stabbing pains as well.  Pain has been slowly worsening over the last 6 months to a year.  He has had previous steroid injection as well as Visco supplementation.  He has tried discal therapy exercises and home exercise program.  He occasionally walks with a cane without significant improvement.  He is unhappy with his quality of life and wants to pursue other treatment options.       CONCURRENT MEDICAL HISTORY:    Past Medical History:   Diagnosis Date   • Actinic keratosis    • Basal cell carcinoma     nose   • Chronic pain    • Essential hypertension    • Generalized anxiety disorder    • Hyperlipidemia    • Kidney stone    • Malignant melanoma of skin (CMS/HCC)    • Peripheral autonomic neuropathy    • Primary osteoarthritis involving multiple joints    • Prostatitis    • Restless leg syndrome    • Skin lesion    • Thyroid nodule    • Tick bite     without infection          Allergies   Allergen Reactions   • Codeine GI Intolerance       Current Outpatient Medications on File Prior to Visit   Medication Sig   • citalopram (CeleXA) 20 MG tablet Take 1 tablet by mouth Daily.   • gabapentin (NEURONTIN) 300 MG capsule Take 1 capsule by mouth 3 (Three) Times a Day.   • hydroCHLOROthiazide (HYDRODIURIL) 25 MG tablet Take 1 tablet by mouth Daily.   • HYDROcodone-acetaminophen (NORCO)  MG per tablet 1 tablet(s) by mouth 4 times per day for pain.   • losartan (COZAAR) 50 MG tablet Take 1 tablet by mouth Daily.   • metroNIDAZOLE  "(METROGEL) 1 % gel Apply to nose twice a day   • rOPINIRole (REQUIP) 0.5 MG tablet Take 1 tablet by mouth Every Night. Take 1 hour before bedtime.   • simvastatin (ZOCOR) 40 MG tablet Take 1 tablet by mouth Every Night.     No current facility-administered medications on file prior to visit.        Past Surgical History:   Procedure Laterality Date   • BASAL CELL CARCINOMA EXCISION  07/25/2016    left nose, 1.4 cm margin with rhombic transposition flap closure.   • COLONOSCOPY     • CRYOTHERAPY  03/03/2016    Destruction of Premalignant Lesion (1st) 31005 (Actinic keratosis)    • KIDNEY STONE SURGERY     • NOSE SURGERY      Revision of nose    • OTHER SURGICAL HISTORY      Reconstruction of midface    • SKIN CANCER DESTRUCTION     • SKIN CANCER EXCISION      melanoma from back   • STEROID INJECTION  03/03/2016    Depo Medrol (Methylprednisone) 80mg (Osteoarthritis) (10)       Family History   Problem Relation Age of Onset   • Coronary artery disease Mother    • Lung cancer Father    • Cancer Father         bone   • Hypertension Sister    • COPD Sister    • Hypertension Sister        Social History     Socioeconomic History   • Marital status:      Spouse name: Not on file   • Number of children: Not on file   • Years of education: Not on file   • Highest education level: Not on file   Tobacco Use   • Smoking status: Current Every Day Smoker     Packs/day: 1.00     Years: 50.00     Pack years: 50.00     Types: Cigarettes   • Smokeless tobacco: Never Used   Substance and Sexual Activity   • Alcohol use: Yes     Comment: occasional beer- 2or 3 per month   • Drug use: No   • Sexual activity: Defer           ROS  No fevers or chills.  No chest pain or shortness of air.  No GI or  disturbances.  Right knee pain, otherwise all systems reviewed as negative.    PHYSICAL EXAMINATION:       Ht 180.3 cm (71\")   Wt 85.3 kg (188 lb 1.6 oz)   BMI 26.23 kg/m²     Physical Exam   Constitutional: He is oriented to person, " place, and time. He appears well-developed and well-nourished. No distress.   Cardiovascular: Normal rate, regular rhythm and normal heart sounds.   Pulmonary/Chest: Effort normal and breath sounds normal.   Abdominal: Soft. Bowel sounds are normal.   Neurological: He is alert and oriented to person, place, and time.   Psychiatric: He has a normal mood and affect. His behavior is normal. Judgment and thought content normal.   Vitals reviewed.      GAIT:     []  Normal  [x]  Antalgic    Assistive device: [x]  None  []  Walker     []  Crutches  []  Cane     []  Wheelchair  []  Stretcher    Right Knee Exam     Muscle Strength   The patient has normal right knee strength.    Tenderness   Right knee tenderness location: diffuse.    Range of Motion   Extension: -5   Flexion: 110     Tests   Varus: negative Valgus: negative  Drawer:  Anterior - negative    Posterior - negative    Other   Sensation: normal  Pulse: present  Swelling: mild    Comments:  Crepitation on motion.  Moderate pain through arc of motion      Left Knee Exam     Muscle Strength   The patient has normal left knee strength.    Tenderness   Left knee tenderness location: diffuse.    Range of Motion   Extension: 0   Flexion: 120     Tests   Varus: negative Valgus: negative  Drawer:  Anterior - negative     Posterior - negative    Other   Erythema: absent  Sensation: normal  Pulse: present  Swelling: none    Comments:  Crepitation with motion  Mild pain through arc of motion              Procedure:  XR KNEE 1 OR 2 VW BILATERAL  Exam Date:  1/16/20     COMPARISON:  Todays X-rays were compared to previous images dated January 9, 2019.     IMPRESSION: AP bilateral standing of the knees with lateral of each knee shows medial joint space narrowing with bone-on-bone findings and complete joint space collapse on the right knee.  Marginal osteophytes are present on both sides of the knee.  Mild lateral subluxation of the tibia is noted with varus positioning.   Moderate patellofemoral joint arthritic change noted on the lateral view.  Left knee shows tricompartmental osteoarthritic change with mild medial joint space narrowing and irregularity of the joint surface.  Mild to moderate osteoarthritic change noted in the patellofemoral compartment as well.  Overall, mild progressive worsening in comparison to prior x-ray.        Jamarcus Weems MD  1/16/20    Procedure:  XR KNEE BILATERAL AP STANDING  Exam Date:  1/16/20     COMPARISON:  Todays X-rays were compared to previous images dated January 9, 2019.     IMPRESSION: AP bilateral standing of the knees with lateral of each knee shows medial joint space narrowing with bone-on-bone findings and complete joint space collapse on the right knee.  Marginal osteophytes are present on both sides of the knee.  Mild lateral subluxation of the tibia is noted with varus positioning.  Moderate patellofemoral joint arthritic change noted on the lateral view.  Left knee shows tricompartmental osteoarthritic change with mild medial joint space narrowing and irregularity of the joint surface.  Mild to moderate osteoarthritic change noted in the patellofemoral compartment as well.  Overall, mild progressive worsening in comparison to prior x-ray.        Jamarcus Weems MD  1/16/20          ASSESSMENT:    Diagnoses and all orders for this visit:    Primary osteoarthritis of both knees  -     Case Request; Standing  -     Type and screen; Future  -     Tranexamic Acid 1,000 mg in sodium chloride 0.9 % 100 mL  -     Tranexamic Acid 1,000 mg in sodium chloride 0.9 % 100 mL  -     ceFAZolin (ANCEF) 2 g in sodium chloride 0.9 % 100 mL IVPB  -     acetaminophen (TYLENOL) tablet 975 mg  -     meloxicam (MOBIC) tablet 15 mg  -     pregabalin (LYRICA) capsule 75 mg  -     oxyCODONE (oxyCONTIN) 12 hr tablet 10 mg  -     MRSA Screen Culture - Swab, Nares; Future    Chronic pain of both knees  -     Case Request; Standing  -     Type and  screen; Future  -     Tranexamic Acid 1,000 mg in sodium chloride 0.9 % 100 mL  -     Tranexamic Acid 1,000 mg in sodium chloride 0.9 % 100 mL  -     ceFAZolin (ANCEF) 2 g in sodium chloride 0.9 % 100 mL IVPB  -     acetaminophen (TYLENOL) tablet 975 mg  -     meloxicam (MOBIC) tablet 15 mg  -     pregabalin (LYRICA) capsule 75 mg  -     oxyCODONE (oxyCONTIN) 12 hr tablet 10 mg  -     MRSA Screen Culture - Swab, Nares; Future    Internal derangement of left knee  -     Case Request; Standing  -     Type and screen; Future  -     Tranexamic Acid 1,000 mg in sodium chloride 0.9 % 100 mL  -     Tranexamic Acid 1,000 mg in sodium chloride 0.9 % 100 mL  -     ceFAZolin (ANCEF) 2 g in sodium chloride 0.9 % 100 mL IVPB  -     acetaminophen (TYLENOL) tablet 975 mg  -     meloxicam (MOBIC) tablet 15 mg  -     pregabalin (LYRICA) capsule 75 mg  -     oxyCODONE (oxyCONTIN) 12 hr tablet 10 mg  -     MRSA Screen Culture - Swab, Nares; Future    Degenerative tear of posterior horn of lateral meniscus of left knee  -     Case Request; Standing  -     Type and screen; Future  -     Tranexamic Acid 1,000 mg in sodium chloride 0.9 % 100 mL  -     Tranexamic Acid 1,000 mg in sodium chloride 0.9 % 100 mL  -     ceFAZolin (ANCEF) 2 g in sodium chloride 0.9 % 100 mL IVPB  -     acetaminophen (TYLENOL) tablet 975 mg  -     meloxicam (MOBIC) tablet 15 mg  -     pregabalin (LYRICA) capsule 75 mg  -     oxyCODONE (oxyCONTIN) 12 hr tablet 10 mg  -     MRSA Screen Culture - Swab, Nares; Future    Essential hypertension  -     Case Request; Standing  -     Type and screen; Future  -     Tranexamic Acid 1,000 mg in sodium chloride 0.9 % 100 mL  -     Tranexamic Acid 1,000 mg in sodium chloride 0.9 % 100 mL  -     ceFAZolin (ANCEF) 2 g in sodium chloride 0.9 % 100 mL IVPB  -     acetaminophen (TYLENOL) tablet 975 mg  -     meloxicam (MOBIC) tablet 15 mg  -     pregabalin (LYRICA) capsule 75 mg  -     oxyCODONE (oxyCONTIN) 12 hr tablet 10 mg  -      MRSA Screen Culture - Swab, Nares; Future    Other orders  -     Outpatient In A Bed; Standing  -     Follow Anesthesia Guidelines / Standing Orders; Future  -     Provide instructions to patient regarding NPO status  -     Follow Anesthesia Guidelines / Standing Orders; Standing  -     Verify NPO Status; Standing  -     POC Glucose Once; Standing  -     Clip operative site; Standing  -     Obtain informed consent (if not collected inpatient or PAT); Standing  -     Provide Patient With ERAS Hydration Instructions  -     Provide Patient With Enhanced Recovery Booklet(s) or Handout  -     Complete A PROMIS And HOOS Or KOOS Survey If Having Hip Or Knee Replacement  -     Nerve Block; Standing          PLAN    Plan right TKA with adductor canal block    The patient voiced understanding of the risks, benefits, and alternative forms of treatment that were discussed and the patient consents to proceed with surgery.  All risks, benefits and alternatives were discussed.  Risks including but not exclusive to anesthetic complications, including death, MI, CVA, infection, bleeding DVT, fracture, residual pain and need for future surgery.    This discussion was held with the patient by Jamarcus Weems MD and all questions were answered.    Pain management may be an issue as he has been taking multiple narcotic pills per day.  However, he states that he has not been taking them recently and is in the process of being evaluated by a Suboxone clinic.  We discussed logistically not starting Suboxone until after he completed the postoperative pain regimen.    Patient's Body mass index is 26.23 kg/m². BMI is within normal parameters. No follow-up required..      Return for Post-operative eval.    Jamarcus Weems MD

## 2020-03-04 DIAGNOSIS — J42 CHRONIC BRONCHITIS, UNSPECIFIED CHRONIC BRONCHITIS TYPE (HCC): Primary | ICD-10-CM

## 2020-03-05 ENCOUNTER — HOSPITAL ENCOUNTER (OUTPATIENT)
Dept: GENERAL RADIOLOGY | Facility: HOSPITAL | Age: 65
Discharge: HOME | End: 2020-03-05
Admitting: INTERNAL MEDICINE

## 2020-03-05 ENCOUNTER — OFFICE VISIT (OUTPATIENT)
Dept: PULMONOLOGY | Facility: CLINIC | Age: 65
End: 2020-03-05

## 2020-03-05 VITALS
DIASTOLIC BLOOD PRESSURE: 72 MMHG | WEIGHT: 193.6 LBS | BODY MASS INDEX: 27.1 KG/M2 | HEIGHT: 71 IN | HEART RATE: 71 BPM | OXYGEN SATURATION: 94 % | SYSTOLIC BLOOD PRESSURE: 140 MMHG

## 2020-03-05 DIAGNOSIS — R93.89 ABNORMAL CHEST X-RAY: Primary | ICD-10-CM

## 2020-03-05 DIAGNOSIS — J44.9 COPD, MILD (HCC): Chronic | ICD-10-CM

## 2020-03-05 DIAGNOSIS — J42 CHRONIC BRONCHITIS, UNSPECIFIED CHRONIC BRONCHITIS TYPE (HCC): ICD-10-CM

## 2020-03-05 PROCEDURE — 99204 OFFICE O/P NEW MOD 45 MIN: CPT | Performed by: INTERNAL MEDICINE

## 2020-03-05 PROCEDURE — 71046 X-RAY EXAM CHEST 2 VIEWS: CPT

## 2020-03-05 NOTE — PROGRESS NOTES
Osvaldo Conley . is a 64 y.o. male.     Chief Complaint   Patient presents with   • Establish Care       History of Present Illness   This 64-year-old white male smoker is here for evaluation of a screening chest CT.  He is a long-term cigarette smoker and does have occasional episodes of bronchitis but denies any significant serious breathing problems at this time.  He is not coughing or wheezing.  He denies chest pain or hemoptysis.  He has a history of hypertension, polyneuropathy, knee pain, ENT infection requiring nasal surgery.  Medications please see his list.  Medication allergies codeine.  Family history is noncontributory.  Social history 1 pack a day for 50 years  The following portions of the patient's history were reviewed and updated as appropriate: allergies, current medications, past family history, past medical history, past social history, past surgical history and problem list.    Review of Systems   Constitutional: Negative for activity change, chills, fatigue, fever and unexpected weight change.   HENT: Positive for congestion ( Chronic nasal congestion) and hearing loss. Negative for dental problem, ear discharge, ear pain, facial swelling, nosebleeds, postnasal drip, rhinorrhea, sinus pressure, sneezing, sore throat, tinnitus, trouble swallowing and voice change.    Eyes: Negative.  Negative for photophobia, pain, discharge, redness, itching and visual disturbance.   Respiratory: Negative for cough, chest tightness, shortness of breath and wheezing.    Cardiovascular: Negative for chest pain, palpitations and leg swelling.   Gastrointestinal: Negative for abdominal distention, abdominal pain and vomiting.   Endocrine: Negative.  Negative for cold intolerance, heat intolerance, polydipsia and polyphagia.   Genitourinary: Negative.  Negative for decreased urine volume, dysuria, enuresis, flank pain, frequency, hematuria and urgency.   Musculoskeletal: Positive for arthralgias and  "gait problem. Negative for back pain, joint swelling, myalgias and neck pain.   Skin: Negative for color change, pallor, rash and wound.   Allergic/Immunologic: Negative.  Negative for environmental allergies, food allergies and immunocompromised state.   Neurological: Positive for headaches. Negative for dizziness, tremors, seizures, syncope, facial asymmetry, speech difficulty, weakness, light-headedness and numbness.   Hematological: Negative for adenopathy. Does not bruise/bleed easily.   Psychiatric/Behavioral: Negative for agitation, behavioral problems, confusion, decreased concentration, hallucinations and self-injury. The patient is not hyperactive.    All other systems reviewed and are negative.      /72 (BP Location: Left arm, Patient Position: Sitting, Cuff Size: Adult)   Pulse 71   Ht 180.3 cm (71\")   Wt 87.8 kg (193 lb 9.6 oz)   SpO2 94%   BMI 27.00 kg/m²   Physical Exam   Constitutional: He appears well-developed and well-nourished. No distress.   HENT:   Head: Normocephalic.   Mouth/Throat: No oropharyngeal exudate ( Nasal deformity).   Eyes: Pupils are equal, round, and reactive to light. Conjunctivae are normal. Right eye exhibits no discharge. Left eye exhibits no discharge. No scleral icterus.   Neck: Normal range of motion. Neck supple. No JVD present. No tracheal deviation present. No thyromegaly present.   Cardiovascular: Normal rate, regular rhythm, normal heart sounds and intact distal pulses. Exam reveals no gallop and no friction rub.   No murmur heard.  Pulmonary/Chest: Breath sounds normal. He is in respiratory distress ( Diminished breath sounds with no rales rhonchi or wheeze). He has no wheezes. He has no rales. He exhibits no tenderness.   Abdominal: Bowel sounds are normal. He exhibits no distension and no mass. There is no tenderness. There is no guarding.   Musculoskeletal: He exhibits no tenderness ( Patient walks with an antalgic gait) or deformity.   Lymphadenopathy: "     He has no cervical adenopathy.   Neurological: He is alert. He has normal reflexes. He displays normal reflexes. No cranial nerve deficit. He exhibits normal muscle tone. Coordination normal.   Skin: Skin is warm and dry. No rash noted. He is not diaphoretic. No pallor.   Psychiatric: He has a normal mood and affect. His behavior is normal. Judgment and thought content normal.     Chest x-ray reveals minimal hyperinflation, chest CT screening reveals increased markings with tree-in-bud abnormalities consistent with old inflammatory disease possibly bronchiectasis    Assessment/Plan   Osvaldo was seen today for establish care.    Diagnoses and all orders for this visit:    Abnormal chest x-ray  -     XR Chest PA & Lateral; Future    COPD, mild (CMS/HCC)  -     XR Chest PA & Lateral; Future      Assessment chronic bronchitis and tobacco use, polyneuropathy, abnormal CT suggesting chronic inflammatory disease in the right lung base possibly of bronchiectasis.    Plan this patient is not interested in the pulmonary work-up at this time.  Would like to repeat his chest x-ray in 6 months or sooner        This document has been produced with the assistance of Dragon dictation  This document has been electronically signed by Reji Lenz MD on March 5, 2020 1:22 PM

## 2020-03-10 ENCOUNTER — LAB (OUTPATIENT)
Dept: ONCOLOGY | Facility: HOSPITAL | Age: 65
End: 2020-03-10

## 2020-03-10 ENCOUNTER — OFFICE VISIT (OUTPATIENT)
Dept: ONCOLOGY | Facility: CLINIC | Age: 65
End: 2020-03-10

## 2020-03-10 VITALS
HEIGHT: 71 IN | WEIGHT: 197 LBS | SYSTOLIC BLOOD PRESSURE: 146 MMHG | TEMPERATURE: 97.4 F | RESPIRATION RATE: 18 BRPM | DIASTOLIC BLOOD PRESSURE: 70 MMHG | HEART RATE: 75 BPM | BODY MASS INDEX: 27.58 KG/M2

## 2020-03-10 DIAGNOSIS — D72.829 LEUKOCYTOSIS, UNSPECIFIED TYPE: ICD-10-CM

## 2020-03-10 DIAGNOSIS — D75.1 POLYCYTHEMIA: Primary | Chronic | ICD-10-CM

## 2020-03-10 DIAGNOSIS — D75.1 POLYCYTHEMIA: ICD-10-CM

## 2020-03-10 DIAGNOSIS — D72.829 LEUKOCYTOSIS, UNSPECIFIED TYPE: Chronic | ICD-10-CM

## 2020-03-10 DIAGNOSIS — Z71.6 ENCOUNTER FOR TOBACCO USE CESSATION COUNSELING: ICD-10-CM

## 2020-03-10 LAB
BASOPHILS # BLD MANUAL: 0.25 10*3/MM3 (ref 0–0.2)
BASOPHILS NFR BLD AUTO: 2 % (ref 0–1.5)
DEPRECATED RDW RBC AUTO: 41.2 FL (ref 37–54)
EOSINOPHIL # BLD MANUAL: 0.62 10*3/MM3 (ref 0–0.4)
EOSINOPHIL NFR BLD MANUAL: 5 % (ref 0.3–6.2)
ERYTHROCYTE [DISTWIDTH] IN BLOOD BY AUTOMATED COUNT: 13.2 % (ref 12.3–15.4)
HCT VFR BLD AUTO: 44 % (ref 37.5–51)
HGB BLD-MCNC: 15.4 G/DL (ref 13–17.7)
LYMPHOCYTES # BLD MANUAL: 4.44 10*3/MM3 (ref 0.7–3.1)
LYMPHOCYTES NFR BLD MANUAL: 36 % (ref 19.6–45.3)
LYMPHOCYTES NFR BLD MANUAL: 6 % (ref 5–12)
MCH RBC QN AUTO: 30.4 PG (ref 26.6–33)
MCHC RBC AUTO-ENTMCNC: 35 G/DL (ref 31.5–35.7)
MCV RBC AUTO: 87 FL (ref 79–97)
MONOCYTES # BLD AUTO: 0.74 10*3/MM3 (ref 0.1–0.9)
NEUTROPHILS # BLD AUTO: 6.04 10*3/MM3 (ref 1.7–7)
NEUTROPHILS NFR BLD MANUAL: 46 % (ref 42.7–76)
NEUTS BAND NFR BLD MANUAL: 3 % (ref 0–5)
PLATELET # BLD AUTO: 201 10*3/MM3 (ref 140–450)
PMV BLD AUTO: 11.9 FL (ref 6–12)
RBC # BLD AUTO: 5.06 10*6/MM3 (ref 4.14–5.8)
RBC MORPH BLD: NORMAL
SMALL PLATELETS BLD QL SMEAR: ADEQUATE
VARIANT LYMPHS NFR BLD MANUAL: 2 % (ref 0–5)
WBC MORPH BLD: NORMAL
WBC NRBC COR # BLD: 12.33 10*3/MM3 (ref 3.4–10.8)

## 2020-03-10 PROCEDURE — 85025 COMPLETE CBC W/AUTO DIFF WBC: CPT | Performed by: INTERNAL MEDICINE

## 2020-03-10 PROCEDURE — G0463 HOSPITAL OUTPT CLINIC VISIT: HCPCS | Performed by: INTERNAL MEDICINE

## 2020-03-10 PROCEDURE — 99213 OFFICE O/P EST LOW 20 MIN: CPT | Performed by: INTERNAL MEDICINE

## 2020-03-10 PROCEDURE — 85007 BL SMEAR W/DIFF WBC COUNT: CPT | Performed by: INTERNAL MEDICINE

## 2020-03-10 PROCEDURE — 99406 BEHAV CHNG SMOKING 3-10 MIN: CPT | Performed by: INTERNAL MEDICINE

## 2020-03-10 NOTE — PROGRESS NOTES
Osvaldo Conley .  [id number]  1955    Subjective     Mr Conley was seen in follow up for leukocytosis and polycythemia.  He denies any new health issues.  Continues to report arthritis pain in his knee especially in the right knee.  Is scheduled to undergo right knee replacement surgery with orthopedic.  No new concern for thrombosis.  Discussed with patient that he is at increased risk of DVT as well as PE after his knee replacement.  Recommend anticoagulation after knee replacement as per orthopedic.  His hemoglobin is normal.  White blood cell count is around his baseline.  He continues to smoke.  Counseled about smoking cessation.    Past Medical History, Past Surgical History, Social History, Family History have been reviewed and are without significant changes except as mentioned.    Review of Systems     CONSTITUTIONAL: fatigue + weakness + No weight loss, fever, chills.  HEENT: Eyes: No visual loss, blurred vision, double vision or yellow sclerae. Ears, Nose, Throat: No hearing loss, sneezing, congestion, runny nose or sore throat.  SKIN: No rash or itching.  CARDIOVASCULAR: No chest pain, chest pressure or chest discomfort. No palpitations or edema.  RESPIRATORY: No shortness of breath, cough or sputum.  GASTROINTESTINAL: No anorexia, nausea, vomiting or diarrhea. No abdominal pain or blood.  GENITOURINARY: Negative for urgency, frequency or dysuria.   NEUROLOGICAL: numbness or tingling in the extremities + No headache, dizziness, syncope, paralysis, ataxia. No change in bowel or bladder control.  MUSCULOSKELETAL: chronic joint pain + back pain +   HEMATOLOGIC: No anemia, bleeding or bruising.  LYMPHATICS: No enlarged nodes. No history of splenectomy.  PSYCHIATRIC: No history of depression or anxiety.  ENDOCRINOLOGIC: No reports of sweating, cold or heat intolerance. No polyuria or polydipsia.  ALLERGIES: No history of asthma, hives, eczema or rhinitis.    Medications:  The current medication list  "was reviewed in the EMR    ALLERGIES:    Allergies   Allergen Reactions   • Codeine GI Intolerance       Objective      Vitals:    03/10/20 0829   BP: 146/70   Pulse: 75   Resp: 18   Temp: 97.4 °F (36.3 °C)   TempSrc: Temporal   Weight: 89.4 kg (197 lb)   Height: 180.3 cm (70.98\")   PainSc:   6   PainLoc: Generalized  Comment: knees     Current Status 3/10/2020   ECOG score 0       Physical Exam      GENERAL: Alert, awake, oriented.  Well dressed.  Not in apparent distress. Vitals as above.   HEAD: Normocephalic, atraumatic.   EYES: PERRL, EOMI.vision is grossly intact.  NECK: Supple, no adenopathy or thyromegaly.   THROAT: Normal oral cavity and pharynx. No inflammation, swelling, exudate, or lesions.  CARDIAC: Normal S1 and S2. No S3, S4 or murmurs. Rhythm is regular.  Extremities are warm and well perfused.   LUNGS: Clear to auscultation and percussion without rales, rhonchi, wheezing or diminished breath sounds.  ABDOMEN: Positive bowel sounds. Soft, nondistended, nontender. No guarding or rebound. No masses.  BACK:  No bony tenderness.   EXTREMITIES: Osteoarthritis involving multiple joints +  Peripheral pulses intact. No varicosities.  SKIN: No rash or bruising.  NEUROLOGICAL: Grossly non-focal exam. No focal weakness. Gait: Normal.   PSYCH: Mood and affect normal. No hallucination or suicidal thoughts.   LYMPHATIC: No cervical, supraclavicular or axillary adenopathy.     RECENT LABS: Independently reviewed and summarized  Hematology WBC   Date Value Ref Range Status   03/10/2020 12.33 (H) 3.40 - 10.80 10*3/mm3 Final     RBC   Date Value Ref Range Status   03/10/2020 5.06 4.14 - 5.80 10*6/mm3 Final     Hemoglobin   Date Value Ref Range Status   03/10/2020 15.4 13.0 - 17.7 g/dL Final     Hematocrit   Date Value Ref Range Status   03/10/2020 44.0 37.5 - 51.0 % Final     Platelets   Date Value Ref Range Status   03/10/2020 201 140 - 450 10*3/mm3 Final          Imaging (independently reviewed and summarized):   CT " abdomen pelvis from June 30, 2016 reviewed.  Showed no evidence of hepatosplenomegaly or lymphadenopathy.  No prior comparison available.    Osvaldo Conley Sr. reports a pain score of 6.  Given his pain assessment as noted, treatment options were discussed and the following options were decided upon as a follow-up plan to address the patient's pain: Patient reports knee pain from arthritis and is scheduled for knee replacement in 2 to 3 weeks..    Patient screened positive for depression based on a PHQ-9 score of 0 on 2/14/2020. Follow-up recommendations include: No antidepressant indicated..    Diagnosis:   (1) leukocytosis  (2) polycythemia  (3) hypertension  (4) peripheral neuropathy  (5) encounter for tobacco use cessation counseling    Assessment/Plan       (1) leukocytosis: Patient with chronic leukocytosis going back to at least 2014. This was detected incidentally. No B symptoms - no unintentional weight loss or high fevers or drenching night sweats. No recurrent infections requiring antibiotics. No lymphadenopathy or hepatosplenomegaly. No anemia.No thrombocytopenia or thrombocytosis. Overall suspicious for primary hematological disorder is low and likely these abnormalities are reactive in nature.    Continue to monitor.    2) polycythemia: Likely secondary.  He does not have any history of arterial venous thrombosis.  No splenomegaly palpable.     Continue to monitor.    (3) hypertension: Blood pressure is well controlled on 50 mg of losartan.     (4) peripheral neuropathy: Stable on Neurontin.     (5) encounter for tobacco use cessation counseling: I had 5-7 minutes discussion with the patient about smoking cessation. Problems with continued smoking including respiratory, cardiovascular and oncological problems were discussion. Advice on smoking cessation was reiterated including methods of quitting and different medications and support system available for smoking cessation was discussed.   Patient  understood and was agreeable.     Jonathan Cruz MD   Hematology Oncology     3/10/2020      CC:

## 2020-03-12 ENCOUNTER — OFFICE VISIT (OUTPATIENT)
Dept: FAMILY MEDICINE CLINIC | Facility: CLINIC | Age: 65
End: 2020-03-12

## 2020-03-12 VITALS
WEIGHT: 193 LBS | TEMPERATURE: 97.6 F | SYSTOLIC BLOOD PRESSURE: 120 MMHG | HEART RATE: 98 BPM | HEIGHT: 71 IN | DIASTOLIC BLOOD PRESSURE: 70 MMHG | BODY MASS INDEX: 27.02 KG/M2

## 2020-03-12 DIAGNOSIS — I10 ESSENTIAL HYPERTENSION: Chronic | ICD-10-CM

## 2020-03-12 DIAGNOSIS — G89.29 CHRONIC PAIN OF BOTH KNEES: ICD-10-CM

## 2020-03-12 DIAGNOSIS — M15.9 PRIMARY OSTEOARTHRITIS INVOLVING MULTIPLE JOINTS: Primary | Chronic | ICD-10-CM

## 2020-03-12 DIAGNOSIS — M25.562 CHRONIC PAIN OF BOTH KNEES: ICD-10-CM

## 2020-03-12 DIAGNOSIS — G63 POLYNEUROPATHY ASSOCIATED WITH UNDERLYING DISEASE (HCC): Chronic | ICD-10-CM

## 2020-03-12 DIAGNOSIS — M25.561 CHRONIC PAIN OF BOTH KNEES: ICD-10-CM

## 2020-03-12 DIAGNOSIS — J44.9 COPD, MILD (HCC): Chronic | ICD-10-CM

## 2020-03-12 PROCEDURE — 99214 OFFICE O/P EST MOD 30 MIN: CPT | Performed by: INTERNAL MEDICINE

## 2020-03-12 RX ORDER — GABAPENTIN 300 MG/1
CAPSULE ORAL
Qty: 90 CAPSULE | Refills: 0 | Status: SHIPPED | OUTPATIENT
Start: 2020-03-12 | End: 2020-04-13 | Stop reason: SDUPTHER

## 2020-03-12 RX ORDER — HYDROCODONE BITARTRATE AND ACETAMINOPHEN 10; 325 MG/1; MG/1
TABLET ORAL
Qty: 120 TABLET | Refills: 0 | Status: SHIPPED | OUTPATIENT
Start: 2020-03-12 | End: 2020-04-09 | Stop reason: SDUPTHER

## 2020-03-12 NOTE — PROGRESS NOTES
Chief Complaint   Patient presents with   • Osteoarthritis     1 mo f/u     Subjective   Osvaldo Conley Sr. is a 64 y.o. male who presents to the office for follow-up. He has hypertension and his blood pressure has been controlled. He has osteoarthritis which causes chronic back and joint pain.  He takes Norco 10/325 mg 4 times a day for treatment of the chronic pain.  He also takes gabapentin 3 times a day for treatment of the neuropathic pain.  I saw him last month for a routine follow-up.  He had an aberrant urine drug screen.  As a result, he is now being seen monthly for closer monitoring.    At his last visit, I ordered a low-dose chest CT for lung cancer screening.  He had tree-in-bud infiltrates in the right lower lobe.  The CT scan was otherwise normal.  I referred him to pulmonology for consult.  This was thought to be due to chronic bronchitis and bronchiectasis.  He has a repeat chest x-ray scheduled in 6 months.    He continues to follow with orthopedics for his knee pain.  He is scheduled for a right total knee arthroplasty on 3/18/2020.    The following portions of the patient's history were reviewed and updated as appropriate: allergies, current medications, past family history, past medical history, past social history, past surgical history and problem list.    Review of Systems   Constitutional: Negative for chills, fatigue and fever.   HENT: Negative for congestion, sneezing, sore throat and trouble swallowing.    Eyes: Negative for visual disturbance.   Respiratory: Negative for cough, chest tightness, shortness of breath and wheezing.    Cardiovascular: Negative for chest pain and palpitations.   Gastrointestinal: Negative for abdominal pain, constipation, diarrhea, nausea and vomiting.   Genitourinary: Negative for dysuria, frequency and urgency.   Musculoskeletal: Positive for arthralgias, back pain and gait problem. Negative for neck pain.   Skin: Negative for rash.   Neurological:  "Negative for dizziness, weakness and headaches.   Psychiatric/Behavioral:        Patient denies any feelings of depression and has not felt down, hopeless or lost interest in any activities.   All other systems reviewed and are negative.      Objective   Vitals:    03/12/20 0844   BP: 120/70   BP Location: Left arm   Patient Position: Sitting   Cuff Size: Adult   Pulse: 98   Temp: 97.6 °F (36.4 °C)   TempSrc: Oral   Weight: 87.5 kg (193 lb)   Height: 180.3 cm (71\")   PainSc:   4   PainLoc: Knee  Comment: Right knee, Left shoulder      Body mass index is 26.92 kg/m².    Physical Exam   Constitutional: He is oriented to person, place, and time. He appears well-developed and well-nourished. No distress.   HENT:   Head: Normocephalic and atraumatic.   Nose: Nose normal.   Mouth/Throat: Oropharynx is clear and moist. No oropharyngeal exudate.   Eyes: Pupils are equal, round, and reactive to light. Conjunctivae and EOM are normal. No scleral icterus.   Neck: Normal range of motion. Neck supple.   Cardiovascular: Normal rate, regular rhythm and normal heart sounds. Exam reveals no gallop and no friction rub.   No murmur heard.  Pulmonary/Chest: Effort normal. No respiratory distress. He has decreased breath sounds. He has no wheezes. He has no rales.   Abdominal: Soft. Bowel sounds are normal. He exhibits no distension. There is no tenderness. There is no rebound and no guarding.   Musculoskeletal: Normal range of motion. He exhibits no edema.   Lymphadenopathy:     He has no cervical adenopathy.   Neurological: He is alert and oriented to person, place, and time. No cranial nerve deficit. Gait abnormal.   He is walking with the aid of a cane due to his knee pain.   Skin: Skin is warm and dry. No rash noted.   Psychiatric: He has a normal mood and affect. His behavior is normal. Judgment and thought content normal.   Nursing note and vitals reviewed.      Assessment/Plan   Osvaldo was seen today for " osteoarthritis.    Diagnoses and all orders for this visit:    Primary osteoarthritis involving multiple joints  -     HYDROcodone-acetaminophen (NORCO)  MG per tablet; 1 tablet(s) by mouth 4 times per day for pain.    Polyneuropathy associated with underlying disease (CMS/HCC)    Essential hypertension    COPD, mild (CMS/HCC)    Chronic pain of both knees          His blood pressure is controlled and he will continue with his current blood pressure medication. He will continue with gabapentin for the neuropathic pain.  He will also continue to use Norco 10/325 mg 4 times a day for treatment of the arthritic pain.  He brought his remaining tablets with him today, and the count is correct with a quantity of 11 remaining.  His refill will be due on Sunday, so he should be able to pick this up at the pharmacy on Saturday.  Since he will be having knee replacement surgery at the end of this month, he will be receiving a stronger pain medicine from his surgeon for short-term treatment.  I will see him back in 2 months for follow-up.    He will follow-up with pulmonology as scheduled for his abnormal chest CT findings.  He will also follow-up with orthopedics as scheduled for his upcoming knee surgery.    Patient understands the risks associated with this controlled medication, including tolerance and addiction.  Patient also agrees to only obtain this medication from me, and not from a another provider, unless that provider is covering for me in my absence.  Patient also agrees to be compliant in dosing, and not self adjust the dose of medication.  A signed controlled substance agreement is on file, and the patient has received a controlled substance education sheet at this a previous visit.  The patient has also signed a consent for treatment with a controlled substance as per [facility] policy. SANDRA was obtained.      CONTROLLED SUBSTANCE TRACKING 8/19/2019 11/19/2019 2/14/2020 3/12/2020   Last Sandra  8/19/2019 11/19/2019 2/14/2020 3/12/2020   Report Number [id number]   Last UDS - - 2/4/2020 -   Last Controlled Substance Agreement 8/19/2019 11/19/2019 2/14/2020 3/12/2020   Prior UDT result - - Aberrant -   Pill count - - - Expected   Comments - - Visits changed to monthly due to urine drug screen -       PHQ-2/PHQ-9 Depression Screening 2/14/2020   Little interest or pleasure in doing things 0   Feeling down, depressed, or hopeless 0   Trouble falling or staying asleep, or sleeping too much -   Feeling tired or having little energy -   Poor appetite or overeating -   Feeling bad about yourself - or that you are a failure or have let yourself or your family down -   Trouble concentrating on things, such as reading the newspaper or watching television -   Moving or speaking so slowly that other people could have noticed. Or the opposite - being so fidgety or restless that you have been moving around a lot more than usual -   Thoughts that you would be better off dead, or of hurting yourself in some way -   Total Score 0   If you checked off any problems, how difficult have these problems made it for you to do your work, take care of things at home, or get along with other people? -

## 2020-03-18 ENCOUNTER — APPOINTMENT (OUTPATIENT)
Dept: PREADMISSION TESTING | Facility: HOSPITAL | Age: 65
End: 2020-03-18

## 2020-04-07 DIAGNOSIS — M15.9 PRIMARY OSTEOARTHRITIS INVOLVING MULTIPLE JOINTS: Chronic | ICD-10-CM

## 2020-04-07 NOTE — TELEPHONE ENCOUNTER
Patient called and states his knee surgery that was scheduled for March 30th has been cancelled due to epidemic. States he has an appt to see ortho on May 12th. He is requesting a refill on his pain medication to hold him over.  Please advise.    #[phone]

## 2020-04-09 DIAGNOSIS — M15.9 PRIMARY OSTEOARTHRITIS INVOLVING MULTIPLE JOINTS: Chronic | ICD-10-CM

## 2020-04-09 RX ORDER — HYDROCODONE BITARTRATE AND ACETAMINOPHEN 10; 325 MG/1; MG/1
TABLET ORAL
Qty: 120 TABLET | Refills: 0 | OUTPATIENT
Start: 2020-04-09

## 2020-04-09 RX ORDER — HYDROCODONE BITARTRATE AND ACETAMINOPHEN 10; 325 MG/1; MG/1
TABLET ORAL
Qty: 120 TABLET | Refills: 0 | Status: SHIPPED | OUTPATIENT
Start: 2020-04-09 | End: 2020-05-07 | Stop reason: SDUPTHER

## 2020-04-09 NOTE — TELEPHONE ENCOUNTER
CONTROLLED SUBSTANCE TRACKING 8/19/2019 11/19/2019 2/14/2020 3/12/2020   Last Laz 8/19/2019 11/19/2019 2/14/2020 3/12/2020   Report Number [id number]   Last UDS - - 2/4/2020 -   Last Controlled Substance Agreement 8/19/2019 11/19/2019 2/14/2020 3/12/2020   Prior UDT result - - Aberrant -   Pill count - - - Expected   Comments - - Visits changed to monthly due to urine drug screen -     Next office visit is scheduled for 5-1-20. Last refill of Hydrocodone was on 3-12-20.

## 2020-04-13 DIAGNOSIS — G63 POLYNEUROPATHY ASSOCIATED WITH UNDERLYING DISEASE (HCC): Chronic | ICD-10-CM

## 2020-04-13 RX ORDER — GABAPENTIN 300 MG/1
CAPSULE ORAL
Qty: 90 CAPSULE | Refills: 0 | Status: SHIPPED | OUTPATIENT
Start: 2020-04-13 | End: 2020-05-07 | Stop reason: SDUPTHER

## 2020-04-13 NOTE — TELEPHONE ENCOUNTER
----- Message from Rachael Armstrong sent at 4/13/2020 11:36 AM CDT -----  Regarding: MED REFILL  Contact: [phone]   Needs refill on gabapentin (NEURONTIN) 300 MG,  [facility] [place].

## 2020-04-13 NOTE — TELEPHONE ENCOUNTER
CONTROLLED SUBSTANCE TRACKING 8/19/2019 11/19/2019 2/14/2020 3/12/2020   Last Laz 8/19/2019 11/19/2019 2/14/2020 3/12/2020   Report Number [id number]   Last UDS - - 2/4/2020 -   Last Controlled Substance Agreement 8/19/2019 11/19/2019 2/14/2020 3/12/2020   Prior UDT result - - Aberrant -   Pill count - - - Expected   Comments - - Visits changed to monthly due to urine drug screen      Next office visit 5-1-20. Last refill of Gabapentin was on 3-12-20.

## 2020-05-01 ENCOUNTER — TELEMEDICINE (OUTPATIENT)
Dept: FAMILY MEDICINE CLINIC | Facility: CLINIC | Age: 65
End: 2020-05-01

## 2020-05-01 DIAGNOSIS — F41.1 GENERALIZED ANXIETY DISORDER: Chronic | ICD-10-CM

## 2020-05-01 DIAGNOSIS — N52.9 PRIMARY ERECTILE DYSFUNCTION: ICD-10-CM

## 2020-05-01 DIAGNOSIS — I10 ESSENTIAL HYPERTENSION: Primary | Chronic | ICD-10-CM

## 2020-05-01 DIAGNOSIS — G25.81 RESTLESS LEG SYNDROME: Chronic | ICD-10-CM

## 2020-05-01 DIAGNOSIS — M15.9 PRIMARY OSTEOARTHRITIS INVOLVING MULTIPLE JOINTS: Chronic | ICD-10-CM

## 2020-05-01 PROCEDURE — 99214 OFFICE O/P EST MOD 30 MIN: CPT | Performed by: INTERNAL MEDICINE

## 2020-05-01 RX ORDER — SILDENAFIL 100 MG/1
100 TABLET, FILM COATED ORAL DAILY PRN
Qty: 30 TABLET | Refills: 1 | Status: SHIPPED | OUTPATIENT
Start: 2020-05-01 | End: 2020-06-16

## 2020-05-01 NOTE — PROGRESS NOTES
Telemedicine visit    You have chosen to receive care through a telehealth visit.  Do you consent to use a video/audio connection for your medical care today? Yes     Chief Complaint   Patient presents with   • Osteoarthritis     2 mo f/u     Subjective   Osvaldo Conley Sr. is a 64 y.o. male who is seen via telehealth video visit for follow-up. He has hypertension and his blood pressure has been controlled. He has osteoarthritis which causes chronic back and joint pain.  He takes Norco 10/325 mg 4 times a day for treatment of the chronic pain.  He also takes gabapentin 3 times a day for treatment of the neuropathic pain. He had an aberrant urine drug screen on 2/4/2020.  As a result, he is now being seen monthly for closer monitoring.     He was scheduled to have a knee replacement in March.  This was postponed due to the pandemic.    He has restless leg syndrome which is well managed with Requip.  He takes Celexa for treatment of anxiety.  His anxiety has been well managed.  He complains of erectile dysfunction, and is requesting medication to help with this.    The following portions of the patient's history were reviewed and updated as appropriate: allergies, current medications, past family history, past medical history, past social history, past surgical history and problem list.    Review of Systems   Constitutional: Negative for chills, fatigue and fever.   HENT: Negative for congestion, sneezing, sore throat and trouble swallowing.    Eyes: Negative for visual disturbance.   Respiratory: Negative for cough, chest tightness, shortness of breath and wheezing.    Cardiovascular: Negative for chest pain and palpitations.   Gastrointestinal: Negative for abdominal pain, constipation, diarrhea, nausea and vomiting.   Genitourinary: Negative for dysuria, frequency and urgency.   Musculoskeletal: Positive for arthralgias, back pain and gait problem. Negative for neck pain.   Skin: Negative for  rash.   Neurological: Negative for dizziness, weakness and headaches.   Psychiatric/Behavioral:        Patient denies any feelings of depression and has not felt down, hopeless or lost interest in any activities.   All other systems reviewed and are negative.      Objective     Physical Exam   Constitutional: He is oriented to person, place, and time. He appears well-developed and well-nourished. No distress.   HENT:   Head: Normocephalic and atraumatic.   Right Ear: Hearing and external ear normal.   Left Ear: Hearing and external ear normal.   Nose: Nose normal.   Eyes: Pupils are equal, round, and reactive to light. EOM are normal. Right eye exhibits no discharge. Left eye exhibits no discharge.   Neck: Normal range of motion.   Pulmonary/Chest: Effort normal.   Neurological: He is alert and oriented to person, place, and time. No cranial nerve deficit.   Psychiatric: He has a normal mood and affect. His behavior is normal. Judgment and thought content normal.       Assessment/Plan        Osvaldo was seen today for osteoarthritis.    Diagnoses and all orders for this visit:    Essential hypertension    Primary osteoarthritis involving multiple joints    Generalized anxiety disorder    Restless leg syndrome    Primary erectile dysfunction  -     sildenafil (VIAGRA) 100 MG tablet; Take 1 tablet by mouth Daily As Needed for Erectile Dysfunction.      His blood pressure is controlled and he will continue with his current blood pressure medication. He will continue with gabapentin for the neuropathic pain.  He will also continue to use Norco 10/325 mg 4 times a day for treatment of the arthritic pain.  He will continue with Requip for treatment of the restless leg syndrome.  He will continue with Celexa for treatment of his anxiety.  He is given sildenafil for treatment of erectile dysfunction.    Unable to complete visit using a video connection to the patient. A phone visit was used to complete this visits. Total  time of discussion was 25 minutes.    Patient understands the risks associated with this controlled medication, including tolerance and addiction.  Patient also agrees to only obtain this medication from me, and not from a another provider, unless that provider is covering for me in my absence.  Patient also agrees to be compliant in dosing, and not self adjust the dose of medication.  A signed controlled substance agreement is on file, and the patient has received a controlled substance education sheet at this or a previous visit.  The patient has also signed a consent for treatment with a controlled substance as per [facility] policy. SANDRA was obtained.    CONTROLLED SUBSTANCE TRACKING 8/19/2019 11/19/2019 2/14/2020 3/12/2020 5/1/2020   Last Sandra 8/19/2019 11/19/2019 2/14/2020 3/12/2020 5/1/2020   Report Number [id number]   Last UDS - - 2/4/2020 - -   Last Controlled Substance Agreement 8/19/2019 11/19/2019 2/14/2020 3/12/2020 -   Prior UDT result - - Aberrant - -   Pill count - - - Expected -   Comments - - Visits changed to monthly due to urine drug screen - -              PHQ-2/PHQ-9 Depression Screening 2/14/2020   Little interest or pleasure in doing things 0   Feeling down, depressed, or hopeless 0   Trouble falling or staying asleep, or sleeping too much -   Feeling tired or having little energy -   Poor appetite or overeating -   Feeling bad about yourself - or that you are a failure or have let yourself or your family down -   Trouble concentrating on things, such as reading the newspaper or watching television -   Moving or speaking so slowly that other people could have noticed. Or the opposite - being so fidgety or restless that you have been moving around a lot more than usual -   Thoughts that you would be better off dead, or of hurting yourself in some way -   Total Score 0   If you checked off any problems, how difficult have these problems made it for you  to do your work, take care of things at home, or get along with other people? -

## 2020-05-07 DIAGNOSIS — G63 POLYNEUROPATHY ASSOCIATED WITH UNDERLYING DISEASE (HCC): Chronic | ICD-10-CM

## 2020-05-07 DIAGNOSIS — M15.9 PRIMARY OSTEOARTHRITIS INVOLVING MULTIPLE JOINTS: Chronic | ICD-10-CM

## 2020-05-07 RX ORDER — GABAPENTIN 300 MG/1
CAPSULE ORAL
Qty: 90 CAPSULE | Refills: 0 | Status: SHIPPED | OUTPATIENT
Start: 2020-05-07 | End: 2020-06-12 | Stop reason: SDUPTHER

## 2020-05-07 RX ORDER — HYDROCODONE BITARTRATE AND ACETAMINOPHEN 10; 325 MG/1; MG/1
TABLET ORAL
Qty: 120 TABLET | Refills: 0 | Status: SHIPPED | OUTPATIENT
Start: 2020-05-07 | End: 2020-06-02 | Stop reason: HOSPADM

## 2020-05-07 NOTE — TELEPHONE ENCOUNTER
CONTROLLED SUBSTANCE TRACKING 8/19/2019 11/19/2019 2/14/2020 3/12/2020 5/1/2020   Last Laz 8/19/2019 11/19/2019 2/14/2020 3/12/2020 5/1/2020   Report Number [id number]   Last UDS - - 2/4/2020 - -   Last Controlled Substance Agreement 8/19/2019 11/19/2019 2/14/2020 3/12/2020 -   Prior UDT result - - Aberrant - -   Pill count - - - Expected -   Comments - - Visits changed to monthly due to urine drug screen - -     Next office visit is scheduled for 6-1-20. Last refill of Hydrocodone was on 4-9-20. Last refill of Gabapentin was on 4-13-20.

## 2020-05-13 ENCOUNTER — TELEPHONE (OUTPATIENT)
Dept: ORTHOPEDIC SURGERY | Facility: CLINIC | Age: 65
End: 2020-05-13

## 2020-05-13 NOTE — TELEPHONE ENCOUNTER
OSCAR        Pt CALLED STATING HE HAD A MISSED CALL FROM OUR OFFICE HE IS CONCERNED IT IS ABOUT SURGERY HE IS SUPPOSED TO HAVE POSSIBLY NOÉ    TO WHOM EVER CALLED PLEASE CALL Pt BACK @ [phone]

## 2020-05-21 ENCOUNTER — OFFICE VISIT (OUTPATIENT)
Dept: ORTHOPEDIC SURGERY | Facility: CLINIC | Age: 65
End: 2020-05-21

## 2020-05-21 VITALS — BODY MASS INDEX: 27.16 KG/M2 | HEIGHT: 71 IN | WEIGHT: 194 LBS

## 2020-05-21 DIAGNOSIS — G89.29 CHRONIC PAIN OF BOTH KNEES: ICD-10-CM

## 2020-05-21 DIAGNOSIS — J44.9 COPD, MILD (HCC): ICD-10-CM

## 2020-05-21 DIAGNOSIS — M25.561 CHRONIC PAIN OF BOTH KNEES: ICD-10-CM

## 2020-05-21 DIAGNOSIS — M17.11 PRIMARY OSTEOARTHRITIS OF RIGHT KNEE: Primary | ICD-10-CM

## 2020-05-21 DIAGNOSIS — I10 ESSENTIAL HYPERTENSION: ICD-10-CM

## 2020-05-21 DIAGNOSIS — F41.1 GENERALIZED ANXIETY DISORDER: ICD-10-CM

## 2020-05-21 DIAGNOSIS — M25.562 CHRONIC PAIN OF BOTH KNEES: ICD-10-CM

## 2020-05-21 DIAGNOSIS — F17.210 HEAVY CIGARETTE SMOKER (20-39 PER DAY): ICD-10-CM

## 2020-05-21 PROCEDURE — 99024 POSTOP FOLLOW-UP VISIT: CPT | Performed by: ORTHOPAEDIC SURGERY

## 2020-05-21 NOTE — PROGRESS NOTES
Osvaldo Conley Sr. is a 64 y.o. male   Primary Care Provider Ruiz Ayon MD     Chief Complaint   Patient presents with   • Right Knee - Pre-op Exam       HISTORY OF PRESENT ILLNESS:  Osvaldo Conley Sr. is here for followup of Right knee pain.  The pain has not improved despite long term treatment with multiple conservative management trials.      Patient being seen for bilateral knee follow up. Left knee injection given 10/25/2019. Right knee injection given 01/16/2020. Reports right knee is worse than left. Right knee injection helped for a couple of days.   He has pain with activities of daily living.  Mostly has dull aches but he has occasional sharp stabbing pains as well.  Pain has been slowly worsening over the last 6 months to a year.  He has had previous steroid injection as well as Visco supplementation.  He has tried discal therapy exercises and home exercise program.  He occasionally walks with a cane without significant improvement.  He is unhappy with his quality of life and wants to pursue other treatment options.    Patient was cancelled due to COVID-19 changes.  He is now ready to proceed.       CONCURRENT MEDICAL HISTORY:    Past Medical History:   Diagnosis Date   • Actinic keratosis    • Basal cell carcinoma     nose   • Chronic pain    • Essential hypertension    • Generalized anxiety disorder    • Hyperlipidemia    • Kidney stone    • Malignant melanoma of skin (CMS/HCC)    • Peripheral autonomic neuropathy    • Primary osteoarthritis involving multiple joints    • Prostatitis    • Restless leg syndrome    • Skin lesion    • Thyroid nodule    • Tick bite     without infection          Allergies   Allergen Reactions   • Codeine GI Intolerance         Current Outpatient Medications:   •  citalopram (CeleXA) 20 MG tablet, Take 1 tablet by mouth Daily., Disp: 30 tablet, Rfl: 11  •  gabapentin (NEURONTIN) 300 MG capsule, Take 1 capsule by mouth three times a day, Disp: 90 capsule, Rfl:  0  •  hydroCHLOROthiazide (HYDRODIURIL) 25 MG tablet, Take 1 tablet by mouth Daily., Disp: 30 tablet, Rfl: 11  •  HYDROcodone-acetaminophen (NORCO)  MG per tablet, 1 tablet(s) by mouth 4 times per day for pain., Disp: 120 tablet, Rfl: 0  •  losartan (COZAAR) 50 MG tablet, Take 1 tablet by mouth Daily., Disp: 30 tablet, Rfl: 11  •  metroNIDAZOLE (METROGEL) 1 % gel, Apply to nose twice a day, Disp: 60 g, Rfl: 1  •  rOPINIRole (REQUIP) 0.5 MG tablet, Take 1 tablet by mouth Every Night. Take 1 hour before bedtime., Disp: 30 tablet, Rfl: 11  •  sildenafil (VIAGRA) 100 MG tablet, Take 1 tablet by mouth Daily As Needed for Erectile Dysfunction., Disp: 30 tablet, Rfl: 1  •  simvastatin (ZOCOR) 40 MG tablet, Take 1 tablet by mouth Every Night., Disp: 30 tablet, Rfl: 11    Past Surgical History:   Procedure Laterality Date   • BASAL CELL CARCINOMA EXCISION  07/25/2016    left nose, 1.4 cm margin with rhombic transposition flap closure.   • COLONOSCOPY     • CRYOTHERAPY  03/03/2016    Destruction of Premalignant Lesion (1st) 44301 (Actinic keratosis)    • KIDNEY STONE SURGERY     • NOSE SURGERY      Revision of nose    • OTHER SURGICAL HISTORY      Reconstruction of midface    • SKIN CANCER DESTRUCTION     • SKIN CANCER EXCISION      melanoma from back   • STEROID INJECTION  03/03/2016    Depo Medrol (Methylprednisone) 80mg (Osteoarthritis) (10)       Family History   Problem Relation Age of Onset   • Coronary artery disease Mother    • Lung cancer Father    • Cancer Father         bone   • Hypertension Sister    • COPD Sister    • Hypertension Sister        Social History     Socioeconomic History   • Marital status:      Spouse name: Not on file   • Number of children: Not on file   • Years of education: Not on file   • Highest education level: Not on file   Tobacco Use   • Smoking status: Current Every Day Smoker     Packs/day: 1.00     Years: 50.00     Pack years: 50.00     Types: Cigarettes   • Smokeless  "tobacco: Never Used   Substance and Sexual Activity   • Alcohol use: Yes     Comment: occasional beer- 2or 3 per month   • Drug use: No   • Sexual activity: Defer        Review of Systems   Constitutional: Negative for chills and fever.   Respiratory: Negative.    Cardiovascular: Negative.    Gastrointestinal: Negative.    Musculoskeletal:        Pain in both knees   All other systems reviewed and are negative.      PHYSICAL EXAMINATION:       Ht 180.3 cm (71\")   Wt 88 kg (194 lb)   BMI 27.06 kg/m²     Physical Exam   Constitutional: He is oriented to person, place, and time. He appears well-developed and well-nourished. No distress.   Cardiovascular: Normal rate, regular rhythm and normal heart sounds.   Pulmonary/Chest: Effort normal and breath sounds normal.   Abdominal: Soft. Bowel sounds are normal.   Neurological: He is alert and oriented to person, place, and time.   Psychiatric: He has a normal mood and affect. His behavior is normal. Judgment and thought content normal.   Vitals reviewed.      GAIT:     []  Normal  [x]  Antalgic    Assistive device: [x]  None  []  Walker     []  Crutches  []  Cane     []  Wheelchair  []  Stretcher    Right Knee Exam     Muscle Strength   The patient has normal right knee strength.    Tenderness   Right knee tenderness location: diffuse.    Range of Motion   Extension: -5   Flexion: 110     Tests   Varus: negative Valgus: negative  Drawer:  Anterior - negative    Posterior - negative    Other   Sensation: normal  Pulse: present  Swelling: mild    Comments:  Crepitation on motion.  Moderate pain through arc of motion      Left Knee Exam     Muscle Strength   The patient has normal left knee strength.    Tenderness   Left knee tenderness location: diffuse.    Range of Motion   Extension: 0   Flexion: 120     Tests   Varus: negative Valgus: negative  Drawer:  Anterior - negative     Posterior - negative    Other   Erythema: absent  Sensation: normal  Pulse: present  Swelling: " none    Comments:  Crepitation with motion  Mild pain through arc of motion                      PRIOR XRAYS FOR REVIEW:     Ordering Provider:  Jamarcus Weems MD  Ordering Diagnosis/Indication:  Chronic pain of both knees     Procedure:  XR KNEE BILATERAL AP STANDING  Exam Date:  1/16/20     COMPARISON:  Todays X-rays were compared to previous images dated January 9, 2019.     IMPRESSION: AP bilateral standing of the knees with lateral of each knee shows medial joint space narrowing with bone-on-bone findings and complete joint space collapse on the right knee.  Marginal osteophytes are present on both sides of the knee.  Mild lateral subluxation of the tibia is noted with varus positioning.  Moderate patellofemoral joint arthritic change noted on the lateral view.  Left knee shows tricompartmental osteoarthritic change with mild medial joint space narrowing and irregularity of the joint surface.  Mild to moderate osteoarthritic change noted in the patellofemoral compartment as well.  Overall, mild progressive worsening in comparison to prior x-ray.        Jamarcus Weems MD  1/16/20    TXA  tranexemic acid summary: THIS PATIENT IS A CANDIDATE FOR IV TXA DURING SURGERY.    ASSESSMENT:    Diagnoses and all orders for this visit:    Primary osteoarthritis of right knee    Chronic pain of both knees    COPD, mild (CMS/HCC)    Essential hypertension    Heavy cigarette smoker (20-39 per day)    Generalized anxiety disorder          PLAN    Patient's Body mass index is 27.06 kg/m². BMI is within normal parameters. No follow-up required..    Plan right TKA with adductor canal block    The patient voiced understanding of the risks, benefits, and alternative forms of treatment that were discussed and the patient consents to proceed with surgery.  All risks, benefits and alternatives were discussed.  Risks including to but not exclusive to anesthetic complications, including death, MI, CVA, infection,  bleeding DVT, fracture, residual pain and need for future surgery.    All questions answered.    Return for Post-operative eval.    Jamarcus Weems MD

## 2020-05-29 PROCEDURE — U0003 INFECTIOUS AGENT DETECTION BY NUCLEIC ACID (DNA OR RNA); SEVERE ACUTE RESPIRATORY SYNDROME CORONAVIRUS 2 (SARS-COV-2) (CORONAVIRUS DISEASE [COVID-19]), AMPLIFIED PROBE TECHNIQUE, MAKING USE OF HIGH THROUGHPUT TECHNOLOGIES AS DESCRIBED BY CMS-2020-01-R: HCPCS | Performed by: ORTHOPAEDIC SURGERY

## 2020-05-30 LAB
COVID LABCORP PRIORITY: NORMAL
SARS-COV-2 RNA RESP QL NAA+PROBE: NOT DETECTED

## 2020-06-01 ENCOUNTER — APPOINTMENT (OUTPATIENT)
Dept: GENERAL RADIOLOGY | Facility: HOSPITAL | Age: 65
End: 2020-06-01

## 2020-06-01 ENCOUNTER — ANESTHESIA EVENT (OUTPATIENT)
Dept: PERIOP | Facility: HOSPITAL | Age: 65
End: 2020-06-01

## 2020-06-01 ENCOUNTER — ANESTHESIA (OUTPATIENT)
Dept: PERIOP | Facility: HOSPITAL | Age: 65
End: 2020-06-01

## 2020-06-01 ENCOUNTER — HOSPITAL ENCOUNTER (OUTPATIENT)
Facility: HOSPITAL | Age: 65
Discharge: HOME | End: 2020-06-02
Attending: ORTHOPAEDIC SURGERY | Admitting: ORTHOPAEDIC SURGERY

## 2020-06-01 DIAGNOSIS — Z78.9 IMPAIRED MOBILITY AND ADLS: ICD-10-CM

## 2020-06-01 DIAGNOSIS — G63 POLYNEUROPATHY ASSOCIATED WITH UNDERLYING DISEASE (HCC): Chronic | ICD-10-CM

## 2020-06-01 DIAGNOSIS — M23.252 DEGENERATIVE TEAR OF POSTERIOR HORN OF LATERAL MENISCUS OF LEFT KNEE: ICD-10-CM

## 2020-06-01 DIAGNOSIS — M17.0 PRIMARY OSTEOARTHRITIS OF BOTH KNEES: Primary | ICD-10-CM

## 2020-06-01 DIAGNOSIS — Z79.01 ANTICOAGULATION MONITORING BY PHARMACIST: ICD-10-CM

## 2020-06-01 DIAGNOSIS — I10 ESSENTIAL HYPERTENSION: ICD-10-CM

## 2020-06-01 DIAGNOSIS — Z74.09 IMPAIRED MOBILITY AND ADLS: ICD-10-CM

## 2020-06-01 DIAGNOSIS — Z74.09 IMPAIRED FUNCTIONAL MOBILITY, BALANCE, GAIT, AND ENDURANCE: ICD-10-CM

## 2020-06-01 DIAGNOSIS — M25.561 CHRONIC PAIN OF BOTH KNEES: ICD-10-CM

## 2020-06-01 DIAGNOSIS — M25.562 CHRONIC PAIN OF BOTH KNEES: ICD-10-CM

## 2020-06-01 DIAGNOSIS — G89.29 CHRONIC PAIN OF BOTH KNEES: ICD-10-CM

## 2020-06-01 DIAGNOSIS — M23.92 INTERNAL DERANGEMENT OF LEFT KNEE: ICD-10-CM

## 2020-06-01 DIAGNOSIS — F41.1 GENERALIZED ANXIETY DISORDER: Chronic | ICD-10-CM

## 2020-06-01 LAB
ABO GROUP BLD: NORMAL
ABO GROUP BLD: NORMAL
ANION GAP SERPL CALCULATED.3IONS-SCNC: 9 MMOL/L (ref 5–15)
BLD GP AB SCN SERPL QL: NEGATIVE
BUN BLD-MCNC: 14 MG/DL (ref 8–23)
BUN/CREAT SERPL: 21.9 (ref 7–25)
CALCIUM SPEC-SCNC: 8.8 MG/DL (ref 8.6–10.5)
CHLORIDE SERPL-SCNC: 106 MMOL/L (ref 98–107)
CO2 SERPL-SCNC: 26 MMOL/L (ref 22–29)
CREAT BLD-MCNC: 0.64 MG/DL (ref 0.76–1.27)
DEPRECATED RDW RBC AUTO: 42.8 FL (ref 37–54)
ERYTHROCYTE [DISTWIDTH] IN BLOOD BY AUTOMATED COUNT: 13.3 % (ref 12.3–15.4)
GFR SERPL CREATININE-BSD FRML MDRD: 126 ML/MIN/1.73
GLUCOSE BLD-MCNC: 117 MG/DL (ref 65–99)
HCT VFR BLD AUTO: 40.9 % (ref 37.5–51)
HCT VFR BLD AUTO: 43.8 % (ref 37.5–51)
HGB BLD-MCNC: 13.9 G/DL (ref 13–17.7)
HGB BLD-MCNC: 15.3 G/DL (ref 13–17.7)
Lab: NORMAL
MCH RBC QN AUTO: 30.8 PG (ref 26.6–33)
MCHC RBC AUTO-ENTMCNC: 34.9 G/DL (ref 31.5–35.7)
MCV RBC AUTO: 88.3 FL (ref 79–97)
MRSA DNA SPEC QL NAA+PROBE: NEGATIVE
PLATELET # BLD AUTO: 201 10*3/MM3 (ref 140–450)
PMV BLD AUTO: 11.9 FL (ref 6–12)
POTASSIUM BLD-SCNC: 4.2 MMOL/L (ref 3.5–5.2)
RBC # BLD AUTO: 4.96 10*6/MM3 (ref 4.14–5.8)
RH BLD: POSITIVE
RH BLD: POSITIVE
SODIUM BLD-SCNC: 141 MMOL/L (ref 136–145)
T&S EXPIRATION DATE: NORMAL
WBC NRBC COR # BLD: 13.43 10*3/MM3 (ref 3.4–10.8)

## 2020-06-01 PROCEDURE — 25010000002 FENTANYL CITRATE (PF) 100 MCG/2ML SOLUTION: Performed by: NURSE ANESTHETIST, CERTIFIED REGISTERED

## 2020-06-01 PROCEDURE — 73560 X-RAY EXAM OF KNEE 1 OR 2: CPT

## 2020-06-01 PROCEDURE — 80048 BASIC METABOLIC PNL TOTAL CA: CPT | Performed by: ANESTHESIOLOGY

## 2020-06-01 PROCEDURE — 27447 TOTAL KNEE ARTHROPLASTY: CPT | Performed by: ORTHOPAEDIC SURGERY

## 2020-06-01 PROCEDURE — 25010000002 MIDAZOLAM PER 1 MG: Performed by: NURSE ANESTHETIST, CERTIFIED REGISTERED

## 2020-06-01 PROCEDURE — 93010 ELECTROCARDIOGRAM REPORT: CPT | Performed by: INTERNAL MEDICINE

## 2020-06-01 PROCEDURE — 86901 BLOOD TYPING SEROLOGIC RH(D): CPT

## 2020-06-01 PROCEDURE — 25010000002 ROPIVACAINE PER 1 MG: Performed by: NURSE ANESTHETIST, CERTIFIED REGISTERED

## 2020-06-01 PROCEDURE — 25010000002 HYDROMORPHONE 1 MG/ML SOLUTION: Performed by: ORTHOPAEDIC SURGERY

## 2020-06-01 PROCEDURE — 87641 MR-STAPH DNA AMP PROBE: CPT | Performed by: ORTHOPAEDIC SURGERY

## 2020-06-01 PROCEDURE — 86850 RBC ANTIBODY SCREEN: CPT | Performed by: ANESTHESIOLOGY

## 2020-06-01 PROCEDURE — 25010000002 HYDROMORPHONE 1 MG/ML SOLUTION: Performed by: ANESTHESIOLOGY

## 2020-06-01 PROCEDURE — 93005 ELECTROCARDIOGRAM TRACING: CPT | Performed by: ANESTHESIOLOGY

## 2020-06-01 PROCEDURE — 85018 HEMOGLOBIN: CPT | Performed by: ORTHOPAEDIC SURGERY

## 2020-06-01 PROCEDURE — 86901 BLOOD TYPING SEROLOGIC RH(D): CPT | Performed by: ANESTHESIOLOGY

## 2020-06-01 PROCEDURE — C1713 ANCHOR/SCREW BN/BN,TIS/BN: HCPCS | Performed by: ORTHOPAEDIC SURGERY

## 2020-06-01 PROCEDURE — 85027 COMPLETE CBC AUTOMATED: CPT | Performed by: ANESTHESIOLOGY

## 2020-06-01 PROCEDURE — 25010000002 PROPOFOL 10 MG/ML EMULSION: Performed by: NURSE ANESTHETIST, CERTIFIED REGISTERED

## 2020-06-01 PROCEDURE — 86900 BLOOD TYPING SEROLOGIC ABO: CPT

## 2020-06-01 PROCEDURE — 86900 BLOOD TYPING SEROLOGIC ABO: CPT | Performed by: ANESTHESIOLOGY

## 2020-06-01 PROCEDURE — 25010000003 MEPERIDINE 100 MG/2ML SOLUTION: Performed by: NURSE ANESTHETIST, CERTIFIED REGISTERED

## 2020-06-01 PROCEDURE — 97162 PT EVAL MOD COMPLEX 30 MIN: CPT | Performed by: PHYSICAL THERAPIST

## 2020-06-01 PROCEDURE — 94799 UNLISTED PULMONARY SVC/PX: CPT

## 2020-06-01 PROCEDURE — C1776 JOINT DEVICE (IMPLANTABLE): HCPCS | Performed by: ORTHOPAEDIC SURGERY

## 2020-06-01 PROCEDURE — 85014 HEMATOCRIT: CPT | Performed by: ORTHOPAEDIC SURGERY

## 2020-06-01 PROCEDURE — 97166 OT EVAL MOD COMPLEX 45 MIN: CPT

## 2020-06-01 DEVICE — SMARTSET HV HIGH VISCOSITY BONE CEMENT 40G
Type: IMPLANTABLE DEVICE | Status: FUNCTIONAL
Brand: SMARTSET

## 2020-06-01 DEVICE — ATTUNE KNEE SYSTEM TIBIAL INSERT ROTATING PLATFORM POSTERIOR STABILIZED 6 8MM AOX
Type: IMPLANTABLE DEVICE | Status: FUNCTIONAL
Brand: ATTUNE

## 2020-06-01 DEVICE — ATTUNE KNEE SYSTEM FEMORAL POSTERIOR STABILIZED SIZE 6 RIGHT CEMENTED
Type: IMPLANTABLE DEVICE | Status: FUNCTIONAL
Brand: ATTUNE

## 2020-06-01 DEVICE — ATTUNE KNEE SYSTEM TIBIAL BASE ROTATING PLATFORM SIZE 6 CEMENTED
Type: IMPLANTABLE DEVICE | Status: FUNCTIONAL
Brand: ATTUNE

## 2020-06-01 DEVICE — TOTL KN ATTUNE DEPUY 9527038: Type: IMPLANTABLE DEVICE | Status: FUNCTIONAL

## 2020-06-01 DEVICE — ATTUNE PATELLA MEDIALIZED DOME 35MM CEMENTED AOX
Type: IMPLANTABLE DEVICE | Status: FUNCTIONAL
Brand: ATTUNE

## 2020-06-01 RX ORDER — SODIUM CHLORIDE 9 MG/ML
100 INJECTION, SOLUTION INTRAVENOUS CONTINUOUS
Status: DISCONTINUED | OUTPATIENT
Start: 2020-06-01 | End: 2020-06-02 | Stop reason: HOSPADM

## 2020-06-01 RX ORDER — SODIUM CHLORIDE 9 MG/ML
1000 INJECTION, SOLUTION INTRAVENOUS CONTINUOUS
Status: DISCONTINUED | OUTPATIENT
Start: 2020-06-01 | End: 2020-06-02 | Stop reason: HOSPADM

## 2020-06-01 RX ORDER — WARFARIN SODIUM 5 MG/1
5 TABLET ORAL
Status: DISCONTINUED | OUTPATIENT
Start: 2020-06-01 | End: 2020-06-02 | Stop reason: HOSPADM

## 2020-06-01 RX ORDER — ACETAMINOPHEN 500 MG
1000 TABLET ORAL ONCE
Status: COMPLETED | OUTPATIENT
Start: 2020-06-01 | End: 2020-06-01

## 2020-06-01 RX ORDER — OXYCODONE HCL 10 MG/1
10 TABLET, FILM COATED, EXTENDED RELEASE ORAL ONCE
Status: COMPLETED | OUTPATIENT
Start: 2020-06-01 | End: 2020-06-01

## 2020-06-01 RX ORDER — OXYCODONE HYDROCHLORIDE 5 MG/1
5 TABLET ORAL EVERY 4 HOURS PRN
Status: DISCONTINUED | OUTPATIENT
Start: 2020-06-01 | End: 2020-06-02 | Stop reason: HOSPADM

## 2020-06-01 RX ORDER — FAMOTIDINE 40 MG/1
40 TABLET, FILM COATED ORAL DAILY
Status: DISCONTINUED | OUTPATIENT
Start: 2020-06-01 | End: 2020-06-02 | Stop reason: HOSPADM

## 2020-06-01 RX ORDER — CLINDAMYCIN PHOSPHATE 600 MG/50ML
600 INJECTION INTRAVENOUS EVERY 8 HOURS
Status: COMPLETED | OUTPATIENT
Start: 2020-06-01 | End: 2020-06-02

## 2020-06-01 RX ORDER — SODIUM CHLORIDE 0.9 % (FLUSH) 0.9 %
3-10 SYRINGE (ML) INJECTION AS NEEDED
Status: DISCONTINUED | OUTPATIENT
Start: 2020-06-01 | End: 2020-06-02 | Stop reason: HOSPADM

## 2020-06-01 RX ORDER — ROPIVACAINE HYDROCHLORIDE 5 MG/ML
INJECTION, SOLUTION EPIDURAL; INFILTRATION; PERINEURAL
Status: COMPLETED | OUTPATIENT
Start: 2020-06-01 | End: 2020-06-01

## 2020-06-01 RX ORDER — BUPIVACAINE HCL/0.9 % NACL/PF 0.1 %
2 PLASTIC BAG, INJECTION (ML) EPIDURAL ONCE
Status: COMPLETED | OUTPATIENT
Start: 2020-06-01 | End: 2020-06-01

## 2020-06-01 RX ORDER — LIDOCAINE HYDROCHLORIDE 20 MG/ML
INJECTION, SOLUTION INFILTRATION; PERINEURAL AS NEEDED
Status: DISCONTINUED | OUTPATIENT
Start: 2020-06-01 | End: 2020-06-01 | Stop reason: SURG

## 2020-06-01 RX ORDER — ROPINIROLE 0.5 MG/1
0.5 TABLET, FILM COATED ORAL NIGHTLY
Status: DISCONTINUED | OUTPATIENT
Start: 2020-06-01 | End: 2020-06-02 | Stop reason: HOSPADM

## 2020-06-01 RX ORDER — ONDANSETRON 2 MG/ML
4 INJECTION INTRAMUSCULAR; INTRAVENOUS ONCE AS NEEDED
Status: DISCONTINUED | OUTPATIENT
Start: 2020-06-01 | End: 2020-06-01 | Stop reason: HOSPADM

## 2020-06-01 RX ORDER — PROMETHAZINE HYDROCHLORIDE 25 MG/1
25 SUPPOSITORY RECTAL ONCE AS NEEDED
Status: DISCONTINUED | OUTPATIENT
Start: 2020-06-01 | End: 2020-06-01 | Stop reason: HOSPADM

## 2020-06-01 RX ORDER — OXYCODONE HCL 10 MG/1
10 TABLET, FILM COATED, EXTENDED RELEASE ORAL EVERY 12 HOURS SCHEDULED
Status: DISCONTINUED | OUTPATIENT
Start: 2020-06-01 | End: 2020-06-02 | Stop reason: HOSPADM

## 2020-06-01 RX ORDER — NALOXONE HCL 0.4 MG/ML
0.1 VIAL (ML) INJECTION
Status: DISCONTINUED | OUTPATIENT
Start: 2020-06-01 | End: 2020-06-02 | Stop reason: HOSPADM

## 2020-06-01 RX ORDER — PREGABALIN 75 MG/1
75 CAPSULE ORAL ONCE
Status: COMPLETED | OUTPATIENT
Start: 2020-06-01 | End: 2020-06-01

## 2020-06-01 RX ORDER — FENTANYL CITRATE 50 UG/ML
INJECTION, SOLUTION INTRAMUSCULAR; INTRAVENOUS AS NEEDED
Status: DISCONTINUED | OUTPATIENT
Start: 2020-06-01 | End: 2020-06-01 | Stop reason: SURG

## 2020-06-01 RX ORDER — ACETAMINOPHEN 500 MG
1000 TABLET ORAL 4 TIMES DAILY
Status: DISCONTINUED | OUTPATIENT
Start: 2020-06-01 | End: 2020-06-02 | Stop reason: HOSPADM

## 2020-06-01 RX ORDER — BUPIVACAINE HYDROCHLORIDE 7.5 MG/ML
INJECTION, SOLUTION EPIDURAL; RETROBULBAR
Status: COMPLETED | OUTPATIENT
Start: 2020-06-01 | End: 2020-06-01

## 2020-06-01 RX ORDER — HYDROCHLOROTHIAZIDE 25 MG/1
25 TABLET ORAL DAILY
Status: DISCONTINUED | OUTPATIENT
Start: 2020-06-01 | End: 2020-06-02 | Stop reason: HOSPADM

## 2020-06-01 RX ORDER — MIDAZOLAM HYDROCHLORIDE 1 MG/ML
INJECTION INTRAMUSCULAR; INTRAVENOUS AS NEEDED
Status: DISCONTINUED | OUTPATIENT
Start: 2020-06-01 | End: 2020-06-01 | Stop reason: SURG

## 2020-06-01 RX ORDER — PROMETHAZINE HYDROCHLORIDE 25 MG/1
25 TABLET ORAL ONCE AS NEEDED
Status: DISCONTINUED | OUTPATIENT
Start: 2020-06-01 | End: 2020-06-01 | Stop reason: HOSPADM

## 2020-06-01 RX ORDER — PROMETHAZINE HYDROCHLORIDE 25 MG/ML
12.5 INJECTION, SOLUTION INTRAMUSCULAR; INTRAVENOUS ONCE AS NEEDED
Status: DISCONTINUED | OUTPATIENT
Start: 2020-06-01 | End: 2020-06-01 | Stop reason: HOSPADM

## 2020-06-01 RX ORDER — LOSARTAN POTASSIUM 50 MG/1
50 TABLET ORAL DAILY
Status: DISCONTINUED | OUTPATIENT
Start: 2020-06-01 | End: 2020-06-02 | Stop reason: HOSPADM

## 2020-06-01 RX ORDER — FERROUS SULFATE TAB EC 324 MG (65 MG FE EQUIVALENT) 324 (65 FE) MG
324 TABLET DELAYED RESPONSE ORAL
Status: DISCONTINUED | OUTPATIENT
Start: 2020-06-02 | End: 2020-06-02 | Stop reason: HOSPADM

## 2020-06-01 RX ORDER — ONDANSETRON 4 MG/1
4 TABLET, FILM COATED ORAL EVERY 6 HOURS PRN
Status: DISCONTINUED | OUTPATIENT
Start: 2020-06-01 | End: 2020-06-02 | Stop reason: HOSPADM

## 2020-06-01 RX ORDER — PROPOFOL 10 MG/ML
VIAL (ML) INTRAVENOUS AS NEEDED
Status: DISCONTINUED | OUTPATIENT
Start: 2020-06-01 | End: 2020-06-01 | Stop reason: SURG

## 2020-06-01 RX ORDER — ONDANSETRON 2 MG/ML
4 INJECTION INTRAMUSCULAR; INTRAVENOUS EVERY 6 HOURS PRN
Status: DISCONTINUED | OUTPATIENT
Start: 2020-06-01 | End: 2020-06-02 | Stop reason: HOSPADM

## 2020-06-01 RX ORDER — CITALOPRAM 20 MG/1
20 TABLET ORAL DAILY
Status: DISCONTINUED | OUTPATIENT
Start: 2020-06-01 | End: 2020-06-02 | Stop reason: HOSPADM

## 2020-06-01 RX ORDER — MELOXICAM 15 MG/1
15 TABLET ORAL ONCE
Status: COMPLETED | OUTPATIENT
Start: 2020-06-01 | End: 2020-06-01

## 2020-06-01 RX ORDER — SODIUM CHLORIDE 0.9 % (FLUSH) 0.9 %
3 SYRINGE (ML) INJECTION EVERY 12 HOURS SCHEDULED
Status: DISCONTINUED | OUTPATIENT
Start: 2020-06-01 | End: 2020-06-02 | Stop reason: HOSPADM

## 2020-06-01 RX ADMIN — ACETAMINOPHEN 1000 MG: 500 TABLET ORAL at 10:15

## 2020-06-01 RX ADMIN — HYDROCHLOROTHIAZIDE 25 MG: 25 TABLET ORAL at 16:57

## 2020-06-01 RX ADMIN — MEPERIDINE HYDROCHLORIDE 12.5 MG: 100 INJECTION, SOLUTION INTRAMUSCULAR; INTRAVENOUS; SUBCUTANEOUS at 13:46

## 2020-06-01 RX ADMIN — MIDAZOLAM HYDROCHLORIDE 2 MG: 2 INJECTION, SOLUTION INTRAMUSCULAR; INTRAVENOUS at 10:37

## 2020-06-01 RX ADMIN — MEPERIDINE HYDROCHLORIDE 12.5 MG: 100 INJECTION, SOLUTION INTRAMUSCULAR; INTRAVENOUS; SUBCUTANEOUS at 13:39

## 2020-06-01 RX ADMIN — PREGABALIN 75 MG: 75 CAPSULE ORAL at 10:11

## 2020-06-01 RX ADMIN — PROPOFOL 50 MG: 10 INJECTION, EMULSION INTRAVENOUS at 11:10

## 2020-06-01 RX ADMIN — OXYCODONE HYDROCHLORIDE 5 MG: 5 TABLET ORAL at 15:03

## 2020-06-01 RX ADMIN — WARFARIN SODIUM 5 MG: 5 TABLET ORAL at 17:04

## 2020-06-01 RX ADMIN — HYDROMORPHONE HYDROCHLORIDE 0.5 MG: 1 INJECTION, SOLUTION INTRAMUSCULAR; INTRAVENOUS; SUBCUTANEOUS at 16:54

## 2020-06-01 RX ADMIN — ACETAMINOPHEN 1000 MG: 500 TABLET ORAL at 17:04

## 2020-06-01 RX ADMIN — MELOXICAM 15 MG: 15 TABLET ORAL at 10:11

## 2020-06-01 RX ADMIN — ROPINIROLE HYDROCHLORIDE 0.5 MG: 0.5 TABLET, FILM COATED ORAL at 21:46

## 2020-06-01 RX ADMIN — SODIUM CHLORIDE: 900 INJECTION, SOLUTION INTRAVENOUS at 12:09

## 2020-06-01 RX ADMIN — OXYCODONE HYDROCHLORIDE 10 MG: 10 TABLET, FILM COATED, EXTENDED RELEASE ORAL at 21:46

## 2020-06-01 RX ADMIN — OXYCODONE HYDROCHLORIDE 5 MG: 5 TABLET ORAL at 23:32

## 2020-06-01 RX ADMIN — ROPIVACAINE HYDROCHLORIDE 30 ML: 5 INJECTION, SOLUTION EPIDURAL; INFILTRATION; PERINEURAL at 11:56

## 2020-06-01 RX ADMIN — FENTANYL CITRATE 100 MCG: 50 INJECTION, SOLUTION INTRAMUSCULAR; INTRAVENOUS at 10:43

## 2020-06-01 RX ADMIN — LIDOCAINE HYDROCHLORIDE 50 MG: 20 INJECTION, SOLUTION INFILTRATION; PERINEURAL at 11:09

## 2020-06-01 RX ADMIN — HYDROMORPHONE HYDROCHLORIDE 0.5 MG: 1 INJECTION, SOLUTION INTRAMUSCULAR; INTRAVENOUS; SUBCUTANEOUS at 19:36

## 2020-06-01 RX ADMIN — FENTANYL CITRATE 12.5 MCG: 50 INJECTION, SOLUTION INTRAMUSCULAR; INTRAVENOUS at 10:55

## 2020-06-01 RX ADMIN — Medication 2 G: at 10:53

## 2020-06-01 RX ADMIN — FAMOTIDINE 40 MG: 40 TABLET, FILM COATED ORAL at 16:57

## 2020-06-01 RX ADMIN — BUPIVACAINE HYDROCHLORIDE 2 ML: 7.5 INJECTION, SOLUTION EPIDURAL; RETROBULBAR at 10:40

## 2020-06-01 RX ADMIN — SODIUM CHLORIDE 100 ML/HR: 9 INJECTION, SOLUTION INTRAVENOUS at 16:56

## 2020-06-01 RX ADMIN — LOSARTAN POTASSIUM 50 MG: 50 TABLET, FILM COATED ORAL at 16:57

## 2020-06-01 RX ADMIN — PROPOFOL 50 MCG/KG/MIN: 10 INJECTION, EMULSION INTRAVENOUS at 11:10

## 2020-06-01 RX ADMIN — HYDROMORPHONE HYDROCHLORIDE 0.5 MG: 1 INJECTION, SOLUTION INTRAMUSCULAR; INTRAVENOUS; SUBCUTANEOUS at 13:30

## 2020-06-01 RX ADMIN — OXYCODONE HYDROCHLORIDE 10 MG: 10 TABLET, FILM COATED, EXTENDED RELEASE ORAL at 10:11

## 2020-06-01 RX ADMIN — SODIUM CHLORIDE: 900 INJECTION, SOLUTION INTRAVENOUS at 10:32

## 2020-06-01 RX ADMIN — CITALOPRAM HYDROBROMIDE 20 MG: 20 TABLET ORAL at 16:57

## 2020-06-01 RX ADMIN — TRANEXAMIC ACID 1000 MG: 100 INJECTION, SOLUTION INTRAVENOUS at 12:21

## 2020-06-01 RX ADMIN — HYDROMORPHONE HYDROCHLORIDE 0.5 MG: 1 INJECTION, SOLUTION INTRAMUSCULAR; INTRAVENOUS; SUBCUTANEOUS at 23:06

## 2020-06-01 RX ADMIN — TRANEXAMIC ACID 1000 MG: 100 INJECTION, SOLUTION INTRAVENOUS at 10:10

## 2020-06-01 RX ADMIN — ACETAMINOPHEN 1000 MG: 500 TABLET ORAL at 21:46

## 2020-06-01 RX ADMIN — CLINDAMYCIN IN 5 PERCENT DEXTROSE 600 MG: 12 INJECTION, SOLUTION INTRAVENOUS at 18:24

## 2020-06-01 NOTE — PLAN OF CARE
Problem: Patient Care Overview  Goal: Plan of Care Review  Outcome: Ongoing (interventions implemented as appropriate)  Flowsheets (Taken 6/1/2020 7456)  Progress: no change  Plan of Care Reviewed With: patient  Outcome Summary: PT evaluation completed. Pt presents limited by R knee pain and R knee ROM deficits. Required Emily with bed mobility tasks going supine <>sit, CGA with transfers sit to stand with a RW and CGA gait training x 5ft each direction (fwd/bwd) with a RW, antalgic gait with R LE. Pt would benefit from skilled PT to facilitate resuming the pt's PLOF. Recommend home with girlfriend to assist as need and home health PT to follow up. Recommend DME of a front wheeled walker.

## 2020-06-01 NOTE — THERAPY EVALUATION
Acute Care - Occupational Therapy Initial Evaluation  [facility]     Patient Name: Osvaldo Conley Sr.  : 1955  MRN: [id number]  Today's Date: 2020  Onset of Illness/Injury or Date of Surgery: 20  Date of Referral to OT: 20  Referring Physician: Faustina BARRIOS MD    Admit Date: 2020       ICD-10-CM ICD-9-CM   1. Chronic pain of both knees M25.561 719.46    M25.562 338.29    G89.29    2. Internal derangement of left knee M23.92 717.9   3. Degenerative tear of posterior horn of lateral meniscus of left knee M23.252 717.43   4. Primary osteoarthritis of both knees M17.0 715.16   5. Essential hypertension I10 401.9   6. Impaired functional mobility, balance, gait, and endurance Z74.09 V49.89   7. Impaired mobility and ADLs Z74.09 799.89     Patient Active Problem List   Diagnosis   • Restless leg syndrome   • Polyneuropathy associated with underlying disease (CMS/HCC)   • Primary osteoarthritis involving multiple joints   • Mixed hyperlipidemia   • Generalized anxiety disorder   • Essential hypertension   • Peripheral edema   • Impaired glucose tolerance   • COPD, mild (CMS/HCC)   • Primary osteoarthritis of both knees   • Leukocytosis   • Polycythemia   • Encounter for tobacco use cessation counseling   • Chronic pain of both knees   • Internal derangement of left knee   • Degenerative tear of posterior horn of lateral meniscus of left knee     Past Medical History:   Diagnosis Date   • Actinic keratosis    • Basal cell carcinoma     nose   • Chronic pain    • Essential hypertension    • Generalized anxiety disorder    • Hyperlipidemia    • Kidney stone    • Malignant melanoma of skin (CMS/HCC)    • Peripheral autonomic neuropathy    • Primary osteoarthritis involving multiple joints    • Prostatitis    • Restless leg syndrome    • Skin lesion    • Thyroid nodule    • Tick bite     without infection        Past Surgical History:   Procedure Laterality Date   • BASAL CELL CARCINOMA  EXCISION  07/25/2016    left nose, 1.4 cm margin with rhombic transposition flap closure.   • COLONOSCOPY     • CRYOTHERAPY  03/03/2016    Destruction of Premalignant Lesion (1st) 32503 (Actinic keratosis)    • KIDNEY STONE SURGERY     • NOSE SURGERY      Revision of nose    • OTHER SURGICAL HISTORY      Reconstruction of midface    • SKIN CANCER DESTRUCTION     • SKIN CANCER EXCISION      melanoma from back   • STEROID INJECTION  03/03/2016    Depo Medrol (Methylprednisone) 80mg (Osteoarthritis) (10)          OT ASSESSMENT FLOWSHEET (last 12 hours)      Occupational Therapy Evaluation     Row Name 06/01/20 153                   OT Evaluation Time/Intention    Subjective Information  complains of;pain  -KO        Document Type  evaluation  -KO        Mode of Treatment  individual therapy  -KO        Patient Effort  good  -KO           General Information    Patient Profile Reviewed?  yes  -KO        Onset of Illness/Injury or Date of Surgery  06/01/20  -KO        Referring Physician  Faustina BARRIOS MD  -KO        Patient Observations  alert;cooperative;agree to therapy  -KO        Patient/Family Observations  no family present  -KO        General Observations of Patient  IV, tele, fowlers, bed alarm  -KO        Prior Level of Function  independent:;gait;ADL's  -KO        Equipment Currently Used at Home  cane, straight;walker, standard  -KO        Pertinent History of Current Functional Problem  Pt is a 64 yr old male who presents after a R TKA  -KO        Risks Reviewed  patient:;LOB;nausea/vomiting;dizziness;increased discomfort;change in vital signs;increased drainage;lines disloged  -KO        Benefits Reviewed  patient:;improve function;increase independence;increase strength;increase balance;decrease pain;decrease risk of DVT;improve skin integrity;increase knowledge  -KO           Relationship/Environment    Lives With  significant other  -KO           Resource/Environmental Concerns    Current Living  Arrangements  home/apartment/condo  -KO           Cognitive Assessment/Intervention- PT/OT    Orientation Status (Cognition)  oriented x 4  -KO        Follows Commands (Cognition)  WFL  -KO           Mobility Assessment/Treatment    Extremity Weight-bearing Status  right lower extremity  -KO        Right Lower Extremity (Weight-bearing Status)  weight-bearing as tolerated (WBAT)  -KO           Bed Mobility Assessment/Treatment    Bed Mobility Assessment/Treatment  supine-sit;sit-supine  -KO        Supine-Sit [place] (Bed Mobility)  minimum assist (75% patient effort)  -KO        Sit-Supine [place] (Bed Mobility)  minimum assist (75% patient effort)  -KO        Assistive Device (Bed Mobility)  bed rails  -KO           Functional Mobility    Functional Mobility- Ind. Level  minimum assist (75% patient effort)  -KO        Functional Mobility- Device  rolling walker  -KO        Functional Mobility-Distance (Feet)  5  -KO        Functional Mobility-Maintain WBing  able to maintain weight bearing status  -KO           Transfer Assessment/Treatment    Transfer Assessment/Treatment  sit-stand transfer;stand-sit transfer  -KO           Sit-Stand Transfer    Sit-Stand [place] (Transfers)  contact guard  -KO           Stand-Sit Transfer    Stand-Sit [place] (Transfers)  contact guard  -KO           ADL Assessment/Intervention    BADL Assessment/Intervention  lower body dressing  -KO           Lower Body Dressing Assessment/Training    Lower Body Dressing [place] Level  don;pants/bottoms;minimum assist (75% patient effort)  -KO        Lower Body Dressing Position  edge of bed sitting  -KO           BADL Safety/Performance    Impairments, BADL Safety/Performance  endurance/activity tolerance;pain;strength;shortness of breath  -KO           General ROM    GENERAL ROM COMMENTS  BUE AROM WFLs  -KO           MMT (Manual Muscle Testing)    General MMT Comments  BUE grossly 5/5  -KO           Sensory  Assessment/Intervention    Sensory General Assessment  light touch sensation deficits identified  -KO           Light Touch Sensation Assessment    Right Lower Extremity: Light Touch Sensation Assessment  moderate impairment, 50 to 74% correct responses numb from surgery  -KO           Positioning and Restraints    Pre-Treatment Position  in bed  -KO        Post Treatment Position  bed  -KO        In Bed  fowlers;call light within reach;encouraged to call for assist;exit alarm on  -KO           Pain Scale: Numbers Pre/Post-Treatment    Pain Scale: Numbers, Pretreatment  9/10  -KO        Pain Scale: Numbers, Post-Treatment  9/10  -KO        Pain Location - Side  Right  -KO        Pain Location  knee  -KO        Pain Intervention(s)  Repositioned;Medication (See MAR) RN reporting she just gave pain meds  -KO           Wound 06/01/20 1125 Right knee Incision    Wound - Properties Group Date first assessed: 06/01/20  -KW Time first assessed: 1125  -KW Side: Right  -KW Location: knee  -KW Primary Wound Type: Incision  -KW       Plan of Care Review    Plan of Care Reviewed With  patient  -KO           Clinical Impression (OT)    Date of Referral to OT  06/01/20  -KO        OT Diagnosis  impaired mobility and ADLs  -KO        Patient/Family Goals Statement (OT Eval)  return to PLOF  -KO        Criteria for Skilled Therapeutic Interventions Met (OT Eval)  yes;treatment indicated  -KO        Rehab Potential (OT Eval)  good, to achieve stated therapy goals  -KO        Therapy Frequency (OT Eval)  daily  -KO        Predicted Duration of Therapy Intervention (Therapy Eval)  until OT goals met or d/c facility  -KO        Care Plan Review (OT)  evaluation/treatment results reviewed;care plan/treatment goals reviewed;risks/benefits reviewed;patient/other agree to care plan;current/potential barriers reviewed  -KO        Anticipated Equipment Needs at Discharge (OT)  tub bench  -KO        Anticipated Discharge Disposition (OT)   home with assist;home with home health  -KO           Vital Signs    Post Systolic BP Rehab  165  -KO        Post Treatment Diastolic BP  79  -KO        Posttreatment Heart Rate (beats/min)  70  -KO        Post SpO2 (%)  97  -KO        O2 Delivery Post Treatment  room air  -KO        Post Patient Position  Supine  -KO           Planned OT Interventions    Planned Therapy Interventions (OT Eval)  activity tolerance training;adaptive equipment training;BADL retraining;functional balance retraining;IADL retraining;occupation/activity based interventions;patient/caregiver education/training;ROM/therapeutic exercise;strengthening exercise;transfer/mobility retraining  -KO           OT Goals    Bed Mobility Goal Selection (OT)  bed mobility, OT goal 1  -KO        Transfer Goal Selection (OT)  transfer, OT goal 1  -KO        Bathing Goal Selection (OT)  bathing, OT goal 1  -KO        Dressing Goal Selection (OT)  dressing, OT goal 1  -KO        Toileting Goal Selection (OT)  toileting, OT goal 1  -KO        Activity Tolerance Goal Selection (OT)  activity tolerance, OT goal 1  -KO        Additional Documentation  Activity Tolerance Goal Selection (OT) (Row)  -KO           Bed Mobility Goal 1 (OT)    Activity/Assistive Device (Bed Mobility Goal 1, OT)  bed mobility activities, all  -KO        [place] Level/Cues Needed (Bed Mobility Goal 1, OT)  independent  -KO        Time Frame (Bed Mobility Goal 1, OT)  long term goal (LTG);by discharge  -KO        Progress/Outcomes (Bed Mobility Goal 1, OT)  goal not met  -KO           Transfer Goal 1 (OT)    Activity/Assistive Device (Transfer Goal 1, OT)  sit-to-stand/stand-to-sit;bed-to-chair/chair-to-bed;toilet  -KO        [place] Level/Cues Needed (Transfer Goal 1, OT)  independent  -KO        Time Frame (Transfer Goal 1, OT)  long term goal (LTG);by discharge  -KO        Progress/Outcome (Transfer Goal 1, OT)  goal not met  -KO           Bathing Goal 1 (OT)     Activity/Assistive Device (Bathing Goal 1, OT)  bathing skills, all  -KO        [place] Level/Cues Needed (Bathing Goal 1, OT)  conditional independence  -KO        Time Frame (Bathing Goal 1, OT)  long term goal (LTG);by discharge  -KO        Progress/Outcomes (Bathing Goal 1, OT)  goal not met  -KO           Dressing Goal 1 (OT)    Activity/Assistive Device (Dressing Goal 1, OT)  dressing skills, all  -KO        [place]/Cues Needed (Dressing Goal 1, OT)  conditional independence  -KO        Time Frame (Dressing Goal 1, OT)  long term goal (LTG);by discharge  -KO        Progress/Outcome (Dressing Goal 1, OT)  goal not met  -KO           Toileting Goal 1 (OT)    Activity/Device (Toileting Goal 1, OT)  toileting skills, all  -KO        [place] Level/Cues Needed (Toileting Goal 1, OT)  independent  -KO        Time Frame (Toileting Goal 1, OT)  long term goal (LTG);by discharge  -KO        Progress/Outcome (Toileting Goal 1, OT)  goal not met  -KO            Activity Tolerance Goal 1 (OT)    Activity Level (Endurance Goal 1, OT)  15 min activity  -KO        Time Frame (Activity Tolerance Goal 1, OT)  long term goal (LTG);by discharge  -KO        Progress/Outcome (Activity Tolerance Goal 1, OT)  goal not met  -KO           Living Environment    Home Accessibility  tub/shower is not walk in reporting has a ramp  -KO          User Key  (r) = Recorded By, (t) = Taken By, (c) = Cosigned By    Initials Name Effective Dates    Latisha Isidro RN 10/17/16 -     Beatrice Latif OT 03/02/20 -          Occupational Therapy Education                 Title: PT OT SLP Therapies (In Progress)     Topic: Occupational Therapy (In Progress)     Point: ADL training (Done)     Description:   Instruct learner(s) on proper safety adaptation and remediation techniques during self care or transfers.   Instruct in proper use of assistive devices.              Learning Progress Summary           Patient Acceptance, E, VU  by KAILA at 6/1/2020 1606    Comment:  Educated pt on fall prevention and safety during ADLs                   Point: Home exercise program (Not Started)     Description:   Instruct learner(s) on appropriate technique for monitoring, assisting and/or progressing therapeutic exercises/activities.              Learner Progress:   Not documented in this visit.          Point: Precautions (Done)     Description:   Instruct learner(s) on prescribed precautions during self-care and functional transfers.              Learning Progress Summary           Patient Acceptance, E, VU by KAILA at 6/1/2020 1606    Comment:  Educated pt on fall prevention and safety during ADLs                   Point: Body mechanics (Not Started)     Description:   Instruct learner(s) on proper positioning and spine alignment during self-care, functional mobility activities and/or exercises.              Learner Progress:   Not documented in this visit.                      User Key     Initials Effective Dates Name Provider Type Discipline    KAILA 03/02/20 -  Beatrice Valdez OT Occupational Therapist OT                  OT Recommendation and Plan  Outcome Summary/Treatment Plan (OT)  Anticipated Equipment Needs at Discharge (OT): tub bench  Anticipated Discharge Disposition (OT): home with assist, home with home health  Planned Therapy Interventions (OT Eval): activity tolerance training, adaptive equipment training, BADL retraining, functional balance retraining, IADL retraining, occupation/activity based interventions, patient/caregiver education/training, ROM/therapeutic exercise, strengthening exercise, transfer/mobility retraining  Therapy Frequency (OT Eval): daily  Plan of Care Review  Plan of Care Reviewed With: patient  Plan of Care Reviewed With: patient  Outcome Summary: RN okay'd OT evaluation this date. Pt in the bed upon arrival and agreeable. Ox4. Reporting he lives with his significant other in a 1 level home with a ramp to enter. Has a  tub/shower. Pt reporting he was IND with all ADLs/IADLs prior to admission. Sup<>sit Emily. Sit>Stand CG. Pt completed functional mob in room with CG and AD. VCs for safety. Pt donned shorts with Emily and cueing to gilberto surgical LE first for ease. Pt in the bed end of assessment. Needs met and bed alarm on. Pt would benefit from continued OT services to inc functional act tolerance and to maximize IND/safety with ADLs. Rec home with home OT. Pt will also need a tub bench prior to d/c.    Outcome Measures     Row Name 06/01/20 1537             How much help from another is currently needed...    Putting on and taking off regular lower body clothing?  3  -KO      Bathing (including washing, rinsing, and drying)  3  -KO      Toileting (which includes using toilet bed pan or urinal)  3  -KO      Putting on and taking off regular upper body clothing  3  -KO      Taking care of personal grooming (such as brushing teeth)  3  -KO      Eating meals  4  -KO      AM-PAC 6 Clicks Score (OT)  19  -KO         Functional Assessment    Outcome Measure Options  AM-PAC 6 Clicks Daily Activity (OT)  -KO        User Key  (r) = Recorded By, (t) = Taken By, (c) = Cosigned By    Initials Name Provider Type    Beatrice Latif OT Occupational Therapist          Time Calculation:   Time Calculation- OT     Row Name 06/01/20 1610             Time Calculation- OT    OT Start Time  1537  -KO      OT Stop Time  1606  -KO      OT Time Calculation (min)  29 min  -KO      OT Received On  06/01/20  -KO      OT Goal Re-Cert Due Date  06/14/20  -KO        User Key  (r) = Recorded By, (t) = Taken By, (c) = Cosigned By    Initials Name Provider Type    Beatrice Latif OT Occupational Therapist        Therapy Charges for Today     Code Description Service Date Service Provider Modifiers Qty    [id number]  OT EVAL MOD COMPLEXITY 2 6/1/2020 Beatrice Valdez OT GO 1               Beatrice Valdez OT  6/1/2020

## 2020-06-01 NOTE — ANESTHESIA POSTPROCEDURE EVALUATION
Patient: Osvaldo Conley Sr.    Procedure Summary     Date:  06/01/20 Room / Location:  [facility] OR [address] [place] [address] OR    Anesthesia Start:  1037 Anesthesia Stop:  1307    Procedure:  TOTAL KNEE ARTHROPLASTY  with adductor canal block (Right ) Diagnosis:       Chronic pain of both knees      Internal derangement of left knee      Degenerative tear of posterior horn of lateral meniscus of left knee      Primary osteoarthritis of both knees      Essential hypertension      (Chronic pain of both knees [M25.561, M25.562, G89.29])      (Internal derangement of left knee [M23.92])      (Degenerative tear of posterior horn of lateral meniscus of left knee [M23.252])      (Primary osteoarthritis of both knees [M17.0])      (Essential hypertension [I10])    Surgeon:  Jamarcus Weems MD Provider:  Jorge Andres MD    Anesthesia Type:  spinal ASA Status:  3          Anesthesia Type: spinal    Vitals  Vitals Value Taken Time   /73 6/1/2020  1:04 PM   Temp 96.8 °F (36 °C) 6/1/2020  1:04 PM   Pulse 64 6/1/2020  1:04 PM   Resp 20 6/1/2020  1:04 PM   SpO2 99 % 6/1/2020  1:04 PM           Post Anesthesia Care and Evaluation    Patient location during evaluation: PACU  Patient participation: complete - patient participated  Level of consciousness: awake and alert  Pain management: adequate  Airway patency: patent  Anesthetic complications: No anesthetic complications  PONV Status: none  Cardiovascular status: acceptable  Respiratory status: acceptable  Hydration status: acceptable

## 2020-06-01 NOTE — NURSING NOTE
Patient did not take any medication since Friday 5- was out took two pills pain pill unsure of other medication. Poor historian.

## 2020-06-01 NOTE — PLAN OF CARE
Problem: Patient Care Overview  Goal: Plan of Care Review  Flowsheets (Taken 6/1/2020 8711)  Plan of Care Reviewed With: patient  Outcome Summary: RN okay'hailey OT evaluation this date. Pt in the bed upon arrival and agreeable. Ox4. Reporting he lives with his significant other in a 1 level home with a ramp to enter. Has a tub/shower. Pt reporting he was IND with all ADLs/IADLs prior to admission. Sup<>sit Emily. Sit>Stand CG. Pt completed functional mob in room with CG and AD. VCs for safety. Pt donned shorts with Emily and cueing to gilberto surgical LE first for ease. Pt in the bed end of assessment. Needs met and bed alarm on. Pt would benefit from continued OT services to inc functional act tolerance and to maximize IND/safety with ADLs. Rec home with home OT. Pt will also need a tub bench prior to d/c.

## 2020-06-01 NOTE — INTERVAL H&P NOTE
H&P reviewed. The patient was examined and there are no changes to the H&P.     Vitals:    06/01/20 1019   BP: 168/80   Pulse: 71   Resp: 16   Temp: 97.4 °F (36.3 °C)   SpO2: 97%     06/01/20 at 10:22 AM by Jamarcus Weems MD

## 2020-06-01 NOTE — ANESTHESIA PROCEDURE NOTES
Spinal Block      Patient reassessed immediately prior to procedure    Patient location during procedure: OR  Indication:at surgeon's request, post-op pain management and procedure for pain  Preanesthetic Checklist  Completed: patient identified, site marked, surgical consent, pre-op evaluation, timeout performed, IV checked, risks and benefits discussed and monitors and equipment checked  Spinal Block Prep:  Patient Position:sitting  Sterile Tech:cap, gloves, mask and sterile barriers  Prep:Betadine  Patient Monitoring:blood pressure monitoring, continuous pulse oximetry and EKG  Spinal Block Procedure  Approach:midline  Guidance:landmark technique and palpation technique  Location:L4-L5  Needle Type:Pencan  Needle Gauge:24 G  Placement of Spinal needle event:cerebrospinal fluid aspirated  Paresthesia: no  Fluid Appearance:clear  Medications: bupivacaine PF (MARCAINE) 0.75 % injection, 2 mL  Med Administered at 6/1/2020 10:40 AM   Post Assessment  Patient Tolerance:patient tolerated the procedure well with no apparent complications  Complications no

## 2020-06-01 NOTE — ANESTHESIA PREPROCEDURE EVALUATION
Anesthesia Evaluation     Patient summary reviewed and Nursing notes reviewed   NPO Solid Status: > 8 hours             Airway   Mallampati: II  TM distance: >3 FB  Neck ROM: full  no difficulty expected  Dental - normal exam     Pulmonary - normal exam   (+) a smoker Current, COPD,   Cardiovascular - normal exam    (+) hypertension, hyperlipidemia,       Neuro/Psych  (+) numbness, psychiatric history Anxiety,     GI/Hepatic/Renal/Endo    (+)   renal disease,     Musculoskeletal     Abdominal  - normal exam   Substance History      OB/GYN          Other   arthritis,    history of cancer remission                    Anesthesia Plan    ASA 3     spinal   (Adductor Canal block)    Anesthetic plan, all risks, benefits, and alternatives have been provided, discussed and informed consent has been obtained with: patient.

## 2020-06-01 NOTE — PROGRESS NOTES
"Anticoagulation by Pharmacy - Warfarin    Osvaldo Conley Sr. is a 64 y.o.male being initiated on warfarin for VTE prophylaxis s/p ortho procedure.  Home regimen: New Start  INR Goal: 1.7-2.5  Last INR: No results found for: INR    Objective:  [Ht: 180.3 cm (71\"); Wt: 92.9 kg (204 lb 12.9 oz)]  No results found for: INR, PROTIME  Lab Results   Component Value Date    HGB 13.9 06/01/2020    HGB 15.3 06/01/2020    HGB 15.4 03/10/2020    HCT 40.9 06/01/2020    HCT 43.8 06/01/2020    HCT 44.0 03/10/2020     06/01/2020     03/10/2020     02/04/2020           Assessment  Interacting medications: None  H/H stable  No INR at this time as patient is a new start.    Plan:  1.  Give warfarin 5 mg tablet PO @ 1800 tonight  2.  Draw a PT/INR in AM  3.  Pharmacy will continue to follow  4.  Today is day 1 of the consult    Kevin Gao RP  06/01/20 15:45     "

## 2020-06-01 NOTE — ANESTHESIA PROCEDURE NOTES
Peripheral Block      Patient reassessed immediately prior to procedure    Patient location during procedure: OR  Start time: 6/1/2020 10:53 AM  Performed by  CRNA: Jamarcus Durbin CRNA  Preanesthetic Checklist  Completed: patient identified, site marked, surgical consent, pre-op evaluation, timeout performed, IV checked, risks and benefits discussed and monitors and equipment checked  Prep:  Prep: Betadine  Procedure  Sedation:yes  Performed under: PNB  Block Type:adductor canal block  Injection Technique:single-shot  Needle Type:echogenic  Needle Gauge:22 G  Resistance on Injection: none    Medications Used: ropivacaine (NAROPIN) 0.5 % injection, 30 mL  Med admintered at 6/1/2020 11:56 AM      Post Assessment  Injection Assessment: negative aspiration for heme and no paresthesia on injection  Patient Tolerance:comfortable throughout block  Complications:no

## 2020-06-01 NOTE — THERAPY EVALUATION
Patient Name: Osvaldo Conley Sr.  : 1955    MRN: [id number]                              Today's Date: 2020       Admit Date: 2020      Visit Dx:     ICD-10-CM ICD-9-CM   1. Chronic pain of both knees M25.561 719.46    M25.562 338.29    G89.29    2. Internal derangement of left knee M23.92 717.9   3. Degenerative tear of posterior horn of lateral meniscus of left knee M23.252 717.43   4. Primary osteoarthritis of both knees M17.0 715.16   5. Essential hypertension I10 401.9   6. Impaired functional mobility, balance, gait, and endurance Z74.09 V49.89     Patient Active Problem List   Diagnosis   • Restless leg syndrome   • Polyneuropathy associated with underlying disease (CMS/HCC)   • Primary osteoarthritis involving multiple joints   • Mixed hyperlipidemia   • Generalized anxiety disorder   • Essential hypertension   • Peripheral edema   • Impaired glucose tolerance   • COPD, mild (CMS/HCC)   • Primary osteoarthritis of both knees   • Leukocytosis   • Polycythemia   • Encounter for tobacco use cessation counseling   • Chronic pain of both knees   • Internal derangement of left knee   • Degenerative tear of posterior horn of lateral meniscus of left knee     Past Medical History:   Diagnosis Date   • Actinic keratosis    • Basal cell carcinoma     nose   • Chronic pain    • Essential hypertension    • Generalized anxiety disorder    • Hyperlipidemia    • Kidney stone    • Malignant melanoma of skin (CMS/HCC)    • Peripheral autonomic neuropathy    • Primary osteoarthritis involving multiple joints    • Prostatitis    • Restless leg syndrome    • Skin lesion    • Thyroid nodule    • Tick bite     without infection        Past Surgical History:   Procedure Laterality Date   • BASAL CELL CARCINOMA EXCISION  2016    left nose, 1.4 cm margin with rhombic transposition flap closure.   • COLONOSCOPY     • CRYOTHERAPY  2016    Destruction of Premalignant Lesion (1st) 19128 (Actinic  keratosis)    • KIDNEY STONE SURGERY     • NOSE SURGERY      Revision of nose    • OTHER SURGICAL HISTORY      Reconstruction of midface    • SKIN CANCER DESTRUCTION     • SKIN CANCER EXCISION      melanoma from back   • STEROID INJECTION  03/03/2016    Depo Medrol (Methylprednisone) 80mg (Osteoarthritis) (10)     General Information     Row Name 06/01/20 1456          PT Evaluation Time/Intention    Document Type  evaluation  -BS     Mode of Treatment  individual therapy;physical therapy  -BS     Row Name 06/01/20 1456          General Information    Patient Profile Reviewed?  yes  -BS     Prior Level of Function  independent:;all household mobility;bed mobility;ADL's;dressing;cooking;driving  -BS     Existing Precautions/Restrictions  no known precautions/restrictions  -BS     Barriers to Rehab  none identified  -BS     Row Name 06/01/20 1456          Relationship/Environment    Lives With  significant other  -BS     Name(s) of Who Lives With Patient  Beverly Boone, girlfriend  -BS     Row Name 06/01/20 1456          Resource/Environmental Concerns    Current Living Arrangements  home/apartment/condo  -BS     Row Name 06/01/20 1456          Home Main Entrance    Number of Stairs, Main Entrance  none ramp access  -BS     Row Name 06/01/20 1456          Stairs Within Home, Primary    Stairs, Within Home, Primary  N/A  -BS     Row Name 06/01/20 1456          Cognitive Assessment/Intervention- PT/OT    Orientation Status (Cognition)  oriented x 4  -BS     Row Name 06/01/20 1456          Safety Issues, Functional Mobility    Impairments Affecting Function (Mobility)  pain;endurance/activity tolerance;range of motion (ROM);strength  -BS       User Key  (r) = Recorded By, (t) = Taken By, (c) = Cosigned By    Initials Name Provider Type    BS Ruiz Carter PT Physical Therapist        Mobility     Row Name 06/01/20 1456          Bed Mobility Assessment/Treatment    Bed Mobility Assessment/Treatment   "scooting/bridging;supine-sit;sit-supine  -BS     Scooting/Bridging [place] (Bed Mobility)  contact guard  -BS     Supine-Sit [place] (Bed Mobility)  minimum assist (75% patient effort)  -BS     Sit-Supine [place] (Bed Mobility)  minimum assist (75% patient effort)  -BS     Comment (Bed Mobility)  Blanche for R LE management  -BS     Row Name 06/01/20 1456          Sit-Stand Transfer    Sit-Stand [place] (Transfers)  contact guard  -BS     Assistive Device (Sit-Stand Transfers)  walker, front-wheeled  -BS     Row Name 06/01/20 1456          Gait/Stairs Assessment/Training    Gait/Stairs Assessment/Training  gait/ambulation independence  -BS     [place] Level (Gait)  contact guard;1 person assist  -BS     Assistive Device (Gait)  walker, front-wheeled  -BS     Pattern (Gait)  step-to  -BS     Deviations/Abnormal Patterns (Gait)  juan decreased;antalgic;stride length decreased  -BS     Left Sided Gait Deviations  weight shift ability decreased  -BS     [place] Level (Stairs)  not tested  -BS     Comment (Gait/Stairs)  decreased stance time on R LE due to severe R knee pain  -BS       User Key  (r) = Recorded By, (t) = Taken By, (c) = Cosigned By    Initials Name Provider Type    BS Ruiz Carter PT Physical Therapist        Obj/Interventions     Row Name 06/01/20 1511          General ROM    GENERAL ROM COMMENTS  AROM: R knee -18-52 deg; L knee -8-90 deg  -BS     Row Name 06/01/20 1511          MMT (Manual Muscle Testing)    General MMT Comments  L LE: /5 R LE: 2/5 hip flex R knee ext/flex 2/5 ankle DF   -BS     Row Name 06/01/20 1511          Therapeutic Exercise    Comment (Therapeutic Exercise)  R heel slides x 5 (AAROM) w/ R LE, QS x 10 reps (5\" hold)  -BS     Row Name 06/01/20 1511          Static Sitting Balance    Level of [place] (Unsupported Sitting, Static Balance)  independent  -BS     Row Name 06/01/20 1511          Dynamic Sitting Balance    Level of [place], " Reaches Outside Midline (Sitting, Dynamic Balance)  independent  -BS     Row Name 06/01/20 1511          Static Standing Balance    Level of [place] (Supported Standing, Static Balance)  independent  -BS     Row Name 06/01/20 1511          Sensory Assessment/Intervention    Sensory General Assessment  -- light touch-numb along dorsum R foot, R medial lower leg and R lat thigh  -BS       User Key  (r) = Recorded By, (t) = Taken By, (c) = Cosigned By    Initials Name Provider Type    Ruiz Quesada, PT Physical Therapist        Goals/Plan     Row Name 06/01/20 8865          Bed Mobility Goal 1 (PT)    Activity/Assistive Device (Bed Mobility Goal 1, PT)  sit to supine;supine to sit  -BS     [place] Level/Cues Needed (Bed Mobility Goal 1, PT)  independent  -BS     Time Frame (Bed Mobility Goal 1, PT)  by discharge  -BS     Barriers (Bed Mobility Goal 1, PT)  R knee pain  -BS     Progress/Outcomes (Bed Mobility Goal 1, PT)  goal not met  -BS     Row Name 06/01/20 1868          Transfer Goal 1 (PT)    Activity/Assistive Device (Transfer Goal 1, PT)  sit-to-stand/stand-to-sit;bed-to-chair/chair-to-bed  -BS     [place] Level/Cues Needed (Transfer Goal 1, PT)  conditional independence  -BS     Time Frame (Transfer Goal 1, PT)  by discharge  -BS     Barriers (Transfers Goal 1, PT)  R knee pain, limited R knee ROM  -BS     Progress/Outcome (Transfer Goal 1, PT)  goal not met  -BS     Row Name 06/01/20 3574          Gait Training Goal 1 (PT)    Activity/Assistive Device (Gait Training Goal 1, PT)  gait (walking locomotion);walker, rolling  -BS     [place] Level (Gait Training Goal 1, PT)  conditional independence  -BS     Distance (Gait Goal 1, PT)  150' x 1  -BS     Time Frame (Gait Training Goal 1, PT)  by discharge  -BS     Barriers (Gait Training Goal 1, PT)  pain, R knee weakness/ROM deficits  -BS     Progress/Outcome (Gait Training Goal 1, PT)  goal not met  -BS     Row Name 06/01/20 7131           "ROM Goal 1 (PT)    ROM Goal 1 (PT)  Improve R knee AROM to -10-75 deg  -BS     Time Frame (ROM Goal 1, PT)  by discharge  -BS     Progress/Outcome (ROM Goal 1, PT)  goal not met  -BS       User Key  (r) = Recorded By, (t) = Taken By, (c) = Cosigned By    Initials Name Provider Type    BS Ruiz Carter, PT Physical Therapist        Clinical Impression     Row Name 06/01/20 1515          Pain Scale: Numbers Pre/Post-Treatment    Pain Scale: Numbers, Pretreatment  9/10  -BS     Pain Scale: Numbers, Post-Treatment  9/10  -BS     Pain Location - Side  Right  -BS     Pain Location  knee  -BS     Pre/Post Treatment Pain Comment  pt ed on importance of icing for swelling management  -BS     Pain Intervention(s)  Medication (See MAR);Repositioned  -BS     Row Name 06/01/20 1515          Plan of Care Review    Plan of Care Reviewed With  patient  -BS     Progress  no change  -BS     Row Name 06/01/20 1515          Physical Therapy Clinical Impression    Patient/Family Goals Statement (PT Clinical Impression)  \"be able to walk again\"  -BS     Criteria for Skilled Interventions Met (PT Clinical Impression)  yes;treatment indicated  -BS     Rehab Potential (PT Clinical Summary)  good, to achieve stated therapy goals  -BS     Predicted Duration of Therapy (PT)  2-3 days  -BS     Row Name 06/01/20 1515          Vital Signs    Pre Systolic BP Rehab  134  -BS     Pre Treatment Diastolic BP  94  -BS     Pretreatment Heart Rate (beats/min)  63  -BS     Pre SpO2 (%)  97  -BS     O2 Delivery Pre Treatment  room air  -BS     Pre Patient Position  Supine  -BS     Intra Patient Position  Standing  -BS     Post Patient Position  Supine  -BS     Row Name 06/01/20 1515          Positioning and Restraints    Pre-Treatment Position  in bed  -BS     Post Treatment Position  bed  -BS     In Bed  notified nsg;fowlers;call light within reach;exit alarm on  -BS       User Key  (r) = Recorded By, (t) = Taken By, (c) = Cosigned By    Initials Name " Provider Type    Ruiz Quesada, PT Physical Therapist        Outcome Measures     Row Name 06/01/20 1456          How much help from another person do you currently need...    Turning from your back to your side while in flat bed without using bedrails?  4  -BS     Moving from lying on back to sitting on the side of a flat bed without bedrails?  3  -BS     Moving to and from a bed to a chair (including a wheelchair)?  3  -BS     Standing up from a chair using your arms (e.g., wheelchair, bedside chair)?  3  -BS     Climbing 3-5 steps with a railing?  3  -BS     To walk in hospital room?  3  -BS     AM-PAC 6 Clicks Score (PT)  19  -BS     Row Name 06/01/20 1456          Functional Assessment    Outcome Measure Options  AM-PAC 6 Clicks Basic Mobility (PT)  -BS       User Key  (r) = Recorded By, (t) = Taken By, (c) = Cosigned By    Initials Name Provider Type    Ruiz Quesada, PT Physical Therapist          PT Recommendation and Plan  Planned Therapy Interventions (PT Eval): balance training, bed mobility training, gait training, home exercise program, neuromuscular re-education, patient/family education, ROM (range of motion), strengthening, stretching, transfer training  Outcome Summary/Treatment Plan (PT)  Anticipated Equipment Needs at Discharge (PT): front wheeled walker  Anticipated Discharge Disposition (PT): home, home with assist, home with home health  Plan of Care Reviewed With: patient  Progress: no change     Time Calculation:   PT Charges     Row Name 06/01/20 1537             Time Calculation    Start Time  1456  -BS      Stop Time  1533  -BS      Time Calculation (min)  37 min  -BS      PT Received On  06/01/20  -BS      PT Goal Re-Cert Due Date  06/14/20  -BS        User Key  (r) = Recorded By, (t) = Taken By, (c) = Cosigned By    Initials Name Provider Type    Ruiz Quesada, PT Physical Therapist        Therapy Charges for Today     Code Description Service Date Service Provider Modifiers  Qty    59106851954 HC PT EVAL MOD COMPLEXITY 2 6/1/2020 Ruiz Carter, PT GP 1          PT G-Codes  Outcome Measure Options: AM-PAC 6 Clicks Basic Mobility (PT)  AM-PAC 6 Clicks Score (PT): 19    Ruiz Carter, PT  6/1/2020

## 2020-06-01 NOTE — OP NOTE
TOTAL KNEE ARTHROPLASTY ATTUNE  Procedure Note    Name:    Osvaldo Conley Sr.  YOB: 1955  Date of surgery:   6/1/2020    Pre-op Diagnosis:   Chronic pain of both knees [M25.561, M25.562, G89.29]  Internal derangement of left knee [M23.92]  Degenerative tear of posterior horn of lateral meniscus of left knee [M23.252]  Primary osteoarthritis of both knees [M17.0]  Essential hypertension [I10]    Post-op Diagnosis:    Post-Op Diagnosis Codes:     * Chronic pain of both knees [M25.561, M25.562, G89.29]     * Internal derangement of left knee [M23.92]     * Degenerative tear of posterior horn of lateral meniscus of left knee [M23.252]     * Primary osteoarthritis of both knees [M17.0]     * Essential hypertension [I10]    Procedure:  Procedure(s):  RIGHT TOTAL KNEE ARTHROPLASTY  with adductor canal block    Surgeon(s):  Jamarcus Weems MD    SURGEON: Jamarcus Weems MD    ASSISTANT:  JANET Whitley    Anesthesia: Spinal    Staff:   Circulator: Latisha Toribio RN  Scrub Person: Zamzam Red  Assistant: Albertina Dempsey CSA; Zamzam Garcia CSA    Estimated Blood Loss: 150cc    Specimens:                ID Type Source Tests Collected by Time   A (Not marked as sent) : bone and soft tissue right knee Tissue Knee, Right TISSUE PATHOLOGY EXAM Jamarcus Weems MD 6/1/2020 1213         Drains: * No LDAs found *    Findings:  End-stage osteoarthritis Right knee    Complications: None    IMPLANTS:   Implant Name Type Inv. Item Serial No.  Lot No. LRB No. Used   CMT BONE SMARTSET HV 40GM - [id number] Implant CMT BONE SMARTSET HV 40GM  DEPUY [id number] Right 2   COMP PAT ATTUNE CMT MEDL/DOME 35MM - [id number] Implant COMP PAT ATTUNE CMT MEDL/DOME 35MM  DEPUY [id number] Right 1   COMP FEM ATTUNE CMT PS SZ6 RT - [id number] Implant COMP FEM ATTUNE CMT PS SZ6 RT  DEPUY [id number] Right 1   BASE TIB ATTUNE CMT RP S PLS SZ6 - [id number] Implant BASE TIB ATTUNE CMT  RP S PLS SZ6  DEPUY [id number] Right 1   INSRT TIB ATTUNE PS RP SZ6 8MM - [id number] Implant INSRT TIB ATTUNE PS RP SZ6 8MM  DEPUY [id number] Right 1         PROCEDURE:    The patient was taken to the operating room and placed in the supine position. Preoperative antibiotics were administered. Surgical time out was performed. After adequate induction of anesthesia, the leg was prepped and draped in the usual sterile fashion, exsanguinated with an Ace bandage and the tourniquet inflated to 300 mmHg. A midline incision was performed followed by a medial parapatellar arthrotomy.    Attention was then placed to the patella. The patella was noted to track centrally through range of motion. The patella was then sized with the trials. The thickness of the patella was then measured and the cut was made in a free-hand technique. The patella protector was then placed and the surrounding osteophytes were removed.   The patella was subluxed laterally in a non-everted position.  A portion of the fat pad, ACL, and anterior horns of the meniscus were excised.     The drill hole was placed in the distal femur and the canal was then irrigated and suctioned. The IM la was placed and a 5 degree distal valgus cut was performed on the femur. The femur was then sized with a sizing guide. The femoral cutting block was placed and all femoral cuts were performed. The proximal tibia was exposed. We used the extramedullary tibial cutting guide set for removal of an appropriate amount of proximal tibia. The tibial cut was performed. The posterior horns of the menisci were excised. The posterior osteophytes were removed. Flexion extension blocks were then used to balance the knee. The tibial cut surface was then sized with the sizing templates and the tibial and femoral trial were then placed. The knee was placed in full extension and then the patella was re-addressed. The patella was resurfaced and the preoperative thickness was reproduced. The  patella tracked centrally through range of motion.     At this point all trial components were removed, the knee was copiously irrigated with pulsed lavage. The cut surfaces were then dried with clean lap sponges, and the components were cemented tibia, followed by femur, then patella. The knee was held in full extension and all excess cement was removed. The knee was held still until the cement had completely hardened. We then placed the trial polyethylene spacer which resulted in full extension and excellent flexion-extension balance. We placed the final polyethylene spacer.    The knee was then copiously irrigated. The tourniquet was then released. There was excellent hemostasis. We placed a 10 [place] Hemovac drain. We closed the knee in multiple layers in standard fashion. Sterile dressing were applied. At the end of the case, the sponge and needle counts were reported as being correct. There were no known complications. The patient was then transported to the recovery room in satisfactory condition.      Jamarcus Weems MD     Date: 6/1/2020  Time: 12:59

## 2020-06-02 ENCOUNTER — ANTICOAGULATION VISIT (OUTPATIENT)
Dept: PHARMACY | Facility: HOSPITAL | Age: 65
End: 2020-06-02

## 2020-06-02 VITALS
HEART RATE: 80 BPM | DIASTOLIC BLOOD PRESSURE: 81 MMHG | OXYGEN SATURATION: 97 % | HEIGHT: 71 IN | SYSTOLIC BLOOD PRESSURE: 155 MMHG | BODY MASS INDEX: 28.67 KG/M2 | RESPIRATION RATE: 18 BRPM | TEMPERATURE: 98.5 F | WEIGHT: 204.81 LBS

## 2020-06-02 LAB
INR PPP: 0.98 (ref 0.8–1.2)
PROTHROMBIN TIME: 12.8 SECONDS (ref 11.1–15.3)

## 2020-06-02 PROCEDURE — 25010000002 HYDROMORPHONE 1 MG/ML SOLUTION: Performed by: ORTHOPAEDIC SURGERY

## 2020-06-02 PROCEDURE — 97530 THERAPEUTIC ACTIVITIES: CPT

## 2020-06-02 PROCEDURE — 97110 THERAPEUTIC EXERCISES: CPT

## 2020-06-02 PROCEDURE — 85610 PROTHROMBIN TIME: CPT | Performed by: ORTHOPAEDIC SURGERY

## 2020-06-02 PROCEDURE — 99024 POSTOP FOLLOW-UP VISIT: CPT | Performed by: ORTHOPAEDIC SURGERY

## 2020-06-02 PROCEDURE — 97535 SELF CARE MNGMENT TRAINING: CPT

## 2020-06-02 PROCEDURE — 97116 GAIT TRAINING THERAPY: CPT

## 2020-06-02 RX ORDER — HYDROCODONE BITARTRATE AND ACETAMINOPHEN 10; 325 MG/1; MG/1
1 TABLET ORAL EVERY 4 HOURS PRN
Qty: 30 TABLET | Refills: 0 | Status: SHIPPED | OUTPATIENT
Start: 2020-06-02 | End: 2020-06-05 | Stop reason: ALTCHOICE

## 2020-06-02 RX ORDER — WARFARIN SODIUM 5 MG/1
5 TABLET ORAL NIGHTLY
Qty: 50 TABLET | Refills: 0 | Status: SHIPPED | OUTPATIENT
Start: 2020-06-02 | End: 2020-08-07

## 2020-06-02 RX ADMIN — HYDROCHLOROTHIAZIDE 25 MG: 25 TABLET ORAL at 08:21

## 2020-06-02 RX ADMIN — FAMOTIDINE 40 MG: 40 TABLET, FILM COATED ORAL at 08:21

## 2020-06-02 RX ADMIN — OXYCODONE HYDROCHLORIDE 10 MG: 10 TABLET, FILM COATED, EXTENDED RELEASE ORAL at 08:21

## 2020-06-02 RX ADMIN — OXYCODONE HYDROCHLORIDE 5 MG: 5 TABLET ORAL at 10:33

## 2020-06-02 RX ADMIN — FERROUS SULFATE TAB EC 324 MG (65 MG FE EQUIVALENT) 324 MG: 324 (65 FE) TABLET DELAYED RESPONSE at 08:21

## 2020-06-02 RX ADMIN — CITALOPRAM HYDROBROMIDE 20 MG: 20 TABLET ORAL at 08:21

## 2020-06-02 RX ADMIN — ACETAMINOPHEN 1000 MG: 500 TABLET ORAL at 08:21

## 2020-06-02 RX ADMIN — ACETAMINOPHEN 1000 MG: 500 TABLET ORAL at 12:31

## 2020-06-02 RX ADMIN — HYDROMORPHONE HYDROCHLORIDE 0.5 MG: 1 INJECTION, SOLUTION INTRAMUSCULAR; INTRAVENOUS; SUBCUTANEOUS at 03:24

## 2020-06-02 RX ADMIN — SODIUM CHLORIDE 100 ML/HR: 9 INJECTION, SOLUTION INTRAVENOUS at 03:24

## 2020-06-02 RX ADMIN — LOSARTAN POTASSIUM 50 MG: 50 TABLET, FILM COATED ORAL at 08:21

## 2020-06-02 RX ADMIN — CLINDAMYCIN IN 5 PERCENT DEXTROSE 600 MG: 12 INJECTION, SOLUTION INTRAVENOUS at 03:24

## 2020-06-02 RX ADMIN — OXYCODONE HYDROCHLORIDE 5 MG: 5 TABLET ORAL at 14:51

## 2020-06-02 RX ADMIN — Medication 3 ML: at 08:15

## 2020-06-02 NOTE — CONSULTS
Orders for outpt therapy-choices given, he chose [facility] in [facility]  Ordered walker.  [facility]ose [facility].   States no further needs.   Confirmed with pharmacy that they follow dr thao's pts on coumadin

## 2020-06-02 NOTE — PROGRESS NOTES
Anticoagulation- Warfarin     Completed Discharge Warfarin Counseling    Discussed the followin. Reason for Medication and Length of therapy  2. Drug-Drug Interactions  3. Drug-Diet Interactions  4. Drug- Alcohol Interactions  5. Signs and Symptoms of Bleeding  6. Fall risk- go to the Emergency Room    8. Sent the Rx to: [facility] for warfarin 5 mg, Number: 50  Si tablet every night or As Directed + 0 refills  9. Gave Warfarin booklet  10. Answered all Questions  11. Called in lab orders to  Nola Tong RPH  20  10:27

## 2020-06-02 NOTE — THERAPY TREATMENT NOTE
Acute Care - Physical Therapy Treatment Note  [facility]     Patient Name: Osvaldo Conley Sr.  : 1955  MRN: [id number]  Today's Date: 2020  Onset of Illness/Injury or Date of Surgery: 20     Referring Physician: Faustina BARRIOS MD    Admit Date: 2020    Visit Dx:    ICD-10-CM ICD-9-CM   1. Primary osteoarthritis of both knees M17.0 715.16   2. Chronic pain of both knees M25.561 719.46    M25.562 338.29    G89.29    3. Internal derangement of left knee M23.92 717.9   4. Degenerative tear of posterior horn of lateral meniscus of left knee M23.252 717.43   5. Essential hypertension I10 401.9   6. Impaired functional mobility, balance, gait, and endurance Z74.09 V49.89   7. Impaired mobility and ADLs Z74.09 799.89   8. Polyneuropathy associated with underlying disease (CMS/HCC) G63 357.4   9. Generalized anxiety disorder F41.1 300.02   10. Anticoagulation monitoring by pharmacist Z79.01 V58.61     Patient Active Problem List   Diagnosis   • Restless leg syndrome   • Polyneuropathy associated with underlying disease (CMS/HCC)   • Primary osteoarthritis involving multiple joints   • Mixed hyperlipidemia   • Generalized anxiety disorder   • Essential hypertension   • Peripheral edema   • Impaired glucose tolerance   • COPD, mild (CMS/HCC)   • Primary osteoarthritis of both knees   • Leukocytosis   • Polycythemia   • Encounter for tobacco use cessation counseling   • Chronic pain of both knees   • Internal derangement of left knee   • Degenerative tear of posterior horn of lateral meniscus of left knee       Therapy Treatment    Rehabilitation Treatment Summary     Row Name 20 1444 20 1006 20 0910       Treatment Time/Intention    Discipline  physical therapy assistant  -NAOMI  physical therapy assistant  -NAOMI  occupational therapy assistant  -LM    Document Type  therapy note (daily note)  -JC2  therapy note (daily note)  -NAOMI  therapy note (daily note)  -LM    Subjective Information   --  --  no complaints  -LM    Mode of Treatment  physical therapy;individual therapy  -JC2  physical therapy;individual therapy  -NAOMI  individual therapy;occupational therapy  -LM    Patient Effort  good  -JC2  good  -NAOMI  good  -LM    Existing Precautions/Restrictions  no known precautions/restrictions  -JC2  no known precautions/restrictions  -NAOMI  no known precautions/restrictions  -LM    Recorded by [NAOMI] Alexandre Olson, PTA 06/02/20 1515  [JC2] Alexandre Olson, PTA 06/02/20 1451 [NAOMI] Alexandre Olson, PTA 06/02/20 1028 [LM] Pearl Sellers, BOSS/L 06/02/20 1146    Row Name 06/02/20 1444 06/02/20 1006 06/02/20 0910       Vital Signs    Pre Systolic BP Rehab  148  -NAOMI  167 pre vitals taken after BR t/f.   -NAOMI  --    Pre Treatment Diastolic BP  74  -NAOMI  83  -NAOMI  --    Intra Systolic BP Rehab  --  --  121  -LM    Intra Treatment Diastolic BP  --  --  63  -LM    Post Systolic BP Rehab  150  -JC2  155  -JC2  --    Post Treatment Diastolic BP  84  -JC2  81  -JC2  --    Pretreatment Heart Rate (beats/min)  81  -JC2  104  -NAOMI  --    Intratreatment Heart Rate (beats/min)  --  --  70  -LM    Posttreatment Heart Rate (beats/min)  81  -JC2  80  -JC2  --    Pre SpO2 (%)  95  -NAOMI  96  -NAOMI  --    O2 Delivery Pre Treatment  room air  -NAOMI  room air  -NAOMI  --    Intra SpO2 (%)  --  --  97  -LM    Post SpO2 (%)  95  -JC2  95  -JC2  --    O2 Delivery Post Treatment  room air  -JC2  room air  -JC2  --    Pre Patient Position  Supine  -NAOMI  Supine  -NAOMI  --    Post Patient Position  Supine  -JC2  Supine pt has already been up to chair this AM.   -NAOMI  --    Recorded by [NAOMI] Alexandre Olson, PTA 06/02/20 1451  [JC2] Alexandre Olson, PTA 06/02/20 1515 [NAOMI] Alexandre Olson, PTA 06/02/20 1028  [JC2] Alexandre Olson, PTA 06/02/20 1113 [LM] Pearl Sellers BOSS/L 06/02/20 1146    Row Name 06/02/20 1444 06/02/20 1006 06/02/20 0910       Cognitive Assessment/Intervention- PT/OT    Orientation Status (Cognition)  oriented x 4  -NAOMI   oriented x 4  -NAOMI  oriented x 4  -LM    Follows Commands (Cognition)  WFL  -NAOMI  WFL  -JC2  WFL  -LM    Recorded by [NAOMI] Alexandre Olson, PTA 06/02/20 1451 [NAOMI] Alexandre Olson, PTA 06/02/20 1028  [JC2] Alexandre Olson, PTA 06/02/20 1053 [LM] Pearl Sellers COTA/L 06/02/20 1146    Row Name 06/02/20 1444 06/02/20 1006          Safety Issues, Functional Mobility    Impairments Affecting Function (Mobility)  pain;endurance/activity tolerance;range of motion (ROM);strength  -NAOMI  pain;endurance/activity tolerance;range of motion (ROM);strength  -NAOMI     Recorded by [NAOMI] Alexandre Olson, PTA 06/02/20 1451 [NAOMI] Alexandre Olson, PTA 06/02/20 1028     Row Name 06/02/20 1444 06/02/20 1006          Bed Mobility Assessment/Treatment    Bed Mobility Assessment/Treatment  supine-sit;sit-supine  -NAOMI  scooting/bridging;supine-sit;sit-supine  -NAOMI     Scooting/Bridging [place] (Bed Mobility)  --  -NAOMI  other (see comments) SBA w/ extended time  -JC2     Supine-Sit [place] (Bed Mobility)  independent  -NAOMI  other (see comments) SBA w/ extended time  -JC2     Sit-Supine [place] (Bed Mobility)  independent  -NAOMI  other (see comments) SBA   -JC2     Comment (Bed Mobility)  HOB flat. no bed rails.   -NAOMI  --     Recorded by [NAOMI] Alexandre Olson, PTA 06/02/20 1526 [NAOMI] Alexandre Olson, PTA 06/02/20 1028  [JC2] Alexandre Olson, PTA 06/02/20 1053     Row Name 06/02/20 1444 06/02/20 1006          Transfer Assessment/Treatment    Transfer Assessment/Treatment  sit-stand transfer;stand-sit transfer;toilet transfer;bed-chair transfer;chair-bed transfer  -NAOMI  sit-stand transfer;stand-sit transfer;toilet transfer;bed-chair transfer;chair-bed transfer  -NAOMI     Recorded by [NAOMI] Alexandre Olson, PTA 06/02/20 1451 [NAOMI] Alexandre Olson, PTA 06/02/20 1053     Row Name 06/02/20 1444 06/02/20 1006          Bed-Chair Transfer    Bed-Chair [place] (Transfers)  --  -NAOMI  contact guard  -NAOMI     Assistive Device (Bed-Chair  Transfers)  --  -NAOMI  walker, front-wheeled  -NAOMI     Recorded by [NAOMI] Alexandre Olson, PTA 06/02/20 1515 [NAOMI] Alexandre Olson, PTA 06/02/20 1053     Row Name 06/02/20 1444 06/02/20 1006          Chair-Bed Transfer    Chair-Bed [place] (Transfers)  --  -NAOMI  contact guard  -NAOMI     Assistive Device (Chair-Bed Transfers)  --  -NAOMI  walker, front-wheeled  -NAOMI     Recorded by [NAOMI] Alexandre Olson, PTA 06/02/20 1515 [NAOMI] Alexandre Olson, PTA 06/02/20 1053     Row Name 06/02/20 1444 06/02/20 1006 06/02/20 0910       Sit-Stand Transfer    Sit-Stand [place] (Transfers)  stand by assist  -NAOMI  contact guard  -NAOMI  contact guard  -LM    Assistive Device (Sit-Stand Transfers)  walker, front-wheeled  -JC2  walker, front-wheeled  -NAOMI  walker, front-wheeled  -LM    Recorded by [NAOMI] Alexandre Olson, PTA 06/02/20 1515  [JC2] Alexandre Olson, PTA 06/02/20 1451 [NAOMI] Alexandre Olson, PTA 06/02/20 1028 [LM] Pearl Sellers BOSS/L 06/02/20 1146    Row Name 06/02/20 1444 06/02/20 1006 06/02/20 0910       Stand-Sit Transfer    Stand-Sit [place] (Transfers)  stand by assist  -NAOMI  contact guard  -NAOMI  contact guard  -LM    Assistive Device (Stand-Sit Transfers)  walker, front-wheeled  -JC2  walker, front-wheeled  -NAOMI  walker, front-wheeled  -LM    Recorded by [NAOMI] Alexandre Olson, PTA 06/02/20 1515  [JC2] Alexandre Olson, PTA 06/02/20 1451 [NAOMI] Alexandre Olson, PTA 06/02/20 1053 [LM] Pearl Sellers, BOSS/L 06/02/20 1146    Row Name 06/02/20 1444 06/02/20 1006          Toilet Transfer    Type (Toilet Transfer)  --  -NAOMI  sit-stand;stand-sit  -NAOMI     [place] Level (Toilet Transfer)  --  -NAOMI  contact guard  -NAOMI     Assistive Device (Toilet Transfer)  --  -NAOMI  walker, front-wheeled  -NAOMI     Recorded by [NAOMI] Alexandre Olson, PTA 06/02/20 1515 [NAOMI] Alexandre Olson, PTA 06/02/20 1053     Row Name 06/02/20 1444 06/02/20 1006          Gait/Stairs Assessment/Training    Gait/Stairs Assessment/Training  gait/ambulation  independence  -NAOMI  gait/ambulation independence  -NAOMI     [place] Level (Gait)  stand by assist gait quality improved and less antalgic.   -JC2  contact guard;1 person assist  -NAOMI     Assistive Device (Gait)  walker, front-wheeled  -NAOMI  walker, front-wheeled  -NAOMI     Distance in Feet (Gait)  164  -JC3  105  -JC2     Pattern (Gait)  step-to  -NAOMI  step-to  -NAOMI     Deviations/Abnormal Patterns (Gait)  juan decreased;antalgic;stride length decreased  -JC3  juan decreased;antalgic;stride length decreased very antalgic. relying heavily on arms.   -JC2     Left Sided Gait Deviations  weight shift ability decreased  -NAOMI  weight shift ability decreased  -NAOMI     [place] Level (Stairs)  --  --  -JC2     Comment (Gait/Stairs)  pt states he has no stairs to enter home.  -JC2  --     Recorded by [NAOMI] Alexandre Olson, PTA 06/02/20 1451  [JC2] Alexandre Olson, PTA 06/02/20 1526  [JC3] Alexandre Olson, PTA 06/02/20 1515 [NAOMI] Alexandre Olson, PTA 06/02/20 1028  [JC2] Alexandre Olson, PTA 06/02/20 1053     Row Name 06/02/20 1006             General ROM    RT Lower Ext  Rt Knee Extension/Flexion  -NAOMI      Recorded by [NAOMI] Alexandre Olson, PTA 06/02/20 1528      Row Name 06/02/20 1006             Right Lower Ext    Rt Knee Extension/Flexion AROM  10-80  -NAOMI      Recorded by [NAOMI] Alexandre Olson, PTA 06/02/20 1528      Row Name 06/02/20 1006             Therapeutic Exercise    Therapeutic Exercise  supine, lower extremities;seated, lower extremities  -NAOMI      Recorded by [NAOMI] Alexandre Olson, PTA 06/02/20 1109      Row Name 06/02/20 1444 06/02/20 1006          Lower Extremity Seated Therapeutic Exercise    Performed, Seated Lower Extremity (Therapeutic Exercise)  --  ankle dorsiflexion/plantarflexion;other (see comments) HS  -NAOMI     Exercise Type, Seated Lower Extremity (Therapeutic Exercise)  --  AROM (active range of motion)  -NAOMI     Restrictions, Seated Lower Extremity (Therapeutic Exercise)  --  15x1  -NAOMI      Comment, Seated Lower Extremity (Therapeutic Exercise)  reinforced HEP. pt w/ good understanding. pt is to complete ex once home  -NAOMI  edu on HEP  -JC2     Recorded by [NAOMI] Alexandre Olson, PTA 06/02/20 1526 [NAOMI] Alexandre Olson, PTA 06/02/20 1109  [JC2] Alexandre Olson, PTA 06/02/20 1207     Row Name 06/02/20 1444 06/02/20 1006          Lower Extremity Supine Therapeutic Exercise    Performed, Supine Lower Extremity (Therapeutic Exercise)  --  quadriceps sets;SAQ (short arc quad) over bolster;SLR (straight leg raise);hip abduction/adduction  -NAOMI     Exercise Type, Supine Lower Extremity (Therapeutic Exercise)  --  AROM (active range of motion)  -NAOMI     Sets/Reps Detail, Supine Lower Extremity (Therapeutic Exercise)  --  15x1  -NAOMI     Comment, Supine Lower Extremity (Therapeutic Exercise)  educated pt on home safety./   -NAOMI  edu on HEP  -JC2     Recorded by [NAOMI] Alexandre Olson, PTA 06/02/20 1526 [NAOMI] Alexandre Olson, PTA 06/02/20 1109  [JC2] Alexandre Olson, PTA 06/02/20 1207     Row Name 06/02/20 1444 06/02/20 1006          Positioning and Restraints    Pre-Treatment Position  in bed  -  in bed  -NAOMI     Post Treatment Position  bed  -  bed  -     In Bed  fowlers;call light within reach;encouraged to call for assist;exit alarm on all needs met  -  fowlers;call light within reach;encouraged to call for assist;exit alarm on all needs met.   -NAOMI     Recorded by [NAOMI] Alexandre Olson, PTA 06/02/20 1526 [NAOMI] Alexandre Olson, PTA 06/02/20 1053     Row Name 06/02/20 1444 06/02/20 1006          Pain Scale: Numbers Pre/Post-Treatment    Pain Scale: Numbers, Pretreatment  5/10  -NAOMI  7/10  -NAOMI     Pain Scale: Numbers, Post-Treatment  5/10  -JC2  6/10  -JC2     Pain Location - Side  Right  -NAOMI  Right  -NAOMI     Pain Location  knee  -NAOMI  knee  -     Pain Intervention(s)  Repositioned pt declined ice. pt encouraged to use ice after exercise thi  -JC2  Repositioned;Medication (See MAR) nsg notified.   -JC3      Recorded by [NAOMI] Alexandre Olson, PTA 06/02/20 1451  [JC2] Alexandre Olson, PTA 06/02/20 1526 [NAOMI] Alexandre Olson, PTA 06/02/20 1028  [JC2] Alexadnre Olson R, PTA 06/02/20 1113  [JC3] Alexandre Olson, PTA 06/02/20 1053     Row Name                Wound 06/01/20 1125 Right knee Incision    Wound - Properties Group Date first assessed: 06/01/20 [KW] Time first assessed: 1125 [KW] Side: Right [KW] Location: knee [KW] Primary Wound Type: Incision [KW] Recorded by:  [KW] Latisha Toribio RN 06/01/20 1125    Row Name 06/02/20 1444 06/02/20 1006          Outcome Summary/Treatment Plan (PT)    Daily Summary of Progress (PT)  --  progress toward functional goals as expected  -NAOMI     Plan for Continued Treatment (PT)  --  continue  -NAOMI     Anticipated Discharge Disposition (PT)  home with home health;home with assist  -NAOMI  home with home health;home with assist  -NAOMI     Recorded by [NAOMI] Alexandre Olson, PTA 06/02/20 1451 [NAOMI] Alexandre Olson, PTA 06/02/20 1053       User Key  (r) = Recorded By, (t) = Taken By, (c) = Cosigned By    Initials Name Effective Dates Discipline    NAOMI Alexandre Olson, PTA 03/07/18 -  PT    LM Pearl Sellers BOSS/L 03/07/18 -  OT    Latisha Isidro RN 10/17/16 -  Nurse          Wound 06/01/20 1125 Right knee Incision (Active)   Dressing Appearance intact;dry;no drainage 6/2/2020  8:21 AM   Closure MANE 6/2/2020  8:21 AM   Base dressing in place, unable to visualize 6/2/2020  8:21 AM   Drainage Amount none 6/1/2020  9:46 PM       Rehab Goal Summary     Row Name 06/02/20 1444 06/02/20 1204 06/02/20 1006       Bed Mobility Goal 1 (PT)    Activity/Assistive Device (Bed Mobility Goal 1, PT)  sit to supine;supine to sit  -NAOMI  --  sit to supine;supine to sit  -NAOMI    [place] Level/Cues Needed (Bed Mobility Goal 1, PT)  independent  -NAOMI  --  independent  -NAOMI    Time Frame (Bed Mobility Goal 1, PT)  by discharge  -NAOMI  --  by discharge  -NAOMI    Barriers (Bed Mobility Goal 1, PT)  R knee pain   -NAOMI  --  R knee pain  -NAOMI    Progress/Outcomes (Bed Mobility Goal 1, PT)  (S) goal met  -NAOMI  --  goal not met  -NAOMI       Transfer Goal 1 (PT)    Activity/Assistive Device (Transfer Goal 1, PT)  sit-to-stand/stand-to-sit;bed-to-chair/chair-to-bed  -NAOMI  --  sit-to-stand/stand-to-sit;bed-to-chair/chair-to-bed  -NAOMI    [place] Level/Cues Needed (Transfer Goal 1, PT)  conditional independence  -NAOMI  --  conditional independence  -NAOMI    Time Frame (Transfer Goal 1, PT)  by discharge  -NAOMI  --  by discharge  -NAOMI    Barriers (Transfers Goal 1, PT)  R knee pain, limited R knee ROM  -NAOMI  --  R knee pain, limited R knee ROM  -NAOMI    Progress/Outcome (Transfer Goal 1, PT)  goal not met  -NAOMI  --  goal not met  -NAOMI       Gait Training Goal 1 (PT)    Activity/Assistive Device (Gait Training Goal 1, PT)  gait (walking locomotion);walker, rolling  -NAOMI  --  gait (walking locomotion);walker, rolling  -NAOMI    [place] Level (Gait Training Goal 1, PT)  conditional independence  -NAOMI  --  conditional independence  -NAOMI    Distance (Gait Goal 1, PT)  150' x 1  -NAOMI  --  150' x 1  -NAOMI    Time Frame (Gait Training Goal 1, PT)  by discharge  -NAOMI  --  by discharge  -NAOMI    Barriers (Gait Training Goal 1, PT)  pain, R knee weakness/ROM deficits  -NAOMI  --  pain, R knee weakness/ROM deficits  -NAOMI    Progress/Outcome (Gait Training Goal 1, PT)  goal not met  -NAOMI  --  goal not met  -NAOMI       ROM Goal 1 (PT)    ROM Goal 1 (PT)  Improve R knee AROM to -10-75 deg  -NAOMI  --  Improve R knee AROM to -10-75 deg  -NAOMI    Time Frame (ROM Goal 1, PT)  by discharge  -NAOMI  --  by discharge  -NAOMI    Progress/Outcome (ROM Goal 1, PT)  (S) goal met  -NAOMI  --  goal not met  -NAOMI       Occupational Therapy Goals    Bed Mobility Goal Selection (OT)  --  bed mobility, OT goal 1  -LM  --    Transfer Goal Selection (OT)  --  transfer, OT goal 1  -LM  --    Bathing Goal Selection (OT)  --  bathing, OT goal 1  -LM  --    Dressing Goal Selection (OT)  --  dressing, OT goal 1   -LM  --    Toileting Goal Selection (OT)  --  toileting, OT goal 1  -LM  --    Activity Tolerance Goal Selection (OT)  --  activity tolerance, OT goal 1  -LM  --       Bed Mobility Goal 1 (OT)    Activity/Assistive Device (Bed Mobility Goal 1, OT)  --  bed mobility activities, all  -LM  --    [place] Level/Cues Needed (Bed Mobility Goal 1, OT)  --  independent  -LM  --    Time Frame (Bed Mobility Goal 1, OT)  --  long term goal (LTG);by discharge  -LM  --    Progress/Outcomes (Bed Mobility Goal 1, OT)  --  goal not met  -LM  --       Transfer Goal 1 (OT)    Activity/Assistive Device (Transfer Goal 1, OT)  --  sit-to-stand/stand-to-sit;bed-to-chair/chair-to-bed;toilet  -LM  --    [place] Level/Cues Needed (Transfer Goal 1, OT)  --  independent  -LM  --    Time Frame (Transfer Goal 1, OT)  --  long term goal (LTG);by discharge  -LM  --    Progress/Outcome (Transfer Goal 1, OT)  --  goal not met  -LM  --       Bathing Goal 1 (OT)    Activity/Assistive Device (Bathing Goal 1, OT)  --  bathing skills, all  -LM  --    [place] Level/Cues Needed (Bathing Goal 1, OT)  --  conditional independence  -LM  --    Time Frame (Bathing Goal 1, OT)  --  long term goal (LTG);by discharge  -LM  --    Progress/Outcomes (Bathing Goal 1, OT)  --  goal not met  -LM  --       Dressing Goal 1 (OT)    Activity/Assistive Device (Dressing Goal 1, OT)  --  dressing skills, all  -LM  --    [place]/Cues Needed (Dressing Goal 1, OT)  --  conditional independence  -LM  --    Time Frame (Dressing Goal 1, OT)  --  long term goal (LTG);by discharge  -LM  --    Progress/Outcome (Dressing Goal 1, OT)  --  goal not met  -LM  --       Toileting Goal 1 (OT)    Activity/Device (Toileting Goal 1, OT)  --  toileting skills, all  -LM  --    [place] Level/Cues Needed (Toileting Goal 1, OT)  --  independent  -LM  --    Time Frame (Toileting Goal 1, OT)  --  long term goal (LTG);by discharge  -LM  --    Progress/Outcome (Toileting Goal 1,  OT)  --  goal not met  -LM  --        Activity Tolerance Goal 1 (OT)    Activity Level (Endurance Goal 1, OT)  --  15 min activity  -LM  --    Progress/Outcome (Activity Tolerance Goal 1, OT)  --  goal not met  -LM  --      User Key  (r) = Recorded By, (t) = Taken By, (c) = Cosigned By    Initials Name Provider Type Discipline    Alexandre Mark, WLIIAM Physical Therapy Assistant PT    Pearl Mancilla, KARYN/L Occupational Therapy Assistant OT          Physical Therapy Education                 Title: PT OT SLP Therapies (In Progress)     Topic: Physical Therapy (In Progress)     Point: Mobility training (In Progress)     Description:   Instruct learner(s) on safety and technique for assisting patient out of bed, chair or wheelchair.  Instruct in the proper use of assistive devices, such as walker, crutches, cane or brace.              Patient Friendly Description:   It's important to get you on your feet again, but we need to do so in a way that is safe for you. Falling has serious consequences, and your personal safety is the most important thing of all.        When it's time to get out of bed, one of us or a family member will sit next to you on the bed to give you support.     If your doctor or nurse tells you to use a walker, crutches, a cane, or a brace, be sure you use it every time you get out of bed, even if you think you don't need it.    Learning Progress Summary           Patient Acceptance, E, NR by NAOMI at 6/2/2020 1528    Acceptance, E,H, VU,NR by NAOMI at 6/2/2020 1055                   Point: Home exercise program (Done)     Description:   Instruct learner(s) on appropriate technique for monitoring, assisting and/or progressing patient with therapeutic exercises and activities.              Learning Progress Summary           Patient Acceptance, E,H, VU,NR by NAOMI at 6/2/2020 1055                   Point: Body mechanics (Not Started)     Description:   Instruct learner(s) on proper positioning and  spine alignment for patient and/or caregiver during mobility tasks and/or exercises.              Learner Progress:   Not documented in this visit.          Point: Precautions (Not Started)     Description:   Instruct learner(s) on prescribed precautions during mobility and gait tasks              Learner Progress:   Not documented in this visit.                      User Key     Initials Effective Dates Name Provider Type Discipline    NAOMI 03/07/18 -  Alexandre Olson, PTA Physical Therapy Assistant PT                PT Recommendation and Plan  Anticipated Discharge Disposition (PT): home with home health, home with assist  Outcome Summary/Treatment Plan (PT)  Daily Summary of Progress (PT): progress toward functional goals as expected  Plan for Continued Treatment (PT): continue  Anticipated Discharge Disposition (PT): home with home health, home with assist  Plan of Care Reviewed With: patient  Progress: improving  Outcome Summary: BID tx completed. pt w/ increased gait to 144 ft SBA w/ RW. pts gait quality improved from AM to PM session. pt educated in HEP and home safety. pt w/ good understanding, pt met 2 new goals this tx. pt has 24/7 care at home. pt would continue to benefit from PT services.  Outcome Measures     Row Name 06/02/20 1006 06/01/20 1537          How much help from another person do you currently need...    Turning from your back to your side while in flat bed without using bedrails?  3  -NAOMI  --     Moving from lying on back to sitting on the side of a flat bed without bedrails?  3  -NAOMI  --     Moving to and from a bed to a chair (including a wheelchair)?  3  -NAOMI  --     Standing up from a chair using your arms (e.g., wheelchair, bedside chair)?  3  -NAOMI  --     Climbing 3-5 steps with a railing?  2  -NAOMI  --     To walk in hospital room?  3  -NAOMI  --     AM-PAC 6 Clicks Score (PT)  17  -NAOMI  --        How much help from another is currently needed...    Putting on and taking off regular lower body  clothing?  --  3  -KO     Bathing (including washing, rinsing, and drying)  --  3  -KO     Toileting (which includes using toilet bed pan or urinal)  --  3  -KO     Putting on and taking off regular upper body clothing  --  3  -KO     Taking care of personal grooming (such as brushing teeth)  --  3  -KO     Eating meals  --  4  -KO     AM-PAC 6 Clicks Score (OT)  --  19  -KO        Functional Assessment    Outcome Measure Options  AM-PAC 6 Clicks Basic Mobility (PT)  -NAOMI  AM-PAC 6 Clicks Daily Activity (OT)  -KO       User Key  (r) = Recorded By, (t) = Taken By, (c) = Cosigned By    Initials Name Provider Type    Alexandre Mark PTA Physical Therapy Assistant    Beatrice Latif, DESIREE Occupational Therapist         Time Calculation:   PT Charges     Row Name 06/02/20 1531 06/02/20 1208          Time Calculation    Start Time  1444  -NAOMI  1006  -NAOMI     Stop Time  1514  -NAOMI  1114  -NAOMI     Time Calculation (min)  30 min  -NAOMI  68 min  -NAOMI        Time Calculation- PT    Total Timed Code Minutes- PT  30 minute(s)  -NAOMI  68 minute(s)  -NAOMI       User Key  (r) = Recorded By, (t) = Taken By, (c) = Cosigned By    Initials Name Provider Type    Alexandre Mark PTA Physical Therapy Assistant        Therapy Charges for Today     Code Description Service Date Service Provider Modifiers Qty    69169453392 HC GAIT TRAINING EA 15 MIN 6/2/2020 Alexandre Olson PTA GP 1    22767632767 HC PT THERAPEUTIC ACT EA 15 MIN 6/2/2020 Alexandre Olson, PTA GP 2    50682470409 HC PT THER PROC EA 15 MIN 6/2/2020 Alexandre Olson, PTA GP 2    18421437890 HC GAIT TRAINING EA 15 MIN 6/2/2020 Alexandre Olson, PTA GP 1    09437130434 HC PT SELF CARE/MGMT/TRAIN EA 15 MIN 6/2/2020 Alexandre Olson, PTA GP 1          PT G-Codes  Outcome Measure Options: AM-PAC 6 Clicks Basic Mobility (PT)  AM-PAC 6 Clicks Score (PT): 17  AM-PAC 6 Clicks Score (OT): 19    Alexandre Olson PTA  6/2/2020

## 2020-06-02 NOTE — DISCHARGE PLACEMENT REQUEST
"Denise Hitchcock Sr. (64 y.o. Male)     Date of Birth Social Security Number Address Home Phone MRN    1955  [address] [place], [place] [address] [phone] [id number]    Episcopalian Marital Status          Non-Jainism        Admission Date Admission Type Admitting Provider Attending Provider Department, Room/Bed    20 Elective Jamarcus Weems MD Dodds, James Carpenter, MD [place] [address]    Discharge Date Discharge Disposition Discharge Destination                       Attending Provider:  Jamarcus Weems MD    Allergies:  Codeine    Isolation:  None   Infection:  None   Code Status:  CPR    Ht:  180.3 cm (71\")   Wt:  92.9 kg (204 lb 12.9 oz)    Admission Cmt:  None   Principal Problem:  Primary osteoarthritis of both knees [M17.0]                 Active Insurance as of 2020     Primary Coverage     Payor Plan Insurance Group Employer/Plan Group    [employer] [place] [employer] [place]      Payor Plan Address Payor Plan Phone Number Payor Plan Fax Number Effective Dates    [address] [address] [phone]  2016 - None Entered    [facility] [id number]       Subscriber Name Subscriber Birth Date Member ID       DENISE HITCHCOCK SR. 1955 [id number]                 Emergency Contacts      (Rel.) Home Phone Work Phone Mobile Phone    Beverly Boone (Friend) -- -- [phone]        [address] [address]  Dept. Phone:  [phone]  Dept. Fax:   Date Ordered: 2020         Patient:  Denise Hitchcock Sr. MRN:  [id number]   [address] [place] [address] :  1955  SSN:    Phone: [phone] Sex:  M     Weight: 92.9 kg (204 lb 12.9 oz)         Ht Readings from Last 1 Encounters:   20 180.3 cm (71\")         Walker               (Order ID: [id number])    Diagnosis:  Chronic pain of both knees (M25.561,M25.562,G89.29 " [ICD-10-CM] 719.46,338.29 [ICD-9-CM])  Primary osteoarthritis of both knees (M17.0 [ICD-10-CM] 715.16 [ICD-9-CM])  Essential hypertension (I10 [ICD-10-CM] 401.9 [ICD-9-CM])  Polyneuropathy associated with underlying disease (CMS/HCC) (G63 [ICD-10-CM] 357.4 [ICD-9-CM])  Generalized anxiety disorder (F41.1 [ICD-10-CM] 300.02 [ICD-9-CM])   Quantity:  1     Equipment:  Walker Folding with Wheels  Length of Need (99 Months = Lifetime): 99 Months = Lifetime        Authorizing Provider's Phone: [phone]   Authorizing Provider:Jamarcus Weems MD  Authorizing Provider's NPI: [id number]  Order Entered By: Jamarcus Weems MD 6/2/2020  7:57 AM     Electronically signed by: Jamarcus Weems MD 6/2/2020  7:57 AM            Insurance Information                [facility]/[employer] MEDICAID Phone: [phone]    Subscriber: Osvaldo Conley Sr. Subscriber#: [id number]    Group#:  Precert#:              History & Physical      Jamarcus Weems MD at 06/01/20 1020          H&P reviewed. The patient was examined and there are no changes to the H&P.     Vitals:    06/01/20 1019   BP: 168/80   Pulse: 71   Resp: 16   Temp: 97.4 °F (36.3 °C)   SpO2: 97%     06/01/20 at 10:22 AM by Jamarcus Weems MD            Electronically signed by Jamarcus Weems MD at 06/01/20 1022   Source Note          Osvaldo Conley Sr. is a 64 y.o. male   Primary Care Provider Ruiz Ayon MD     Chief Complaint   Patient presents with   • Right Knee - Pre-op Exam       HISTORY OF PRESENT ILLNESS:  Osvaldo Conley Sr. is here for followup of Right knee pain.  The pain has not improved despite long term treatment with multiple conservative management trials.      Patient being seen for bilateral knee follow up. Left knee injection given 10/25/2019. Right knee injection given 01/16/2020. Reports right knee is worse than left. Right knee injection helped for a couple of days.   He has pain  with activities of daily living.  Mostly has dull aches but he has occasional sharp stabbing pains as well.  Pain has been slowly worsening over the last 6 months to a year.  He has had previous steroid injection as well as Visco supplementation.  He has tried discal therapy exercises and home exercise program.  He occasionally walks with a cane without significant improvement.  He is unhappy with his quality of life and wants to pursue other treatment options.    Patient was cancelled due to COVID-19 changes.  He is now ready to proceed.       CONCURRENT MEDICAL HISTORY:    Past Medical History:   Diagnosis Date   • Actinic keratosis    • Basal cell carcinoma     nose   • Chronic pain    • Essential hypertension    • Generalized anxiety disorder    • Hyperlipidemia    • Kidney stone    • Malignant melanoma of skin (CMS/HCC)    • Peripheral autonomic neuropathy    • Primary osteoarthritis involving multiple joints    • Prostatitis    • Restless leg syndrome    • Skin lesion    • Thyroid nodule    • Tick bite     without infection          Allergies   Allergen Reactions   • Codeine GI Intolerance         Current Outpatient Medications:   •  citalopram (CeleXA) 20 MG tablet, Take 1 tablet by mouth Daily., Disp: 30 tablet, Rfl: 11  •  gabapentin (NEURONTIN) 300 MG capsule, Take 1 capsule by mouth three times a day, Disp: 90 capsule, Rfl: 0  •  hydroCHLOROthiazide (HYDRODIURIL) 25 MG tablet, Take 1 tablet by mouth Daily., Disp: 30 tablet, Rfl: 11  •  HYDROcodone-acetaminophen (NORCO)  MG per tablet, 1 tablet(s) by mouth 4 times per day for pain., Disp: 120 tablet, Rfl: 0  •  losartan (COZAAR) 50 MG tablet, Take 1 tablet by mouth Daily., Disp: 30 tablet, Rfl: 11  •  metroNIDAZOLE (METROGEL) 1 % gel, Apply to nose twice a day, Disp: 60 g, Rfl: 1  •  rOPINIRole (REQUIP) 0.5 MG tablet, Take 1 tablet by mouth Every Night. Take 1 hour before bedtime., Disp: 30 tablet, Rfl: 11  •  sildenafil (VIAGRA) 100 MG tablet, Take  "1 tablet by mouth Daily As Needed for Erectile Dysfunction., Disp: 30 tablet, Rfl: 1  •  simvastatin (ZOCOR) 40 MG tablet, Take 1 tablet by mouth Every Night., Disp: 30 tablet, Rfl: 11    Past Surgical History:   Procedure Laterality Date   • BASAL CELL CARCINOMA EXCISION  07/25/2016    left nose, 1.4 cm margin with rhombic transposition flap closure.   • COLONOSCOPY     • CRYOTHERAPY  03/03/2016    Destruction of Premalignant Lesion (1st) 80[address] (Actinic keratosis)    • KIDNEY STONE SURGERY     • NOSE SURGERY      Revision of nose    • OTHER SURGICAL HISTORY      Reconstruction of midface    • SKIN CANCER DESTRUCTION     • SKIN CANCER EXCISION      melanoma from back   • STEROID INJECTION  03/03/2016    Depo Medrol (Methylprednisone) 80mg (Osteoarthritis) (10)       Family History   Problem Relation Age of Onset   • Coronary artery disease Mother    • Lung cancer Father    • Cancer Father         bone   • Hypertension Sister    • COPD Sister    • Hypertension Sister        Social History     Socioeconomic History   • Marital status:      Spouse name: Not on file   • Number of children: Not on file   • Years of education: Not on file   • Highest education level: Not on file   Tobacco Use   • Smoking status: Current Every Day Smoker     Packs/day: 1.00     Years: 50.00     Pack years: 50.00     Types: Cigarettes   • Smokeless tobacco: Never Used   Substance and Sexual Activity   • Alcohol use: Yes     Comment: occasional beer- 2or 3 per month   • Drug use: No   • Sexual activity: Defer        Review of Systems   Constitutional: Negative for chills and fever.   Respiratory: Negative.    Cardiovascular: Negative.    Gastrointestinal: Negative.    Musculoskeletal:        Pain in both knees   All other systems reviewed and are negative.      PHYSICAL EXAMINATION:       Ht 180.3 cm (71\")   Wt 88 kg (194 lb)   BMI 27.06 kg/m²      Physical Exam   Constitutional: He is oriented to person, place, and time. He " appears well-developed and well-nourished. No distress.   Cardiovascular: Normal rate, regular rhythm and normal heart sounds.   Pulmonary/Chest: Effort normal and breath sounds normal.   Abdominal: Soft. Bowel sounds are normal.   Neurological: He is alert and oriented to person, place, and time.   Psychiatric: He has a normal mood and affect. His behavior is normal. Judgment and thought content normal.   Vitals reviewed.      GAIT:     []  Normal  [x]  Antalgic    Assistive device: [x]  None  []  Walker     []  Crutches  []  Cane     []  Wheelchair  []  Stretcher    Right Knee Exam     Muscle Strength   The patient has normal right knee strength.    Tenderness   Right knee tenderness location: diffuse.    Range of Motion   Extension: -5   Flexion: 110     Tests   Varus: negative Valgus: negative  Drawer:  Anterior - negative    Posterior - negative    Other   Sensation: normal  Pulse: present  Swelling: mild    Comments:  Crepitation on motion.  Moderate pain through arc of motion      Left Knee Exam     Muscle Strength   The patient has normal left knee strength.    Tenderness   Left knee tenderness location: diffuse.    Range of Motion   Extension: 0   Flexion: 120     Tests   Varus: negative Valgus: negative  Drawer:  Anterior - negative     Posterior - negative    Other   Erythema: absent  Sensation: normal  Pulse: present  Swelling: none    Comments:  Crepitation with motion  Mild pain through arc of motion                      PRIOR XRAYS FOR REVIEW:     Ordering Provider:  Jamarcus Weems MD  Ordering Diagnosis/Indication:  Chronic pain of both knees     Procedure:  XR KNEE BILATERAL AP STANDING  Exam Date:  1/16/20     COMPARISON:  Todays X-rays were compared to previous images dated January 9, 2019.     IMPRESSION: AP bilateral standing of the knees with lateral of each knee shows medial joint space narrowing with bone-on-bone findings and complete joint space collapse on the right knee.  Marginal  osteophytes are present on both sides of the knee.  Mild lateral subluxation of the tibia is noted with varus positioning.  Moderate patellofemoral joint arthritic change noted on the lateral view.  Left knee shows tricompartmental osteoarthritic change with mild medial joint space narrowing and irregularity of the joint surface.  Mild to moderate osteoarthritic change noted in the patellofemoral compartment as well.  Overall, mild progressive worsening in comparison to prior x-ray.        Jamarcus Weems MD  1/16/20    TXA  tranexemic acid summary: THIS PATIENT IS A CANDIDATE FOR IV TXA DURING SURGERY.    ASSESSMENT:    Diagnoses and all orders for this visit:    Primary osteoarthritis of right knee    Chronic pain of both knees    COPD, mild (CMS/HCC)    Essential hypertension    Heavy cigarette smoker (20-39 per day)    Generalized anxiety disorder          PLAN    Patient's Body mass index is 27.06 kg/m². BMI is within normal parameters. No follow-up required..    Plan right TKA with adductor canal block    The patient voiced understanding of the risks, benefits, and alternative forms of treatment that were discussed and the patient consents to proceed with surgery.  All risks, benefits and alternatives were discussed.  Risks including to but not exclusive to anesthetic complications, including death, MI, CVA, infection, bleeding DVT, fracture, residual pain and need for future surgery.    All questions answered.    Return for Post-operative eval.    Jamarcus Weems MD              Electronically signed by Jamarcus Weems MD at 05/21/20 0852             Jamarcus Weems MD at 05/21/20 0820          Osvaldo Del Realne Hope Sr. is a 64 y.o. male   Primary Care Provider Ruiz Ayon MD     Chief Complaint   Patient presents with   • Right Knee - Pre-op Exam       HISTORY OF PRESENT ILLNESS:  Osvaldo Conley Sr. is here for followup of Right knee pain.  The pain has not improved  despite long term treatment with multiple conservative management trials.      Patient being seen for bilateral knee follow up. Left knee injection given 10/25/2019. Right knee injection given 01/16/2020. Reports right knee is worse than left. Right knee injection helped for a couple of days.   He has pain with activities of daily living.  Mostly has dull aches but he has occasional sharp stabbing pains as well.  Pain has been slowly worsening over the last 6 months to a year.  He has had previous steroid injection as well as Visco supplementation.  He has tried discal therapy exercises and home exercise program.  He occasionally walks with a cane without significant improvement.  He is unhappy with his quality of life and wants to pursue other treatment options.    Patient was cancelled due to COVID-19 changes.  He is now ready to proceed.       CONCURRENT MEDICAL HISTORY:    Past Medical History:   Diagnosis Date   • Actinic keratosis    • Basal cell carcinoma     nose   • Chronic pain    • Essential hypertension    • Generalized anxiety disorder    • Hyperlipidemia    • Kidney stone    • Malignant melanoma of skin (CMS/HCC)    • Peripheral autonomic neuropathy    • Primary osteoarthritis involving multiple joints    • Prostatitis    • Restless leg syndrome    • Skin lesion    • Thyroid nodule    • Tick bite     without infection          Allergies   Allergen Reactions   • Codeine GI Intolerance         Current Outpatient Medications:   •  citalopram (CeleXA) 20 MG tablet, Take 1 tablet by mouth Daily., Disp: 30 tablet, Rfl: 11  •  gabapentin (NEURONTIN) 300 MG capsule, Take 1 capsule by mouth three times a day, Disp: 90 capsule, Rfl: 0  •  hydroCHLOROthiazide (HYDRODIURIL) 25 MG tablet, Take 1 tablet by mouth Daily., Disp: 30 tablet, Rfl: 11  •  HYDROcodone-acetaminophen (NORCO)  MG per tablet, 1 tablet(s) by mouth 4 times per day for pain., Disp: 120 tablet, Rfl: 0  •  losartan (COZAAR) 50 MG tablet, Take  1 tablet by mouth Daily., Disp: 30 tablet, Rfl: 11  •  metroNIDAZOLE (METROGEL) 1 % gel, Apply to nose twice a day, Disp: 60 g, Rfl: 1  •  rOPINIRole (REQUIP) 0.5 MG tablet, Take 1 tablet by mouth Every Night. Take 1 hour before bedtime., Disp: 30 tablet, Rfl: 11  •  sildenafil (VIAGRA) 100 MG tablet, Take 1 tablet by mouth Daily As Needed for Erectile Dysfunction., Disp: 30 tablet, Rfl: 1  •  simvastatin (ZOCOR) 40 MG tablet, Take 1 tablet by mouth Every Night., Disp: 30 tablet, Rfl: 11    Past Surgical History:   Procedure Laterality Date   • BASAL CELL CARCINOMA EXCISION  07/25/2016    left nose, 1.4 cm margin with rhombic transposition flap closure.   • COLONOSCOPY     • CRYOTHERAPY  03/03/2016    Destruction of Premalignant Lesion (1st) 28083 (Actinic keratosis)    • KIDNEY STONE SURGERY     • NOSE SURGERY      Revision of nose    • OTHER SURGICAL HISTORY      Reconstruction of midface    • SKIN CANCER DESTRUCTION     • SKIN CANCER EXCISION      melanoma from back   • STEROID INJECTION  03/03/2016    Depo Medrol (Methylprednisone) 80mg (Osteoarthritis) (10)       Family History   Problem Relation Age of Onset   • Coronary artery disease Mother    • Lung cancer Father    • Cancer Father         bone   • Hypertension Sister    • COPD Sister    • Hypertension Sister        Social History     Socioeconomic History   • Marital status:      Spouse name: Not on file   • Number of children: Not on file   • Years of education: Not on file   • Highest education level: Not on file   Tobacco Use   • Smoking status: Current Every Day Smoker     Packs/day: 1.00     Years: 50.00     Pack years: 50.00     Types: Cigarettes   • Smokeless tobacco: Never Used   Substance and Sexual Activity   • Alcohol use: Yes     Comment: occasional beer- 2or 3 per month   • Drug use: No   • Sexual activity: Defer        Review of Systems   Constitutional: Negative for chills and fever.   Respiratory: Negative.    Cardiovascular:  "Negative.    Gastrointestinal: Negative.    Musculoskeletal:        Pain in both knees   All other systems reviewed and are negative.      PHYSICAL EXAMINATION:       Ht 180.3 cm (71\")   Wt 88 kg (194 lb)   BMI 27.06 kg/m²      Physical Exam   Constitutional: He is oriented to person, place, and time. He appears well-developed and well-nourished. No distress.   Cardiovascular: Normal rate, regular rhythm and normal heart sounds.   Pulmonary/Chest: Effort normal and breath sounds normal.   Abdominal: Soft. Bowel sounds are normal.   Neurological: He is alert and oriented to person, place, and time.   Psychiatric: He has a normal mood and affect. His behavior is normal. Judgment and thought content normal.   Vitals reviewed.      GAIT:     []  Normal  [x]  Antalgic    Assistive device: [x]  None  []  Walker     []  Crutches  []  Cane     []  Wheelchair  []  Stretcher    Right Knee Exam     Muscle Strength   The patient has normal right knee strength.    Tenderness   Right knee tenderness location: diffuse.    Range of Motion   Extension: -5   Flexion: 110     Tests   Varus: negative Valgus: negative  Drawer:  Anterior - negative    Posterior - negative    Other   Sensation: normal  Pulse: present  Swelling: mild    Comments:  Crepitation on motion.  Moderate pain through arc of motion      Left Knee Exam     Muscle Strength   The patient has normal left knee strength.    Tenderness   Left knee tenderness location: diffuse.    Range of Motion   Extension: 0   Flexion: 120     Tests   Varus: negative Valgus: negative  Drawer:  Anterior - negative     Posterior - negative    Other   Erythema: absent  Sensation: normal  Pulse: present  Swelling: none    Comments:  Crepitation with motion  Mild pain through arc of motion                      PRIOR XRAYS FOR REVIEW:     Ordering Provider:  Jamarcus Weems MD  Ordering Diagnosis/Indication:  Chronic pain of both knees     Procedure:  XR KNEE BILATERAL AP " STANDING  Exam Date:  1/16/20     COMPARISON:  Todays X-rays were compared to previous images dated January 9, 2019.     IMPRESSION: AP bilateral standing of the knees with lateral of each knee shows medial joint space narrowing with bone-on-bone findings and complete joint space collapse on the right knee.  Marginal osteophytes are present on both sides of the knee.  Mild lateral subluxation of the tibia is noted with varus positioning.  Moderate patellofemoral joint arthritic change noted on the lateral view.  Left knee shows tricompartmental osteoarthritic change with mild medial joint space narrowing and irregularity of the joint surface.  Mild to moderate osteoarthritic change noted in the patellofemoral compartment as well.  Overall, mild progressive worsening in comparison to prior x-ray.        Jamarcus Weems MD  1/16/20    TXA  tranexemic acid summary: THIS PATIENT IS A CANDIDATE FOR IV TXA DURING SURGERY.    ASSESSMENT:    Diagnoses and all orders for this visit:    Primary osteoarthritis of right knee    Chronic pain of both knees    COPD, mild (CMS/HCC)    Essential hypertension    Heavy cigarette smoker (20-39 per day)    Generalized anxiety disorder          PLAN    Patient's Body mass index is 27.06 kg/m². BMI is within normal parameters. No follow-up required..    Plan right TKA with adductor canal block    The patient voiced understanding of the risks, benefits, and alternative forms of treatment that were discussed and the patient consents to proceed with surgery.  All risks, benefits and alternatives were discussed.  Risks including to but not exclusive to anesthetic complications, including death, MI, CVA, infection, bleeding DVT, fracture, residual pain and need for future surgery.    All questions answered.    Return for Post-operative eval.    Jamarcus Weems MD              Electronically signed by Jamarcus Weems MD at 05/21/20 5954

## 2020-06-02 NOTE — PLAN OF CARE
Problem: Patient Care Overview  Goal: Plan of Care Review  Outcome: Ongoing (interventions implemented as appropriate)  Flowsheets (Taken 6/2/2020 9514)  Progress: improving  Plan of Care Reviewed With: patient  Outcome Summary: BID tx completed. pt w/ increased gait to 144 ft SBA w/ RW. pts gait quality improved from AM to PM session. Less antalgic. pt educated in HEP and home safety. pt w/ good understanding. pt met 2 new goals this tx. pt has 24/7 care at home. pt would continue to benefit from PT services.

## 2020-06-02 NOTE — THERAPY TREATMENT NOTE
Acute Care - Occupational Therapy Treatment Note  [facility]     Patient Name: Osvaldo Conley Sr.  : 1955  MRN: [id number]  Today's Date: 2020  Onset of Illness/Injury or Date of Surgery: 20  Date of Referral to OT: 20  Referring Physician: Faustina BARRIOS MD    Admit Date: 2020       ICD-10-CM ICD-9-CM   1. Primary osteoarthritis of both knees M17.0 715.16   2. Chronic pain of both knees M25.561 719.46    M25.562 338.29    G89.29    3. Internal derangement of left knee M23.92 717.9   4. Degenerative tear of posterior horn of lateral meniscus of left knee M23.252 717.43   5. Essential hypertension I10 401.9   6. Impaired functional mobility, balance, gait, and endurance Z74.09 V49.89   7. Impaired mobility and ADLs Z74.09 799.89   8. Polyneuropathy associated with underlying disease (CMS/HCC) G63 357.4   9. Generalized anxiety disorder F41.1 300.02   10. Anticoagulation monitoring by pharmacist Z79.01 V58.61     Patient Active Problem List   Diagnosis   • Restless leg syndrome   • Polyneuropathy associated with underlying disease (CMS/HCC)   • Primary osteoarthritis involving multiple joints   • Mixed hyperlipidemia   • Generalized anxiety disorder   • Essential hypertension   • Peripheral edema   • Impaired glucose tolerance   • COPD, mild (CMS/HCC)   • Primary osteoarthritis of both knees   • Leukocytosis   • Polycythemia   • Encounter for tobacco use cessation counseling   • Chronic pain of both knees   • Internal derangement of left knee   • Degenerative tear of posterior horn of lateral meniscus of left knee     Past Medical History:   Diagnosis Date   • Actinic keratosis    • Basal cell carcinoma     nose   • Chronic pain    • Essential hypertension    • Generalized anxiety disorder    • Hyperlipidemia    • Kidney stone    • Malignant melanoma of skin (CMS/HCC)    • Peripheral autonomic neuropathy    • Primary osteoarthritis involving multiple joints    • Prostatitis    •  Restless leg syndrome    • Skin lesion    • Thyroid nodule    • Tick bite     without infection        Past Surgical History:   Procedure Laterality Date   • BASAL CELL CARCINOMA EXCISION  07/25/2016    left nose, 1.4 cm margin with rhombic transposition flap closure.   • COLONOSCOPY     • CRYOTHERAPY  03/03/2016    Destruction of Premalignant Lesion (1st) 84484 (Actinic keratosis)    • KIDNEY STONE SURGERY     • NOSE SURGERY      Revision of nose    • OTHER SURGICAL HISTORY      Reconstruction of midface    • SKIN CANCER DESTRUCTION     • SKIN CANCER EXCISION      melanoma from back   • STEROID INJECTION  03/03/2016    Depo Medrol (Methylprednisone) 80mg (Osteoarthritis) (10)       Therapy Treatment    Rehabilitation Treatment Summary     Row Name 06/02/20 1006 06/02/20 0910          Treatment Time/Intention    Discipline  physical therapy assistant  -NAOMI  occupational therapy assistant  -LM     Document Type  therapy note (daily note)  -NAOMI  therapy note (daily note)  -LM     Subjective Information  --  no complaints  -LM     Mode of Treatment  physical therapy;individual therapy  -NAOMI  individual therapy;occupational therapy  -LM     Patient Effort  good  -NAOMI  good  -LM     Existing Precautions/Restrictions  no known precautions/restrictions  -NAOMI  no known precautions/restrictions  -LM     Recorded by [NAOMI] Alexandre Olson, PTA 06/02/20 1028 [LM] Pearl Sellers, BOSS/L 06/02/20 1146     Row Name 06/02/20 1006 06/02/20 0910          Vital Signs    Pre Systolic BP Rehab  167 pre vitals taken after BR t/f.   -NAOMI  --     Pre Treatment Diastolic BP  83  -NAOMI  --     Intra Systolic BP Rehab  --  121  -LM     Intra Treatment Diastolic BP  --  63  -LM     Post Systolic BP Rehab  155  -JC2  --     Post Treatment Diastolic BP  81  -JC2  --     Pretreatment Heart Rate (beats/min)  104  -NAOMI  --     Intratreatment Heart Rate (beats/min)  --  70  -LM     Posttreatment Heart Rate (beats/min)  80  -JC2  --     Pre SpO2 (%)  96   -NAOMI  --     O2 Delivery Pre Treatment  room air  -NAOMI  --     Intra SpO2 (%)  --  97  -LM     Post SpO2 (%)  95  -JC2  --     O2 Delivery Post Treatment  room air  -JC2  --     Pre Patient Position  Supine  -NAOMI  --     Post Patient Position  Supine pt has already been up to chair this AM.   -NAOMI  --     Recorded by [NAOMI] Alexandre Olson, PTA 06/02/20 1028  [JC2] Alexandre Olson, PTA 06/02/20 1113 [LM] Pearl Sellers BOSS/L 06/02/20 1146     Row Name 06/02/20 1006 06/02/20 0910          Cognitive Assessment/Intervention- PT/OT    Orientation Status (Cognition)  oriented x 4  -NAOMI  oriented x 4  -LM     Follows Commands (Cognition)  WFL  -JC2  WFL  -LM     Recorded by [NAOMI] Alexandre Olson, PTA 06/02/20 1028  [JC2] Alexandre Olson, PTA 06/02/20 1053 [LM] Pearl Sellers BOSS/L 06/02/20 1146     Row Name 06/02/20 1006             Safety Issues, Functional Mobility    Impairments Affecting Function (Mobility)  pain;endurance/activity tolerance;range of motion (ROM);strength  -NAOMI      Recorded by [NAOMI] Alexandre Olson, PTA 06/02/20 1028      Row Name 06/02/20 1006             Bed Mobility Assessment/Treatment    Bed Mobility Assessment/Treatment  scooting/bridging;supine-sit;sit-supine  -NAOMI      Scooting/Bridging [place] (Bed Mobility)  other (see comments) SBA w/ extended time  -JC2      Supine-Sit [place] (Bed Mobility)  other (see comments) SBA w/ extended time  -JC2      Sit-Supine [place] (Bed Mobility)  other (see comments) SBA   -JC2      Recorded by [NAOMI] Alexandre Olson, PTA 06/02/20 1028  [JC2] Alexandre Olson, PTA 06/02/20 1053      Row Name 06/02/20 1006             Transfer Assessment/Treatment    Transfer Assessment/Treatment  sit-stand transfer;stand-sit transfer;toilet transfer;bed-chair transfer;chair-bed transfer  -NAOMI      Recorded by [NAOMI] Alexandre Olson, PTA 06/02/20 1053      Row Name 06/02/20 1006             Bed-Chair Transfer    Bed-Chair [place] (Transfers)  contact  guard  -NAOMI      Assistive Device (Bed-Chair Transfers)  walker, front-wheeled  -NAOMI      Recorded by [NAOMI] Alexandre Olson, PTA 06/02/20 1053      Row Name 06/02/20 1006             Chair-Bed Transfer    Chair-Bed [place] (Transfers)  contact guard  -NAOMI      Assistive Device (Chair-Bed Transfers)  walker, front-wheeled  -NAOMI      Recorded by [NAOMI] Alexandre Olson, PTA 06/02/20 1053      Row Name 06/02/20 1006 06/02/20 0910          Sit-Stand Transfer    Sit-Stand [place] (Transfers)  contact guard  -NAOMI  contact guard  -LM     Assistive Device (Sit-Stand Transfers)  walker, front-wheeled  -NAOMI  walker, front-wheeled  -LM     Recorded by [NAOMI] Alexandre Olson, PTA 06/02/20 1028 [LM] Pearl Sellers, BOSS/L 06/02/20 1146     Row Name 06/02/20 1006 06/02/20 0910          Stand-Sit Transfer    Stand-Sit [place] (Transfers)  contact guard  -NAOMI  contact guard  -LM     Assistive Device (Stand-Sit Transfers)  walker, front-wheeled  -NAOMI  walker, front-wheeled  -LM     Recorded by [NAOMI] Alexandre Olson, PTA 06/02/20 1053 [LM] Pearl Sellers BOSS/L 06/02/20 1146     Row Name 06/02/20 1006             Toilet Transfer    Type (Toilet Transfer)  sit-stand;stand-sit  -NAOMI      [place] Level (Toilet Transfer)  contact guard  -NAOMI      Assistive Device (Toilet Transfer)  walker, front-wheeled  -NAOMI      Recorded by [NOAMI] Alexandre Olson, PTA 06/02/20 1053      Row Name 06/02/20 1006             Gait/Stairs Assessment/Training    Gait/Stairs Assessment/Training  gait/ambulation independence  -NAOMI      [place] Level (Gait)  contact guard;1 person assist  -NAOMI      Assistive Device (Gait)  walker, front-wheeled  -NAOMI      Distance in Feet (Gait)  105  -JC2      Pattern (Gait)  step-to  -NAOMI      Deviations/Abnormal Patterns (Gait)  juan decreased;antalgic;stride length decreased very antalgic. relying heavily on arms.   -JC2      Left Sided Gait Deviations  weight shift ability decreased  -NAOMI      [place]  Level (Stairs)  --  -JC2      Recorded by [NAOMI] Alexandre Olson, PTA 06/02/20 1028  [JC2] Alexandre Olson, PTA 06/02/20 1053      Row Name 06/02/20 1006             Therapeutic Exercise    Therapeutic Exercise  supine, lower extremities;seated, lower extremities  -NAOMI      Recorded by [NAOMI] Alexandre Olson, PTA 06/02/20 1109      Row Name 06/02/20 1006             Lower Extremity Seated Therapeutic Exercise    Performed, Seated Lower Extremity (Therapeutic Exercise)  ankle dorsiflexion/plantarflexion;other (see comments) HS  -NAOMI      Exercise Type, Seated Lower Extremity (Therapeutic Exercise)  AROM (active range of motion)  -NAOMI      Restrictions, Seated Lower Extremity (Therapeutic Exercise)  15x1  -NAOMI      Recorded by [NAMOI] Alexandre Olson, PTA 06/02/20 1109      Row Name 06/02/20 1006             Lower Extremity Supine Therapeutic Exercise    Performed, Supine Lower Extremity (Therapeutic Exercise)  quadriceps sets;SAQ (short arc quad) over bolster;SLR (straight leg raise);hip abduction/adduction  -NAOMI      Exercise Type, Supine Lower Extremity (Therapeutic Exercise)  AROM (active range of motion)  -NAOMI      Sets/Reps Detail, Supine Lower Extremity (Therapeutic Exercise)  15x1  -NAOMI      Recorded by [NAOMI] Alexandre Olson, PTA 06/02/20 1109      Row Name 06/02/20 1006             Positioning and Restraints    Pre-Treatment Position  in bed  -NAOMI      Post Treatment Position  bed  -NAOMI      In Bed  fowlers;call light within reach;encouraged to call for assist;exit alarm on all needs met.   -NAOMI      Recorded by [NAOMI] Alexandre Olson, PTA 06/02/20 1053      Row Name 06/02/20 1006             Pain Scale: Numbers Pre/Post-Treatment    Pain Scale: Numbers, Pretreatment  7/10  -NAOMI      Pain Scale: Numbers, Post-Treatment  6/10  -JC2      Pain Location - Side  Right  -NAOMI      Pain Location  knee  -NAOMI      Pain Intervention(s)  Repositioned;Medication (See MAR) nsg notified.   -JC3      Recorded by [NAOMI] Alexandre Olson, PTA  06/02/20 1028  [JC2] Alexandre Olson, PTA 06/02/20 1113  [JC3] Alexandre Olson, PTA 06/02/20 1053      Row Name                Wound 06/01/20 1125 Right knee Incision    Wound - Properties Group Date first assessed: 06/01/20 [KW] Time first assessed: 1125 [KW] Side: Right [KW] Location: knee [KW] Primary Wound Type: Incision [KW] Recorded by:  [KW] Latisha Toribio RN 06/01/20 1125    Row Name 06/02/20 1006             Outcome Summary/Treatment Plan (PT)    Daily Summary of Progress (PT)  progress toward functional goals as expected  -NAOMI      Plan for Continued Treatment (PT)  continue  -NAOMI      Anticipated Discharge Disposition (PT)  home with home health;home with assist  -NAOMI      Recorded by [NAOMI] Alexandre Olson, PTA 06/02/20 1053        User Key  (r) = Recorded By, (t) = Taken By, (c) = Cosigned By    Initials Name Effective Dates Discipline    NAOMI Alexandre Olson, PTA 03/07/18 -  PT    LM Pearl Sellers, BOSS/L 03/07/18 -  OT    KW Latisha Toribio RN 10/17/16 -  Nurse        Wound 06/01/20 1125 Right knee Incision (Active)   Dressing Appearance intact;dry;no drainage 6/2/2020  8:21 AM   Closure MANE 6/2/2020  8:21 AM   Base dressing in place, unable to visualize 6/2/2020  8:21 AM   Drainage Amount none 6/1/2020  9:46 PM     Rehab Goal Summary     Row Name 06/02/20 1204 06/02/20 1006          Bed Mobility Goal 1 (PT)    Activity/Assistive Device (Bed Mobility Goal 1, PT)  --  sit to supine;supine to sit  -NAOMI     [place] Level/Cues Needed (Bed Mobility Goal 1, PT)  --  independent  -NAOMI     Time Frame (Bed Mobility Goal 1, PT)  --  by discharge  -     Barriers (Bed Mobility Goal 1, PT)  --  R knee pain  -NAOMI     Progress/Outcomes (Bed Mobility Goal 1, PT)  --  goal not met  -NAOMI        Transfer Goal 1 (PT)    Activity/Assistive Device (Transfer Goal 1, PT)  --  sit-to-stand/stand-to-sit;bed-to-chair/chair-to-bed  -NAOMI     [place] Level/Cues Needed (Transfer Goal 1, PT)  --  conditional independence   -NAOMI     Time Frame (Transfer Goal 1, PT)  --  by discharge  -NAOMI     Barriers (Transfers Goal 1, PT)  --  R knee pain, limited R knee ROM  -NAOMI     Progress/Outcome (Transfer Goal 1, PT)  --  goal not met  -NAOMI        Gait Training Goal 1 (PT)    Activity/Assistive Device (Gait Training Goal 1, PT)  --  gait (walking locomotion);walker, rolling  -NAOMI     [place] Level (Gait Training Goal 1, PT)  --  conditional independence  -NAOMI     Distance (Gait Goal 1, PT)  --  150' x 1  -NAOMI     Time Frame (Gait Training Goal 1, PT)  --  by discharge  -NAOMI     Barriers (Gait Training Goal 1, PT)  --  pain, R knee weakness/ROM deficits  -NAOMI     Progress/Outcome (Gait Training Goal 1, PT)  --  goal not met  -NAOMI        ROM Goal 1 (PT)    ROM Goal 1 (PT)  --  Improve R knee AROM to -10-75 deg  -NAOMI     Time Frame (ROM Goal 1, PT)  --  by discharge  -NAOMI     Progress/Outcome (ROM Goal 1, PT)  --  goal not met  -NAOMI        Occupational Therapy Goals    Bed Mobility Goal Selection (OT)  bed mobility, OT goal 1  -LM  --     Transfer Goal Selection (OT)  transfer, OT goal 1  -LM  --     Bathing Goal Selection (OT)  bathing, OT goal 1  -LM  --     Dressing Goal Selection (OT)  dressing, OT goal 1  -LM  --     Toileting Goal Selection (OT)  toileting, OT goal 1  -LM  --     Activity Tolerance Goal Selection (OT)  activity tolerance, OT goal 1  -LM  --        Bed Mobility Goal 1 (OT)    Activity/Assistive Device (Bed Mobility Goal 1, OT)  bed mobility activities, all  -LM  --     [place] Level/Cues Needed (Bed Mobility Goal 1, OT)  independent  -LM  --     Time Frame (Bed Mobility Goal 1, OT)  long term goal (LTG);by discharge  -LM  --     Progress/Outcomes (Bed Mobility Goal 1, OT)  goal not met  -LM  --        Transfer Goal 1 (OT)    Activity/Assistive Device (Transfer Goal 1, OT)  sit-to-stand/stand-to-sit;bed-to-chair/chair-to-bed;toilet  -LM  --     [place] Level/Cues Needed (Transfer Goal 1, OT)  independent  -LM  --     Time  Frame (Transfer Goal 1, OT)  long term goal (LTG);by discharge  -LM  --     Progress/Outcome (Transfer Goal 1, OT)  goal not met  -LM  --        Bathing Goal 1 (OT)    Activity/Assistive Device (Bathing Goal 1, OT)  bathing skills, all  -LM  --     [place] Level/Cues Needed (Bathing Goal 1, OT)  conditional independence  -LM  --     Time Frame (Bathing Goal 1, OT)  long term goal (LTG);by discharge  -LM  --     Progress/Outcomes (Bathing Goal 1, OT)  goal not met  -LM  --        Dressing Goal 1 (OT)    Activity/Assistive Device (Dressing Goal 1, OT)  dressing skills, all  -LM  --     [place]/Cues Needed (Dressing Goal 1, OT)  conditional independence  -LM  --     Time Frame (Dressing Goal 1, OT)  long term goal (LTG);by discharge  -LM  --     Progress/Outcome (Dressing Goal 1, OT)  goal not met  -LM  --        Toileting Goal 1 (OT)    Activity/Device (Toileting Goal 1, OT)  toileting skills, all  -LM  --     [place] Level/Cues Needed (Toileting Goal 1, OT)  independent  -LM  --     Time Frame (Toileting Goal 1, OT)  long term goal (LTG);by discharge  -LM  --     Progress/Outcome (Toileting Goal 1, OT)  goal not met  -LM  --         Activity Tolerance Goal 1 (OT)    Activity Level (Endurance Goal 1, OT)  15 min activity  -LM  --     Progress/Outcome (Activity Tolerance Goal 1, OT)  goal not met  -LM  --       User Key  (r) = Recorded By, (t) = Taken By, (c) = Cosigned By    Initials Name Provider Type Discipline    NAOMI Alexandre Olson, PTA Physical Therapy Assistant PT    LM Pearl Sellers, BOSS/L Occupational Therapy Assistant OT        Occupational Therapy Education                 Title: PT OT SLP Therapies (In Progress)     Topic: Occupational Therapy (In Progress)     Point: ADL training (Done)     Description:   Instruct learner(s) on proper safety adaptation and remediation techniques during self care or transfers.   Instruct in proper use of assistive devices.              Learning Progress  Summary           Patient Acceptance, E, VU by KAILA at 6/1/2020 1606    Comment:  Educated pt on fall prevention and safety during ADLs                   Point: Home exercise program (Not Started)     Description:   Instruct learner(s) on appropriate technique for monitoring, assisting and/or progressing therapeutic exercises/activities.              Learner Progress:   Not documented in this visit.          Point: Precautions (Done)     Description:   Instruct learner(s) on prescribed precautions during self-care and functional transfers.              Learning Progress Summary           Patient Acceptance, E, VU by KAILA at 6/1/2020 1606    Comment:  Educated pt on fall prevention and safety during ADLs                   Point: Body mechanics (Not Started)     Description:   Instruct learner(s) on proper positioning and spine alignment during self-care, functional mobility activities and/or exercises.              Learner Progress:   Not documented in this visit.                      User Key     Initials Effective Dates Name Provider Type Discipline    KAILA 03/02/20 -  Beatrice Valdez OT Occupational Therapist OT                OT Recommendation and Plan     Plan of Care Review  Plan of Care Reviewed With: patient  Plan of Care Reviewed With: patient  Outcome Summary: pt perf well with OT cont otward all goals pt ed on home safety fall prevention and ed on ae dme and equipment for home and safety using it  Outcome Measures     Row Name 06/02/20 1006 06/01/20 1537          How much help from another person do you currently need...    Turning from your back to your side while in flat bed without using bedrails?  3  -NAOMI  --     Moving from lying on back to sitting on the side of a flat bed without bedrails?  3  -NAOMI  --     Moving to and from a bed to a chair (including a wheelchair)?  3  -NAOMI  --     Standing up from a chair using your arms (e.g., wheelchair, bedside chair)?  3  -NAOMI  --     Climbing 3-5 steps with a railing?   2  -NAOMI  --     To walk in hospital room?  3  -NAOMI  --     AM-PAC 6 Clicks Score (PT)  17  -NAOMI  --        How much help from another is currently needed...    Putting on and taking off regular lower body clothing?  --  3  -KO     Bathing (including washing, rinsing, and drying)  --  3  -KO     Toileting (which includes using toilet bed pan or urinal)  --  3  -KO     Putting on and taking off regular upper body clothing  --  3  -KO     Taking care of personal grooming (such as brushing teeth)  --  3  -KO     Eating meals  --  4  -KO     AM-PAC 6 Clicks Score (OT)  --  19  -KO        Functional Assessment    Outcome Measure Options  AM-PAC 6 Clicks Basic Mobility (PT)  -NAOMI  AM-[facility] 6 Clicks Daily Activity (OT)  -KO       User Key  (r) = Recorded By, (t) = Taken By, (c) = Cosigned By    Initials Name Provider Type    NAOMI Alexandre Olson, PTA Physical Therapy Assistant    Beatrice Latif OT Occupational Therapist           Time Calculation:   Time Calculation- OT     Row Name 06/02/20 0912             Time Calculation- OT    OT Start Time  0910  -LM      OT Stop Time  0950  -LM      OT Time Calculation (min)  40 min  -LM      Total Timed Code Minutes- OT  40 minute(s)  -LM      OT Received On  06/02/20  -        User Key  (r) = Recorded By, (t) = Taken By, (c) = Cosigned By    Initials Name Provider Type    Pearl Mancilla COTA/L Occupational Therapy Assistant                 [facility] [place]  6/2/2020

## 2020-06-02 NOTE — PROGRESS NOTES
Orthopedic Total Knee Progress Note      Patient: Osvaldo Conley Sr.  Date of Admission: 6/1/2020  YOB: 1955  Medical Record Number: [id number]    LOS: 0    Status Post: Procedure(s) (LRB):  TOTAL KNEE ARTHROPLASTY  with adductor canal block (Right)        Systemic or Specific Complaints: Pain Control  Complications: None  Pain Relief: some relief    Physical Exam:  64 y.o. male alert and oriented  Temp:  [96.8 °F (36 °C)-101.1 °F (38.4 °C)] 98.5 °F (36.9 °C)  Heart Rate:  [61-90] 81  Resp:  [16-20] 18  BP: (122-168)/(63-94) 131/66  Chest: Clear to auscultation  CV: Regular Rate and Rhythm  Abd: Soft, NT, with BS +  Ext: NV intact. ROM appropriate. Calf is soft and nontender. Negative Homans Sn  Skin: dressomg clean dry and intact w/out signs or  symptoms of infection.    Activity: Mobilizing Per P.T.   Weight Bearing: As Tolerated    Data Review  Admission on 06/01/2020   Component Date Value Ref Range Status   • SARS-CoV-2, TONE 05/29/2020 Not Detected  Not Detected Final    This test was developed and its performance characteristics determined  by [facility]. This test has not been FDA cleared or  approved. This test has been authorized by FDA under an Emergency Use  Authorization (EUA). This test is only authorized for the duration of  time the declaration that circumstances exist justifying the  authorization of the emergency use of in vitro diagnostic tests for  detection of SARS-CoV-2 virus and/or diagnosis of COVID-19 infection  under section 564(b)(1) of the Act, 21 U.S.C. 360bbb-3(b)(1), unless  the authorization is terminated or revoked sooner.  When diagnostic testing is negative, the possibility of a false  negative result should be considered in the context of a patient's  recent exposures and the presence of clinical signs and symptoms  consistent with COVID-19. An individual without symptoms of COVID-19  and who is not shedding SARS-CoV-2 virus would expect to have  a  negative (not detected) result in this assay.   • COVID [facility] PRIORITY 05/29/2020 Comment   Final    Received   • Glucose 06/01/2020 117* 65 - 99 mg/dL Final   • BUN 06/01/2020 14  8 - 23 mg/dL Final   • Creatinine 06/01/2020 0.64* 0.76 - 1.27 mg/dL Final   • Sodium 06/01/2020 141  136 - 145 mmol/L Final   • Potassium 06/01/2020 4.2  3.5 - 5.2 mmol/L Final   • Chloride 06/01/2020 106  98 - 107 mmol/L Final   • CO2 06/01/2020 26.0  22.0 - 29.0 mmol/L Final   • Calcium 06/01/2020 8.8  8.6 - 10.5 mg/dL Final   • eGFR Non African Amer 06/01/2020 126  >60 mL/min/1.73 Final   • BUN/Creatinine Ratio 06/01/2020 21.9  7.0 - 25.0 Final   • Anion Gap 06/01/2020 9.0  5.0 - 15.0 mmol/L Final   • WBC 06/01/2020 13.43* 3.40 - 10.80 10*3/mm3 Final   • RBC 06/01/2020 4.96  4.14 - 5.80 10*6/mm3 Final   • Hemoglobin 06/01/2020 15.3  13.0 - 17.7 g/dL Final   • Hematocrit 06/01/2020 43.8  37.5 - 51.0 % Final   • MCV 06/01/2020 88.3  79.0 - 97.0 fL Final   • MCH 06/01/2020 30.8  26.6 - 33.0 pg Final   • MCHC 06/01/2020 34.9  31.5 - 35.7 g/dL Final   • RDW 06/01/2020 13.3  12.3 - 15.4 % Final   • RDW-SD 06/01/2020 42.8  37.0 - 54.0 fl Final   • MPV 06/01/2020 11.9  6.0 - 12.0 fL Final   • Platelets 06/01/2020 201  140 - 450 10*3/mm3 Final   • ABO Type 06/01/2020 O   Final   • RH type 06/01/2020 Positive   Final   • Antibody Screen 06/01/2020 Negative   Final   • T&S Expiration Date 06/01/2020 6/4/2020 11:59:59 PM   Final   • Previous History 06/01/2020 No record on File   Final   • MRSA, PCR 06/01/2020 Negative  Negative Final   • ABO Type 06/01/2020 O   Final   • RH type 06/01/2020 Positive   Final   • Hemoglobin 06/01/2020 13.9  13.0 - 17.7 g/dL Final   • Hematocrit 06/01/2020 40.9  37.5 - 51.0 % Final   • Protime 06/02/2020 12.8  11.1 - 15.3 Seconds Final   • INR 06/02/2020 0.98  0.80 - 1.20 Final       No results found.    Medications    acetaminophen 1,000 mg Oral 4x Daily   citalopram 20 mg Oral Daily   famotidine 40 mg Oral  Daily   ferrous sulfate 324 mg Oral Daily With Breakfast   hydroCHLOROthiazide 25 mg Oral Daily   losartan 50 mg Oral Daily   oxyCODONE 10 mg Oral Q12H   rOPINIRole 0.5 mg Oral Nightly   sodium chloride 3 mL Intravenous Q12H   warfarin 5 mg Oral Daily     HYDROmorphone **AND** naloxone  •  ondansetron **OR** ondansetron  •  oxyCODONE  •  Pharmacy to dose warfarin  •  sodium chloride    Assessment:  Doing well following total knee replacement  Patient Active Problem List   Diagnosis   • Restless leg syndrome   • Polyneuropathy associated with underlying disease (CMS/HCC)   • Primary osteoarthritis involving multiple joints   • Mixed hyperlipidemia   • Generalized anxiety disorder   • Essential hypertension   • Peripheral edema   • Impaired glucose tolerance   • COPD, mild (CMS/HCC)   • Primary osteoarthritis of both knees   • Leukocytosis   • Polycythemia   • Encounter for tobacco use cessation counseling   • Chronic pain of both knees   • Internal derangement of left knee   • Degenerative tear of posterior horn of lateral meniscus of left knee         Plan:   Continue efforts to mobilize  Continue Pain Control Measures  Continue incisional Care  DVT prophylaxis    Discharge Plan:Home today or tomorrow.  Anticipate outpatient physical therapy.    Jamarcus Weems MD    Date: 6/2/2020   Time: 07:48

## 2020-06-02 NOTE — PLAN OF CARE
Problem: Patient Care Overview  Goal: Plan of Care Review  Outcome: Ongoing (interventions implemented as appropriate)  Flowsheets  Taken 6/1/2020 9538  Plan of Care Reviewed With: patient  Taken 6/2/2020 3519  Outcome Summary: temp of 101.1 given tylenol po and other vss, ambulated to restroom, adquate urine output for shift, complains of pain, working on pain control via po and iv meds, ice to knee, will continue to montior.

## 2020-06-02 NOTE — PLAN OF CARE
Problem: Patient Care Overview  Goal: Plan of Care Review  Outcome: Ongoing (interventions implemented as appropriate)  Flowsheets (Taken 6/2/2020 1204)  Progress: improving  Plan of Care Reviewed With: patient  Outcome Summary: pt perf well with OT cont otward all goals pt ed on home safety fall prevention and ed on ae dme and equipment for home and safety using it

## 2020-06-02 NOTE — DISCHARGE PLACEMENT REQUEST
"Denise Hitchcock Sr. (64 y.o. Male)     Date of Birth Social Security Number Address Home Phone MRN    1955  [address] [place], [place] [id number] [phone] [id number]    Tenriism Marital Status          Non-Congregational        Admission Date Admission Type Admitting Provider Attending Provider Department, Room/Bed    20 Elective Jamarcus Weems MD Dodds, James Carpenter, MD [place] [address]    Discharge Date Discharge Disposition Discharge Destination                       Attending Provider:  Jamarcus Weems MD    Allergies:  Codeine    Isolation:  None   Infection:  None   Code Status:  CPR    Ht:  180.3 cm (71\")   Wt:  92.9 kg (204 lb 12.9 oz)    Admission Cmt:  None   Principal Problem:  Primary osteoarthritis of both knees [M17.0]                 Active Insurance as of 2020     Primary Coverage     Payor Plan Insurance Group Employer/Plan Group    [employer] [place] [employer] [place]      Payor Plan Address Payor Plan Phone Number Payor Plan Fax Number Effective Dates    [address] [address] [phone]  2016 - None Entered    [facility] [id number]       Subscriber Name Subscriber Birth Date Member ID       DENISE HITCHCOCK SR. 1955 [id number]                 Emergency Contacts      (Rel.) Home Phone Work Phone Mobile Phone    Beverly Boone (Friend) -- -- [phone]        [address] [address]  Dept. Phone:  [phone]  Dept. Fax:   Date Ordered: 2020         Patient:  Denise Hitchcock Sr. MRN:  [id number]   [address] [place] [id number] :  1955  SSN:    Phone: [phone] Sex:  M     Weight: 92.9 kg (204 lb 12.9 oz)         Ht Readings from Last 1 Encounters:   20 180.3 cm (71\")         Walker               (Order ID: [id number])    Diagnosis:  Chronic pain of both knees (M25.561,M25.562,G89.29 " [ICD-10-CM] 719.46,338.29 [ICD-9-CM])  Primary osteoarthritis of both knees (M17.0 [ICD-10-CM] 715.16 [ICD-9-CM])  Essential hypertension (I10 [ICD-10-CM] 401.9 [ICD-9-CM])  Polyneuropathy associated with underlying disease (CMS/HCC) (G63 [ICD-10-CM] 357.4 [ICD-9-CM])  Generalized anxiety disorder (F41.1 [ICD-10-CM] 300.02 [ICD-9-CM])   Quantity:  1     Equipment:  Walker Folding with Wheels  Length of Need (99 Months = Lifetime): 99 Months = Lifetime        Authorizing Provider's Phone: [phone]   Authorizing Provider:Jamarcus Weems MD  Authorizing Provider's NPI: [id number]  Order Entered By: Jamarcus Weems MD 6/2/2020  7:57 AM     Electronically signed by: Jamarcus Weems MD 6/2/2020  7:57 AM        Noffsinger PT:  ROM/STR as tolerated  Progress weight bearing as pain allows  Swelling control  Teach HEP  Call with any questions.    Insurance Information                [facility]/WELL[employer] MEDICAID Phone: [phone]    Subscriber: Osvaldo Conley Sr. Subscriber#: [id number]    Group#:  Precert#:              History & Physical      Jamarcus Weems MD at 06/01/20 1020          H&P reviewed. The patient was examined and there are no changes to the H&P.     Vitals:    06/01/20 1019   BP: 168/80   Pulse: 71   Resp: 16   Temp: 97.4 °F (36.3 °C)   SpO2: 97%     06/01/20 at 10:22 AM by Jamarcus Weems MD            Electronically signed by Jamarcus Weems MD at 06/01/20 1022   Source Note          Osvaldo Conley Sr. is a 64 y.o. male   Primary Care Provider Ruiz Ayon MD     Chief Complaint   Patient presents with   • Right Knee - Pre-op Exam       HISTORY OF PRESENT ILLNESS:  Osvaldo Conley Sr. is here for followup of Right knee pain.  The pain has not improved despite long term treatment with multiple conservative management trials.      Patient being seen for bilateral knee follow up. Left knee injection given 10/25/2019. Right knee  injection given 01/16/2020. Reports right knee is worse than left. Right knee injection helped for a couple of days.   He has pain with activities of daily living.  Mostly has dull aches but he has occasional sharp stabbing pains as well.  Pain has been slowly worsening over the last 6 months to a year.  He has had previous steroid injection as well as Visco supplementation.  He has tried discal therapy exercises and home exercise program.  He occasionally walks with a cane without significant improvement.  He is unhappy with his quality of life and wants to pursue other treatment options.    Patient was cancelled due to COVID-19 changes.  He is now ready to proceed.       CONCURRENT MEDICAL HISTORY:    Past Medical History:   Diagnosis Date   • Actinic keratosis    • Basal cell carcinoma     nose   • Chronic pain    • Essential hypertension    • Generalized anxiety disorder    • Hyperlipidemia    • Kidney stone    • Malignant melanoma of skin (CMS/HCC)    • Peripheral autonomic neuropathy    • Primary osteoarthritis involving multiple joints    • Prostatitis    • Restless leg syndrome    • Skin lesion    • Thyroid nodule    • Tick bite     without infection          Allergies   Allergen Reactions   • Codeine GI Intolerance         Current Outpatient Medications:   •  citalopram (CeleXA) 20 MG tablet, Take 1 tablet by mouth Daily., Disp: 30 tablet, Rfl: 11  •  gabapentin (NEURONTIN) 300 MG capsule, Take 1 capsule by mouth three times a day, Disp: 90 capsule, Rfl: 0  •  hydroCHLOROthiazide (HYDRODIURIL) 25 MG tablet, Take 1 tablet by mouth Daily., Disp: 30 tablet, Rfl: 11  •  HYDROcodone-acetaminophen (NORCO)  MG per tablet, 1 tablet(s) by mouth 4 times per day for pain., Disp: 120 tablet, Rfl: 0  •  losartan (COZAAR) 50 MG tablet, Take 1 tablet by mouth Daily., Disp: 30 tablet, Rfl: 11  •  metroNIDAZOLE (METROGEL) 1 % gel, Apply to nose twice a day, Disp: 60 g, Rfl: 1  •  rOPINIRole (REQUIP) 0.5 MG tablet,  Take 1 tablet by mouth Every Night. Take 1 hour before bedtime., Disp: 30 tablet, Rfl: 11  •  sildenafil (VIAGRA) 100 MG tablet, Take 1 tablet by mouth Daily As Needed for Erectile Dysfunction., Disp: 30 tablet, Rfl: 1  •  simvastatin (ZOCOR) 40 MG tablet, Take 1 tablet by mouth Every Night., Disp: 30 tablet, Rfl: 11    Past Surgical History:   Procedure Laterality Date   • BASAL CELL CARCINOMA EXCISION  07/25/2016    left nose, 1.4 cm margin with rhombic transposition flap closure.   • COLONOSCOPY     • CRYOTHERAPY  03/03/2016    Destruction of Premalignant Lesion (1st) 49296 (Actinic keratosis)    • KIDNEY STONE SURGERY     • NOSE SURGERY      Revision of nose    • OTHER SURGICAL HISTORY      Reconstruction of midface    • SKIN CANCER DESTRUCTION     • SKIN CANCER EXCISION      melanoma from back   • STEROID INJECTION  03/03/2016    Depo Medrol (Methylprednisone) 80mg (Osteoarthritis) (10)       Family History   Problem Relation Age of Onset   • Coronary artery disease Mother    • Lung cancer Father    • Cancer Father         bone   • Hypertension Sister    • COPD Sister    • Hypertension Sister        Social History     Socioeconomic History   • Marital status:      Spouse name: Not on file   • Number of children: Not on file   • Years of education: Not on file   • Highest education level: Not on file   Tobacco Use   • Smoking status: Current Every Day Smoker     Packs/day: 1.00     Years: 50.00     Pack years: 50.00     Types: Cigarettes   • Smokeless tobacco: Never Used   Substance and Sexual Activity   • Alcohol use: Yes     Comment: occasional beer- 2or 3 per month   • Drug use: No   • Sexual activity: Defer        Review of Systems   Constitutional: Negative for chills and fever.   Respiratory: Negative.    Cardiovascular: Negative.    Gastrointestinal: Negative.    Musculoskeletal:        Pain in both knees   All other systems reviewed and are negative.      PHYSICAL EXAMINATION:       Ht 180.3 cm  "(71\")   Wt 88 kg (194 lb)   BMI 27.06 kg/m²      Physical Exam   Constitutional: He is oriented to person, place, and time. He appears well-developed and well-nourished. No distress.   Cardiovascular: Normal rate, regular rhythm and normal heart sounds.   Pulmonary/Chest: Effort normal and breath sounds normal.   Abdominal: Soft. Bowel sounds are normal.   Neurological: He is alert and oriented to person, place, and time.   Psychiatric: He has a normal mood and affect. His behavior is normal. Judgment and thought content normal.   Vitals reviewed.      GAIT:     []  Normal  [x]  Antalgic    Assistive device: [x]  None  []  Walker     []  Crutches  []  Cane     []  Wheelchair  []  Stretcher    Right Knee Exam     Muscle Strength   The patient has normal right knee strength.    Tenderness   Right knee tenderness location: diffuse.    Range of Motion   Extension: -5   Flexion: 110     Tests   Varus: negative Valgus: negative  Drawer:  Anterior - negative    Posterior - negative    Other   Sensation: normal  Pulse: present  Swelling: mild    Comments:  Crepitation on motion.  Moderate pain through arc of motion      Left Knee Exam     Muscle Strength   The patient has normal left knee strength.    Tenderness   Left knee tenderness location: diffuse.    Range of Motion   Extension: 0   Flexion: 120     Tests   Varus: negative Valgus: negative  Drawer:  Anterior - negative     Posterior - negative    Other   Erythema: absent  Sensation: normal  Pulse: present  Swelling: none    Comments:  Crepitation with motion  Mild pain through arc of motion                      PRIOR XRAYS FOR REVIEW:     Ordering Provider:  Jamarcus Weems MD  Ordering Diagnosis/Indication:  Chronic pain of both knees     Procedure:  XR KNEE BILATERAL AP STANDING  Exam Date:  1/16/20     COMPARISON:  Todays X-rays were compared to previous images dated January 9, 2019.     IMPRESSION: AP bilateral standing of the knees with lateral of each " knee shows medial joint space narrowing with bone-on-bone findings and complete joint space collapse on the right knee.  Marginal osteophytes are present on both sides of the knee.  Mild lateral subluxation of the tibia is noted with varus positioning.  Moderate patellofemoral joint arthritic change noted on the lateral view.  Left knee shows tricompartmental osteoarthritic change with mild medial joint space narrowing and irregularity of the joint surface.  Mild to moderate osteoarthritic change noted in the patellofemoral compartment as well.  Overall, mild progressive worsening in comparison to prior x-ray.        Jamarucs Weems MD  1/16/20    TXA  tranexemic acid summary: THIS PATIENT IS A CANDIDATE FOR IV TXA DURING SURGERY.    ASSESSMENT:    Diagnoses and all orders for this visit:    Primary osteoarthritis of right knee    Chronic pain of both knees    COPD, mild (CMS/HCC)    Essential hypertension    Heavy cigarette smoker (20-39 per day)    Generalized anxiety disorder          PLAN    Patient's Body mass index is 27.06 kg/m². BMI is within normal parameters. No follow-up required..    Plan right TKA with adductor canal block    The patient voiced understanding of the risks, benefits, and alternative forms of treatment that were discussed and the patient consents to proceed with surgery.  All risks, benefits and alternatives were discussed.  Risks including to but not exclusive to anesthetic complications, including death, MI, CVA, infection, bleeding DVT, fracture, residual pain and need for future surgery.    All questions answered.    Return for Post-operative eval.    Jamarcus Weems MD              Electronically signed by Jaamrcus Weems MD at 05/21/20 0852             Jamarcus Weems MD at 05/21/20 0802          Osvaldo Gunner Hope Sr. is a 64 y.o. male   Primary Care Provider Ruiz Ayon MD     Chief Complaint   Patient presents with   • Right Knee - Pre-op Exam        HISTORY OF PRESENT ILLNESS:  Osvaldo Conley . is here for followup of Right knee pain.  The pain has not improved despite long term treatment with multiple conservative management trials.      Patient being seen for bilateral knee follow up. Left knee injection given 10/25/2019. Right knee injection given 01/16/2020. Reports right knee is worse than left. Right knee injection helped for a couple of days.   He has pain with activities of daily living.  Mostly has dull aches but he has occasional sharp stabbing pains as well.  Pain has been slowly worsening over the last 6 months to a year.  He has had previous steroid injection as well as Visco supplementation.  He has tried discal therapy exercises and home exercise program.  He occasionally walks with a cane without significant improvement.  He is unhappy with his quality of life and wants to pursue other treatment options.    Patient was cancelled due to COVID-19 changes.  He is now ready to proceed.       CONCURRENT MEDICAL HISTORY:    Past Medical History:   Diagnosis Date   • Actinic keratosis    • Basal cell carcinoma     nose   • Chronic pain    • Essential hypertension    • Generalized anxiety disorder    • Hyperlipidemia    • Kidney stone    • Malignant melanoma of skin (CMS/HCC)    • Peripheral autonomic neuropathy    • Primary osteoarthritis involving multiple joints    • Prostatitis    • Restless leg syndrome    • Skin lesion    • Thyroid nodule    • Tick bite     without infection          Allergies   Allergen Reactions   • Codeine GI Intolerance         Current Outpatient Medications:   •  citalopram (CeleXA) 20 MG tablet, Take 1 tablet by mouth Daily., Disp: 30 tablet, Rfl: 11  •  gabapentin (NEURONTIN) 300 MG capsule, Take 1 capsule by mouth three times a day, Disp: 90 capsule, Rfl: 0  •  hydroCHLOROthiazide (HYDRODIURIL) 25 MG tablet, Take 1 tablet by mouth Daily., Disp: 30 tablet, Rfl: 11  •  HYDROcodone-acetaminophen (NORCO)  MG  per tablet, 1 tablet(s) by mouth 4 times per day for pain., Disp: 120 tablet, Rfl: 0  •  losartan (COZAAR) 50 MG tablet, Take 1 tablet by mouth Daily., Disp: 30 tablet, Rfl: 11  •  metroNIDAZOLE (METROGEL) 1 % gel, Apply to nose twice a day, Disp: 60 g, Rfl: 1  •  rOPINIRole (REQUIP) 0.5 MG tablet, Take 1 tablet by mouth Every Night. Take 1 hour before bedtime., Disp: 30 tablet, Rfl: 11  •  sildenafil (VIAGRA) 100 MG tablet, Take 1 tablet by mouth Daily As Needed for Erectile Dysfunction., Disp: 30 tablet, Rfl: 1  •  simvastatin (ZOCOR) 40 MG tablet, Take 1 tablet by mouth Every Night., Disp: 30 tablet, Rfl: 11    Past Surgical History:   Procedure Laterality Date   • BASAL CELL CARCINOMA EXCISION  07/25/2016    left nose, 1.4 cm margin with rhombic transposition flap closure.   • COLONOSCOPY     • CRYOTHERAPY  03/03/2016    Destruction of Premalignant Lesion (1st) 00382 (Actinic keratosis)    • KIDNEY STONE SURGERY     • NOSE SURGERY      Revision of nose    • OTHER SURGICAL HISTORY      Reconstruction of midface    • SKIN CANCER DESTRUCTION     • SKIN CANCER EXCISION      melanoma from back   • STEROID INJECTION  03/03/2016    Depo Medrol (Methylprednisone) 80mg (Osteoarthritis) (10)       Family History   Problem Relation Age of Onset   • Coronary artery disease Mother    • Lung cancer Father    • Cancer Father         bone   • Hypertension Sister    • COPD Sister    • Hypertension Sister        Social History     Socioeconomic History   • Marital status:      Spouse name: Not on file   • Number of children: Not on file   • Years of education: Not on file   • Highest education level: Not on file   Tobacco Use   • Smoking status: Current Every Day Smoker     Packs/day: 1.00     Years: 50.00     Pack years: 50.00     Types: Cigarettes   • Smokeless tobacco: Never Used   Substance and Sexual Activity   • Alcohol use: Yes     Comment: occasional beer- 2or 3 per month   • Drug use: No   • Sexual activity:  "Defer        Review of Systems   Constitutional: Negative for chills and fever.   Respiratory: Negative.    Cardiovascular: Negative.    Gastrointestinal: Negative.    Musculoskeletal:        Pain in both knees   All other systems reviewed and are negative.      PHYSICAL EXAMINATION:       Ht 180.3 cm (71\")   Wt 88 kg (194 lb)   BMI 27.06 kg/m²      Physical Exam   Constitutional: He is oriented to person, place, and time. He appears well-developed and well-nourished. No distress.   Cardiovascular: Normal rate, regular rhythm and normal heart sounds.   Pulmonary/Chest: Effort normal and breath sounds normal.   Abdominal: Soft. Bowel sounds are normal.   Neurological: He is alert and oriented to person, place, and time.   Psychiatric: He has a normal mood and affect. His behavior is normal. Judgment and thought content normal.   Vitals reviewed.      GAIT:     []  Normal  [x]  Antalgic    Assistive device: [x]  None  []  Walker     []  Crutches  []  Cane     []  Wheelchair  []  Stretcher    Right Knee Exam     Muscle Strength   The patient has normal right knee strength.    Tenderness   Right knee tenderness location: diffuse.    Range of Motion   Extension: -5   Flexion: 110     Tests   Varus: negative Valgus: negative  Drawer:  Anterior - negative    Posterior - negative    Other   Sensation: normal  Pulse: present  Swelling: mild    Comments:  Crepitation on motion.  Moderate pain through arc of motion      Left Knee Exam     Muscle Strength   The patient has normal left knee strength.    Tenderness   Left knee tenderness location: diffuse.    Range of Motion   Extension: 0   Flexion: 120     Tests   Varus: negative Valgus: negative  Drawer:  Anterior - negative     Posterior - negative    Other   Erythema: absent  Sensation: normal  Pulse: present  Swelling: none    Comments:  Crepitation with motion  Mild pain through arc of motion                      PRIOR XRAYS FOR REVIEW:     Ordering Provider:  Faustina" Jamarcus Richardson MD  Ordering Diagnosis/Indication:  Chronic pain of both knees     Procedure:  XR KNEE BILATERAL AP STANDING  Exam Date:  1/16/20     COMPARISON:  Todays X-rays were compared to previous images dated January 9, 2019.     IMPRESSION: AP bilateral standing of the knees with lateral of each knee shows medial joint space narrowing with bone-on-bone findings and complete joint space collapse on the right knee.  Marginal osteophytes are present on both sides of the knee.  Mild lateral subluxation of the tibia is noted with varus positioning.  Moderate patellofemoral joint arthritic change noted on the lateral view.  Left knee shows tricompartmental osteoarthritic change with mild medial joint space narrowing and irregularity of the joint surface.  Mild to moderate osteoarthritic change noted in the patellofemoral compartment as well.  Overall, mild progressive worsening in comparison to prior x-ray.        Jamarcus Weems MD  1/16/20    TXA  tranexemic acid summary: THIS PATIENT IS A CANDIDATE FOR IV TXA DURING SURGERY.    ASSESSMENT:    Diagnoses and all orders for this visit:    Primary osteoarthritis of right knee    Chronic pain of both knees    COPD, mild (CMS/HCC)    Essential hypertension    Heavy cigarette smoker (20-39 per day)    Generalized anxiety disorder          PLAN    Patient's Body mass index is 27.06 kg/m². BMI is within normal parameters. No follow-up required..    Plan right TKA with adductor canal block    The patient voiced understanding of the risks, benefits, and alternative forms of treatment that were discussed and the patient consents to proceed with surgery.  All risks, benefits and alternatives were discussed.  Risks including to but not exclusive to anesthetic complications, including death, MI, CVA, infection, bleeding DVT, fracture, residual pain and need for future surgery.    All questions answered.    Return for Post-operative eval.    Jamarcus Weems  MD              Electronically signed by Jamarcus Weems MD at 05/21/20 3466

## 2020-06-03 LAB
LAB AP CASE REPORT: NORMAL
PATH REPORT.FINAL DX SPEC: NORMAL

## 2020-06-05 ENCOUNTER — OFFICE VISIT (OUTPATIENT)
Dept: ORTHOPEDIC SURGERY | Facility: CLINIC | Age: 65
End: 2020-06-05

## 2020-06-05 VITALS — WEIGHT: 204 LBS | TEMPERATURE: 97.9 F | HEIGHT: 71 IN | BODY MASS INDEX: 28.56 KG/M2

## 2020-06-05 DIAGNOSIS — Z96.651 PRESENCE OF TOTAL RIGHT KNEE JOINT PROSTHESIS: ICD-10-CM

## 2020-06-05 DIAGNOSIS — Z96.651 STATUS POST RIGHT KNEE REPLACEMENT: ICD-10-CM

## 2020-06-05 DIAGNOSIS — M17.11 PRIMARY OSTEOARTHRITIS OF RIGHT KNEE: Primary | ICD-10-CM

## 2020-06-05 PROCEDURE — 99024 POSTOP FOLLOW-UP VISIT: CPT | Performed by: NURSE PRACTITIONER

## 2020-06-05 RX ORDER — OXYCODONE AND ACETAMINOPHEN 10; 325 MG/1; MG/1
1 TABLET ORAL EVERY 4 HOURS PRN
Qty: 18 TABLET | Refills: 0 | Status: SHIPPED | OUTPATIENT
Start: 2020-06-05 | End: 2020-06-08 | Stop reason: SDUPTHER

## 2020-06-05 NOTE — PROGRESS NOTES
Osvaldo Conley . is a 64 y.o. male is s/p       Chief Complaint   Patient presents with   • Right Knee - Post-op       HISTORY OF PRESENT ILLNESS: Patient presents to office for evaluation of postoperative right knee/leg pain and swelling.  Patient is 4 days post right total knee arthroplasty performed per Dr. Weems.  Patient is attending physical therapy at [facility].  Patient complains of pain and soreness in his right thigh that has been present since surgery.  Patient complains of right knee pain and swelling with increased pain when he flexes the knee.  Patient is also concerned about some redness and warmth to touch at his knee.  Staples remain in place to the right knee.  No calf pain.  No fever or chills.  No injuries or falls reported.  Patient is taking Norco for pain but states it is not helping and his pain is severe.  He states that he has been on Norco for 20+ years for chronic back pain.          06/01/20 (4d) Jamarcus Weems MD    TOTAL KNEE ARTHROPLASTY  with adductor canal block - Right       Allergies   Allergen Reactions   • Codeine GI Intolerance         Current Outpatient Medications:   •  citalopram (CeleXA) 20 MG tablet, Take 1 tablet by mouth Daily., Disp: 30 tablet, Rfl: 11  •  gabapentin (NEURONTIN) 300 MG capsule, Take 1 capsule by mouth three times a day, Disp: 90 capsule, Rfl: 0  •  hydroCHLOROthiazide (HYDRODIURIL) 25 MG tablet, Take 1 tablet by mouth Daily., Disp: 30 tablet, Rfl: 11  •  losartan (COZAAR) 50 MG tablet, Take 1 tablet by mouth Daily., Disp: 30 tablet, Rfl: 11  •  metroNIDAZOLE (METROGEL) 1 % gel, Apply to nose twice a day, Disp: 60 g, Rfl: 1  •  rOPINIRole (REQUIP) 0.5 MG tablet, Take 1 tablet by mouth Every Night. Take 1 hour before bedtime., Disp: 30 tablet, Rfl: 11  •  sildenafil (VIAGRA) 100 MG tablet, Take 1 tablet by mouth Daily As Needed for Erectile Dysfunction., Disp: 30 tablet, Rfl: 1  •  simvastatin (ZOCOR) 40 MG tablet, Take 1 tablet by mouth  Every Night., Disp: 30 tablet, Rfl: 11  •  warfarin (Coumadin) 5 MG tablet, Take 1 tablet by mouth Every Night., Disp: 50 tablet, Rfl: 0  •  oxyCODONE-acetaminophen (PERCOCET)  MG per tablet, Take 1 tablet by mouth Every 4 (Four) Hours As Needed for Moderate Pain ., Disp: 18 tablet, Rfl: 0    No fevers or chills.  No nausea or vomiting. Right knee pain/swelling. Right thigh pain.       PHYSICAL EXAMINATION:       Osvaldo Conley Sr. is a 64 y.o. male    Patient is awake and alert, answers questions appropriately and is in no apparent distress.    GAIT:     []  Normal  [x]  Antalgic    Assistive device: []  None  [x]  Walker     []  Crutches  []  Cane     []  Wheelchair  []  Stretcher    Right Knee Exam     Tenderness   Right knee tenderness location: Diffuse.    Range of Motion   Extension: 5   Right knee flexion: 85.     Other   Erythema: present (Mild)  Sensation: normal  Pulse: present  Swelling: severe  Effusion: effusion present    Comments:  Pain and limitations with range of motion.  Moderate to severe swelling/effusion noted at the knee joint.  Mild erythema is noted and mild warmth.  No overt signs of infection.  No bright red erythema or cellulitis noted.  Staples are intact to the anterior knee incision.  The surgical incision is well approximated with no active drainage noted.  There is some swelling that extends into the anterior thigh with mild tenderness to palpation at the thigh.  Calf is soft and nontender.            Xr Knee 1 Or 2 View Right    Result Date: 6/1/2020  Narrative: EXAM DESCRIPTION:  XR KNEE 1 OR 2 VW RIGHT CLINICAL HISTORY: 64 years  Male  implant , M25.561 Pain in right knee M25.562 Pain in left knee G89.29 Other chronic pain M23.92 Unspecified internal derangement of left knee M23.252 Derangement of posterior horn of lateral meniscus due to old tear or injury, left knee M17.0 Bilateral primary osteoarthritis of knee I10 Essential (primary) hypertension: Post-op knee  arthroplasty COMPARISON: January 16, 2020 TECHNIQUE: Two views-AP and lateral radiographs of the right knee FINDINGS: The patient is status post arthroplasty of the right knee. The alignment of the prosthesis is satisfactory without radiographic evidence of complication. Skin staples are noted anteriorly. The alignment is anatomic. A surgical drain is noted in the suprapatellar bursa.     Impression: 1. Postoperative changes from total arthroplasty of the right knee with satisfactory alignment and no radiographic evidence of complication. Electronically signed by:  Lyssa Schofield MD  6/1/2020 1:30 PM CDT Workstation: [id number]        ASSESSMENT:    Diagnoses and all orders for this visit:    Primary osteoarthritis of right knee  -     oxyCODONE-acetaminophen (PERCOCET)  MG per tablet; Take 1 tablet by mouth Every 4 (Four) Hours As Needed for Moderate Pain .    Presence of total right knee joint prosthesis  -     oxyCODONE-acetaminophen (PERCOCET)  MG per tablet; Take 1 tablet by mouth Every 4 (Four) Hours As Needed for Moderate Pain .    Status post right knee replacement  -     oxyCODONE-acetaminophen (PERCOCET)  MG per tablet; Take 1 tablet by mouth Every 4 (Four) Hours As Needed for Moderate Pain .    PLAN    Patient is here today for days post right total knee arthroplasty due to pain and swelling in his right knee as well as tenderness and some swelling in the right thigh.  There were no concerning findings on physical exam for overt infection or DVT.  He does have some mild erythema, which appears more consistent with inflammation.  He has some warmth at the knee with moderate to severe swelling and effusion.  We discussed that the pain and swelling are normal expectations following knee arthroplasty.  We discussed that the warmth is also a normal variable.  He does complain of some tenderness in the right thigh, which he states is been present immediately since surgery.  We discussed the  possibility that he is having pain/soreness from the tourniquet based on the location of his tenderness.  Suspicion for DVT is low.  He continues with his Coumadin therapy as directed for DVT prophylaxis.  The calf is soft and nontender.  Recommend continue with use of his walker for modified weightbearing.  We discussed that he needs to diligently elevate the right leg and apply ice therapy for 15 minutes every 2-4 hours while awake to minimize pain/swelling.  Patient has been taking Norco since surgery and states that his pain is poorly controlled and remains severe.  He indicates today that he has been on hydrocodone products for 20+ years for chronic back pain and believes that he is tolerant to this medication.  His pain medication is increased oxycodone today for improved control of postoperative pain.  Patient is instructed to stop the Norco and certainly not to take both medications concurrently due to the risk for drowsiness, respiratory depression and/or death.  Continue with current course of physical therapy.  Patient has a scheduled upcoming postoperative appointment on 6/16/2020 for recheck and staple removal.  We discussed that if his pain and swelling are improving with the interventions discussed and implemented today, he can keep his postoperative appointment as scheduled.  If he continues to have pain/swelling and certainly if his symptoms worsen, he should follow-up sooner.    This patient has underwent a recent surgical procedure.  His pain is currently severe.  Therefore, I will recommend a course of narcotic pain medication for this patient until their pain has been sufficiently reduced to a level that I deem acceptable to be treated with alternative treatment options.  [facility] # [id number].     Return in about 2 weeks (around 6/19/2020).        This document has been electronically signed by JOHNNY Bartlett on June 8, 2020 07:56      JOHNNY Bartlett

## 2020-06-06 ENCOUNTER — ANTICOAGULATION VISIT (OUTPATIENT)
Dept: PHARMACY | Facility: HOSPITAL | Age: 65
End: 2020-06-06

## 2020-06-06 ENCOUNTER — TELEPHONE (OUTPATIENT)
Dept: ORTHOPEDIC SURGERY | Facility: CLINIC | Age: 65
End: 2020-06-06

## 2020-06-06 NOTE — PROGRESS NOTES
Spoke with girlfriend Beverly. Reviewed current lab.  No s/s of bleeding. No new meds. No missed doses.     INR n/a  Patient had therapy and a post-op Friday and wasn't able to get out until after lab closed.  Will continue warfarin 5 mg nightly.  Will schedule next INR for 6/8.  Wanted to know if [facility] is ok to get blood work at, instructed to go there and the orders should already be in for you.    Reviewed schedule for following week. Pt read back regimen and verbalized understanding. All questions answered. Next INR on 6/8 provided by  [facility].    Brennan Vincent [facility]  06/06/20  15:21

## 2020-06-06 NOTE — TELEPHONE ENCOUNTER
Attempted phone call to see how patient was doing.  No voice mail identifier and therefore no message left.  06/06/20 at 10:17 AM by Jamarcus Weems MD

## 2020-06-08 ENCOUNTER — ANTICOAGULATION VISIT (OUTPATIENT)
Dept: PHARMACY | Facility: HOSPITAL | Age: 65
End: 2020-06-08

## 2020-06-08 ENCOUNTER — LAB (OUTPATIENT)
Dept: LAB | Facility: OTHER | Age: 65
End: 2020-06-08

## 2020-06-08 DIAGNOSIS — Z96.651 STATUS POST RIGHT KNEE REPLACEMENT: ICD-10-CM

## 2020-06-08 DIAGNOSIS — Z79.01 ANTICOAGULATION MONITORING BY PHARMACIST: ICD-10-CM

## 2020-06-08 DIAGNOSIS — Z96.651 PRESENCE OF TOTAL RIGHT KNEE JOINT PROSTHESIS: ICD-10-CM

## 2020-06-08 DIAGNOSIS — M17.11 PRIMARY OSTEOARTHRITIS OF RIGHT KNEE: ICD-10-CM

## 2020-06-08 LAB
INR PPP: 1.68 (ref 0.8–1.2)
PROTHROMBIN TIME: 19.4 SECONDS (ref 11.1–15.3)

## 2020-06-08 PROCEDURE — 85610 PROTHROMBIN TIME: CPT | Performed by: ORTHOPAEDIC SURGERY

## 2020-06-08 RX ORDER — OXYCODONE AND ACETAMINOPHEN 10; 325 MG/1; MG/1
1 TABLET ORAL EVERY 6 HOURS PRN
Qty: 30 TABLET | Refills: 0 | Status: SHIPPED | OUTPATIENT
Start: 2020-06-08 | End: 2020-06-16 | Stop reason: SDUPTHER

## 2020-06-08 NOTE — TELEPHONE ENCOUNTER
BLUE.  PATIENT IS REQUESTING A REFILL OF PERCOCET 10 MG AT [facility]..  HE HAS TWO PILLS LEFT.

## 2020-06-08 NOTE — TELEPHONE ENCOUNTER
Varsha,  Please let him know that another prescription will not be refilled early.  It is written for every 6 hours and therefore should last 7 days.  SHAMEKA

## 2020-06-08 NOTE — PROGRESS NOTES
Spoke with Mr. Conley over the telephone    Reviewed current lab/INR which is slightly below goal (Goal 1.7-2.5)    Pt reports no s/s of bleeding.   No changes to their medications.  No missed doses of warfarin.   No extreme changes in their diet    Reviewed schedule for following week of: 1.5 tablets tonight and then continued with 1 tablet daily after that    Next INR on 6/15 provided by [facility]  Pt read back regimen and verbalized understanding    Christopher Tong RP  06/08/20  14:37

## 2020-06-10 ENCOUNTER — TELEPHONE (OUTPATIENT)
Dept: ORTHOPEDIC SURGERY | Facility: CLINIC | Age: 65
End: 2020-06-10

## 2020-06-10 DIAGNOSIS — Z96.651 STATUS POST RIGHT KNEE REPLACEMENT: ICD-10-CM

## 2020-06-10 DIAGNOSIS — Z96.651 PRESENCE OF TOTAL RIGHT KNEE JOINT PROSTHESIS: Primary | ICD-10-CM

## 2020-06-10 NOTE — TELEPHONE ENCOUNTER
DR MOORE.  PATIENT CALLED STATING HE IS HAVING TO GO TO PHYSICAL THERAPY 3 TIMES A WEEK.  HE SAID IT IS DIFFICULT TO DRIVE TO [place] THAT MANY TIMES A WEEK. HE IS ASKING IF HE CAN DO THE EXERCISES AT HOME?

## 2020-06-10 NOTE — TELEPHONE ENCOUNTER
Correction:  Patient is going to therapy at [facility].  He states it is a long drive for him from [facility].  He has an exercise bicycle at home and he states he can do all the ROM exercises from home.

## 2020-06-12 ENCOUNTER — OFFICE VISIT (OUTPATIENT)
Dept: FAMILY MEDICINE CLINIC | Facility: CLINIC | Age: 65
End: 2020-06-12

## 2020-06-12 DIAGNOSIS — M15.9 PRIMARY OSTEOARTHRITIS INVOLVING MULTIPLE JOINTS: Chronic | ICD-10-CM

## 2020-06-12 DIAGNOSIS — Z09 HOSPITAL DISCHARGE FOLLOW-UP: Primary | ICD-10-CM

## 2020-06-12 DIAGNOSIS — I10 ESSENTIAL HYPERTENSION: Chronic | ICD-10-CM

## 2020-06-12 DIAGNOSIS — G63 POLYNEUROPATHY ASSOCIATED WITH UNDERLYING DISEASE (HCC): Chronic | ICD-10-CM

## 2020-06-12 DIAGNOSIS — Z96.651 STATUS POST TOTAL KNEE REPLACEMENT, RIGHT: ICD-10-CM

## 2020-06-12 PROCEDURE — 99443 PR PHYS/QHP TELEPHONE EVALUATION 21-30 MIN: CPT | Performed by: INTERNAL MEDICINE

## 2020-06-12 RX ORDER — GABAPENTIN 300 MG/1
CAPSULE ORAL
Qty: 90 CAPSULE | Refills: 0 | Status: SHIPPED | OUTPATIENT
Start: 2020-06-12 | End: 2020-07-10 | Stop reason: SDUPTHER

## 2020-06-12 NOTE — PROGRESS NOTES
Telemedicine visit    You have chosen to receive care through a telephone visit. Do you consent to use a telephone visit for your medical care today? Yes      Chief Complaint   Patient presents with   • Hospital Discharge Follow-Up      Hosp D/C F/U, total right knee replacement     Subjective   Osvaldo Conley Sr. is a 64 y.o. male who is seen via telephone visit for a hospital follow-up.  He was admitted to [facility] on 6/1/2020 and had a right total knee arthroplasty performed by Dr. Weems.  He tolerated this procedure well.  At discharge he was on Coumadin for anticoagulation due to the knee replacement.  He takes Norco for treatment of chronic pain.  This was placed on hold, and his postoperative pain is being treated with Percocet 10/325 mg every 6 hours as needed for pain.  All of his other medications remained the same.  He has been doing well since discharge.  He continues with physical therapy.  His INR is being managed by the anticoagulation clinic in [place].  He has hypertension, and his blood pressure was slightly elevated at times while he was in the hospital.  It has been doing much better since discharge.    The following portions of the patient's history were reviewed and updated as appropriate: allergies, current medications, past family history, past medical history, past social history, past surgical history and problem list.    Review of Systems   Constitutional: Negative for chills, fatigue and fever.   HENT: Negative for congestion, sneezing, sore throat and trouble swallowing.    Eyes: Negative for visual disturbance.   Respiratory: Negative for cough, chest tightness, shortness of breath and wheezing.    Cardiovascular: Negative for chest pain and palpitations.   Gastrointestinal: Negative for abdominal pain, constipation, diarrhea, nausea and vomiting.   Genitourinary: Negative for dysuria, frequency and urgency.   Musculoskeletal: Positive for  arthralgias, back pain and gait problem. Negative for neck pain.   Skin: Negative for rash.   Neurological: Negative for dizziness, weakness and headaches.   Psychiatric/Behavioral:        Patient denies any feelings of depression and has not felt down, hopeless or lost interest in any activities.   All other systems reviewed and are negative.      Objective     Physical Exam   Constitutional: He is oriented to person, place, and time. He appears well-developed and well-nourished. No distress.   Neurological: He is alert and oriented to person, place, and time.   Psychiatric: He has a normal mood and affect. His behavior is normal. Judgment and thought content normal.       Assessment/Plan             Osvaldo was seen today for hospital discharge follow-up.    Diagnoses and all orders for this visit:    Hospital discharge follow-up    Status post total knee replacement, right    Essential hypertension    Primary osteoarthritis involving multiple joints    Polyneuropathy associated with underlying disease (CMS/[facility])  -     gabapentin (NEURONTIN) 300 MG capsule; Take 1 capsule by mouth three times a day         I reviewed records from his recent hospitalization.  These are discussed above in the history of present illness.  He will continue to follow with orthopedics as scheduled.  He will continue with physical therapy.  He will continue with Percocet 10/325 mg every 6 hours as needed for pain as directed by orthopedics.  Once he has finished this medication, he will transition back to Norco 10/325 mg 4 times a day.    Current outpatient and discharge medications have been reconciled for the patient.  Reviewed by: Ruiz Ayon MD    This visit has been rescheduled as a phone visit to comply with patient safety concerns in accordance with CDC recommendations. Total time of discussion was 23 minutes.    PHQ-2/PHQ-9 Depression Screening 2/14/2020   Little interest or pleasure in doing things 0   Feeling down,  depressed, or hopeless 0   Trouble falling or staying asleep, or sleeping too much -   Feeling tired or having little energy -   Poor appetite or overeating -   Feeling bad about yourself - or that you are a failure or have let yourself or your family down -   Trouble concentrating on things, such as reading the newspaper or watching television -   Moving or speaking so slowly that other people could have noticed. Or the opposite - being so fidgety or restless that you have been moving around a lot more than usual -   Thoughts that you would be better off dead, or of hurting yourself in some way -   Total Score 0   If you checked off any problems, how difficult have these problems made it for you to do your work, take care of things at home, or get along with other people? -

## 2020-06-14 NOTE — DISCHARGE SUMMARY
Patient Name: Osvaldo Conley .  Patient YOB: 1955    Date of Admission:  6/1/2020  Date of Discharge:  6/2/2020  Discharge Diagnosis:   Primary osteoarthritis of both knees    Chronic pain of both knees    Internal derangement of left knee    Degenerative tear of posterior horn of lateral meniscus of left knee    Essential hypertension    Impaired functional mobility, balance, gait, and endurance    Impaired mobility and ADLs    Polyneuropathy associated with underlying disease (CMS/HCC)    Generalized anxiety disorder    Anticoagulation monitoring by pharmacist    Presenting Problem/History of Present Illness: Chronic pain of both knees [M25.561, M25.562, G89.29]  Internal derangement of left knee [M23.92]  Degenerative tear of posterior horn of lateral meniscus of left knee [M23.252]  Primary osteoarthritis of both knees [M17.0]  Essential hypertension [I10]  Primary osteoarthritis of both knees [M17.0]    Procedure:  Procedure(s) (LRB):  TOTAL KNEE ARTHROPLASTY  with adductor canal block (Right)    Admitting Physician:  Jamarcus Weems MD    Consults:   Consults     No orders found from 5/3/2020 to 6/2/2020.          DETAILS OF HOSPITAL STAY:  Patient is a 64 y.o. male was admitted to the floor following the above procedure and underwent an uncomplicated hospital stay.  Patient did well with physical therapy and was ambulating well without problems.  On the day of discharge the wound was clean, dry and intact and calf was soft and non tender and Homans sign was negative.  Patient was tolerating diet without problems.  Patient was discharged home.    Condition on Discharge:  Stable      LABS:      Admission on 06/01/2020, Discharged on 06/02/2020   Component Date Value Ref Range Status   • SARS-CoV-2, TONE 05/29/2020 Not Detected  Not Detected Final    This test was developed and its performance characteristics determined  by [facility]. This test has not been FDA cleared  or  approved. This test has been authorized by FDA under an Emergency Use  Authorization (EUA). This test is only authorized for the duration of  time the declaration that circumstances exist justifying the  authorization of the emergency use of in vitro diagnostic tests for  detection of SARS-CoV-2 virus and/or diagnosis of COVID-19 infection  under section 564(b)(1) of the Act, 21 U.S.C. 360bbb-3(b)(1), unless  the authorization is terminated or revoked sooner.  When diagnostic testing is negative, the possibility of a false  negative result should be considered in the context of a patient's  recent exposures and the presence of clinical signs and symptoms  consistent with COVID-19. An individual without symptoms of COVID-19  and who is not shedding SARS-CoV-2 virus would expect to have a  negative (not detected) result in this assay.   • COVID [facility] PRIORITY 05/29/2020 Comment   Final    Received   • Glucose 06/01/2020 117* 65 - 99 mg/dL Final   • BUN 06/01/2020 14  8 - 23 mg/dL Final   • Creatinine 06/01/2020 0.64* 0.76 - 1.27 mg/dL Final   • Sodium 06/01/2020 141  136 - 145 mmol/L Final   • Potassium 06/01/2020 4.2  3.5 - 5.2 mmol/L Final   • Chloride 06/01/2020 106  98 - 107 mmol/L Final   • CO2 06/01/2020 26.0  22.0 - 29.0 mmol/L Final   • Calcium 06/01/2020 8.8  8.6 - 10.5 mg/dL Final   • eGFR Non African Amer 06/01/2020 126  >60 mL/min/1.73 Final   • BUN/Creatinine Ratio 06/01/2020 21.9  7.0 - 25.0 Final   • Anion Gap 06/01/2020 9.0  5.0 - 15.0 mmol/L Final   • WBC 06/01/2020 13.43* 3.40 - 10.80 10*3/mm3 Final   • RBC 06/01/2020 4.96  4.14 - 5.80 10*6/mm3 Final   • Hemoglobin 06/01/2020 15.3  13.0 - 17.7 g/dL Final   • Hematocrit 06/01/2020 43.8  37.5 - 51.0 % Final   • MCV 06/01/2020 88.3  79.0 - 97.0 fL Final   • MCH 06/01/2020 30.8  26.6 - 33.0 pg Final   • MCHC 06/01/2020 34.9  31.5 - 35.7 g/dL Final   • RDW 06/01/2020 13.3  12.3 - 15.4 % Final   • RDW-SD 06/01/2020 42.8  37.0 - 54.0 fl Final   • MPV  06/01/2020 11.9  6.0 - 12.0 fL Final   • Platelets 06/01/2020 201  140 - 450 10*3/mm3 Final   • ABO Type 06/01/2020 O   Final   • RH type 06/01/2020 Positive   Final   • Antibody Screen 06/01/2020 Negative   Final   • T&S Expiration Date 06/01/2020 6/4/2020 11:59:59 PM   Final   • Previous History 06/01/2020 No record on File   Final   • MRSA, PCR 06/01/2020 Negative  Negative Final   • ABO Type 06/01/2020 O   Final   • RH type 06/01/2020 Positive   Final   • Case Report 06/01/2020    Final                    Value:Surgical Pathology Report                         Case: [id number]                                  Authorizing Provider:  Jamarcus Weems MD Collected:           06/01/2020 12:13 PM          Ordering Location:     [facility]             Received:            06/01/2020 01:13 PM                                 [facility]                                                              Pathologist:           Rainer Griffin MD                                                          Specimen:    Knee, Right, bone and soft tissue right knee                                              • Final Diagnosis 06/01/2020    Final                    Value:This result contains rich text formatting which cannot be displayed here.   • Hemoglobin 06/01/2020 13.9  13.0 - 17.7 g/dL Final   • Hematocrit 06/01/2020 40.9  37.5 - 51.0 % Final   • Protime 06/02/2020 12.8  11.1 - 15.3 Seconds Final   • INR 06/02/2020 0.98  0.80 - 1.20 Final       Xr Knee 1 Or 2 View Right    Result Date: 6/1/2020  Narrative: EXAM DESCRIPTION:  XR KNEE 1 OR 2 VW RIGHT CLINICAL HISTORY: 64 years  Male  implant , M25.561 Pain in right knee M25.562 Pain in left knee G89.29 Other chronic pain M23.92 Unspecified internal derangement of left knee M23.252 Derangement of posterior horn of lateral meniscus due to old tear or injury, left knee M17.0 Bilateral primary osteoarthritis of knee I10 Essential (primary) hypertension: Post-op knee  arthroplasty COMPARISON: January 16, 2020 TECHNIQUE: Two views-AP and lateral radiographs of the right knee FINDINGS: The patient is status post arthroplasty of the right knee. The alignment of the prosthesis is satisfactory without radiographic evidence of complication. Skin staples are noted anteriorly. The alignment is anatomic. A surgical drain is noted in the suprapatellar bursa.     Impression: 1. Postoperative changes from total arthroplasty of the right knee with satisfactory alignment and no radiographic evidence of complication. Electronically signed by:  Lyssa Schofield MD  6/1/2020 1:30 PM CDT Workstation: [id number]      Discharge Medications     Discharge Medications      New Medications      Instructions Start Date   warfarin 5 MG tablet  Commonly known as:  COUMADIN   5 mg, Oral, Nightly         Continue These Medications      Instructions Start Date   citalopram 20 MG tablet  Commonly known as:  CeleXA   20 mg, Oral, Daily      hydroCHLOROthiazide 25 MG tablet  Commonly known as:  HYDRODIURIL   25 mg, Oral, Daily      losartan 50 MG tablet  Commonly known as:  COZAAR   50 mg, Oral, Daily      metroNIDAZOLE 1 % gel  Commonly known as:  METROGEL   Apply to nose twice a day      rOPINIRole 0.5 MG tablet  Commonly known as:  REQUIP   0.5 mg, Oral, Nightly, Take 1 hour before bedtime.      sildenafil 100 MG tablet  Commonly known as:  VIAGRA   100 mg, Oral, Daily PRN      simvastatin 40 MG tablet  Commonly known as:  ZOCOR   40 mg, Oral, Nightly         Stop These Medications    HYDROcodone-acetaminophen  MG per tablet  Commonly known as:  NORCO            Discharge Diet:   Diet Instructions     Diet: Regular      Discharge Diet:  Regular          Activity at Discharge:   Activity Instructions     Activity as Tolerated            Discharge Instructions: Patient is to continue with physical therapy exercises daily and continue working with the physical therapist as ordered. Patient may weight bear as  tolerated. Continue ice regularly and use ace bandages from the foot toward the knee as needed for swelling. Patient instructed on frequent calf pumping exercises.  Patient also instructed on incentive spirometer during hospitalization and encouraged to continue to use at home regularly. Patient may shower on POD#2. The wound should be gently cleaned with antibacterial soap then allowed to dry.  If there is any drainage then it can be covered with a dry sterile dressing.  If there is drainage, redness or swelling, then the physician should be notified. Follow up appointment in 2 weeks - patient to call the office at [phone] to schedule.  Coumadin management per hospital pharmacy.  All other meds per the discharge med/rec    Discharge Diagnosis:    Patient Active Problem List   Diagnosis   • Restless leg syndrome   • Polyneuropathy associated with underlying disease (CMS/HCC)   • Primary osteoarthritis involving multiple joints   • Mixed hyperlipidemia   • Generalized anxiety disorder   • Essential hypertension   • Peripheral edema   • Impaired glucose tolerance   • COPD, mild (CMS/HCC)   • Primary osteoarthritis of both knees   • Leukocytosis   • Polycythemia   • Encounter for tobacco use cessation counseling   • Chronic pain of both knees   • Internal derangement of left knee   • Degenerative tear of posterior horn of lateral meniscus of left knee   • Status post right knee replacement   • Presence of total right knee joint prosthesis   • Primary osteoarthritis of right knee       Follow-up Appointments  Future Appointments   Date Time Provider Department Center   6/16/2020  9:30 AM Liss Goyal APRN [facility] None   6/24/2020  2:45 PM Enmanuel Fontanez MD [facility] None   7/10/2020 11:00 AM Ruiz Ayon MD [facility] None   9/8/2020 10:00 AM NURSE [facility]   9/8/2020 10:30 AM Kishor Cruz MD [facility]     Additional Instructions for the Follow-ups that You  "Need to Schedule     Ambulatory Referral to Physical Therapy Evaluate and treat   As directed      [facility] PT:  ROM/STR as tolerated  Progress weight bearing as pain allows  Swelling control  Teach HEP  Call with any questions.    Order Comments:  [facility] PT: ROM/STR as tolerated Progress weight bearing as pain allows Swelling control Teach HEP Call with any questions.     Specialty needed:  Evaluate and treat         Call MD With Problems / Concerns   As directed      Instructions:   Call the office with questions, problems, or concerns.  Remember that pain medication can not be \"called in\" but a prescription must be written and picked up from the office.   So, be sure to notify the office with a little notice prior to running out.    Order Comments:  Instructions: Call the office with questions, problems, or concerns. Remember that pain medication can not be \"called in\" but a prescription must be written and picked up from the office. So, be sure to notify the office with a little notice prior to running out.          Discharge Follow-up with Specified Provider: KIMBERLEY Mcdermott T Young; 2 Weeks   As directed      To:  KIMBERLEY Mcdermott T Young    Follow Up:  2 Weeks         Protime-INR  (Twice a Week)  Jun 02, 2021               Jamarcus Weems MD  06/14/20  07:37  "

## 2020-06-15 ENCOUNTER — ANTICOAGULATION VISIT (OUTPATIENT)
Dept: PHARMACY | Facility: HOSPITAL | Age: 65
End: 2020-06-15

## 2020-06-15 ENCOUNTER — LAB (OUTPATIENT)
Dept: LAB | Facility: OTHER | Age: 65
End: 2020-06-15

## 2020-06-15 DIAGNOSIS — Z79.01 ANTICOAGULATION MONITORING BY PHARMACIST: ICD-10-CM

## 2020-06-15 LAB
INR PPP: 1.96 (ref 0.8–1.2)
PROTHROMBIN TIME: 21.9 SECONDS (ref 11.1–15.3)

## 2020-06-15 PROCEDURE — 85610 PROTHROMBIN TIME: CPT | Performed by: ORTHOPAEDIC SURGERY

## 2020-06-15 NOTE — PROGRESS NOTES
Spoke with Mr. Conley over the telephone    Reviewed current lab/INR which is within goal (Goal 1.7-2.5)    Pt reports no s/s of bleeding.   No changes to their medications.  No missed doses of warfarin.   No extreme changes in their diet    Reviewed schedule for following week of: 1.5 tablets tonight followed by 1 tablet all other nights   Next INR on 6/22 provided by ANTONIO Vaca  Pt read back regimen and verbalized understanding    Christopher Tong [facility]  06/15/20  14:23

## 2020-06-16 ENCOUNTER — OFFICE VISIT (OUTPATIENT)
Dept: ORTHOPEDIC SURGERY | Facility: CLINIC | Age: 65
End: 2020-06-16

## 2020-06-16 VITALS — BODY MASS INDEX: 27.44 KG/M2 | WEIGHT: 196 LBS | HEIGHT: 71 IN

## 2020-06-16 DIAGNOSIS — Z96.651 STATUS POST RIGHT KNEE REPLACEMENT: ICD-10-CM

## 2020-06-16 DIAGNOSIS — Z96.651 PRESENCE OF TOTAL RIGHT KNEE JOINT PROSTHESIS: Primary | ICD-10-CM

## 2020-06-16 DIAGNOSIS — M17.11 PRIMARY OSTEOARTHRITIS OF RIGHT KNEE: ICD-10-CM

## 2020-06-16 DIAGNOSIS — M25.561 CHRONIC PAIN OF BOTH KNEES: ICD-10-CM

## 2020-06-16 DIAGNOSIS — M25.562 CHRONIC PAIN OF BOTH KNEES: ICD-10-CM

## 2020-06-16 DIAGNOSIS — Z96.651 PRESENCE OF TOTAL RIGHT KNEE JOINT PROSTHESIS: ICD-10-CM

## 2020-06-16 DIAGNOSIS — G89.29 CHRONIC PAIN OF BOTH KNEES: ICD-10-CM

## 2020-06-16 PROCEDURE — 99024 POSTOP FOLLOW-UP VISIT: CPT | Performed by: NURSE PRACTITIONER

## 2020-06-16 RX ORDER — OXYCODONE AND ACETAMINOPHEN 10; 325 MG/1; MG/1
1 TABLET ORAL EVERY 8 HOURS PRN
Qty: 30 TABLET | Refills: 0 | Status: SHIPPED | OUTPATIENT
Start: 2020-06-16 | End: 2020-06-23 | Stop reason: SDUPTHER

## 2020-06-16 NOTE — PROGRESS NOTES
Osvaldo Conley . is a 64 y.o. male is s/p       Chief Complaint   Patient presents with   • Right Knee - Follow-up   • Suture / Staple Removal     HISTORY OF PRESENT ILLNESS: Patient presents to office for postoperative follow-up status post right total knee arthroplasty performed per Dr. Weems on 6/1/2020.  Patient is improved from prior exam.  Right knee pain and swelling are gradually improving.  It is unclear if patient is attending physical therapy as instructed.  He cites transportation issues.  [facility]  He states that he can do the home exercises at home on his own.  No new complaints or concerns noted.  Patient continues to take Percocet as needed for pain.  He requests a refill of this medication today.  Patient continues to take Coumadin as directed for DVT prophylaxis.  Patient is ambulating in office today with use of his cane and an antalgic gait.  No falls or injuries reported.  X-rays are performed in office today.  Staples are removed today.    Allergies   Allergen Reactions   • Codeine GI Intolerance         Current Outpatient Medications:   •  citalopram (CeleXA) 20 MG tablet, Take 1 tablet by mouth Daily., Disp: 30 tablet, Rfl: 11  •  gabapentin (NEURONTIN) 300 MG capsule, Take 1 capsule by mouth three times a day, Disp: 90 capsule, Rfl: 0  •  hydroCHLOROthiazide (HYDRODIURIL) 25 MG tablet, Take 1 tablet by mouth Daily., Disp: 30 tablet, Rfl: 11  •  losartan (COZAAR) 50 MG tablet, Take 1 tablet by mouth Daily., Disp: 30 tablet, Rfl: 11  •  metroNIDAZOLE (METROGEL) 1 % gel, Apply to nose twice a day, Disp: 60 g, Rfl: 1  •  rOPINIRole (REQUIP) 0.5 MG tablet, Take 1 tablet by mouth Every Night. Take 1 hour before bedtime., Disp: 30 tablet, Rfl: 11  •  simvastatin (ZOCOR) 40 MG tablet, Take 1 tablet by mouth Every Night., Disp: 30 tablet, Rfl: 11  •  warfarin (Coumadin) 5 MG tablet, Take 1 tablet by mouth Every Night., Disp:  50 tablet, Rfl: 0  •  oxyCODONE-acetaminophen (PERCOCET)  MG per tablet, Take 1 tablet by mouth Every 8 (Eight) Hours As Needed for Moderate Pain ., Disp: 30 tablet, Rfl: 0    No fevers or chills.  No nausea or vomiting. Right knee pain.       PHYSICAL EXAMINATION:       Osvaldo Conley Sr. is a 64 y.o. male    Patient is awake and alert, answers questions appropriately and is in no apparent distress.    GAIT:     []  Normal  [x]  Antalgic    Assistive device: []  None  []  Walker     []  Crutches  [x]  Cane     []  Wheelchair  []  Stretcher    Right Knee Exam     Tenderness   Right knee tenderness location: Diffuse.    Range of Motion   Extension: 5   Flexion: 90     Other   Erythema: absent  Sensation: normal  Pulse: present  Swelling: mild  Effusion: effusion present    Comments:  Pain and limitations with range of motion.  Mild to moderate swelling/effusion noted, improved from prior exam.  Surgical incision is well approximated with no erythema no drainage noted.  No signs of infection noted.  Calf is soft and nontender. Homans sign is negative.            Xr Knee 1 Or 2 View Right    Result Date: 6/16/2020  Narrative: Lateral view of the right knee reveals postsurgical changes post total knee arthroplasty.  Prostheses appear well-positioned and well-aligned with no evidence of hardware failure or loosening noted.  There is surrounding soft tissue swelling noted.  Subcutaneous staples are present to the anterior knee.  No significant changes are noted when compared with prior images from 6/1/2020.  The previously noted surgical drain has been removed.  No acute bony radiologic abnormalities are noted at this time.06/16/20 at 5:29 PM by JOHNNY Bartlett     Xr Knee 1 Or 2 View Right    Result Date: 6/1/2020  Narrative: EXAM DESCRIPTION:  XR KNEE 1 OR 2 VW RIGHT CLINICAL HISTORY: 64 years  Male  implant , M25.561 Pain in right knee M25.562 Pain in left knee G89.29 Other chronic pain M23.92 Unspecified  internal derangement of left knee M23.252 Derangement of posterior horn of lateral meniscus due to old tear or injury, left knee M17.0 Bilateral primary osteoarthritis of knee I10 Essential (primary) hypertension: Post-op knee arthroplasty COMPARISON: January 16, 2020 TECHNIQUE: Two views-AP and lateral radiographs of the right knee FINDINGS: The patient is status post arthroplasty of the right knee. The alignment of the prosthesis is satisfactory without radiographic evidence of complication. Skin staples are noted anteriorly. The alignment is anatomic. A surgical drain is noted in the suprapatellar bursa.     Impression: 1. Postoperative changes from total arthroplasty of the right knee with satisfactory alignment and no radiographic evidence of complication. Electronically signed by:  Lyssa Schofield MD  6/1/2020 1:30 PM CDT Workstation: [id number]    Xr Knee Bilateral Ap Standing    Result Date: 6/16/2020  Narrative: AP standing view of the bilateral knees reveals postsurgical changes in the right knee post total knee arthroplasty.  Prostheses appear well-positioned and well-aligned with no evidence of hardware failure or loosening noted.  The presence of the right knee implant is new in the interim when compared with prior images from 1/16/2020.  There are moderate degenerative changes noted in the left knee with diminished joint spacing and marginal osteophyte formations noted in the medial compartment.  The degenerative changes in the left knee appear unchanged when compared with prior images from 1/16/2020.  No acute bony radiologic abnormalities are noted at this time.06/16/20 at 5:32 PM by JOHNNY Bartlett         ASSESSMENT:    Diagnoses and all orders for this visit:    Presence of total right knee joint prosthesis  -     XR Knee 1 or 2 View Right  -     XR Knee Bilateral AP Standing    Status post right knee replacement  -     XR Knee 1 or 2 View Right  -     XR Knee Bilateral AP Standing    Primary  osteoarthritis of right knee  -     XR Knee 1 or 2 View Right  -     XR Knee Bilateral AP Standing    Chronic pain of both knees  -     XR Knee 1 or 2 View Right  -     XR Knee Bilateral AP Standing    PLAN    AP standing x-ray of the bilateral knees with a lateral x-ray of the right knee performed in office today and reviewed with no acute findings noted.  Right knee implant appears stable and unchanged.  Overall, the patient is doing well postoperatively and is improved from his prior exam.  Right knee pain has been gradually improving.  Swelling is better controlled than last office visit.  Surgical incision is healing as expected with no signs of infection noted.  Staples are removed today and Steri-Strips placed.  Wound care instructions reviewed, including care of Steri-Strips.  Patient is instructed to continue to monitor the incisions/knee for any signs of infection including redness, increased swelling, increased tenderness, increased warmth and/or purulent drainage.  Patient is instructed to notify the office immediately if any such signs of infection are noted.  Recommend to continue with current course of physical therapy.  It is unclear if he is actually attending physical therapy.  Patient states that he cannot attend physical therapy that many days per week.  We discussed the importance of physical therapy so that he has good strength and range of motion in his right knee.  Patient verbalized understanding and states that he would be willing to go once per week and do home exercise program.  Continue with gradual progression of activity and weightbearing as tolerated.  Continue with use of his cane or a walker as needed for modified weightbearing.  Continue with elevation and ice therapy to the right knee as needed to minimize pain/swelling/inflammation.  Continue Coumadin therapy as directed for DVT prophylaxis for total of 6 weeks postop, as managed per hospital pharmacy.  Patient continues to take  Percocet as needed for pain and requests a refill today.  This is refilled for the patient per Dr. Weems.  Patient is reminded to take pain medication as prescribed and to take the least amount of pain medication needed to control his pain.  He is cautioned that opioids can be addictive.  Recommend Tylenol as needed for milder pain.  Follow-up in 4 weeks for recheck.    This patient has underwent a recent surgical procedure.  Therefore, I will recommend a course of narcotic pain medication for this patient until their pain has been sufficiently reduced to a level that I deem acceptable to be treated with alternative treatment options.  Patient will continue with non-opioid pain management methods as well including physical therapy, home exercises, elevation, ice therapy and modified weightbearing with use of his cane. [facility] reviewed # [id number].     Patient's Body mass index is 27.34 kg/m². BMI is within normal parameters. No follow-up required.     Return in about 4 weeks (around 7/14/2020) for Recheck.      This document has been electronically signed by JOHNNY Bartlett on June 17, 2020 17:51      OJHNNY Bartlett

## 2020-06-16 NOTE — TELEPHONE ENCOUNTER
BLUE        Pt CALLED TO CHECK IF WE HAD SENT HIS PAIN MEDICATION IN TO THE PHARMACY YET     I INFORMED HIM NO MEDICATION HAD BRYAN SENT YET     PLEASE INFORM Pt ONCE MEDICATION HAS BEEN SENT    CALL BACK # [phone]

## 2020-06-22 ENCOUNTER — LAB (OUTPATIENT)
Dept: LAB | Facility: OTHER | Age: 65
End: 2020-06-22

## 2020-06-22 ENCOUNTER — ANTICOAGULATION VISIT (OUTPATIENT)
Dept: PHARMACY | Facility: HOSPITAL | Age: 65
End: 2020-06-22

## 2020-06-22 DIAGNOSIS — Z79.01 ANTICOAGULATION MONITORING BY PHARMACIST: ICD-10-CM

## 2020-06-22 LAB
INR PPP: 1.7 (ref 0.8–1.2)
PROTHROMBIN TIME: 19.6 SECONDS (ref 11.1–15.3)

## 2020-06-22 PROCEDURE — 85610 PROTHROMBIN TIME: CPT | Performed by: ORTHOPAEDIC SURGERY

## 2020-06-22 NOTE — PROGRESS NOTES
Spoke with Mr Conley. Reviewed current lab.  No s/s of bleeding. No new meds. No missed doses. Reviewed schedule for following week.     INR 1.7, therapeutic, trending appropriately past three weeks.  Will continue to trend current regimen, warfarin 7.5 mg on Mo and warfarin 5 mg all other days.    Pt read back regimen and verbalized understanding. All questions answered. Next INR on 6/29 provided by ANTONIO Vaca.    Brennan Vincent [facility]  06/22/20  15:53

## 2020-06-23 DIAGNOSIS — Z96.651 PRESENCE OF TOTAL RIGHT KNEE JOINT PROSTHESIS: ICD-10-CM

## 2020-06-23 DIAGNOSIS — Z96.651 STATUS POST RIGHT KNEE REPLACEMENT: ICD-10-CM

## 2020-06-23 DIAGNOSIS — M17.11 PRIMARY OSTEOARTHRITIS OF RIGHT KNEE: ICD-10-CM

## 2020-06-23 RX ORDER — OXYCODONE AND ACETAMINOPHEN 10; 325 MG/1; MG/1
1 TABLET ORAL EVERY 8 HOURS PRN
Qty: 30 TABLET | Refills: 0 | Status: SHIPPED | OUTPATIENT
Start: 2020-06-26 | End: 2020-07-06 | Stop reason: ALTCHOICE

## 2020-06-23 RX ORDER — OXYCODONE AND ACETAMINOPHEN 10; 325 MG/1; MG/1
1 TABLET ORAL EVERY 8 HOURS PRN
Qty: 30 TABLET | Refills: 0 | Status: SHIPPED | OUTPATIENT
Start: 2020-06-26 | End: 2020-06-23

## 2020-06-24 ENCOUNTER — OFFICE VISIT (OUTPATIENT)
Dept: OTOLARYNGOLOGY | Facility: CLINIC | Age: 65
End: 2020-06-24

## 2020-06-24 VITALS — HEIGHT: 71 IN | WEIGHT: 197 LBS | TEMPERATURE: 98.2 F | OXYGEN SATURATION: 97 % | BODY MASS INDEX: 27.58 KG/M2

## 2020-06-24 DIAGNOSIS — Z86.018 HISTORY OF INVERTED PAPILLOMA: ICD-10-CM

## 2020-06-24 DIAGNOSIS — M95.0 ACQUIRED NASAL DEFORMITY: Primary | ICD-10-CM

## 2020-06-24 DIAGNOSIS — J34.89 NASAL SEPTAL PERFORATION: ICD-10-CM

## 2020-06-24 DIAGNOSIS — Z85.828 PERSONAL HISTORY OF MALIGNANT NEOPLASM OF SKIN: ICD-10-CM

## 2020-06-24 PROCEDURE — 31231 NASAL ENDOSCOPY DX: CPT | Performed by: OTOLARYNGOLOGY

## 2020-06-24 PROCEDURE — 99213 OFFICE O/P EST LOW 20 MIN: CPT | Performed by: OTOLARYNGOLOGY

## 2020-06-27 NOTE — PROGRESS NOTES
Subjective   Osvaldo Conley Sr. is a 64 y.o. male.       History of Present Illness   Patient is followed with a history of previous left-sided medial maxillectomy performed by Dr. Del Cid back in 2000.  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  Also has a nasal septal perforation.  Subsequently in 2016 underwent excision of a basal cell carcinoma of the left nose that penetrated full-thickness and required a flap reconstruction.  He has developed a subsequent external nasal deformity due to loss of alar cartilage.  Previously had undergone biopsy of a mass in the left nostril for possible recurrent inverted papilloma however this showed only granulation tissue.  Patient was referred to Dr. Patel at the [facility] for possible nasal reconstruction, but apparently had issues with his telephones and was never able to be contacted with an appointment date.  Patient returns today stating his nose remains congested and he is again interested in an appointment in [place] for nasal reconstruction.  He states he has 2 new telephones that should hopefully work reliably.      The following portions of the patient's history were reviewed and updated as appropriate: allergies, current medications, past family history, past medical history, past social history, past surgical history and problem list.     reports that he has been smoking cigarettes. He has a 50.00 pack-year smoking history. He has never used smokeless tobacco. He reports that he drinks alcohol. He reports that he does not use drugs.   Patient is a tobacco user and has been counseled for use of tobacco products      Review of Systems   Constitutional: Negative for fever.           Objective   Physical Exam  General: Well-developed well-nourished male in no acute distress.  Alert and oriented x3.Voice:Strong. Speech:Fluent  Ears: External ears no deformity, canals no discharge, tympanic membranes intact clear and mobile  bilaterally.  Nose: External deformity with collapse of the left lateral nasal ala and subsequent narrowing of the left naris.  No evidence of recurrence of cutaneous malignancy.  Also has a large chronic septal perforation that is well epithelialized without evidence of neoplasm.  Anterior rhinoscopy is insufficient to evaluate for recurrent tumor.  Oral cavity: Lips and gums without lesions.  Tongue and floor of mouth without lesions.  Parotid and submandibular ducts unobstructed.  No mucosal lesions on the buccal mucosa or vestibule of the mouth.  Pharynx: No erythema exudate mass or ulcer  Neck: No lymphadenopathy.  No thyromegaly.  Trachea and larynx midline.  No masses in the parotid or submandibular glands.  Nasal endoscopy is performed: Olvin-Synephrine and Xylocaine are instilled the nares bilaterally.  0° scope is passed into each nostril.  The inferior, middle, and superior turbinates as well as nasal septum and nasopharynx are examined.  Pertinent findings include: Large septal perforation unchanged from previous.  Defect in the left medial maxilla with no evidence of recurrent tumor.  The middle turbinate shows some minimal crusting but no evidence of neoplasm or bleeding. No mass in the nasopharynx..    Assessment/Plan   Osvaldo was seen today for follow-up.    Diagnoses and all orders for this visit:    Acquired nasal deformity    History of inverted papilloma    Nasal septal perforation    Personal history of malignant neoplasm of skin        Plan: Reassurance that I saw no evidence of recurrent tumor either intranasally or externally.  Advised smoking cessation.  I will again request consultation from Dr. Patel at the [facility] for possible nasal reconstruction.  Patient may return to me in 6 months or sooner at  discretion if local follow-up needed after procedure.

## 2020-06-29 ENCOUNTER — ANTICOAGULATION VISIT (OUTPATIENT)
Dept: PHARMACY | Facility: HOSPITAL | Age: 65
End: 2020-06-29

## 2020-06-29 ENCOUNTER — LAB (OUTPATIENT)
Dept: LAB | Facility: OTHER | Age: 65
End: 2020-06-29

## 2020-06-29 DIAGNOSIS — Z79.01 ANTICOAGULATION MONITORING BY PHARMACIST: ICD-10-CM

## 2020-06-29 LAB
INR PPP: 1.78 (ref 0.8–1.2)
PROTHROMBIN TIME: 20.9 SECONDS (ref 11.1–15.3)

## 2020-06-29 PROCEDURE — 85610 PROTHROMBIN TIME: CPT | Performed by: ORTHOPAEDIC SURGERY

## 2020-06-29 NOTE — PROGRESS NOTES
Spoke with Mr Conley. Reviewed current lab.  No s/s of bleeding. No new meds. No missed doses. Reviewed schedule for following week.     INR 1.78, therapeutic.  Will continue current regimen this week again.  Mentioned next week will be the last blood check due to ending on 7/12.    Pt read back regimen and verbalized understanding. All questions answered. Next INR on 7/6 provided by ANTONIO Vaca.    Brennan Vincent [facility]  06/29/20  16:37

## 2020-07-06 ENCOUNTER — ANTICOAGULATION VISIT (OUTPATIENT)
Dept: PHARMACY | Facility: HOSPITAL | Age: 65
End: 2020-07-06

## 2020-07-06 ENCOUNTER — LAB (OUTPATIENT)
Dept: LAB | Facility: OTHER | Age: 65
End: 2020-07-06

## 2020-07-06 DIAGNOSIS — Z96.651 STATUS POST RIGHT KNEE REPLACEMENT: Primary | ICD-10-CM

## 2020-07-06 DIAGNOSIS — Z79.01 ANTICOAGULATION MONITORING BY PHARMACIST: ICD-10-CM

## 2020-07-06 LAB
INR PPP: 1.47 (ref 0.8–1.2)
PROTHROMBIN TIME: 18 SECONDS (ref 11.1–15.3)

## 2020-07-06 PROCEDURE — 85610 PROTHROMBIN TIME: CPT | Performed by: ORTHOPAEDIC SURGERY

## 2020-07-06 RX ORDER — OXYCODONE AND ACETAMINOPHEN 7.5; 325 MG/1; MG/1
1 TABLET ORAL EVERY 8 HOURS PRN
Qty: 30 TABLET | Refills: 0 | Status: SHIPPED | OUTPATIENT
Start: 2020-07-06 | End: 2020-07-14 | Stop reason: ALTCHOICE

## 2020-07-06 RX ORDER — OXYCODONE AND ACETAMINOPHEN 10; 325 MG/1; MG/1
1 TABLET ORAL EVERY 8 HOURS PRN
Qty: 30 TABLET | Refills: 0 | Status: CANCELLED | OUTPATIENT
Start: 2020-07-06

## 2020-07-06 NOTE — TELEPHONE ENCOUNTER
Tried calling to notify patient. Family member states he is unavailable and she will have him call our office.

## 2020-07-06 NOTE — PROGRESS NOTES
Spoke with Beverly. Reviewed current lab.  No s/s of bleeding. No new meds. No missed doses. Reviewed schedule for following week.     No significant findings that she knew of.  INR 1.5, slightly subtherapeutic today after being within goal for quite some time.  Will give warfarin 7.5 mg on MoWe and warfarin 5 mg TuThFrSaSu.  No further blood work, last day is 7/12.    Pt read back regimen and verbalized understanding. All questions answered.     Brennan Vincent [facility]  07/06/20  15:31

## 2020-07-06 NOTE — TELEPHONE ENCOUNTER
OSCAR      Pt CALLED REQUESTING A REFILL OF   OXYCODONE (PERCOCET) 10  LAST FILL 6/26/2020        PLEASE CALL Pt ONCE REQUEST HAS BEEN ANSWERED [phone]      Changed dosage to 7.5mg  Still only maximal of 10 days.  07/06/20 at 1:03 PM by Jamarcus Weems MD

## 2020-07-10 ENCOUNTER — OFFICE VISIT (OUTPATIENT)
Dept: FAMILY MEDICINE CLINIC | Facility: CLINIC | Age: 65
End: 2020-07-10

## 2020-07-10 DIAGNOSIS — I10 ESSENTIAL HYPERTENSION: Primary | Chronic | ICD-10-CM

## 2020-07-10 DIAGNOSIS — F41.1 GENERALIZED ANXIETY DISORDER: Chronic | ICD-10-CM

## 2020-07-10 DIAGNOSIS — G63 POLYNEUROPATHY ASSOCIATED WITH UNDERLYING DISEASE (HCC): Chronic | ICD-10-CM

## 2020-07-10 DIAGNOSIS — M15.9 PRIMARY OSTEOARTHRITIS INVOLVING MULTIPLE JOINTS: Chronic | ICD-10-CM

## 2020-07-10 PROCEDURE — 99442 PR PHYS/QHP TELEPHONE EVALUATION 11-20 MIN: CPT | Performed by: INTERNAL MEDICINE

## 2020-07-10 RX ORDER — GABAPENTIN 300 MG/1
CAPSULE ORAL
Qty: 90 CAPSULE | Refills: 0 | Status: SHIPPED | OUTPATIENT
Start: 2020-07-10 | End: 2020-08-07 | Stop reason: SDUPTHER

## 2020-07-10 NOTE — PROGRESS NOTES
Telemedicine visit    You have chosen to receive care through a telehealth visit.  Do you consent to use a video/audio connection for your medical care today? Yes     Chief Complaint   Patient presents with   • Osteoarthritis     1 month f/u med refill    • Peripheral Neuropathy     Subjective   Osvaldo Conley Sr. is a 64 y.o. male who is seen via telehealth video visit for follow-up. He has hypertension and his blood pressure has been controlled. He has osteoarthritis which causes chronic back and joint pain.  He takes Norco 10/325 mg 4 times a day for treatment of the chronic pain.  He also takes gabapentin 3 times a day for treatment of the neuropathic pain. He had an aberrant urine drug screen on 2/4/2020.  As a result, he is now being seen monthly for closer monitoring.      He had a right total knee arthroplasty completed last month.  His Norco has been on hold since his knee replacement surgery.  He is currently taking Percocet 7.5/325 mg 3 times a day as needed for his postoperative knee pain.  He last received a prescription for 30 tablets on 7/6/2020.    He has restless leg syndrome which is well managed with Requip.  He takes Celexa for treatment of anxiety.  His anxiety has been well managed.     The following portions of the patient's history were reviewed and updated as appropriate: allergies, current medications, past family history, past medical history, past social history, past surgical history and problem list.    Review of Systems   Constitutional: Negative for chills, fatigue and fever.   HENT: Negative for congestion, sneezing, sore throat and trouble swallowing.    Eyes: Negative for visual disturbance.   Respiratory: Negative for cough, chest tightness, shortness of breath and wheezing.    Cardiovascular: Negative for chest pain and palpitations.   Gastrointestinal: Negative for abdominal pain, constipation, diarrhea, nausea and vomiting.   Genitourinary: Negative for  dysuria, frequency and urgency.   Musculoskeletal: Positive for arthralgias, back pain and gait problem. Negative for neck pain.   Skin: Negative for rash.   Neurological: Negative for dizziness, weakness and headaches.   Psychiatric/Behavioral:        Patient denies any feelings of depression and has not felt down, hopeless or lost interest in any activities.   All other systems reviewed and are negative.      Objective     Physical Exam   Constitutional: He is oriented to person, place, and time. He appears well-developed and well-nourished. No distress.   HENT:   Head: Normocephalic and atraumatic.   Right Ear: Hearing and external ear normal.   Left Ear: Hearing and external ear normal.   Nose: Nose normal.   Eyes: Pupils are equal, round, and reactive to light. EOM are normal. Right eye exhibits no discharge. Left eye exhibits no discharge.   Neck: Normal range of motion.   Pulmonary/Chest: Effort normal.   Neurological: He is alert and oriented to person, place, and time. No cranial nerve deficit.   Psychiatric: He has a normal mood and affect. His behavior is normal. Judgment and thought content normal.       Assessment/Plan        Osvaldo was seen today for osteoarthritis and peripheral neuropathy.    Diagnoses and all orders for this visit:    Essential hypertension    Primary osteoarthritis involving multiple joints    Polyneuropathy associated with underlying disease (CMS/[facility])  -     gabapentin (NEURONTIN) 300 MG capsule; Take 1 capsule by mouth three times a day    Generalized anxiety disorder      His blood pressure is controlled and he will continue with his current blood pressure medication. He will continue with gabapentin for the neuropathic pain.  He will also continue Percocet 7.5/325 mg 3 times a day for treatment of the postoperative knee pain and his chronic arthritic pain.  Once his orthopedic surgeon discontinues Percocet, I will plan to switch him back to Norco 10/325 mg.  He will continue  to hold off on the Norco for now.  He will continue with Requip for treatment of the restless leg syndrome.  He will continue with Celexa for treatment of his anxiety.      This visit has been rescheduled as a phone visit to comply with patient safety concerns in accordance with [facility] recommendations. Total time of discussion was 15 minutes.    Patient understands the risks associated with this controlled medication, including tolerance and addiction.  Patient also agrees to only obtain this medication from me, and not from a another provider, unless that provider is covering for me in my absence.  Patient also agrees to be compliant in dosing, and not self adjust the dose of medication.  A signed controlled substance agreement is on file, and the patient has received a controlled substance education sheet at this or a previous visit.  The patient has also signed a consent for treatment with a controlled substance as per [facility] policy. SANDRA was obtained.    CONTROLLED SUBSTANCE TRACKING 8/19/2019 11/19/2019 2/14/2020 3/12/2020 5/1/2020 6/12/2020 7/10/2020   Last Sandra 8/19/2019 11/19/2019 2/14/2020 3/12/2020 5/1/2020 6/12/2020 7/10/2020   Report Number [id number]   Last UDS - - 2/4/2020 - - - -   Last Controlled Substance Agreement 8/19/2019 11/19/2019 2/14/2020 3/12/2020 - - -   Prior UDT result - - Aberrant - - - -   Pill count - - - Expected - - -   Comments - - Visits changed to monthly due to urine drug screen - - - -              PHQ-2/PHQ-9 Depression Screening 7/10/2020   Little interest or pleasure in doing things 0   Feeling down, depressed, or hopeless 0   Trouble falling or staying asleep, or sleeping too much -   Feeling tired or having little energy -   Poor appetite or overeating -   Feeling bad about yourself - or that you are a failure or have let yourself or your family down -   Trouble concentrating on things, such as reading the  newspaper or watching television -   Moving or speaking so slowly that other people could have noticed. Or the opposite - being so fidgety or restless that you have been moving around a lot more than usual -   Thoughts that you would be better off dead, or of hurting yourself in some way -   Total Score 0   If you checked off any problems, how difficult have these problems made it for you to do your work, take care of things at home, or get along with other people? -

## 2020-07-14 ENCOUNTER — TELEPHONE (OUTPATIENT)
Dept: PHARMACY | Facility: HOSPITAL | Age: 65
End: 2020-07-14

## 2020-07-14 ENCOUNTER — OFFICE VISIT (OUTPATIENT)
Dept: ORTHOPEDIC SURGERY | Facility: CLINIC | Age: 65
End: 2020-07-14

## 2020-07-14 VITALS — HEIGHT: 71 IN | WEIGHT: 203 LBS | BODY MASS INDEX: 28.42 KG/M2

## 2020-07-14 DIAGNOSIS — Z96.651 PRESENCE OF TOTAL RIGHT KNEE JOINT PROSTHESIS: ICD-10-CM

## 2020-07-14 DIAGNOSIS — F17.210 HEAVY CIGARETTE SMOKER (20-39 PER DAY): ICD-10-CM

## 2020-07-14 DIAGNOSIS — J44.9 COPD, MILD (HCC): ICD-10-CM

## 2020-07-14 DIAGNOSIS — F41.1 GENERALIZED ANXIETY DISORDER: ICD-10-CM

## 2020-07-14 DIAGNOSIS — Z96.651 STATUS POST RIGHT KNEE REPLACEMENT: Primary | ICD-10-CM

## 2020-07-14 DIAGNOSIS — M17.11 PRIMARY OSTEOARTHRITIS OF RIGHT KNEE: ICD-10-CM

## 2020-07-14 DIAGNOSIS — M15.9 PRIMARY OSTEOARTHRITIS INVOLVING MULTIPLE JOINTS: Primary | ICD-10-CM

## 2020-07-14 PROCEDURE — 99024 POSTOP FOLLOW-UP VISIT: CPT | Performed by: ORTHOPAEDIC SURGERY

## 2020-07-14 RX ORDER — HYDROCODONE BITARTRATE AND ACETAMINOPHEN 10; 325 MG/1; MG/1
1 TABLET ORAL EVERY 6 HOURS PRN
Qty: 120 TABLET | Refills: 0 | Status: SHIPPED | OUTPATIENT
Start: 2020-07-14 | End: 2020-08-11 | Stop reason: SDUPTHER

## 2020-07-14 NOTE — PROGRESS NOTES
Followed up with patient about discontinuing warfarin. Stated he had no issues with bruising or bleeding and had already disposed of his remaining medication.

## 2020-07-14 NOTE — TELEPHONE ENCOUNTER
He may have Norco 10/325 mg, 1 tablet 4 times daily, #120, no refills.  Discontinue Percocet.  I discussed this with him at his recent visit, so he should be aware that we are changing back to the pain medication which he was taking prior to his surgery.

## 2020-07-14 NOTE — TELEPHONE ENCOUNTER
Patient called and states Dr. Weems will not continue his pain medication.  He is needing a refill as he is out today.    Please advise.

## 2020-07-14 NOTE — PROGRESS NOTES
Osvaldo Conley . is a 64 y.o. male is s/p       Chief Complaint   Patient presents with   • Right Knee - Follow-up          06/01/20 (43d) Jamarcus Weems MD    TOTAL KNEE ARTHROPLASTY  with adductor canal block - Right       HISTORY OF PRESENT ILLNESS: Follow up on right total knee. Pain level of 4/10.  Mild stiffness  Pain improving  Doing better than before surgery       Allergies   Allergen Reactions   • Codeine GI Intolerance         Current Outpatient Medications:   •  citalopram (CeleXA) 20 MG tablet, Take 1 tablet by mouth Daily., Disp: 30 tablet, Rfl: 11  •  gabapentin (NEURONTIN) 300 MG capsule, Take 1 capsule by mouth three times a day, Disp: 90 capsule, Rfl: 0  •  hydroCHLOROthiazide (HYDRODIURIL) 25 MG tablet, Take 1 tablet by mouth Daily., Disp: 30 tablet, Rfl: 11  •  losartan (COZAAR) 50 MG tablet, Take 1 tablet by mouth Daily., Disp: 30 tablet, Rfl: 11  •  metroNIDAZOLE (METROGEL) 1 % gel, Apply to nose twice a day, Disp: 60 g, Rfl: 1  •  oxyCODONE-acetaminophen (PERCOCET) 7.5-325 MG per tablet, Take 1 tablet by mouth Every 8 (Eight) Hours As Needed for Moderate Pain ., Disp: 30 tablet, Rfl: 0  •  rOPINIRole (REQUIP) 0.5 MG tablet, Take 1 tablet by mouth Every Night. Take 1 hour before bedtime., Disp: 30 tablet, Rfl: 11  •  simvastatin (ZOCOR) 40 MG tablet, Take 1 tablet by mouth Every Night., Disp: 30 tablet, Rfl: 11  •  warfarin (Coumadin) 5 MG tablet, Take 1 tablet by mouth Every Night., Disp: 50 tablet, Rfl: 0    No fevers or chills.  No nausea or vomiting.      PHYSICAL EXAMINATION:       Osvaldo Conley Sr. is a 64 y.o. male    Patient is awake and alert, answers questions appropriately and is in no apparent distress.    GAIT:     []  Normal  [x]  Antalgic    Assistive device: [x]  None  []  Walker     []  Crutches  []  Cane     []  Wheelchair  []  Stretcher    Ortho Exam  ROM -4-110  Mild swelling   No erythema  Good distal pulses and sensation    Xr Knee 1 Or 2 View  Right    Result Date: 6/16/2020  Narrative: Lateral view of the right knee reveals postsurgical changes post total knee arthroplasty.  Prostheses appear well-positioned and well-aligned with no evidence of hardware failure or loosening noted.  There is surrounding soft tissue swelling noted.  Subcutaneous staples are present to the anterior knee.  No significant changes are noted when compared with prior images from 6/1/2020.  The previously noted surgical drain has been removed.  No acute bony radiologic abnormalities are noted at this time.06/16/20 at 5:29 PM by JOHNNY Bartlett     Xr Knee Bilateral Ap Standing    Result Date: 6/16/2020  Narrative: AP standing view of the bilateral knees reveals postsurgical changes in the right knee post total knee arthroplasty.  Prostheses appear well-positioned and well-aligned with no evidence of hardware failure or loosening noted.  The presence of the right knee implant is new in the interim when compared with prior images from 1/16/2020.  There are moderate degenerative changes noted in the left knee with diminished joint spacing and marginal osteophyte formations noted in the medial compartment.  The degenerative changes in the left knee appear unchanged when compared with prior images from 1/16/2020.  No acute bony radiologic abnormalities are noted at this time.06/16/20 at 5:32 PM by JOHNNY Bartlett           ASSESSMENT:    Diagnoses and all orders for this visit:    Status post right knee replacement    Primary osteoarthritis of right knee    Presence of total right knee joint prosthesis    Generalized anxiety disorder    Heavy cigarette smoker (20-39 per day)    COPD, mild (CMS/HCC)          PLAN    Continue HEP  Slowly progress as tolerated  Continue strength and conditioning  F/u in 8 weeks  Continue full extension exercises    Discussed no further pain medication from the knee standpoint.  Any further medication would come through his primary care  physician.    Patient's Body mass index is 28.31 kg/m². BMI is within normal parameters. No follow-up required..    Return in about 8 weeks (around 9/8/2020) for Recheck with repeat xrays.    Jamarcus Weems MD

## 2020-08-07 ENCOUNTER — OFFICE VISIT (OUTPATIENT)
Dept: FAMILY MEDICINE CLINIC | Facility: CLINIC | Age: 65
End: 2020-08-07

## 2020-08-07 VITALS
HEIGHT: 71 IN | WEIGHT: 201 LBS | DIASTOLIC BLOOD PRESSURE: 68 MMHG | SYSTOLIC BLOOD PRESSURE: 120 MMHG | HEART RATE: 68 BPM | TEMPERATURE: 98.6 F | BODY MASS INDEX: 28.14 KG/M2 | OXYGEN SATURATION: 98 %

## 2020-08-07 DIAGNOSIS — M15.9 PRIMARY OSTEOARTHRITIS INVOLVING MULTIPLE JOINTS: Chronic | ICD-10-CM

## 2020-08-07 DIAGNOSIS — G63 POLYNEUROPATHY ASSOCIATED WITH UNDERLYING DISEASE (HCC): Chronic | ICD-10-CM

## 2020-08-07 DIAGNOSIS — F41.1 GENERALIZED ANXIETY DISORDER: Chronic | ICD-10-CM

## 2020-08-07 DIAGNOSIS — I10 ESSENTIAL HYPERTENSION: Primary | Chronic | ICD-10-CM

## 2020-08-07 PROCEDURE — 99213 OFFICE O/P EST LOW 20 MIN: CPT | Performed by: INTERNAL MEDICINE

## 2020-08-07 RX ORDER — GABAPENTIN 300 MG/1
CAPSULE ORAL
Qty: 90 CAPSULE | Refills: 0 | Status: SHIPPED | OUTPATIENT
Start: 2020-08-07 | End: 2020-09-04 | Stop reason: SDUPTHER

## 2020-08-07 NOTE — PROGRESS NOTES
Chief Complaint   Patient presents with   • Osteoarthritis     1 month f/u med refill    • Peripheral Neuropathy     Subjective   Osvaldo Conley Sr. is a 65 y.o. male who presents to the office for follow-up. He has hypertension and his blood pressure has been controlled. He has osteoarthritis which causes chronic back and joint pain.  He takes Norco 10/325 mg 4 times a day for treatment of the chronic pain.  He also takes gabapentin 3 times a day for treatment of the neuropathic pain. He had an aberrant urine drug screen on 2/4/2020.  As a result, he is now being seen monthly for closer monitoring. He takes Celexa for treatment of anxiety.  His anxiety has been well managed.    He continues to recover well from recent knee replacement surgery.  He is now off of the Coumadin.  He has finished physical therapy, although he is still doing exercises at home.  He is using a cane to assist with ambulation.    The following portions of the patient's history were reviewed and updated as appropriate: allergies, current medications, past family history, past medical history, past social history, past surgical history and problem list.    Review of Systems   Constitutional: Negative for chills, fatigue and fever.   HENT: Negative for congestion, sneezing, sore throat and trouble swallowing.    Eyes: Negative for visual disturbance.   Respiratory: Negative for cough, chest tightness, shortness of breath and wheezing.    Cardiovascular: Negative for chest pain and palpitations.   Gastrointestinal: Negative for abdominal pain, constipation, diarrhea, nausea and vomiting.   Genitourinary: Negative for dysuria, frequency and urgency.   Musculoskeletal: Positive for arthralgias, back pain and gait problem. Negative for neck pain.   Skin: Negative for rash.   Neurological: Negative for dizziness, weakness and headaches.   Psychiatric/Behavioral:        Patient denies any feelings of depression and has not felt down, hopeless  "or lost interest in any activities.   All other systems reviewed and are negative.      Objective    Vitals:    08/07/20 1042   BP: 120/68   Pulse: 68   Temp: 98.6 °F (37 °C)   TempSrc: Oral   SpO2: 98%   Weight: 91.2 kg (201 lb)   Height: 180.3 cm (71\")   PainSc:   5   PainLoc: Generalized     Body mass index is 28.03 kg/m².      Physical Exam   Constitutional: He is oriented to person, place, and time. He appears well-developed and well-nourished. No distress.   HENT:   Head: Normocephalic and atraumatic.   Nose: Nose normal.   Mouth/Throat: Oropharynx is clear and moist. No oropharyngeal exudate.   Eyes: Pupils are equal, round, and reactive to light. Conjunctivae and EOM are normal. No scleral icterus.   Neck: Normal range of motion. Neck supple.   Cardiovascular: Normal rate, regular rhythm and normal heart sounds. Exam reveals no gallop and no friction rub.   No murmur heard.  Pulmonary/Chest: Effort normal. No respiratory distress. He has decreased breath sounds. He has no wheezes. He has no rales.   Abdominal: Soft. Bowel sounds are normal. He exhibits no distension. There is no tenderness. There is no rebound and no guarding.   Musculoskeletal: Normal range of motion. He exhibits no edema.   Lymphadenopathy:     He has no cervical adenopathy.   Neurological: He is alert and oriented to person, place, and time. No cranial nerve deficit. Gait abnormal.   He is walking with the aid of a cane due to his knee pain.   Skin: Skin is warm and dry. No rash noted.   Psychiatric: He has a normal mood and affect. His behavior is normal. Judgment and thought content normal.   Nursing note and vitals reviewed.      Assessment/Plan        Osvaldo was seen today for osteoarthritis and peripheral neuropathy.    Diagnoses and all orders for this visit:    Essential hypertension    Primary osteoarthritis involving multiple joints    Polyneuropathy associated with underlying disease (CMS/[facility])  -     gabapentin (NEURONTIN) 300 " MG capsule; Take 1 capsule by mouth three times a day    Generalized anxiety disorder      His blood pressure is controlled and he will continue with his current blood pressure medication. He will continue with gabapentin for the neuropathic pain.  He will also continue to use Norco 10/325 mg 4 times a day for treatment of the arthritic pain. He will continue with Celexa for treatment of his anxiety.      Patient understands the risks associated with this controlled medication, including tolerance and addiction.  Patient also agrees to only obtain this medication from me, and not from a another provider, unless that provider is covering for me in my absence.  Patient also agrees to be compliant in dosing, and not self adjust the dose of medication.  A signed controlled substance agreement is on file, and the patient has received a controlled substance education sheet at this or a previous visit.  The patient has also signed a consent for treatment with a controlled substance as per [facility] policy. SANDRA was obtained.    CONTROLLED SUBSTANCE TRACKING 11/19/2019 2/14/2020 3/12/2020 5/1/2020 6/12/2020 7/10/2020 8/7/2020   Burke Nesbitt 11/19/2019 2/14/2020 3/12/2020 5/1/2020 6/12/2020 7/10/2020 8/7/2020   Report Number [id number]   Last UDS - 2/4/2020 - - - - -   Last Controlled Substance Agreement 11/19/2019 2/14/2020 3/12/2020 - - - 8/7/2020   Prior UDT result - Aberrant - - - - -   Pill count - - Expected - - - -   Comments - Visits changed to monthly due to urine drug screen - - - - -              PHQ-2/PHQ-9 Depression Screening 8/7/2020   Little interest or pleasure in doing things 0   Feeling down, depressed, or hopeless 0   Trouble falling or staying asleep, or sleeping too much 0   Feeling tired or having little energy 0   Poor appetite or overeating 0   Feeling bad about yourself - or that you are a failure or have let yourself or your family down 0    Trouble concentrating on things, such as reading the newspaper or watching television 0   Moving or speaking so slowly that other people could have noticed. Or the opposite - being so fidgety or restless that you have been moving around a lot more than usual 0   Thoughts that you would be better off dead, or of hurting yourself in some way 0   Total Score 0   If you checked off any problems, how difficult have these problems made it for you to do your work, take care of things at home, or get along with other people? Not difficult at all

## 2020-08-11 DIAGNOSIS — M15.9 PRIMARY OSTEOARTHRITIS INVOLVING MULTIPLE JOINTS: ICD-10-CM

## 2020-08-11 RX ORDER — HYDROCODONE BITARTRATE AND ACETAMINOPHEN 10; 325 MG/1; MG/1
1 TABLET ORAL EVERY 6 HOURS PRN
Qty: 120 TABLET | Refills: 0 | Status: SHIPPED | OUTPATIENT
Start: 2020-08-11 | End: 2020-09-04 | Stop reason: SDUPTHER

## 2020-08-11 NOTE — TELEPHONE ENCOUNTER
CONTROLLED SUBSTANCE TRACKING 11/19/2019 2/14/2020 3/12/2020 5/1/2020 6/12/2020 7/10/2020 8/7/2020   Last Laz 11/19/2019 2/14/2020 3/12/2020 5/1/2020 6/12/2020 7/10/2020 8/7/2020   Report Number [id number]   Last UDS - 2/4/2020 - - - - -   Last Controlled Substance Agreement 11/19/2019 2/14/2020 3/12/2020 - - - 8/7/2020   Prior UDT result - Aberrant - - - - -   Pill count - - Expected - - - -   Comments - Visits changed to monthly due to urine drug screen - - - - -

## 2020-08-12 DIAGNOSIS — Z96.651 STATUS POST RIGHT KNEE REPLACEMENT: Primary | ICD-10-CM

## 2020-08-13 ENCOUNTER — OFFICE VISIT (OUTPATIENT)
Dept: ORTHOPEDIC SURGERY | Facility: CLINIC | Age: 65
End: 2020-08-13

## 2020-08-13 VITALS — BODY MASS INDEX: 28.59 KG/M2 | WEIGHT: 204.2 LBS | HEIGHT: 71 IN

## 2020-08-13 DIAGNOSIS — Z96.651 STATUS POST RIGHT KNEE REPLACEMENT: Primary | ICD-10-CM

## 2020-08-13 DIAGNOSIS — M17.11 PRIMARY OSTEOARTHRITIS OF RIGHT KNEE: ICD-10-CM

## 2020-08-13 DIAGNOSIS — Z96.651 PRESENCE OF TOTAL RIGHT KNEE JOINT PROSTHESIS: ICD-10-CM

## 2020-08-13 PROCEDURE — 99024 POSTOP FOLLOW-UP VISIT: CPT | Performed by: ORTHOPAEDIC SURGERY

## 2020-08-13 NOTE — PROGRESS NOTES
Osvaldo Conley . is a 65 y.o. male is s/p       Chief Complaint   Patient presents with   • Right Knee - Post-op          06/01/20 (73d) Jamarcus Weems MD    TOTAL KNEE ARTHROPLASTY  with adductor canal block - Right   Xray done today.  HISTORY OF PRESENT ILLNESS: Post op right knee. Pain level of 3/10.   Mild stiffness and soreness.  No fever or chills      Allergies   Allergen Reactions   • Codeine GI Intolerance         Current Outpatient Medications:   •  citalopram (CeleXA) 20 MG tablet, Take 1 tablet by mouth Daily., Disp: 30 tablet, Rfl: 11  •  gabapentin (NEURONTIN) 300 MG capsule, Take 1 capsule by mouth three times a day, Disp: 90 capsule, Rfl: 0  •  hydroCHLOROthiazide (HYDRODIURIL) 25 MG tablet, Take 1 tablet by mouth Daily., Disp: 30 tablet, Rfl: 11  •  HYDROcodone-acetaminophen (NORCO)  MG per tablet, Take 1 tablet by mouth Every 6 (Six) Hours As Needed for Moderate Pain ., Disp: 120 tablet, Rfl: 0  •  losartan (COZAAR) 50 MG tablet, Take 1 tablet by mouth Daily., Disp: 30 tablet, Rfl: 11  •  metroNIDAZOLE (METROGEL) 1 % gel, Apply to nose twice a day, Disp: 60 g, Rfl: 1  •  rOPINIRole (REQUIP) 0.5 MG tablet, Take 1 tablet by mouth Every Night. Take 1 hour before bedtime., Disp: 30 tablet, Rfl: 11  •  simvastatin (ZOCOR) 40 MG tablet, Take 1 tablet by mouth Every Night., Disp: 30 tablet, Rfl: 11    No fevers or chills.  No nausea or vomiting.      PHYSICAL EXAMINATION:       Osvaldo Conley Sr. is a 65 y.o. male    Patient is awake and alert, answers questions appropriately and is in no apparent distress.    GAIT:     [x]  Normal  []  Antalgic    Assistive device: [x]  None  []  Walker     []  Crutches  []  Cane     []  Wheelchair  []  Stretcher    Right Knee Exam     Muscle Strength   The patient has normal right knee strength.    Tenderness   The patient is experiencing no tenderness.     Range of Motion   Extension: 0   Flexion: 110     Tests   Varus: negative Valgus:  negative  Drawer:  Anterior - negative        Other   Erythema: absent  Scars: present  Sensation: normal  Swelling: mild              Xr Knee 1 Or 2 View Right    Result Date: 8/13/2020  Narrative: Ordering Provider:  Jamarcus Weems MD Ordering Diagnosis/Indication:  Status post right knee replacement Procedure:  XR KNEE 1 OR 2 VW RIGHT Exam Date:  8/13/20 COMPARISON:  Todays X-rays were compared to previous images dated June 16, 2020.     Impression:  AP bilateral standing of the knees with lateral of the right knee shows acceptable position and alignment of right total knee arthroplasty with no sign of implant loosening or failure.  Appropriate sizing is noted on both views.  No interval change from prior x-ray.  The left knee shows moderate osteoarthritic changes with mild varus collapse.  Marginal osteophytes noted on both sides of the left knee. Jamarcus Weems MD 8/13/20     Xr Knee Bilateral Ap Standing    Result Date: 8/13/2020  Narrative: Ordering Provider:  Jamarcus Weems MD Ordering Diagnosis/Indication:  Status post right knee replacement Procedure:  XR KNEE BILATERAL AP STANDING Exam Date:  8/13/20 COMPARISON:  Todays X-rays were compared to previous images dated June 16, 2020.     Impression:  AP bilateral standing of the knees with lateral of the right knee shows acceptable position and alignment of right total knee arthroplasty with no sign of implant loosening or failure.  Appropriate sizing is noted on both views.  No interval change from prior x-ray.  The left knee shows moderate osteoarthritic changes with mild varus collapse.  Marginal osteophytes noted on both sides of the left knee. Jamarcus Weems MD 8/13/20           ASSESSMENT:    Diagnoses and all orders for this visit:    Status post right knee replacement    Primary osteoarthritis of right knee    Presence of total right knee joint prosthesis          PLAN    Continue PT  Slowly progress strength and  conditioning as pain allows  No restriction  Continue ROM for further flexion  F/u 8 weeks with xrays.    Patient's Body mass index is 28.48 kg/m². BMI is within normal parameters. No follow-up required..    Return in about 8 weeks (around 10/8/2020) for Recheck with repeat xrays.    Jamarcus Weems MD

## 2020-09-04 ENCOUNTER — OFFICE VISIT (OUTPATIENT)
Dept: FAMILY MEDICINE CLINIC | Facility: CLINIC | Age: 65
End: 2020-09-04

## 2020-09-04 VITALS
RESPIRATION RATE: 16 BRPM | WEIGHT: 204 LBS | TEMPERATURE: 97.3 F | OXYGEN SATURATION: 97 % | HEIGHT: 71 IN | DIASTOLIC BLOOD PRESSURE: 84 MMHG | SYSTOLIC BLOOD PRESSURE: 160 MMHG | BODY MASS INDEX: 28.56 KG/M2 | HEART RATE: 81 BPM

## 2020-09-04 DIAGNOSIS — F41.1 GENERALIZED ANXIETY DISORDER: Chronic | ICD-10-CM

## 2020-09-04 DIAGNOSIS — M15.9 PRIMARY OSTEOARTHRITIS INVOLVING MULTIPLE JOINTS: Chronic | ICD-10-CM

## 2020-09-04 DIAGNOSIS — G63 POLYNEUROPATHY ASSOCIATED WITH UNDERLYING DISEASE (HCC): Chronic | ICD-10-CM

## 2020-09-04 DIAGNOSIS — I10 ESSENTIAL HYPERTENSION: Primary | Chronic | ICD-10-CM

## 2020-09-04 PROCEDURE — 99214 OFFICE O/P EST MOD 30 MIN: CPT | Performed by: INTERNAL MEDICINE

## 2020-09-04 RX ORDER — GABAPENTIN 300 MG/1
CAPSULE ORAL
Qty: 90 CAPSULE | Refills: 0 | Status: SHIPPED | OUTPATIENT
Start: 2020-09-04 | End: 2020-10-05 | Stop reason: SDUPTHER

## 2020-09-04 RX ORDER — HYDROCODONE BITARTRATE AND ACETAMINOPHEN 10; 325 MG/1; MG/1
1 TABLET ORAL EVERY 6 HOURS PRN
Qty: 120 TABLET | Refills: 0 | Status: SHIPPED | OUTPATIENT
Start: 2020-09-04 | End: 2020-10-05 | Stop reason: SDUPTHER

## 2020-09-04 NOTE — PROGRESS NOTES
Chief Complaint   Patient presents with   • Peripheral Neuropathy     1 month f/u      Subjective   Osvaldo Conley Sr. is a 65 y.o. male who presents to the office for follow-up. He has hypertension and his blood pressure has been controlled. He has osteoarthritis which causes chronic back and joint pain.  He takes Norco 10/325 mg 4 times a day for treatment of the chronic pain.  He also takes gabapentin 3 times a day for treatment of the neuropathic pain. He had an aberrant urine drug screen on 2/4/2020.  As a result, he is now being seen monthly for closer monitoring. He takes Celexa for treatment of anxiety.  His anxiety has been well managed.    The following portions of the patient's history were reviewed and updated as appropriate: allergies, current medications, past family history, past medical history, past social history, past surgical history and problem list.    Review of Systems   Constitutional: Negative for chills, fatigue and fever.   HENT: Negative for congestion, sneezing, sore throat and trouble swallowing.    Eyes: Negative for visual disturbance.   Respiratory: Negative for cough, chest tightness, shortness of breath and wheezing.    Cardiovascular: Negative for chest pain and palpitations.   Gastrointestinal: Negative for abdominal pain, constipation, diarrhea, nausea and vomiting.   Genitourinary: Negative for dysuria, frequency and urgency.   Musculoskeletal: Positive for arthralgias, back pain and gait problem. Negative for neck pain.   Skin: Negative for rash.   Neurological: Negative for dizziness, weakness and headaches.   Psychiatric/Behavioral:        Patient denies any feelings of depression and has not felt down, hopeless or lost interest in any activities.   All other systems reviewed and are negative.      Objective    Vitals:    09/04/20 0911   BP: 160/84   BP Location: Left arm   Patient Position: Sitting   Cuff Size: Adult   Pulse: 81   Resp: 16   Temp: 97.3 °F (36.3 °C)  "  SpO2: 97%   Weight: 92.5 kg (204 lb)   Height: 180.3 cm (71\")   PainSc:   4   PainLoc: Knee     Body mass index is 28.45 kg/m².      Physical Exam   Constitutional: He is oriented to person, place, and time. He appears well-developed and well-nourished. No distress.   HENT:   Head: Normocephalic and atraumatic.   Nose: Nose normal.   Mouth/Throat: Oropharynx is clear and moist. No oropharyngeal exudate.   Eyes: Pupils are equal, round, and reactive to light. Conjunctivae and EOM are normal. No scleral icterus.   Neck: Normal range of motion. Neck supple.   Cardiovascular: Normal rate, regular rhythm and normal heart sounds. Exam reveals no gallop and no friction rub.   No murmur heard.  Pulmonary/Chest: Effort normal. No respiratory distress. He has decreased breath sounds. He has no wheezes. He has no rales.   Abdominal: Soft. Bowel sounds are normal. He exhibits no distension. There is no tenderness. There is no rebound and no guarding.   Musculoskeletal: Normal range of motion. He exhibits no edema.   Lymphadenopathy:     He has no cervical adenopathy.   Neurological: He is alert and oriented to person, place, and time. No cranial nerve deficit. Gait abnormal.   He is walking with the aid of a cane due to his knee pain.   Skin: Skin is warm and dry. No rash noted.   Psychiatric: He has a normal mood and affect. His behavior is normal. Judgment and thought content normal.   Nursing note and vitals reviewed.      Assessment/Plan        Osvaldo was seen today for peripheral neuropathy.    Diagnoses and all orders for this visit:    Essential hypertension    Primary osteoarthritis involving multiple joints  -     HYDROcodone-acetaminophen (NORCO)  MG per tablet; Take 1 tablet by mouth Every 6 (Six) Hours As Needed for Moderate Pain .    Polyneuropathy associated with underlying disease (CMS/HCC)  -     gabapentin (NEURONTIN) 300 MG capsule; Take 1 capsule by mouth three times a day    Generalized anxiety " disorder      He is due for routine labs, and will have these updated prior to his next visit.  His blood pressure is controlled and he will continue with his current blood pressure medication. He will continue with gabapentin for the neuropathic pain.  He will also continue to use Norco 10/325 mg 4 times a day for treatment of the arthritic pain. He will continue with Celexa for treatment of his anxiety.      Patient understands the risks associated with this controlled medication, including tolerance and addiction.  Patient also agrees to only obtain this medication from me, and not from a another provider, unless that provider is covering for me in my absence.  Patient also agrees to be compliant in dosing, and not self adjust the dose of medication.  A signed controlled substance agreement is on file, and the patient has received a controlled substance education sheet at this or a previous visit.  The patient has also signed a consent for treatment with a controlled substance as per [facility] policy. SANDRA was obtained.    CONTROLLED SUBSTANCE TRACKING 2/14/2020 3/12/2020 5/1/2020 6/12/2020 7/10/2020 8/7/2020 9/4/2020   Last Sandra 2/14/2020 3/12/2020 5/1/2020 6/12/2020 7/10/2020 8/7/2020 9/4/2020   Report Number [id number]   Last UDS 2/4/2020 - - - - - -   Last Controlled Substance Agreement 2/14/2020 3/12/2020 - - - 8/7/2020 9/4/2020   Prior UDT result Aberrant - - - - - -   Pill count - Expected - - - - -   Comments Visits changed to monthly due to urine drug screen - - - - - -              PHQ-2/PHQ-9 Depression Screening 9/4/2020   Little interest or pleasure in doing things 0   Feeling down, depressed, or hopeless 0   Trouble falling or staying asleep, or sleeping too much 0   Feeling tired or having little energy 0   Poor appetite or overeating 0   Feeling bad about yourself - or that you are a failure or have let yourself or your family down 0    Trouble concentrating on things, such as reading the newspaper or watching television 0   Moving or speaking so slowly that other people could have noticed. Or the opposite - being so fidgety or restless that you have been moving around a lot more than usual 0   Thoughts that you would be better off dead, or of hurting yourself in some way 0   Total Score 0   If you checked off any problems, how difficult have these problems made it for you to do your work, take care of things at home, or get along with other people? Not difficult at all

## 2020-09-08 ENCOUNTER — LAB (OUTPATIENT)
Dept: ONCOLOGY | Facility: HOSPITAL | Age: 65
End: 2020-09-08

## 2020-09-08 ENCOUNTER — OFFICE VISIT (OUTPATIENT)
Dept: ONCOLOGY | Facility: CLINIC | Age: 65
End: 2020-09-08

## 2020-09-08 VITALS
RESPIRATION RATE: 18 BRPM | HEART RATE: 69 BPM | TEMPERATURE: 97 F | SYSTOLIC BLOOD PRESSURE: 134 MMHG | WEIGHT: 204.6 LBS | BODY MASS INDEX: 28.54 KG/M2 | DIASTOLIC BLOOD PRESSURE: 63 MMHG

## 2020-09-08 DIAGNOSIS — Z71.6 ENCOUNTER FOR TOBACCO USE CESSATION COUNSELING: ICD-10-CM

## 2020-09-08 DIAGNOSIS — I10 ESSENTIAL HYPERTENSION: Chronic | ICD-10-CM

## 2020-09-08 DIAGNOSIS — D72.829 LEUKOCYTOSIS, UNSPECIFIED TYPE: ICD-10-CM

## 2020-09-08 DIAGNOSIS — G63 POLYNEUROPATHY ASSOCIATED WITH UNDERLYING DISEASE (HCC): Chronic | ICD-10-CM

## 2020-09-08 DIAGNOSIS — D72.829 LEUKOCYTOSIS, UNSPECIFIED TYPE: Primary | Chronic | ICD-10-CM

## 2020-09-08 DIAGNOSIS — D75.1 POLYCYTHEMIA: Chronic | ICD-10-CM

## 2020-09-08 DIAGNOSIS — D75.1 POLYCYTHEMIA: ICD-10-CM

## 2020-09-08 LAB
BASOPHILS # BLD AUTO: 0.05 10*3/MM3 (ref 0–0.2)
BASOPHILS NFR BLD AUTO: 0.4 % (ref 0–1.5)
DEPRECATED RDW RBC AUTO: 41.7 FL (ref 37–54)
EOSINOPHIL # BLD AUTO: 0.51 10*3/MM3 (ref 0–0.4)
EOSINOPHIL NFR BLD AUTO: 4.4 % (ref 0.3–6.2)
ERYTHROCYTE [DISTWIDTH] IN BLOOD BY AUTOMATED COUNT: 12.8 % (ref 12.3–15.4)
HCT VFR BLD AUTO: 48.5 % (ref 37.5–51)
HGB BLD-MCNC: 16.4 G/DL (ref 13–17.7)
IMM GRANULOCYTES # BLD AUTO: 0.03 10*3/MM3 (ref 0–0.05)
IMM GRANULOCYTES NFR BLD AUTO: 0.3 % (ref 0–0.5)
LYMPHOCYTES # BLD AUTO: 4.59 10*3/MM3 (ref 0.7–3.1)
LYMPHOCYTES NFR BLD AUTO: 39.6 % (ref 19.6–45.3)
MCH RBC QN AUTO: 30.2 PG (ref 26.6–33)
MCHC RBC AUTO-ENTMCNC: 33.8 G/DL (ref 31.5–35.7)
MCV RBC AUTO: 89.3 FL (ref 79–97)
MONOCYTES # BLD AUTO: 1.02 10*3/MM3 (ref 0.1–0.9)
MONOCYTES NFR BLD AUTO: 8.8 % (ref 5–12)
NEUTROPHILS NFR BLD AUTO: 46.5 % (ref 42.7–76)
NEUTROPHILS NFR BLD AUTO: 5.4 10*3/MM3 (ref 1.7–7)
NRBC BLD AUTO-RTO: 0 /100 WBC (ref 0–0.2)
PLATELET # BLD AUTO: 213 10*3/MM3 (ref 140–450)
PMV BLD AUTO: 12.1 FL (ref 6–12)
RBC # BLD AUTO: 5.43 10*6/MM3 (ref 4.14–5.8)
WBC # BLD AUTO: 11.6 10*3/MM3 (ref 3.4–10.8)

## 2020-09-08 PROCEDURE — 99213 OFFICE O/P EST LOW 20 MIN: CPT | Performed by: INTERNAL MEDICINE

## 2020-09-08 PROCEDURE — G0463 HOSPITAL OUTPT CLINIC VISIT: HCPCS | Performed by: INTERNAL MEDICINE

## 2020-09-08 PROCEDURE — 99406 BEHAV CHNG SMOKING 3-10 MIN: CPT | Performed by: INTERNAL MEDICINE

## 2020-09-08 PROCEDURE — 85025 COMPLETE CBC W/AUTO DIFF WBC: CPT | Performed by: INTERNAL MEDICINE

## 2020-09-08 RX ORDER — NICOTINE 21 MG/24HR
1 PATCH, TRANSDERMAL 24 HOURS TRANSDERMAL EVERY 24 HOURS
Qty: 28 EACH | Refills: 0 | Status: SHIPPED | OUTPATIENT
Start: 2020-09-08 | End: 2020-10-08

## 2020-09-09 NOTE — PROGRESS NOTES
Subjective     Osvaldo Conley Sr. was seen in follow up for leukocytosis and polycythemia.   No new health issues.   No new medications.   No new concern for thrombosis.   Leukocytosis is persistent.   Polycythemia improved.   Smokes 1 PPD.   Counseled about smoking cessation.       Past Medical History, Past Surgical History, Social History, Family History have been reviewed and are without significant changes except as mentioned.    Review of Systems       CONSTITUTIONAL: No weight loss, fever, chills, weakness or fatigue.  HEENT: Eyes: No visual loss, blurred vision, double vision or yellow sclerae. Ears, Nose, Throat: No hearing loss, sneezing, congestion, runny nose or sore throat.  SKIN: No rash or itching.  CARDIOVASCULAR: No chest pain, chest pressure or chest discomfort. No palpitations or edema.  RESPIRATORY: No shortness of breath, cough or sputum.  GASTROINTESTINAL: No anorexia, nausea, vomiting or diarrhea. No abdominal pain or blood.  GENITOURINARY: Negative for urgency, frequency or dysuria.   NEUROLOGICAL: numbness or tingling in the extremities + No headache, dizziness, syncope, paralysis, ataxia. No change in bowel or bladder control.  MUSCULOSKELETAL: chronic joint pain +   HEMATOLOGIC: No anemia, bleeding or bruising.  LYMPHATICS: No enlarged nodes. No history of splenectomy.  PSYCHIATRIC: No history of depression or anxiety.  ENDOCRINOLOGIC: No reports of sweating, cold or heat intolerance. No polyuria or polydipsia.  ALLERGIES: No history of asthma, hives, eczema or rhinitis.      Medications:  The current medication list was reviewed in the EMR    ALLERGIES:    Allergies   Allergen Reactions   • Codeine GI Intolerance       Objective      Vitals:    09/08/20 0817   BP: 134/63   Pulse: 69   Resp: 18   Temp: 97 °F (36.1 °C)   Weight: 92.8 kg (204 lb 9.6 oz)   PainSc: 0-No pain     Current Status 9/8/2020   ECOG score 0       Physical Exam      GENERAL: Alert, awake, oriented.  Well  dressed.  Not in apparent distress. Vitals as above.   HEAD: Normocephalic, atraumatic.   EYES: PERRL, EOMI.vision is grossly intact.  NECK: Supple, no adenopathy or thyromegaly.   THROAT: Normal oral cavity and pharynx. No inflammation, swelling, exudate, or lesions.  CARDIAC: Normal S1 and S2. No S3, S4 or murmurs. Rhythm is regular.  Extremities are warm and well perfused.   LUNGS: Clear to auscultation and percussion without rales, rhonchi, wheezing or diminished breath sounds.  ABDOMEN: Positive bowel sounds. Soft, nondistended, nontender. No guarding or rebound. No masses.  BACK:  No bony tenderness.   EXTREMITIES: No significant deformity or joint abnormality.  Peripheral pulses intact. No varicosities.  SKIN: No rash or bruising.  NEUROLOGICAL: Grossly non-focal exam. No focal weakness. Gait: Normal.   PSYCH: Mood and affect normal. No hallucination or suicidal thoughts.   LYMPHATIC: No cervical, supraclavicular or axillary adenopathy.       RECENT LABS:Independently reviewed and summarized.  Hematology WBC   Date Value Ref Range Status   09/08/2020 11.60 (H) 3.40 - 10.80 10*3/mm3 Final     RBC   Date Value Ref Range Status   09/08/2020 5.43 4.14 - 5.80 10*6/mm3 Final     Hemoglobin   Date Value Ref Range Status   09/08/2020 16.4 13.0 - 17.7 g/dL Final     Hematocrit   Date Value Ref Range Status   09/08/2020 48.5 37.5 - 51.0 % Final     Platelets   Date Value Ref Range Status   09/08/2020 213 140 - 450 10*3/mm3 Final          [facility]val[facility]. reports a pain score of 0.  Given his pain assessment as noted, treatment options were discussed and the following options were decided upon as a follow-up plan to address the patient's pain: continuation of current treatment plan for pain.    Patient screened positive for depression based on a PHQ-9 score of 0 on 9/8/2020. Follow-up recommendations include: Suicide Risk Assessment performed.    Advance Care Planning   ACP discussion was declined by the patient.  Patient does not have an advance directive, declines further assistance.    Diagnosis:   (1) leukocytosis  (2) polycythemia  (3) Encounter for tobacco use cessation counseling     Assessment/Plan       (1) leukocytosis  Persistent.   No B symptoms.   No new infections.   No new thrombosis.   Continue to monitor.     (2) polycythemia  Likely secondary.   Continue to monitor.     (3) Encounter for tobacco use cessation counseling   I had 5-7 minutes discussion with the patient about smoking cessation. Problems with continued smoking including respiratory, cardiovascular and oncological problems were discussion. Advice on smoking cessation was reiterated including methods of quitting and different medications and support system available for smoking cessation was discussed.   Patient understood and was agreeable.   Prescription of nicotine patch to sent to his pharmacy to assist him quit smoking.         9/8/2020      [place]:

## 2020-10-05 ENCOUNTER — OFFICE VISIT (OUTPATIENT)
Dept: FAMILY MEDICINE CLINIC | Facility: CLINIC | Age: 65
End: 2020-10-05

## 2020-10-05 VITALS
WEIGHT: 196 LBS | HEIGHT: 71 IN | HEART RATE: 75 BPM | TEMPERATURE: 97.8 F | OXYGEN SATURATION: 99 % | DIASTOLIC BLOOD PRESSURE: 82 MMHG | BODY MASS INDEX: 27.44 KG/M2 | SYSTOLIC BLOOD PRESSURE: 142 MMHG

## 2020-10-05 DIAGNOSIS — G63 POLYNEUROPATHY ASSOCIATED WITH UNDERLYING DISEASE (HCC): Chronic | ICD-10-CM

## 2020-10-05 DIAGNOSIS — Z23 INFLUENZA VACCINATION ADMINISTERED AT CURRENT VISIT: ICD-10-CM

## 2020-10-05 DIAGNOSIS — M15.9 PRIMARY OSTEOARTHRITIS INVOLVING MULTIPLE JOINTS: Chronic | ICD-10-CM

## 2020-10-05 DIAGNOSIS — I10 ESSENTIAL HYPERTENSION: Primary | Chronic | ICD-10-CM

## 2020-10-05 DIAGNOSIS — F41.1 GENERALIZED ANXIETY DISORDER: Chronic | ICD-10-CM

## 2020-10-05 PROCEDURE — 90694 VACC AIIV4 NO PRSRV 0.5ML IM: CPT | Performed by: INTERNAL MEDICINE

## 2020-10-05 PROCEDURE — 99213 OFFICE O/P EST LOW 20 MIN: CPT | Performed by: INTERNAL MEDICINE

## 2020-10-05 PROCEDURE — G0008 ADMIN INFLUENZA VIRUS VAC: HCPCS | Performed by: INTERNAL MEDICINE

## 2020-10-05 RX ORDER — GABAPENTIN 300 MG/1
CAPSULE ORAL
Qty: 90 CAPSULE | Refills: 0 | Status: SHIPPED | OUTPATIENT
Start: 2020-10-05 | End: 2020-11-03 | Stop reason: SDUPTHER

## 2020-10-05 RX ORDER — HYDROCODONE BITARTRATE AND ACETAMINOPHEN 10; 325 MG/1; MG/1
1 TABLET ORAL EVERY 6 HOURS PRN
Qty: 120 TABLET | Refills: 0 | Status: SHIPPED | OUTPATIENT
Start: 2020-10-05 | End: 2020-11-03 | Stop reason: SDUPTHER

## 2020-10-05 NOTE — TELEPHONE ENCOUNTER
CONTROLLED SUBSTANCE TRACKING 2/14/2020 3/12/2020 5/1/2020 6/12/2020 7/10/2020 8/7/2020 9/4/2020   Last Laz 2/14/2020 3/12/2020 5/1/2020 6/12/2020 7/10/2020 8/7/2020 9/4/2020   Report Number [id number]   Last UDS 2/4/2020 - - - - - -   Last Controlled Substance Agreement 2/14/2020 3/12/2020 - - - 8/7/2020 9/4/2020   Prior UDT result Aberrant - - - - - -   Pill count - Expected - - - - -   Comments Visits changed to monthly due to urine drug screen - - - -             ----- Message from Rachael Armstrong sent at 10/5/2020 11:03 AM CDT -----  Regarding: med refill  Contact: [phone]   Needs refill on HYDROcodone-acetaminophen (NORCO)  MG, and needs on gabapentin (NEURONTIN) 300 MG [facility] [place].

## 2020-10-05 NOTE — PROGRESS NOTES
Chief Complaint   Patient presents with   • Osteoarthritis     1 month med refill    • Peripheral Neuropathy     Subjective   Osvaldo Conley Sr. is a 65 y.o. male who presents to the office for follow-up.  He has hypertension, and his blood pressure has been doing well.  He has osteoarthritis which causes chronic back and joint pain. He takes Norco 10/325 mg 4 times a day for treatment of the chronic pain.  He takes gabapentin 300 mg 3 times a day for treatment of his neuropathic pain.  He had an aberrant urine drug screen on 2/4/2020.  As a result, he is now being seen monthly for closer monitoring. He takes Celexa for treatment of anxiety.  His anxiety has been well managed.    The following portions of the patient's history were reviewed and updated as appropriate: allergies, current medications, past family history, past medical history, past social history, past surgical history and problem list.    Review of Systems   Constitutional: Negative for chills, fatigue and fever.   HENT: Negative for congestion, sneezing, sore throat and trouble swallowing.    Eyes: Negative for visual disturbance.   Respiratory: Negative for cough, chest tightness, shortness of breath and wheezing.    Cardiovascular: Negative for chest pain and palpitations.   Gastrointestinal: Negative for abdominal pain, constipation, diarrhea, nausea and vomiting.   Genitourinary: Negative for dysuria, frequency and urgency.   Musculoskeletal: Positive for arthralgias, back pain and gait problem. Negative for neck pain.   Skin: Negative for rash.   Neurological: Negative for dizziness, weakness and headaches.   Psychiatric/Behavioral:        Patient denies any feelings of depression and has not felt down, hopeless or lost interest in any activities.   All other systems reviewed and are negative.  Review of systems has been reviewed and validated on 10/05/2020and updated with any changes.      Objective    Vitals:    10/05/20 1406   BP:  "142/82   Pulse: 75   Temp: 97.8 °F (36.6 °C)   TempSrc: Oral   SpO2: 99%   Weight: 88.9 kg (196 lb)   Height: 180.3 cm (71\")   PainSc:   4   PainLoc: Generalized     Body mass index is 27.34 kg/m².      Physical Exam   Constitutional: He is oriented to person, place, and time. He appears well-developed. No distress.   HENT:   Head: Normocephalic and atraumatic.   Nose: Nose normal.   Mouth/Throat: No oropharyngeal exudate.   Eyes: Pupils are equal, round, and reactive to light. Conjunctivae are normal. No scleral icterus.   Neck: Normal range of motion. Neck supple.   Cardiovascular: Normal rate, regular rhythm and normal heart sounds. Exam reveals no gallop and no friction rub.   No murmur heard.  Pulmonary/Chest: Effort normal. No respiratory distress. He has decreased breath sounds. He has no wheezes. He has no rales.   Abdominal: Soft. Bowel sounds are normal. He exhibits no distension. There is no abdominal tenderness. There is no rebound and no guarding.   Musculoskeletal: Normal range of motion.   Lymphadenopathy:     He has no cervical adenopathy.   Neurological: He is alert and oriented to person, place, and time. No cranial nerve deficit. Gait abnormal.   He is walking with the aid of a cane due to his knee pain.   Skin: Skin is warm and dry. No rash noted.   Psychiatric: His behavior is normal. Judgment and thought content normal.   Nursing note and vitals reviewed.  Physical exam has been reviewed and validated on 10/05/2020and updated with any changes.      Assessment/Plan        Osvaldo was seen today for osteoarthritis and peripheral neuropathy.    Diagnoses and all orders for this visit:    Essential hypertension    Primary osteoarthritis involving multiple joints    Generalized anxiety disorder    Polyneuropathy associated with underlying disease (CMS/[facility])    Influenza vaccination administered at current visit  -     Fluad Quad >65 years      I have reminded him that he is due for routine labs.  He " will have these updated prior to his next visit.  His blood pressure is controlled and he will continue with his current blood pressure medication.  He will continue with gabapentin 300 mg 3 times a day for treatment of his neuropathic pain.  He will also continue to use Norco 10/325 mg 4 times a day for treatment of the arthritic pain. He will continue with Celexa for treatment of his anxiety.      Patient understands the risks associated with this controlled medication, including tolerance and addiction.  Patient also agrees to only obtain this medication from me, and not from another provider, unless that provider is covering for me in my absence.  Patient also agrees to be compliant in dosing, and not self adjust the dose of medication.  A signed controlled substance agreement is on file, and the patient has received a controlled substance education sheet at this or a previous visit.  The patient has also signed a consent for treatment with a controlled substance as per [facility] policy. SANDRA was obtained.    Patient is given a flu shot today.           PHQ-2/PHQ-9 Depression Screening 10/5/2020   Little interest or pleasure in doing things 0   Feeling down, depressed, or hopeless 0   Trouble falling or staying asleep, or sleeping too much -   Feeling tired or having little energy -   Poor appetite or overeating -   Feeling bad about yourself - or that you are a failure or have let yourself or your family down -   Trouble concentrating on things, such as reading the newspaper or watching television -   Moving or speaking so slowly that other people could have noticed. Or the opposite - being so fidgety or restless that you have been moving around a lot more than usual -   Thoughts that you would be better off dead, or of hurting yourself in some way -   Total Score 0   If you checked off any problems, how difficult have these problems made it for you to do your work, take care of things at home, or get  along with other people? -

## 2020-10-07 DIAGNOSIS — Z96.651 STATUS POST RIGHT KNEE REPLACEMENT: Primary | ICD-10-CM

## 2020-10-08 ENCOUNTER — OFFICE VISIT (OUTPATIENT)
Dept: ORTHOPEDIC SURGERY | Facility: CLINIC | Age: 65
End: 2020-10-08

## 2020-10-08 VITALS — BODY MASS INDEX: 27.86 KG/M2 | WEIGHT: 199 LBS | HEIGHT: 71 IN

## 2020-10-08 DIAGNOSIS — I10 ESSENTIAL HYPERTENSION: ICD-10-CM

## 2020-10-08 DIAGNOSIS — M17.11 PRIMARY OSTEOARTHRITIS OF RIGHT KNEE: Primary | ICD-10-CM

## 2020-10-08 DIAGNOSIS — Z96.651 PRESENCE OF TOTAL RIGHT KNEE JOINT PROSTHESIS: ICD-10-CM

## 2020-10-08 DIAGNOSIS — F41.1 GENERALIZED ANXIETY DISORDER: ICD-10-CM

## 2020-10-08 PROCEDURE — 99213 OFFICE O/P EST LOW 20 MIN: CPT | Performed by: ORTHOPAEDIC SURGERY

## 2020-10-08 NOTE — PROGRESS NOTES
"Osvaldo Conley Sr. is a 65 y.o. male returns for     Chief Complaint   Patient presents with   • Right Knee - Follow-up       HISTORY OF PRESENT ILLNESS:   06/01/20 (4m) Jamarcus Weems MD    TOTAL KNEE ARTHROPLASTY  with adductor canal block - Right     New xrays taken today.  Patient states that he is doing well.  He has mild soreness occasionally.  He is doing much better than he was prior to surgery.  No fevers or chills.     CONCURRENT MEDICAL HISTORY:    The following portions of the patient's history were reviewed and updated as appropriate: allergies, current medications, past family history, past medical history, past social history, past surgical history and problem list.     ROS  No fevers or chills.  No chest pain or shortness of air.  No GI or  disturbances.    PHYSICAL EXAMINATION:       Ht 180.3 cm (71\")   Wt 90.3 kg (199 lb)   BMI 27.75 kg/m²     Physical Exam  Constitutional:       General: He is not in acute distress.     Appearance: He is well-developed. He is not ill-appearing.   Pulmonary:      Effort: Pulmonary effort is normal. No respiratory distress.   Musculoskeletal:      Right knee: He exhibits no effusion.   Neurological:      Mental Status: He is alert and oriented to person, place, and time.   Psychiatric:         Mood and Affect: Mood normal.         Behavior: Behavior normal.         Thought Content: Thought content normal.         Judgment: Judgment normal.         GAIT:     [x]  Normal  []  Antalgic    Assistive device: [x]  None  []  Walker     []  Crutches  []  Cane     []  Wheelchair  []  Stretcher    Right Knee Exam     Muscle Strength   The patient has normal right knee strength.    Tenderness   The patient is experiencing no tenderness.     Range of Motion   Right knee extension: -3.   Flexion: 120     Tests   Varus: negative Valgus: negative  Drawer:  Anterior - negative        Other   Erythema: absent  Scars: present  Sensation: normal  Pulse: " present  Swelling: mild  Effusion: no effusion present              Xr Knee 1 Or 2 View Right    Result Date: 10/8/2020  Narrative: Ordering Provider:  Jamarcus Weems MD Ordering Diagnosis/Indication:  Status post right knee replacement Procedure:  XR KNEE 1 OR 2 VW RIGHT Exam Date:  10/8/20 COMPARISON:  Todays X-rays were compared to previous images dated August 13, 2020.     Impression:  AP bilateral standing of the knees with lateral of the right knee shows acceptable position and alignment of a right total knee arthroplasty.  No sign of implant loosening or failure is noted.  Appropriate sizing noted on both views.  The left knee shows moderate to severe arthritic change with marginal osteophytes on the medial and lateral aspects of the knee.  No interval change from prior x-ray. Jamarcus Weems MD 10/8/20     Xr Knee Bilateral Ap Standing    Result Date: 10/8/2020  Narrative: Ordering Provider:  Jamarcus Weems MD Ordering Diagnosis/Indication:  Status post right knee replacement Procedure:  XR KNEE BILATERAL AP STANDING Exam Date:  10/8/20 COMPARISON:  Todays X-rays were compared to previous images dated August 13, 2020.     Impression:  AP bilateral standing of the knees with lateral of the right knee shows acceptable position and alignment of a right total knee arthroplasty.  No sign of implant loosening or failure is noted.  Appropriate sizing noted on both views.  The left knee shows moderate to severe arthritic change with marginal osteophytes on the medial and lateral aspects of the knee.  No interval change from prior x-ray. Jamarcus Weems MD 10/8/20             ASSESSMENT:    Diagnoses and all orders for this visit:    Primary osteoarthritis of right knee    Presence of total right knee joint prosthesis    Generalized anxiety disorder    Essential hypertension          PLAN    Continue to slowly increase activity as tolerated.  Continue strengthening conditioning  exercises.  We discussed swelling control and that his general overall soreness should continue to slowly progress.  No restrictions.  Follow-up 1 year postop.      Patient's Body mass index is 27.75 kg/m². BMI is above normal parameters. Recommendations include: exercise counseling and nutrition counseling.    Return in about 1 year (around 10/8/2021) for Recheck with repeat xrays.    Jamarcus Weems MD

## 2020-10-28 ENCOUNTER — LAB (OUTPATIENT)
Dept: LAB | Facility: OTHER | Age: 65
End: 2020-10-28

## 2020-10-28 DIAGNOSIS — R73.02 IMPAIRED GLUCOSE TOLERANCE: Chronic | ICD-10-CM

## 2020-10-28 DIAGNOSIS — E78.2 MIXED HYPERLIPIDEMIA: Chronic | ICD-10-CM

## 2020-10-28 DIAGNOSIS — I10 ESSENTIAL HYPERTENSION: ICD-10-CM

## 2020-10-28 LAB
ALBUMIN SERPL-MCNC: 4.5 G/DL (ref 3.5–5)
ALBUMIN/GLOB SERPL: 1.2 G/DL (ref 1.1–1.8)
ALP SERPL-CCNC: 120 U/L (ref 38–126)
ALT SERPL W P-5'-P-CCNC: 25 U/L
ANION GAP SERPL CALCULATED.3IONS-SCNC: 8 MMOL/L (ref 5–15)
AST SERPL-CCNC: 31 U/L (ref 17–59)
BASOPHILS # BLD AUTO: 0.02 10*3/MM3 (ref 0–0.2)
BASOPHILS NFR BLD AUTO: 0.1 % (ref 0–1.5)
BILIRUB SERPL-MCNC: 0.8 MG/DL (ref 0.2–1.3)
BILIRUB UR QL STRIP: NEGATIVE
BUN SERPL-MCNC: 16 MG/DL (ref 7–23)
BUN/CREAT SERPL: 19 (ref 7–25)
CALCIUM SPEC-SCNC: 9.4 MG/DL (ref 8.4–10.2)
CHLORIDE SERPL-SCNC: 101 MMOL/L (ref 101–112)
CHOLEST SERPL-MCNC: 186 MG/DL (ref 150–200)
CLARITY UR: CLEAR
CO2 SERPL-SCNC: 31 MMOL/L (ref 22–30)
COLOR UR: YELLOW
CREAT SERPL-MCNC: 0.84 MG/DL (ref 0.7–1.3)
DEPRECATED RDW RBC AUTO: 43.2 FL (ref 37–54)
EOSINOPHIL # BLD AUTO: 0.29 10*3/MM3 (ref 0–0.4)
EOSINOPHIL NFR BLD AUTO: 2.1 % (ref 0.3–6.2)
ERYTHROCYTE [DISTWIDTH] IN BLOOD BY AUTOMATED COUNT: 13.6 % (ref 12.3–15.4)
GFR SERPL CREATININE-BSD FRML MDRD: 92 ML/MIN/1.73 (ref 49–113)
GLOBULIN UR ELPH-MCNC: 3.7 GM/DL (ref 2.3–3.5)
GLUCOSE SERPL-MCNC: 118 MG/DL (ref 70–99)
GLUCOSE UR STRIP-MCNC: NEGATIVE MG/DL
HCT VFR BLD AUTO: 51.4 % (ref 37.5–51)
HDLC SERPL-MCNC: 36 MG/DL (ref 40–59)
HGB BLD-MCNC: 17.4 G/DL (ref 13–17.7)
HGB UR QL STRIP.AUTO: NEGATIVE
KETONES UR QL STRIP: NEGATIVE
LDLC SERPL CALC-MCNC: 83 MG/DL
LDLC/HDLC SERPL: 1.88 {RATIO} (ref 0–3.55)
LEUKOCYTE ESTERASE UR QL STRIP.AUTO: NEGATIVE
LYMPHOCYTES # BLD AUTO: 4.82 10*3/MM3 (ref 0.7–3.1)
LYMPHOCYTES NFR BLD AUTO: 35.7 % (ref 19.6–45.3)
MCH RBC QN AUTO: 29.6 PG (ref 26.6–33)
MCHC RBC AUTO-ENTMCNC: 33.9 G/DL (ref 31.5–35.7)
MCV RBC AUTO: 87.4 FL (ref 79–97)
MONOCYTES # BLD AUTO: 0.72 10*3/MM3 (ref 0.1–0.9)
MONOCYTES NFR BLD AUTO: 5.3 % (ref 5–12)
NEUTROPHILS NFR BLD AUTO: 56.8 % (ref 42.7–76)
NEUTROPHILS NFR BLD AUTO: 7.66 10*3/MM3 (ref 1.7–7)
NITRITE UR QL STRIP: NEGATIVE
PH UR STRIP.AUTO: 6 [PH] (ref 5.5–8)
PLATELET # BLD AUTO: 243 10*3/MM3 (ref 140–450)
PMV BLD AUTO: 12.4 FL (ref 6–12)
POTASSIUM SERPL-SCNC: 3.9 MMOL/L (ref 3.4–5)
PROT SERPL-MCNC: 8.2 G/DL (ref 6.3–8.6)
PROT UR QL STRIP: NEGATIVE
RBC # BLD AUTO: 5.88 10*6/MM3 (ref 4.14–5.8)
SODIUM SERPL-SCNC: 140 MMOL/L (ref 137–145)
SP GR UR STRIP: 1.02 (ref 1–1.03)
TRIGL SERPL-MCNC: 411 MG/DL
UROBILINOGEN UR QL STRIP: NORMAL
VLDLC SERPL-MCNC: 67 MG/DL (ref 5–40)
WBC # BLD AUTO: 13.51 10*3/MM3 (ref 3.4–10.8)

## 2020-10-28 PROCEDURE — 36415 COLL VENOUS BLD VENIPUNCTURE: CPT | Performed by: INTERNAL MEDICINE

## 2020-10-28 PROCEDURE — 80053 COMPREHEN METABOLIC PANEL: CPT | Performed by: INTERNAL MEDICINE

## 2020-10-28 PROCEDURE — 80061 LIPID PANEL: CPT | Performed by: INTERNAL MEDICINE

## 2020-10-28 PROCEDURE — 81003 URINALYSIS AUTO W/O SCOPE: CPT | Performed by: INTERNAL MEDICINE

## 2020-10-28 PROCEDURE — 85025 COMPLETE CBC W/AUTO DIFF WBC: CPT | Performed by: INTERNAL MEDICINE

## 2020-10-28 PROCEDURE — 84439 ASSAY OF FREE THYROXINE: CPT | Performed by: INTERNAL MEDICINE

## 2020-10-28 PROCEDURE — 83036 HEMOGLOBIN GLYCOSYLATED A1C: CPT | Performed by: INTERNAL MEDICINE

## 2020-10-28 PROCEDURE — 84443 ASSAY THYROID STIM HORMONE: CPT | Performed by: INTERNAL MEDICINE

## 2020-10-29 LAB
HBA1C MFR BLD: 5.94 % (ref 4.8–5.6)
T4 FREE SERPL-MCNC: 1.3 NG/DL (ref 0.93–1.7)
TSH SERPL DL<=0.05 MIU/L-ACNC: 0.56 UIU/ML (ref 0.27–4.2)

## 2020-11-03 ENCOUNTER — OFFICE VISIT (OUTPATIENT)
Dept: FAMILY MEDICINE CLINIC | Facility: CLINIC | Age: 65
End: 2020-11-03

## 2020-11-03 VITALS
HEART RATE: 71 BPM | DIASTOLIC BLOOD PRESSURE: 74 MMHG | SYSTOLIC BLOOD PRESSURE: 130 MMHG | HEIGHT: 71 IN | BODY MASS INDEX: 28 KG/M2 | WEIGHT: 200 LBS | OXYGEN SATURATION: 99 % | TEMPERATURE: 98.4 F

## 2020-11-03 DIAGNOSIS — D75.1 POLYCYTHEMIA: Chronic | ICD-10-CM

## 2020-11-03 DIAGNOSIS — D72.829 LEUKOCYTOSIS, UNSPECIFIED TYPE: Chronic | ICD-10-CM

## 2020-11-03 DIAGNOSIS — F41.1 GENERALIZED ANXIETY DISORDER: Chronic | ICD-10-CM

## 2020-11-03 DIAGNOSIS — E78.2 MIXED HYPERLIPIDEMIA: Chronic | ICD-10-CM

## 2020-11-03 DIAGNOSIS — I10 ESSENTIAL HYPERTENSION: Chronic | ICD-10-CM

## 2020-11-03 DIAGNOSIS — Z12.5 SCREENING FOR PROSTATE CANCER: ICD-10-CM

## 2020-11-03 DIAGNOSIS — G25.81 RESTLESS LEG SYNDROME: Chronic | ICD-10-CM

## 2020-11-03 DIAGNOSIS — R73.02 IMPAIRED GLUCOSE TOLERANCE: Primary | Chronic | ICD-10-CM

## 2020-11-03 DIAGNOSIS — Z79.899 DRUG THERAPY: ICD-10-CM

## 2020-11-03 DIAGNOSIS — M15.9 PRIMARY OSTEOARTHRITIS INVOLVING MULTIPLE JOINTS: Chronic | ICD-10-CM

## 2020-11-03 DIAGNOSIS — G63 POLYNEUROPATHY ASSOCIATED WITH UNDERLYING DISEASE (HCC): Chronic | ICD-10-CM

## 2020-11-03 DIAGNOSIS — J44.9 COPD, MILD (HCC): Chronic | ICD-10-CM

## 2020-11-03 PROCEDURE — 99214 OFFICE O/P EST MOD 30 MIN: CPT | Performed by: INTERNAL MEDICINE

## 2020-11-03 PROCEDURE — 96372 THER/PROPH/DIAG INJ SC/IM: CPT | Performed by: INTERNAL MEDICINE

## 2020-11-03 RX ORDER — GABAPENTIN 300 MG/1
CAPSULE ORAL
Qty: 120 CAPSULE | Refills: 0 | Status: SHIPPED | OUTPATIENT
Start: 2020-11-03 | End: 2020-12-01

## 2020-11-03 RX ORDER — HYDROCODONE BITARTRATE AND ACETAMINOPHEN 10; 325 MG/1; MG/1
1 TABLET ORAL EVERY 6 HOURS PRN
Qty: 120 TABLET | Refills: 0 | Status: SHIPPED | OUTPATIENT
Start: 2020-11-03 | End: 2020-12-01 | Stop reason: SDUPTHER

## 2020-11-03 RX ORDER — METHYLPREDNISOLONE ACETATE 80 MG/ML
80 INJECTION, SUSPENSION INTRA-ARTICULAR; INTRALESIONAL; INTRAMUSCULAR; SOFT TISSUE ONCE
Status: COMPLETED | OUTPATIENT
Start: 2020-11-03 | End: 2020-11-03

## 2020-11-03 RX ADMIN — METHYLPREDNISOLONE ACETATE 80 MG: 80 INJECTION, SUSPENSION INTRA-ARTICULAR; INTRALESIONAL; INTRAMUSCULAR; SOFT TISSUE at 09:21

## 2020-11-03 NOTE — PROGRESS NOTES
Chief Complaint   Patient presents with   • Osteoarthritis     1 month f/u med   • Peripheral Neuropathy     Subjective   Osvaldo Conley Sr. is a 65 y.o. male who presents to the office for follow-up and review of labs.  He has impaired glucose tolerance, and has been monitoring his dietary intake of sugar and carbohydrates.  He has hypertension and his blood pressure has been controlled.  He has hyperlipidemia and takes simvastatin daily.  He has restless leg syndrome and takes Requip nightly.  He has anxiety and takes Celexa daily.  His anxiety has been doing well.  He has osteoarthritis which causes chronic back and joint pain.  He takes Norco 10/325 mg 4 times a day for treatment of the chronic pain.  He is requesting a shot of Depo-Medrol today to help with his arthritic pain.  He also takes gabapentin 3 times daily to help with the neuropathic pain.  He complains of increased numbness and tingling in his upper legs.  This is worse on the left.  He had an aberrant urine drug screen on 2/4/2020.  As a result, he is now being seen monthly for closer monitoring.  He has leukocytosis and polycythemia.  He follows with oncology for this, and it has been stable.    The following portions of the patient's history were reviewed and updated as appropriate: allergies, current medications, past family history, past medical history, past social history, past surgical history and problem list.    Review of Systems   Constitutional: Negative for chills, fatigue and fever.   HENT: Negative for congestion, sneezing, sore throat and trouble swallowing.    Eyes: Negative for visual disturbance.   Respiratory: Negative for cough, chest tightness, shortness of breath and wheezing.    Cardiovascular: Negative for chest pain, palpitations and leg swelling.   Gastrointestinal: Negative for abdominal pain, constipation, diarrhea, nausea and vomiting.   Genitourinary: Negative for dysuria, frequency and urgency.   Musculoskeletal:  "Positive for arthralgias and back pain. Negative for neck pain.   Skin: Negative for rash.   Neurological: Negative for dizziness, weakness and headaches.   Psychiatric/Behavioral:        Patient denies any feelings of depression and has not felt down, hopeless or lost interest in any activities.   All other systems reviewed and are negative.      Objective   Vitals:    11/03/20 0900   BP: 130/74   Pulse: 71   Temp: 98.4 °F (36.9 °C)   TempSrc: Oral   SpO2: 99%   Weight: 90.7 kg (200 lb)   Height: 180.3 cm (71\")   PainSc:   4   PainLoc: Generalized      Body mass index is 27.89 kg/m².    Physical Exam   Constitutional: He is oriented to person, place, and time. He appears well-developed. No distress.   HENT:   Head: Normocephalic and atraumatic.   Nose: Nose normal.   Mouth/Throat: No oropharyngeal exudate.   Eyes: Pupils are equal, round, and reactive to light. Conjunctivae are normal. No scleral icterus.   Neck: Normal range of motion. Neck supple.   Cardiovascular: Normal rate, regular rhythm and normal heart sounds. Exam reveals no gallop and no friction rub.   No murmur heard.  Pulmonary/Chest: Effort normal. No respiratory distress. He has decreased breath sounds. He has no wheezes. He has no rales.   Abdominal: Soft. Bowel sounds are normal. He exhibits no distension. There is no abdominal tenderness. There is no rebound and no guarding.   Musculoskeletal: Normal range of motion.      Lumbar back: He exhibits pain.   Lymphadenopathy:     He has no cervical adenopathy.   Neurological: He is alert and oriented to person, place, and time. No cranial nerve deficit.   Skin: Skin is warm and dry. No rash noted.   Psychiatric: His behavior is normal. Judgment and thought content normal.   Nursing note and vitals reviewed.      Assessment/Plan   Diagnoses and all orders for this visit:    1. Impaired glucose tolerance (Primary)  -     Hemoglobin A1c; Future    2. Essential hypertension  -     CBC & Differential; " Future  -     Comprehensive Metabolic Panel; Future  -     T4, Free; Future  -     TSH; Future  -     Urinalysis With Culture If Indicated -; Future    3. Mixed hyperlipidemia  -     LDL Cholesterol, Direct; Future    4. COPD, mild (CMS/HCC)    5. Polyneuropathy associated with underlying disease (CMS/HCC)  -     gabapentin (NEURONTIN) 300 MG capsule; Take 1 capsule by mouth 2 (two) times a day AND 2 capsules Every Night. Take 1 capsule by mouth three times a day  Dispense: 120 capsule; Refill: 0    6. Primary osteoarthritis involving multiple joints  -     HYDROcodone-acetaminophen (NORCO)  MG per tablet; Take 1 tablet by mouth Every 6 (Six) Hours As Needed for Moderate Pain .  Dispense: 120 tablet; Refill: 0  -     methylPREDNISolone acetate (DEPO-medrol) injection 80 mg    7. Restless leg syndrome    8. Generalized anxiety disorder    9. Drug therapy  -     ToxASSURE Select 13 Discrete -; Future    10. Leukocytosis, unspecified type    11. Polycythemia    12. Screening for prostate cancer  -     PSA Screen; Future         Labs are reviewed with patient.  His glucose is slightly elevated at 118.  His hemoglobin A1c shows a nondiabetic state at 5.94.  Patient will continue to watch diet to help maintain control of the glucose.  Patient understands that there is an increased risk of developing diabetes in the future.  His WBC is elevated at 13.51.  His hematocrit is also elevated at 51.4 with hemoglobin of 17.4.  He will continue to follow with oncology due to the leukocytosis and polycythemia.  His total cholesterol is 186, LDL 83 and triglycerides 411.  He will continue with simvastatin for treatment of hyperlipidemia.  His blood pressure is controlled and he will continue with his current blood pressure medication.  He will continue with Requip for treatment of RLS.  He will continue with Celexa for treatment of his anxiety. He will continue with Norco 10/325 mg 4 times a day for treatment of the chronic  arthritic pain.  He will continue with gabapentin for treatment of the peripheral neuropathy.  Since he is having increased symptoms related to the neuropathy, I will increase this to 300 mg, 4 tablets daily.  He will take 1 each morning and afternoon and 2 nightly at bedtime.  He is given Depo-Medrol 80 mg IM to help with the flareup in his arthritic pain.    Patient understands the risks associated with this controlled medication, including tolerance and addiction.  Patient also agrees to only obtain this medication from me, and not from a another provider, unless that provider is covering for me in my absence.  Patient also agrees to be compliant in dosing, and not self adjust the dose of medication.  A signed controlled substance agreement is on file, and the patient has received a controlled substance education sheet at this a previous visit.  The patient has also signed a consent for treatment with a controlled substance as per [facility] policy. SANDRA was obtained.          PHQ-2/PHQ-9 Depression Screening 11/3/2020   Little interest or pleasure in doing things 0   Feeling down, depressed, or hopeless 0   Trouble falling or staying asleep, or sleeping too much -   Feeling tired or having little energy -   Poor appetite or overeating -   Feeling bad about yourself - or that you are a failure or have let yourself or your family down -   Trouble concentrating on things, such as reading the newspaper or watching television -   Moving or speaking so slowly that other people could have noticed. Or the opposite - being so fidgety or restless that you have been moving around a lot more than usual -   Thoughts that you would be better off dead, or of hurting yourself in some way -   Total Score 0   If you checked off any problems, how difficult have these problems made it for you to do your work, take care of things at home, or get along with other people? -         Lab on 10/28/2020   Component Date Value Ref  Range Status   • Glucose 10/28/2020 118* 70 - 99 mg/dL Final   • BUN 10/28/2020 16  7 - 23 mg/dL Final   • Creatinine 10/28/2020 0.84  0.70 - 1.30 mg/dL Final   • Sodium 10/28/2020 140  137 - 145 mmol/L Final   • Potassium 10/28/2020 3.9  3.4 - 5.0 mmol/L Final   • Chloride 10/28/2020 101  101 - 112 mmol/L Final   • CO2 10/28/2020 31.0* 22.0 - 30.0 mmol/L Final   • Calcium 10/28/2020 9.4  8.4 - 10.2 mg/dL Final   • Total Protein 10/28/2020 8.2  6.3 - 8.6 g/dL Final   • Albumin 10/28/2020 4.50  3.50 - 5.00 g/dL Final   • ALT (SGPT) 10/28/2020 25  <=50 U/L Final   • AST (SGOT) 10/28/2020 31  17 - 59 U/L Final   • Alkaline Phosphatase 10/28/2020 120  38 - 126 U/L Final   • Total Bilirubin 10/28/2020 0.8  0.2 - 1.3 mg/dL Final   • eGFR Non African Amer 10/28/2020 92  49 - 113 mL/min/1.73 Final   • Globulin 10/28/2020 3.7* 2.3 - 3.5 gm/dL Final   • A/G Ratio 10/28/2020 1.2  1.1 - 1.8 g/dL Final   • BUN/Creatinine Ratio 10/28/2020 19.0  7.0 - 25.0 Final   • Anion Gap 10/28/2020 8.0  5.0 - 15.0 mmol/L Final   • Free T4 10/28/2020 1.30  0.93 - 1.70 ng/dL Final   • TSH 10/28/2020 0.557  0.270 - 4.200 uIU/mL Final   • Color, UA 10/28/2020 Yellow  Yellow, Straw Final   • Appearance, UA 10/28/2020 Clear  Clear Final   • pH, UA 10/28/2020 6.0  5.5 - 8.0 Final   • Specific Gravity, UA 10/28/2020 1.025  1.005 - 1.030 Final   • Glucose, UA 10/28/2020 Negative  Negative Final   • Ketones, UA 10/28/2020 Negative  Negative Final   • Bilirubin, UA 10/28/2020 Negative  Negative Final   • Blood, UA 10/28/2020 Negative  Negative Final   • Protein, UA 10/28/2020 Negative  Negative Final   • Leuk Esterase, UA 10/28/2020 Negative  Negative Final   • Nitrite, UA 10/28/2020 Negative  Negative Final   • Urobilinogen, UA 10/28/2020 0.2 E.U./dL  0.2 - 1.0 E.U./dL Final   • Hemoglobin A1C 10/28/2020 5.94* 4.80 - 5.60 % Final   • Total Cholesterol 10/28/2020 186  150 - 200 mg/dL Final   • Triglycerides 10/28/2020 411* <=150 mg/dL Final   • HDL  Cholesterol 10/28/2020 36* 40 - 59 mg/dL Final   • LDL Cholesterol  10/28/2020 83  <=100 mg/dL Final   • VLDL Cholesterol 10/28/2020 67* 5 - 40 mg/dL Final   • LDL/HDL Ratio 10/28/2020 1.88  0.00 - 3.55 Final   • WBC 10/28/2020 13.51* 3.40 - 10.80 10*3/mm3 Final   • RBC 10/28/2020 5.88* 4.14 - 5.80 10*6/mm3 Final   • Hemoglobin 10/28/2020 17.4  13.0 - 17.7 g/dL Final   • Hematocrit 10/28/2020 51.4* 37.5 - 51.0 % Final   • MCV 10/28/2020 87.4  79.0 - 97.0 fL Final   • MCH 10/28/2020 29.6  26.6 - 33.0 pg Final   • MCHC 10/28/2020 33.9  31.5 - 35.7 g/dL Final   • RDW 10/28/2020 13.6  12.3 - 15.4 % Final   • RDW-SD 10/28/2020 43.2  37.0 - 54.0 fl Final   • MPV 10/28/2020 12.4* 6.0 - 12.0 fL Final   • Platelets 10/28/2020 243  140 - 450 10*3/mm3 Final   • Neutrophil % 10/28/2020 56.8  42.7 - 76.0 % Final   • Lymphocyte % 10/28/2020 35.7  19.6 - 45.3 % Final   • Monocyte % 10/28/2020 5.3  5.0 - 12.0 % Final   • Eosinophil % 10/28/2020 2.1  0.3 - 6.2 % Final   • Basophil % 10/28/2020 0.1  0.0 - 1.5 % Final   • Neutrophils, Absolute 10/28/2020 7.66* 1.70 - 7.00 10*3/mm3 Final   • Lymphocytes, Absolute 10/28/2020 4.82* 0.70 - 3.10 10*3/mm3 Final   • Monocytes, Absolute 10/28/2020 0.72  0.10 - 0.90 10*3/mm3 Final   • Eosinophils, Absolute 10/28/2020 0.29  0.00 - 0.40 10*3/mm3 Final   • Basophils, Absolute 10/28/2020 0.02  0.00 - 0.20 10*3/mm3 Final   Lab on 09/08/2020   Component Date Value Ref Range Status   • WBC 09/08/2020 11.60* 3.40 - 10.80 10*3/mm3 Final   • RBC 09/08/2020 5.43  4.14 - 5.80 10*6/mm3 Final   • Hemoglobin 09/08/2020 16.4  13.0 - 17.7 g/dL Final   • Hematocrit 09/08/2020 48.5  37.5 - 51.0 % Final   • MCV 09/08/2020 89.3  79.0 - 97.0 fL Final   • MCH 09/08/2020 30.2  26.6 - 33.0 pg Final   • MCHC 09/08/2020 33.8  31.5 - 35.7 g/dL Final   • RDW 09/08/2020 12.8  12.3 - 15.4 % Final   • RDW-SD 09/08/2020 41.7  37.0 - 54.0 fl Final   • MPV 09/08/2020 12.1* 6.0 - 12.0 fL Final   • Platelets 09/08/2020 213  140 -  450 10*3/mm3 Final   • Neutrophil % 09/08/2020 46.5  42.7 - 76.0 % Final   • Lymphocyte % 09/08/2020 39.6  19.6 - 45.3 % Final   • Monocyte % 09/08/2020 8.8  5.0 - 12.0 % Final   • Eosinophil % 09/08/2020 4.4  0.3 - 6.2 % Final   • Basophil % 09/08/2020 0.4  0.0 - 1.5 % Final   • Immature Grans % 09/08/2020 0.3  0.0 - 0.5 % Final   • Neutrophils, Absolute 09/08/2020 5.40  1.70 - 7.00 10*3/mm3 Final   • Lymphocytes, Absolute 09/08/2020 4.59* 0.70 - 3.10 10*3/mm3 Final   • Monocytes, Absolute 09/08/2020 1.02* 0.10 - 0.90 10*3/mm3 Final   • Eosinophils, Absolute 09/08/2020 0.51* 0.00 - 0.40 10*3/mm3 Final   • Basophils, Absolute 09/08/2020 0.05  0.00 - 0.20 10*3/mm3 Final   • Immature Grans, Absolute 09/08/2020 0.03  0.00 - 0.05 10*3/mm3 Final   • nRBC 09/08/2020 0.0  0.0 - 0.2 /100 WBC Final   ]

## 2020-12-01 ENCOUNTER — OFFICE VISIT (OUTPATIENT)
Dept: FAMILY MEDICINE CLINIC | Facility: CLINIC | Age: 65
End: 2020-12-01

## 2020-12-01 VITALS
WEIGHT: 190 LBS | HEART RATE: 72 BPM | SYSTOLIC BLOOD PRESSURE: 122 MMHG | OXYGEN SATURATION: 99 % | TEMPERATURE: 97.4 F | BODY MASS INDEX: 26.6 KG/M2 | HEIGHT: 71 IN | DIASTOLIC BLOOD PRESSURE: 80 MMHG

## 2020-12-01 DIAGNOSIS — I10 ESSENTIAL HYPERTENSION: Primary | Chronic | ICD-10-CM

## 2020-12-01 DIAGNOSIS — G63 POLYNEUROPATHY ASSOCIATED WITH UNDERLYING DISEASE (HCC): Chronic | ICD-10-CM

## 2020-12-01 DIAGNOSIS — M15.9 PRIMARY OSTEOARTHRITIS INVOLVING MULTIPLE JOINTS: Chronic | ICD-10-CM

## 2020-12-01 PROCEDURE — 99214 OFFICE O/P EST MOD 30 MIN: CPT | Performed by: INTERNAL MEDICINE

## 2020-12-01 RX ORDER — HYDROCODONE BITARTRATE AND ACETAMINOPHEN 10; 325 MG/1; MG/1
1 TABLET ORAL EVERY 6 HOURS PRN
Qty: 120 TABLET | Refills: 0 | Status: SHIPPED | OUTPATIENT
Start: 2020-12-01 | End: 2020-12-31 | Stop reason: SDUPTHER

## 2020-12-01 RX ORDER — GABAPENTIN 600 MG/1
600 TABLET ORAL 2 TIMES DAILY
Qty: 60 TABLET | Refills: 0 | Status: SHIPPED | OUTPATIENT
Start: 2020-12-01 | End: 2020-12-31 | Stop reason: SDUPTHER

## 2020-12-01 NOTE — PROGRESS NOTES
Chief Complaint   Patient presents with   • Peripheral Neuropathy     3 month med refill    • Osteoarthritis     Subjective   Osvaldo Conley Sr. is a 65 y.o. male who presents to the office for follow-up.  He has hypertension, and his blood pressure has been controlled.  He has osteoarthritis which causes chronic back and joint pain.  He takes Norco 10/325 mg 4 times a day for treatment of the chronic pain.  He takes gabapentin 600 mg 2 times a day for treatment of his neuropathic pain.  He had an aberrant urine drug screen on 2/4/2020.  As a result, he is now being seen monthly for closer monitoring of his medications.  He has anxiety and takes Celexa daily.  His anxiety has been doing well.      The following portions of the patient's history were reviewed and updated as appropriate: allergies, current medications, past family history, past medical history, past social history, past surgical history and problem list.    Review of Systems   Constitutional: Negative for chills, fatigue and fever.   HENT: Negative for congestion, sneezing, sore throat and trouble swallowing.    Eyes: Negative for visual disturbance.   Respiratory: Negative for cough, chest tightness, shortness of breath and wheezing.    Cardiovascular: Negative for chest pain, palpitations and leg swelling.   Gastrointestinal: Negative for abdominal pain, constipation, diarrhea, nausea and vomiting.   Genitourinary: Negative for dysuria, frequency and urgency.   Musculoskeletal: Positive for arthralgias, back pain and gait problem. Negative for neck pain.   Skin: Negative for rash.   Neurological: Negative for dizziness, weakness and headaches.   Psychiatric/Behavioral:        Patient denies any feelings of depression and has not felt down, hopeless or lost interest in any activities.   All other systems reviewed and are negative.        Objective    Vitals:    12/01/20 1030   BP: 122/80   Pulse: 72   Temp: 97.4 °F (36.3 °C)   TempSrc: Oral  "  SpO2: 99%   Weight: 86.2 kg (190 lb)   Height: 180.3 cm (71\")   PainSc:   5   PainLoc: Generalized     Body mass index is 26.5 kg/m².      Physical Exam   Constitutional: He is oriented to person, place, and time. He appears well-developed. No distress.   HENT:   Head: Normocephalic and atraumatic.   Nose: Nose normal.   Mouth/Throat: No oropharyngeal exudate.   Eyes: Pupils are equal, round, and reactive to light. Conjunctivae are normal. No scleral icterus.   Neck: Normal range of motion. Neck supple.   Cardiovascular: Normal rate, regular rhythm and normal heart sounds. Exam reveals no gallop and no friction rub.   No murmur heard.  Pulmonary/Chest: Effort normal. No respiratory distress. He has decreased breath sounds. He has no wheezes. He has no rales.   Abdominal: Soft. Bowel sounds are normal. He exhibits no distension. There is no abdominal tenderness. There is no rebound and no guarding.   Musculoskeletal: Normal range of motion.   Lymphadenopathy:     He has no cervical adenopathy.   Neurological: He is alert and oriented to person, place, and time. No cranial nerve deficit. Gait abnormal.   He is walking with the aid of a cane due to his knee pain.   Skin: Skin is warm and dry. No rash noted.   Psychiatric: His behavior is normal. Judgment and thought content normal.   Nursing note and vitals reviewed.  Physical exam has been reviewed and validated on 12/01/2020and updated with any changes.        Assessment/Plan        Diagnoses and all orders for this visit:    1. Essential hypertension (Primary)    2. Polyneuropathy associated with underlying disease (CMS/[facility])  -     gabapentin (NEURONTIN) 600 MG tablet; Take 1 tablet by mouth 2 (Two) Times a Day.  Dispense: 60 tablet; Refill: 0    3. Primary osteoarthritis involving multiple joints  -     HYDROcodone-acetaminophen (NORCO)  MG per tablet; Take 1 tablet by mouth Every 6 (Six) Hours As Needed for Moderate Pain .  Dispense: 120 tablet; Refill: " 0      His blood pressure is controlled, and he will continue with his current blood pressure medication.  He will continue with Celexa for treatment of his anxiety.  He will continue with gabapentin 600 mg 2 times daily for treatment of his neuropathic pain.  He will also continue with Norco 10/325 mg 4 times a day for treatment of his chronic arthritic pain.     Patient understands the risks associated with this controlled medication, including tolerance and addiction.  Patient also agrees to only obtain this medication from me, and not from a another provider, unless that provider is covering for me in my absence.  Patient also agrees to be compliant in dosing, and not self adjust the dose of medication.  A signed controlled substance agreement is on file, and the patient has received a controlled substance education sheet at this or a previous visit.  The patient has also signed a consent for treatment with a controlled substance as per [facility] policy. SANDRA ([id number])  is obtained.           PHQ-2/PHQ-9 Depression Screening 12/1/2020   Little interest or pleasure in doing things 0   Feeling down, depressed, or hopeless 0   Trouble falling or staying asleep, or sleeping too much -   Feeling tired or having little energy -   Poor appetite or overeating -   Feeling bad about yourself - or that you are a failure or have let yourself or your family down -   Trouble concentrating on things, such as reading the newspaper or watching television -   Moving or speaking so slowly that other people could have noticed. Or the opposite - being so fidgety or restless that you have been moving around a lot more than usual -   Thoughts that you would be better off dead, or of hurting yourself in some way -   Total Score 0   If you checked off any problems, how difficult have these problems made it for you to do your work, take care of things at home, or get along with other people? -

## 2020-12-16 ENCOUNTER — OFFICE VISIT (OUTPATIENT)
Dept: OTOLARYNGOLOGY | Facility: CLINIC | Age: 65
End: 2020-12-16

## 2020-12-16 VITALS — BODY MASS INDEX: 26.35 KG/M2 | HEIGHT: 71 IN | TEMPERATURE: 98.6 F | WEIGHT: 188.2 LBS | OXYGEN SATURATION: 98 %

## 2020-12-16 DIAGNOSIS — J37.0 CHRONIC LARYNGITIS: ICD-10-CM

## 2020-12-16 DIAGNOSIS — Z86.018 HISTORY OF INVERTED PAPILLOMA: ICD-10-CM

## 2020-12-16 DIAGNOSIS — Z85.828 PERSONAL HISTORY OF MALIGNANT NEOPLASM OF SKIN: ICD-10-CM

## 2020-12-16 DIAGNOSIS — M95.0 ACQUIRED NASAL DEFORMITY: Primary | ICD-10-CM

## 2020-12-16 PROCEDURE — 99214 OFFICE O/P EST MOD 30 MIN: CPT | Performed by: OTOLARYNGOLOGY

## 2020-12-16 PROCEDURE — 31575 DIAGNOSTIC LARYNGOSCOPY: CPT | Performed by: OTOLARYNGOLOGY

## 2020-12-20 NOTE — PROGRESS NOTES
Subjective   Osvaldo Conley . is a 65 y.o. male.       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  As a result nasal septal perforation and a defect in his medial maxilla.  Subsequently developed a basal cell carcinoma of the left nose that penetrated full-thickness and required a flap for reconstruction but subsequently resulted in a significant external deformity of the nose causing limited airflow.  He had been referred to Dr. Patel for possible nasal reconstruction but states after hearing the possible side effects he decided not to proceed with surgery at this point and he is therefore back for surveillance.  Says his nose is about the same.  He says he is feeling like his throat is bothering him on the left side and he has some sensation that food does not always want to go down well on the left.  No hemoptysis.  He does continue to smoke.      The following portions of the patient's history were reviewed and updated as appropriate: allergies, current medications, past family history, past medical history, past social history, past surgical history and problem list.     reports that he has been smoking cigarettes. He has a 50.00 pack-year smoking history. He has never used smokeless tobacco. He reports current alcohol use. He reports that he does not use drugs.   Patient is a tobacco user and has been counseled for use of tobacco products      Review of Systems   Constitutional: Negative for fever.   HENT: Positive for trouble swallowing.            Objective   Physical Exam  General: Well-developed well-nourished male in no acute distress.  Voice:Strong. Speech:Fluent  Ears: External ears no deformity, canals show some nonobstructing wax.  Beyond this, tympanic membranes intact clear and mobile bilaterally.  Nose: Nares show external collapse of the lateral ala without evidence of external neoplasm.   Intranasally there is a large septal perforation with no evidence of granulation tissue or purulence.  Oral cavity: Lips and gums without lesions.  Tongue and floor of mouth without lesions.  Parotid and submandibular ducts unobstructed.  No mucosal lesions on the buccal mucosa or vestibule of the mouth.  Pharynx: No erythema exudate mass or ulcer.  2+ tonsils.  Neck: No lymphadenopathy.  No thyromegaly.  Trachea and larynx midline.  No masses in the parotid or submandibular glands.  Procedure Note    Pre-operative Diagnosis:   Chief Complaint   Patient presents with   • Follow-up   Trouble swallowing    Post-operative Diagnosis: Chronic laryngitis; nasal septal perforation; no evidence of recurrent inverted papilloma    Anesthesia: topical with xylocaine and neosynephrine    Endoscopy Type:  Flexible Laryngoscopy    Procedure Details:    The patient was placed in the sitting position.  After topical anesthesia and decongestion, the 4 mm laryngoscope was passed.  The nasal cavities, nasopharynx, oropharynx, hypopharynx, and larynx were all examined.  Vocal cords were examined during respiration and phonation.  The following findings were noted:    Findings: As the scope was passed through the nose there is a large nasal septal perforation with well epithelialized edges and no evidence of granulation tissue or neoplasm.  Large defect in the medial wall of the maxilla also with no evidence of granulation tissue or lesion that would suggest recurrence of his previous inverted papilloma.  Nasopharynx is without mass.  Tongue base and hypopharynx show no evidence of neoplasm.  Endolarynx shows chronic appearing edema and erythema consistent with cigarette smoking along with mild polypoid change of both vocal cords that does not appear to be neoplastic.  Vocal cord mobility is intact.  The polyps do not significantly obstruct the glottis on inspiration.    Condition:  Stable.  Patient tolerated procedure  well.    Complications:  None    Assessment/Plan   Diagnoses and all orders for this visit:    1. Acquired nasal deformity (Primary)    2. History of inverted papilloma    3. Chronic laryngitis    4. Personal history of malignant neoplasm of skin      Plan: Reassured the patient that I saw no evidence of pharyngeal neoplasm.  Strongly urged smoking cessation.  Continue saline nasal spray for his nose.  Return in 6 months but call sooner if he decides he wants to go back to see Dr. Patel and proceed with reconstruction of his nose.

## 2020-12-31 ENCOUNTER — OFFICE VISIT (OUTPATIENT)
Dept: FAMILY MEDICINE CLINIC | Facility: CLINIC | Age: 65
End: 2020-12-31

## 2020-12-31 VITALS
DIASTOLIC BLOOD PRESSURE: 70 MMHG | TEMPERATURE: 98.4 F | SYSTOLIC BLOOD PRESSURE: 116 MMHG | HEIGHT: 71 IN | HEART RATE: 71 BPM | BODY MASS INDEX: 26.04 KG/M2 | WEIGHT: 186 LBS | OXYGEN SATURATION: 99 %

## 2020-12-31 DIAGNOSIS — M15.9 PRIMARY OSTEOARTHRITIS INVOLVING MULTIPLE JOINTS: Primary | Chronic | ICD-10-CM

## 2020-12-31 DIAGNOSIS — G63 POLYNEUROPATHY ASSOCIATED WITH UNDERLYING DISEASE (HCC): Chronic | ICD-10-CM

## 2020-12-31 DIAGNOSIS — I10 ESSENTIAL HYPERTENSION: Chronic | ICD-10-CM

## 2020-12-31 PROCEDURE — 99214 OFFICE O/P EST MOD 30 MIN: CPT | Performed by: INTERNAL MEDICINE

## 2020-12-31 RX ORDER — HYDROCODONE BITARTRATE AND ACETAMINOPHEN 10; 325 MG/1; MG/1
1 TABLET ORAL EVERY 6 HOURS PRN
Qty: 120 TABLET | Refills: 0 | Status: SHIPPED | OUTPATIENT
Start: 2020-12-31 | End: 2021-02-02 | Stop reason: SDUPTHER

## 2020-12-31 RX ORDER — GABAPENTIN 600 MG/1
600 TABLET ORAL 2 TIMES DAILY
Qty: 60 TABLET | Refills: 0 | Status: SHIPPED | OUTPATIENT
Start: 2020-12-31 | End: 2021-02-02 | Stop reason: SDUPTHER

## 2020-12-31 NOTE — PROGRESS NOTES
Chief Complaint   Patient presents with   • Peripheral Neuropathy     1 month f/u on labs    • Osteoarthritis     Subjective   Osvaldo Conley Sr. is a 65 y.o. male who presents to the office for follow-up.  He has hypertension, and his blood pressure has been doing well.  He has osteoarthritis which causes chronic back and joint pain.  He takes Norco 10/325 mg 4 times a day for treatment of the chronic pain.  He takes gabapentin 600 mg 2 times a day for the neuropathic pain.  He had an aberrant urine drug screen on 2/4/2020.  As a result, he is now being seen monthly for closer monitoring of his medications.  He has anxiety and takes Celexa daily.  His anxiety is well controlled.      The following portions of the patient's history were reviewed and updated as appropriate: allergies, current medications, past family history, past medical history, past social history, past surgical history and problem list.    Review of Systems   Constitutional: Negative for chills, fatigue and fever.   HENT: Negative for congestion, sneezing, sore throat and trouble swallowing.    Eyes: Negative for visual disturbance.   Respiratory: Negative for cough, chest tightness, shortness of breath and wheezing.    Cardiovascular: Negative for chest pain, palpitations and leg swelling.   Gastrointestinal: Negative for abdominal pain, constipation, diarrhea, nausea and vomiting.   Genitourinary: Negative for dysuria, frequency and urgency.   Musculoskeletal: Positive for arthralgias, back pain and gait problem. Negative for neck pain.   Skin: Negative for rash.   Neurological: Negative for dizziness, weakness and headaches.   Psychiatric/Behavioral:        Patient denies any feelings of depression and has not felt down, hopeless or lost interest in any activities.   All other systems reviewed and are negative.  Review of systems has been reviewed and validated on 12/31/2020and updated with any changes.        Objective    Vitals:     "12/31/20 1038   BP: 116/70   Pulse: 71   Temp: 98.4 °F (36.9 °C)   TempSrc: Oral   SpO2: 99%   Weight: 84.4 kg (186 lb)   Height: 180.3 cm (71\")   PainSc:   3   PainLoc: Generalized     Body mass index is 25.94 kg/m².    Physical Exam   Constitutional: He is oriented to person, place, and time. He appears well-developed. No distress.   HENT:   Head: Normocephalic and atraumatic.   Nose: Nose normal.   Mouth/Throat: No oropharyngeal exudate.   Eyes: Pupils are equal, round, and reactive to light. Conjunctivae are normal. No scleral icterus.   Neck: Normal range of motion. Neck supple.   Cardiovascular: Normal rate, regular rhythm and normal heart sounds. Exam reveals no gallop and no friction rub.   No murmur heard.  Pulmonary/Chest: Effort normal. No respiratory distress. He has decreased breath sounds. He has no wheezes. He has no rales.   Abdominal: Soft. Bowel sounds are normal. He exhibits no distension. There is no abdominal tenderness. There is no rebound and no guarding.   Musculoskeletal: Normal range of motion.   Lymphadenopathy:     He has no cervical adenopathy.   Neurological: He is alert and oriented to person, place, and time. No cranial nerve deficit. Gait abnormal.   He is walking with the aid of a cane due to his knee pain.   Skin: Skin is warm and dry. No rash noted.   Psychiatric: His behavior is normal. Judgment and thought content normal.   Nursing note and vitals reviewed.  Physical exam has been reviewed and validated on 12/31/2020and updated with any changes.          Assessment/Plan        Diagnoses and all orders for this visit:    1. Primary osteoarthritis involving multiple joints (Primary)  -     HYDROcodone-acetaminophen (NORCO)  MG per tablet; Take 1 tablet by mouth Every 6 (Six) Hours As Needed for Moderate Pain . Fill when due  Dispense: 120 tablet; Refill: 0    2. Polyneuropathy associated with underlying disease (CMS/HCC)  -     gabapentin (NEURONTIN) 600 MG tablet; Take 1 " tablet by mouth 2 (Two) Times a Day.  Dispense: 60 tablet; Refill: 0    3. Essential hypertension      His blood pressure is controlled, and he will continue with his current blood pressure medication.  He will continue with Celexa for treatment of anxiety.  He will continue with gabapentin 600 mg twice a day for treatment of the neuropathic pain.  He will continue with Norco 10/325 mg 4 times a day for treatment of the chronic arthritic pain.    Patient understands the risks associated with this controlled medication, including tolerance and addiction.  Patient also agrees to only obtain this medication from me, and not from a another provider, unless that provider is covering for me in my absence.  Patient also agrees to be compliant in dosing, and not self adjust the dose of medication.  A signed controlled substance agreement is on file, and the patient has received a controlled substance education sheet at this or a previous visit.  The patient has also signed a consent for treatment with a controlled substance as per [facility] policy. SANDRA  is obtained.           PHQ-2/PHQ-9 Depression Screening 12/31/2020   Little interest or pleasure in doing things 0   Feeling down, depressed, or hopeless 0   Trouble falling or staying asleep, or sleeping too much -   Feeling tired or having little energy -   Poor appetite or overeating -   Feeling bad about yourself - or that you are a failure or have let yourself or your family down -   Trouble concentrating on things, such as reading the newspaper or watching television -   Moving or speaking so slowly that other people could have noticed. Or the opposite - being so fidgety or restless that you have been moving around a lot more than usual -   Thoughts that you would be better off dead, or of hurting yourself in some way -   Total Score 0   If you checked off any problems, how difficult have these problems made it for you to do your work, take care of things at  home, or get along with other people? -

## 2021-02-02 ENCOUNTER — OFFICE VISIT (OUTPATIENT)
Dept: FAMILY MEDICINE CLINIC | Facility: CLINIC | Age: 66
End: 2021-02-02

## 2021-02-02 VITALS
BODY MASS INDEX: 25.48 KG/M2 | OXYGEN SATURATION: 98 % | HEIGHT: 71 IN | DIASTOLIC BLOOD PRESSURE: 80 MMHG | WEIGHT: 182 LBS | HEART RATE: 105 BPM | SYSTOLIC BLOOD PRESSURE: 124 MMHG

## 2021-02-02 DIAGNOSIS — F33.41 MAJOR DEPRESSIVE DISORDER, RECURRENT EPISODE, IN PARTIAL REMISSION (HCC): Chronic | ICD-10-CM

## 2021-02-02 DIAGNOSIS — J44.9 COPD, MILD (HCC): Chronic | ICD-10-CM

## 2021-02-02 DIAGNOSIS — M15.9 PRIMARY OSTEOARTHRITIS INVOLVING MULTIPLE JOINTS: Chronic | ICD-10-CM

## 2021-02-02 DIAGNOSIS — Z00.00 INITIAL MEDICARE ANNUAL WELLNESS VISIT: Primary | ICD-10-CM

## 2021-02-02 DIAGNOSIS — F41.1 GENERALIZED ANXIETY DISORDER: Chronic | ICD-10-CM

## 2021-02-02 DIAGNOSIS — G63 POLYNEUROPATHY ASSOCIATED WITH UNDERLYING DISEASE (HCC): Chronic | ICD-10-CM

## 2021-02-02 DIAGNOSIS — I10 ESSENTIAL HYPERTENSION: Chronic | ICD-10-CM

## 2021-02-02 PROCEDURE — 99214 OFFICE O/P EST MOD 30 MIN: CPT | Performed by: INTERNAL MEDICINE

## 2021-02-02 PROCEDURE — G0438 PPPS, INITIAL VISIT: HCPCS | Performed by: INTERNAL MEDICINE

## 2021-02-02 PROCEDURE — 96372 THER/PROPH/DIAG INJ SC/IM: CPT | Performed by: INTERNAL MEDICINE

## 2021-02-02 PROCEDURE — 1159F MED LIST DOCD IN RCRD: CPT | Performed by: INTERNAL MEDICINE

## 2021-02-02 PROCEDURE — 1170F FXNL STATUS ASSESSED: CPT | Performed by: INTERNAL MEDICINE

## 2021-02-02 RX ORDER — CITALOPRAM 40 MG/1
40 TABLET ORAL DAILY
Qty: 30 TABLET | Refills: 5 | Status: SHIPPED | OUTPATIENT
Start: 2021-02-02 | End: 2021-03-04 | Stop reason: SDUPTHER

## 2021-02-02 RX ORDER — METHYLPREDNISOLONE ACETATE 80 MG/ML
80 INJECTION, SUSPENSION INTRA-ARTICULAR; INTRALESIONAL; INTRAMUSCULAR; SOFT TISSUE ONCE
Status: COMPLETED | OUTPATIENT
Start: 2021-02-02 | End: 2021-02-02

## 2021-02-02 RX ORDER — GABAPENTIN 600 MG/1
600 TABLET ORAL 2 TIMES DAILY
Qty: 60 TABLET | Refills: 0 | Status: SHIPPED | OUTPATIENT
Start: 2021-02-02 | End: 2021-03-01 | Stop reason: SDUPTHER

## 2021-02-02 RX ORDER — HYDROCODONE BITARTRATE AND ACETAMINOPHEN 10; 325 MG/1; MG/1
1 TABLET ORAL EVERY 6 HOURS PRN
Qty: 120 TABLET | Refills: 0 | Status: SHIPPED | OUTPATIENT
Start: 2021-02-02 | End: 2021-03-01 | Stop reason: SDUPTHER

## 2021-02-02 RX ADMIN — METHYLPREDNISOLONE ACETATE 80 MG: 80 INJECTION, SUSPENSION INTRA-ARTICULAR; INTRALESIONAL; INTRAMUSCULAR; SOFT TISSUE at 10:43

## 2021-02-02 NOTE — PROGRESS NOTES
The ABCs of the Annual Wellness Visit  Initial Medicare Wellness Visit    Chief Complaint   Patient presents with   • Hyperlipidemia     1 month FU    • Hypertension   • Osteoarthritis   • Medicare Wellness-Initial Visit       Subjective   History of Present Illness:  Osvaldo Conley Sr. is a 65 y.o. male who presents for an Initial Medicare Wellness Visit.    HEALTH RISK ASSESSMENT    Recent Hospitalizations:  Recently treated at the following:  [facility]    Current Medical Providers:  Patient Care Team:  Ruiz Ayon MD as PCP - General  Yury Falcon MD as Surgeon (General Surgery)  Enmanuel Fontanez MD as Surgeon (Otolaryngology)  Kishor Cruz MD as Consulting Physician (Hematology and Oncology)    Smoking Status:  Social History     Tobacco Use   Smoking Status Current Every Day Smoker   • Packs/day: 1.00   • Years: 50.00   • Pack years: 50.00   • Types: Cigarettes   Smokeless Tobacco Never Used       Alcohol Consumption:  Social History     Substance and Sexual Activity   Alcohol Use Yes    Comment: occasional beer- 2or 3 per month       Depression Screen:   PHQ-2/PHQ-9 Depression Screening 2/2/2021   Little interest or pleasure in doing things 0   Feeling down, depressed, or hopeless 3   Trouble falling or staying asleep, or sleeping too much -   Feeling tired or having little energy 2   Poor appetite or overeating 0   Feeling bad about yourself - or that you are a failure or have let yourself or your family down 0   Trouble concentrating on things, such as reading the newspaper or watching television 0   Moving or speaking so slowly that other people could have noticed. Or the opposite - being so fidgety or restless that you have been moving around a lot more than usual 0   Thoughts that you would be better off dead, or of hurting yourself in some way 0   Total Score 5   If you checked off any problems, how difficult have these problems made it for you to do  your work, take care of things at home, or get along with other people? Not difficult at all       Fall Risk Screen:  ELLA Fall Risk Assessment was completed, and patient is at LOW risk for falls.Assessment completed on:2/2/2021    Health Habits and Functional and Cognitive Screening:  Functional & Cognitive Status 2/2/2021   Do you have difficulty preparing food and eating? No   Do you have difficulty bathing yourself, getting dressed or grooming yourself? No   Do you have difficulty using the toilet? No   Do you have difficulty moving around from place to place? Yes   Do you have trouble with steps or getting out of a bed or a chair? Yes   Current Diet Unhealthy Diet   Dental Exam Not up to date   Eye Exam Not up to date   Exercise (times per week) 7 times per week   Current Exercise Activities Include Walking   Do you need help using the phone?  No   Are you deaf or do you have serious difficulty hearing?  No   Do you need help with transportation? No   Do you need help shopping? No   Do you need help preparing meals?  No   Do you need help with housework?  No   Do you need help with laundry? No   Do you need help taking your medications? No   Do you need help managing money? No   Do you ever drive or ride in a car without wearing a seat belt? Yes   Have you felt unusual stress, anger or loneliness in the last month? Yes   Who do you live with? Alone   If you need help, do you have trouble finding someone available to you? No   Have you been bothered in the last four weeks by sexual problems? No   Do you have difficulty concentrating, remembering or making decisions? Yes         Does the patient have evidence of cognitive impairment? No    Asprin use counseling:Does not take aspirin    Age-appropriate Screening Schedule:  Refer to the list below for future screening recommendations based on patient's age, sex and/or medical conditions. Orders for these recommended tests are listed in the plan section. The  patient has been provided with a written plan.    Health Maintenance   Topic Date Due   • ZOSTER VACCINE (1 of 2) 07/28/2005   • PROSTATE CANCER SCREENING  02/04/2021   • LIPID PANEL  10/28/2021   • COLONOSCOPY  11/02/2021   • TDAP/TD VACCINES (2 - Td) 08/06/2023   • INFLUENZA VACCINE  Completed          The following portions of the patient's history were reviewed and updated as appropriate: allergies, current medications, past family history, past medical history, past social history, past surgical history and problem list.    Outpatient Medications Prior to Visit   Medication Sig Dispense Refill   • hydroCHLOROthiazide (HYDRODIURIL) 25 MG tablet Take 1 tablet by mouth Daily. 30 tablet 11   • losartan (COZAAR) 50 MG tablet Take 1 tablet by mouth Daily. 30 tablet 11   • metroNIDAZOLE (METROGEL) 1 % gel Apply to nose twice a day 60 g 1   • rOPINIRole (REQUIP) 0.5 MG tablet Take 1 tablet by mouth Every Night. Take 1 hour before bedtime. 30 tablet 11   • simvastatin (ZOCOR) 40 MG tablet Take 1 tablet by mouth Every Night. 30 tablet 11   • citalopram (CeleXA) 20 MG tablet Take 1 tablet by mouth Daily. 30 tablet 11   • gabapentin (NEURONTIN) 600 MG tablet Take 1 tablet by mouth 2 (Two) Times a Day. 60 tablet 0   • HYDROcodone-acetaminophen (NORCO)  MG per tablet Take 1 tablet by mouth Every 6 (Six) Hours As Needed for Moderate Pain . Fill when due 120 tablet 0     No facility-administered medications prior to visit.        Patient Active Problem List   Diagnosis   • Restless leg syndrome   • Polyneuropathy associated with underlying disease (CMS/HCC)   • Primary osteoarthritis involving multiple joints   • Mixed hyperlipidemia   • Generalized anxiety disorder   • Essential hypertension   • Peripheral edema   • Impaired glucose tolerance   • COPD, mild (CMS/HCC)   • Primary osteoarthritis of both knees   • Leukocytosis   • Polycythemia   • Encounter for tobacco use cessation counseling   • Chronic pain of both  "knees   • Internal derangement of left knee   • Degenerative tear of posterior horn of lateral meniscus of left knee   • Status post right knee replacement   • Presence of total right knee joint prosthesis   • Primary osteoarthritis of right knee   • Major depressive disorder, recurrent episode, in partial remission (CMS/[facility])       Advanced Care Planning:  ACP discussion was held with the patient during this visit. Patient does not have an advance directive, declines further assistance.    Review of Systems    Compared to one year ago, the patient feels his physical health is the same.  Compared to one year ago, the patient feels his mental health is the same.    Reviewed chart for potential of high risk medication in the elderly: yes  Reviewed chart for potential of harmful drug interactions in the elderly:yes    Objective         Vitals:    02/02/21 1017   BP: 124/80   Pulse: 105   SpO2: 98%   Weight: 82.6 kg (182 lb)   Height: 180.3 cm (71\")   PainSc:   4   PainLoc: Knee  Comment: bilateral, more left then right       Body mass index is 25.38 kg/m².  Discussed the patient's BMI with him. The BMI is in the acceptable range.    Physical Exam          Assessment/Plan   Medicare Risks and Personalized Health Plan  CMS Preventative Services Quick Reference  Chronic Pain   Diabetic Lab Screening   Immunizations Discussed/Encouraged (specific immunizations; Influenza, Shingrix and COVID-19 when available )  Prostate Cancer Screening   Tobacco Use/Dependance (use dotphrase .tobaccocessation for documentation)    The above risks/problems have been discussed with the patient.  Pertinent information has been shared with the patient in the After Visit Summary.  Follow up plans and orders are seen below in the Assessment/Plan Section.    Diagnoses and all orders for this visit:    1. Initial Medicare annual wellness visit (Primary)    2. Essential hypertension    3. Primary osteoarthritis involving multiple joints  -     " HYDROcodone-acetaminophen (NORCO)  MG per tablet; Take 1 tablet by mouth Every 6 (Six) Hours As Needed for Moderate Pain . Fill when due  Dispense: 120 tablet; Refill: 0  -     methylPREDNISolone acetate (DEPO-medrol) injection 80 mg    4. Polyneuropathy associated with underlying disease (CMS/HCC)  -     gabapentin (NEURONTIN) 600 MG tablet; Take 1 tablet by mouth 2 (Two) Times a Day.  Dispense: 60 tablet; Refill: 0    5. Generalized anxiety disorder  -     citalopram (CeleXA) 40 MG tablet; Take 1 tablet by mouth Daily.  Dispense: 30 tablet; Refill: 5    6. COPD, mild (CMS/HCC)    7. Major depressive disorder, recurrent episode, in partial remission (CMS/HCC)  -     citalopram (CeleXA) 40 MG tablet; Take 1 tablet by mouth Daily.  Dispense: 30 tablet; Refill: 5      Follow Up:  Return in about 1 month (around 3/2/2021) for Next scheduled follow up, Or sooner as needed.     An After Visit Summary and PPPS were given to the patient.

## 2021-02-02 NOTE — PROGRESS NOTES
Chief Complaint   Patient presents with   • Hyperlipidemia     1 month FU    • Hypertension   • Osteoarthritis   • Medicare Wellness-Initial Visit     Subjective   Osvaldo Conley Sr. is a 65 y.o. male who presents to the office for follow-up.  He has hypertension and his blood pressure has been controlled.  He has osteoarthritis which causes chronic back and joint pain.  He takes Norco 10/325 mg 4 times a day for treatment of his chronic pain.  He takes gabapentin 600 mg twice daily for his neuropathic pain.  He has been compliant with the medication.  His pain has been adequately controlled. He had an aberrant urine drug screen on 2020.  As a result, he is now being seen monthly for closer monitoring of his medications.  He has COPD, and his breathing has been baseline.  He denies any shortness of breath.  He continues to smoke cigarettes.    He has anxiety and depression, and takes Celexa daily.  His wife  last month, and he has been having increased anxiety and depression.  He denies any suicidal thoughts.  He is requesting additional medication to help with his symptoms.  He is also requesting a shot of Depo-Medrol to help with increased arthritic pain.    The following portions of the patient's history were reviewed and updated as appropriate: allergies, current medications, past family history, past medical history, past social history, past surgical history and problem list.    Review of Systems   Constitutional: Negative for chills, fatigue and fever.   HENT: Negative for congestion, sneezing, sore throat and trouble swallowing.    Eyes: Negative for visual disturbance.   Respiratory: Negative for cough, chest tightness, shortness of breath and wheezing.    Cardiovascular: Negative for chest pain, palpitations and leg swelling.   Gastrointestinal: Negative for abdominal pain, constipation, diarrhea, nausea and vomiting.   Genitourinary: Negative for dysuria, frequency and urgency.  "  Musculoskeletal: Positive for arthralgias, back pain and gait problem. Negative for neck pain.   Skin: Negative for rash.   Neurological: Negative for dizziness, weakness and headaches.   All other systems reviewed and are negative.  Review of systems has been reviewed and validated on 02/02/2021and updated with any changes.      Objective    Vitals:    02/02/21 1017   BP: 124/80   Pulse: 105   SpO2: 98%   Weight: 82.6 kg (182 lb)   Height: 180.3 cm (71\")   PainSc:   4   PainLoc: Knee  Comment: bilateral, more left then right     Body mass index is 25.38 kg/m².    Physical Exam   Constitutional: He is oriented to person, place, and time. He appears well-developed. No distress.   HENT:   Head: Normocephalic and atraumatic.   Nose: Nose normal.   Mouth/Throat: No oropharyngeal exudate.   Eyes: Pupils are equal, round, and reactive to light. Conjunctivae are normal. No scleral icterus.   Neck: Normal range of motion. Neck supple.   Cardiovascular: Normal rate, regular rhythm and normal heart sounds. Exam reveals no gallop and no friction rub.   No murmur heard.  Pulmonary/Chest: Effort normal. No respiratory distress. He has decreased breath sounds. He has no wheezes. He has no rales.   Abdominal: Soft. Bowel sounds are normal. He exhibits no distension. There is no abdominal tenderness. There is no rebound and no guarding.   Musculoskeletal: Normal range of motion.   Lymphadenopathy:     He has no cervical adenopathy.   Neurological: He is alert and oriented to person, place, and time. No cranial nerve deficit. Gait abnormal.   He is walking with the aid of a cane due to his knee pain.   Skin: Skin is warm and dry. No rash noted.   Psychiatric: His behavior is normal. Judgment and thought content normal.   Nursing note and vitals reviewed.  Physical exam has been reviewed and validated on 02/02/2021and updated with any changes.    Assessment/Plan        Diagnoses and all orders for this visit:    1. Initial Medicare " annual wellness visit (Primary)    2. Essential hypertension    3. Primary osteoarthritis involving multiple joints  -     HYDROcodone-acetaminophen (NORCO)  MG per tablet; Take 1 tablet by mouth Every 6 (Six) Hours As Needed for Moderate Pain . Fill when due  Dispense: 120 tablet; Refill: 0  -     methylPREDNISolone acetate (DEPO-medrol) injection 80 mg    4. Polyneuropathy associated with underlying disease (CMS/HCC)  -     gabapentin (NEURONTIN) 600 MG tablet; Take 1 tablet by mouth 2 (Two) Times a Day.  Dispense: 60 tablet; Refill: 0    5. Generalized anxiety disorder  -     citalopram (CeleXA) 40 MG tablet; Take 1 tablet by mouth Daily.  Dispense: 30 tablet; Refill: 5    6. COPD, mild (CMS/HCC)    7. Major depressive disorder, recurrent episode, in partial remission (CMS/HCC)  -     citalopram (CeleXA) 40 MG tablet; Take 1 tablet by mouth Daily.  Dispense: 30 tablet; Refill: 5      His blood pressure is controlled, and he will continue with his current blood pressure medication. He will continue with gabapentin 600 mg twice a day for treatment of his peripheral neuropathy.  He will continue with Norco 10/325 mg 4 times a day for treatment of chronic arthritic pain.  His COPD is stable.  He may use an albuterol inhaler as needed for any shortness of breath.    He is given Depo-Medrol 80 mg IM to help with the increased arthritic pain.  He will continue with Celexa for treatment of his anxiety and depression.  I will increase this to 40 mg daily.  He will let me know if his symptoms do not improve.    Patient understands the risks associated with this controlled medication, including tolerance and addiction.  Patient also agrees to only obtain this medication from me, and not from a another provider, unless that provider is covering for me in my absence.  Patient also agrees to be compliant in dosing, and not self adjust the dose of medication.  A signed controlled substance agreement is on file, and the  patient has received a controlled substance education sheet at this or a previous visit.  The patient has also signed a consent for treatment with a controlled substance as per [facility] policy. SANDRA  is obtained.           PHQ-2/PHQ-9 Depression Screening 2/2/2021   Little interest or pleasure in doing things 0   Feeling down, depressed, or hopeless 3   Trouble falling or staying asleep, or sleeping too much -   Feeling tired or having little energy 2   Poor appetite or overeating 0   Feeling bad about yourself - or that you are a failure or have let yourself or your family down 0   Trouble concentrating on things, such as reading the newspaper or watching television 0   Moving or speaking so slowly that other people could have noticed. Or the opposite - being so fidgety or restless that you have been moving around a lot more than usual 0   Thoughts that you would be better off dead, or of hurting yourself in some way 0   Total Score 5   If you checked off any problems, how difficult have these problems made it for you to do your work, take care of things at home, or get along with other people? Not difficult at all           .

## 2021-03-01 ENCOUNTER — OFFICE VISIT (OUTPATIENT)
Dept: FAMILY MEDICINE CLINIC | Facility: CLINIC | Age: 66
End: 2021-03-01

## 2021-03-01 VITALS
OXYGEN SATURATION: 99 % | WEIGHT: 188 LBS | BODY MASS INDEX: 26.32 KG/M2 | HEIGHT: 71 IN | DIASTOLIC BLOOD PRESSURE: 66 MMHG | HEART RATE: 74 BPM | TEMPERATURE: 98.7 F | SYSTOLIC BLOOD PRESSURE: 122 MMHG

## 2021-03-01 DIAGNOSIS — M15.9 PRIMARY OSTEOARTHRITIS INVOLVING MULTIPLE JOINTS: Chronic | ICD-10-CM

## 2021-03-01 DIAGNOSIS — D75.1 POLYCYTHEMIA: ICD-10-CM

## 2021-03-01 DIAGNOSIS — I10 ESSENTIAL HYPERTENSION: Primary | Chronic | ICD-10-CM

## 2021-03-01 DIAGNOSIS — G63 POLYNEUROPATHY ASSOCIATED WITH UNDERLYING DISEASE (HCC): Chronic | ICD-10-CM

## 2021-03-01 DIAGNOSIS — D72.829 LEUKOCYTOSIS, UNSPECIFIED TYPE: ICD-10-CM

## 2021-03-01 PROCEDURE — 99214 OFFICE O/P EST MOD 30 MIN: CPT | Performed by: INTERNAL MEDICINE

## 2021-03-01 RX ORDER — HYDROCODONE BITARTRATE AND ACETAMINOPHEN 10; 325 MG/1; MG/1
1 TABLET ORAL EVERY 6 HOURS PRN
Qty: 120 TABLET | Refills: 0 | Status: SHIPPED | OUTPATIENT
Start: 2021-03-01 | End: 2021-04-01 | Stop reason: SDUPTHER

## 2021-03-01 RX ORDER — GABAPENTIN 600 MG/1
600 TABLET ORAL 2 TIMES DAILY
Qty: 60 TABLET | Refills: 0 | Status: SHIPPED | OUTPATIENT
Start: 2021-03-01 | End: 2021-04-01 | Stop reason: SDUPTHER

## 2021-03-01 NOTE — PROGRESS NOTES
Chief Complaint   Patient presents with   • Osteoarthritis     1 month f/u    • Peripheral Neuropathy     Subjective   Osvaldo Conley Sr. is a 65 y.o. male who presents to the office for follow-up.  He has hypertension and his blood pressure has been controlled.  He has osteoarthritis which causes chronic back and joint pain.  He takes Norco 10/325 mg 4 times a day for treatment of his chronic pain.  He takes gabapentin 600 mg twice daily for treatment of his neuropathic pain. He has been compliant with the medication.  His pain has been adequately controlled. He had an aberrant urine drug screen on 2020.  As a result, he is now being seen monthly for closer monitoring of his medications.  He has COPD, and his breathing has been baseline.  He denies any shortness of breath.  He continues to smoke cigarettes.    He has anxiety and depression, and takes Celexa daily.  His wife  a couple of months ago.  At the last visit, he was having increased anxiety and depression.  I increased his Celexa to 40 mg daily.  The higher dose has been working better for him.  His anxiety and depression symptoms have improved. He denies any suicidal thoughts.    History of Present Illness has been reviewed and validated on nd updated with any changes.    The following portions of the patient's history were reviewed and updated as appropriate: allergies, current medications, past family history, past medical history, past social history, past surgical history and problem list.    Review of Systems   Constitutional: Negative for chills, fatigue and fever.   HENT: Negative for congestion, sneezing, sore throat and trouble swallowing.    Eyes: Negative for visual disturbance.   Respiratory: Negative for cough, chest tightness, shortness of breath and wheezing.    Cardiovascular: Negative for chest pain, palpitations and leg swelling.   Gastrointestinal: Negative for abdominal pain, constipation, diarrhea, nausea  "and vomiting.   Genitourinary: Negative for dysuria, frequency and urgency.   Musculoskeletal: Positive for arthralgias, back pain and gait problem. Negative for neck pain.   Skin: Negative for rash.   Neurological: Negative for dizziness, weakness and headaches.   All other systems reviewed and are negative.  Review of systems has been reviewed and validated on 02/02/2021and updated with any changes.      Objective    Vitals:    03/01/21 0936   BP: 122/66   Pulse: 74   Temp: 98.7 °F (37.1 °C)   TempSrc: Oral   SpO2: 99%   Weight: 85.3 kg (188 lb)   Height: 180.3 cm (71\")   PainSc:   3   PainLoc: Generalized     Body mass index is 26.22 kg/m².    Physical Exam   Constitutional: He is oriented to person, place, and time. He appears well-developed. No distress.   HENT:   Head: Normocephalic and atraumatic.   Eyes: Pupils are equal, round, and reactive to light. Conjunctivae are normal. No scleral icterus.   Neck: Normal range of motion. Neck supple.   Cardiovascular: Normal rate, regular rhythm and normal heart sounds. Exam reveals no gallop and no friction rub.   No murmur heard.  Pulmonary/Chest: Effort normal. No respiratory distress. He has decreased breath sounds. He has no wheezes. He has no rales.   Musculoskeletal: Normal range of motion.   Lymphadenopathy:     He has no cervical adenopathy.   Neurological: He is alert and oriented to person, place, and time. No cranial nerve deficit. Gait abnormal.   He is walking with the aid of a cane due to his knee pain.   Skin: Skin is warm and dry. No rash noted.   Psychiatric: His behavior is normal. Judgment and thought content normal.   Nursing note and vitals reviewed.  Physical exam has been reviewed and validated on 02/02/2021and updated with any changes.    Assessment/Plan        Diagnoses and all orders for this visit:    1. Essential hypertension (Primary)    2. Primary osteoarthritis involving multiple joints  -     HYDROcodone-acetaminophen (NORCO)  MG " per tablet; Take 1 tablet by mouth Every 6 (Six) Hours As Needed for Moderate Pain . Fill when due  Dispense: 120 tablet; Refill: 0    3. Polyneuropathy associated with underlying disease (CMS/HCC)  -     gabapentin (NEURONTIN) 600 MG tablet; Take 1 tablet by mouth 2 (Two) Times a Day.  Dispense: 60 tablet; Refill: 0      His blood pressure is controlled, and he will continue with his current blood pressure medication. He will continue with gabapentin 600 mg twice a day for treatment of his peripheral neuropathy.  He will continue with Norco 10/325 mg 4 times a day for treatment of chronic arthritic pain.  His COPD is stable.  He may use an albuterol inhaler as needed for any shortness of breath.  He will continue with Celexa 40 mg daily for treatment of anxiety and depression.    Patient understands the risks associated with this controlled medication, including tolerance and addiction.  Patient also agrees to only obtain this medication from me, and not from a another provider, unless that provider is covering for me in my absence.  Patient also agrees to be compliant in dosing, and not self adjust the dose of medication.  A signed controlled substance agreement is on file, and the patient has received a controlled substance education sheet at this or a previous visit.  The patient has also signed a consent for treatment with a controlled substance as per [facility] policy. SANDRA  is obtained.           PHQ-2/PHQ-9 Depression Screening 3/1/2021   Little interest or pleasure in doing things 0   Feeling down, depressed, or hopeless 0   Trouble falling or staying asleep, or sleeping too much 3   Feeling tired or having little energy 1   Poor appetite or overeating 0   Feeling bad about yourself - or that you are a failure or have let yourself or your family down 0   Trouble concentrating on things, such as reading the newspaper or watching television 0   Moving or speaking so slowly that other people could have  noticed. Or the opposite - being so fidgety or restless that you have been moving around a lot more than usual 0   Thoughts that you would be better off dead, or of hurting yourself in some way 0   Total Score 4   If you checked off any problems, how difficult have these problems made it for you to do your work, take care of things at home, or get along with other people? Somewhat difficult           .

## 2021-03-03 DIAGNOSIS — F41.1 GENERALIZED ANXIETY DISORDER: Chronic | ICD-10-CM

## 2021-03-03 DIAGNOSIS — E78.2 MIXED HYPERLIPIDEMIA: Chronic | ICD-10-CM

## 2021-03-03 DIAGNOSIS — G25.81 RESTLESS LEG SYNDROME: Chronic | ICD-10-CM

## 2021-03-03 DIAGNOSIS — I10 ESSENTIAL HYPERTENSION: Chronic | ICD-10-CM

## 2021-03-03 DIAGNOSIS — F33.41 MAJOR DEPRESSIVE DISORDER, RECURRENT EPISODE, IN PARTIAL REMISSION (HCC): Chronic | ICD-10-CM

## 2021-03-04 RX ORDER — HYDROCHLOROTHIAZIDE 25 MG/1
25 TABLET ORAL DAILY
Qty: 30 TABLET | Refills: 11 | Status: SHIPPED | OUTPATIENT
Start: 2021-03-04 | End: 2022-01-20

## 2021-03-04 RX ORDER — LOSARTAN POTASSIUM 50 MG/1
50 TABLET ORAL DAILY
Qty: 30 TABLET | Refills: 11 | Status: SHIPPED | OUTPATIENT
Start: 2021-03-04 | End: 2022-01-20

## 2021-03-04 RX ORDER — CITALOPRAM 40 MG/1
40 TABLET ORAL DAILY
Qty: 30 TABLET | Refills: 5 | Status: SHIPPED | OUTPATIENT
Start: 2021-03-04 | End: 2021-10-26 | Stop reason: SDUPTHER

## 2021-03-04 RX ORDER — ROPINIROLE 0.5 MG/1
TABLET, FILM COATED ORAL
Qty: 30 TABLET | Refills: 11 | Status: SHIPPED | OUTPATIENT
Start: 2021-03-04 | End: 2022-04-22 | Stop reason: SDUPTHER

## 2021-03-04 RX ORDER — CITALOPRAM 20 MG/1
20 TABLET ORAL DAILY
Qty: 30 TABLET | Refills: 11 | OUTPATIENT
Start: 2021-03-04

## 2021-03-04 RX ORDER — SIMVASTATIN 40 MG
40 TABLET ORAL NIGHTLY
Qty: 30 TABLET | Refills: 11 | Status: SHIPPED | OUTPATIENT
Start: 2021-03-04 | End: 2022-01-20

## 2021-04-01 ENCOUNTER — OFFICE VISIT (OUTPATIENT)
Dept: FAMILY MEDICINE CLINIC | Facility: CLINIC | Age: 66
End: 2021-04-01

## 2021-04-01 VITALS
WEIGHT: 184 LBS | BODY MASS INDEX: 25.76 KG/M2 | HEIGHT: 71 IN | SYSTOLIC BLOOD PRESSURE: 122 MMHG | TEMPERATURE: 98.7 F | HEART RATE: 74 BPM | OXYGEN SATURATION: 99 % | DIASTOLIC BLOOD PRESSURE: 66 MMHG

## 2021-04-01 DIAGNOSIS — I10 ESSENTIAL HYPERTENSION: Chronic | ICD-10-CM

## 2021-04-01 DIAGNOSIS — G63 POLYNEUROPATHY ASSOCIATED WITH UNDERLYING DISEASE (HCC): Chronic | ICD-10-CM

## 2021-04-01 DIAGNOSIS — F41.1 GENERALIZED ANXIETY DISORDER: Chronic | ICD-10-CM

## 2021-04-01 DIAGNOSIS — M15.9 PRIMARY OSTEOARTHRITIS INVOLVING MULTIPLE JOINTS: Primary | Chronic | ICD-10-CM

## 2021-04-01 DIAGNOSIS — F33.41 MAJOR DEPRESSIVE DISORDER, RECURRENT EPISODE, IN PARTIAL REMISSION (HCC): Chronic | ICD-10-CM

## 2021-04-01 PROCEDURE — 99214 OFFICE O/P EST MOD 30 MIN: CPT | Performed by: INTERNAL MEDICINE

## 2021-04-01 RX ORDER — GABAPENTIN 600 MG/1
600 TABLET ORAL 2 TIMES DAILY
Qty: 60 TABLET | Refills: 0 | Status: SHIPPED | OUTPATIENT
Start: 2021-04-01 | End: 2021-04-30 | Stop reason: SDUPTHER

## 2021-04-01 RX ORDER — HYDROCODONE BITARTRATE AND ACETAMINOPHEN 10; 325 MG/1; MG/1
1 TABLET ORAL EVERY 6 HOURS PRN
Qty: 120 TABLET | Refills: 0 | Status: SHIPPED | OUTPATIENT
Start: 2021-04-01 | End: 2021-04-30 | Stop reason: SDUPTHER

## 2021-04-26 ENCOUNTER — LAB (OUTPATIENT)
Dept: LAB | Facility: OTHER | Age: 66
End: 2021-04-26

## 2021-04-26 DIAGNOSIS — Z12.5 SCREENING FOR PROSTATE CANCER: ICD-10-CM

## 2021-04-26 DIAGNOSIS — E78.2 MIXED HYPERLIPIDEMIA: Chronic | ICD-10-CM

## 2021-04-26 DIAGNOSIS — I10 ESSENTIAL HYPERTENSION: ICD-10-CM

## 2021-04-26 DIAGNOSIS — R73.02 IMPAIRED GLUCOSE TOLERANCE: Chronic | ICD-10-CM

## 2021-04-26 DIAGNOSIS — Z79.899 DRUG THERAPY: ICD-10-CM

## 2021-04-26 LAB
ALBUMIN SERPL-MCNC: 4.2 G/DL (ref 3.5–5)
ALBUMIN/GLOB SERPL: 1.4 G/DL (ref 1.1–1.8)
ALP SERPL-CCNC: 121 U/L (ref 38–126)
ALT SERPL W P-5'-P-CCNC: 25 U/L
ANION GAP SERPL CALCULATED.3IONS-SCNC: 7 MMOL/L (ref 5–15)
ANISOCYTOSIS BLD QL: ABNORMAL
AST SERPL-CCNC: 34 U/L (ref 17–59)
BACTERIA UR QL AUTO: ABNORMAL /HPF
BILIRUB SERPL-MCNC: 0.6 MG/DL (ref 0.2–1.3)
BILIRUB UR QL STRIP: NEGATIVE
BUN SERPL-MCNC: 18 MG/DL (ref 7–23)
BUN/CREAT SERPL: 20 (ref 7–25)
CALCIUM SPEC-SCNC: 9.3 MG/DL (ref 8.4–10.2)
CHLORIDE SERPL-SCNC: 95 MMOL/L (ref 101–112)
CLARITY UR: CLEAR
CO2 SERPL-SCNC: 34 MMOL/L (ref 22–30)
COLOR UR: YELLOW
CREAT SERPL-MCNC: 0.9 MG/DL (ref 0.7–1.3)
DEPRECATED RDW RBC AUTO: 40.7 FL (ref 37–54)
EOSINOPHIL # BLD MANUAL: 0.68 10*3/MM3 (ref 0–0.4)
EOSINOPHIL NFR BLD MANUAL: 5 % (ref 0.3–6.2)
ERYTHROCYTE [DISTWIDTH] IN BLOOD BY AUTOMATED COUNT: 13.1 % (ref 12.3–15.4)
GFR SERPL CREATININE-BSD FRML MDRD: 85 ML/MIN/1.73 (ref 49–113)
GLOBULIN UR ELPH-MCNC: 3 GM/DL (ref 2.3–3.5)
GLUCOSE SERPL-MCNC: 125 MG/DL (ref 70–99)
GLUCOSE UR STRIP-MCNC: ABNORMAL MG/DL
HCT VFR BLD AUTO: 45.1 % (ref 37.5–51)
HGB BLD-MCNC: 16.8 G/DL (ref 13–17.7)
HGB UR QL STRIP.AUTO: NEGATIVE
HYALINE CASTS UR QL AUTO: ABNORMAL /LPF
KETONES UR QL STRIP: NEGATIVE
LEUKOCYTE ESTERASE UR QL STRIP.AUTO: NEGATIVE
LYMPHOCYTES # BLD MANUAL: 3.68 10*3/MM3 (ref 0.7–3.1)
LYMPHOCYTES NFR BLD MANUAL: 27 % (ref 19.6–45.3)
LYMPHOCYTES NFR BLD MANUAL: 8 % (ref 5–12)
MCH RBC QN AUTO: 31.7 PG (ref 26.6–33)
MCHC RBC AUTO-ENTMCNC: 37.3 G/DL (ref 31.5–35.7)
MCV RBC AUTO: 85.1 FL (ref 79–97)
MONOCYTES # BLD AUTO: 1.09 10*3/MM3 (ref 0.1–0.9)
MUCOUS THREADS URNS QL MICRO: ABNORMAL /HPF
NEUTROPHILS # BLD AUTO: 8.18 10*3/MM3 (ref 1.7–7)
NEUTROPHILS NFR BLD MANUAL: 60 % (ref 42.7–76)
NITRITE UR QL STRIP: NEGATIVE
PH UR STRIP.AUTO: 6 [PH] (ref 5.5–8)
PLATELET # BLD AUTO: 349 10*3/MM3 (ref 140–450)
PMV BLD AUTO: 10.8 FL (ref 6–12)
POTASSIUM SERPL-SCNC: 3.4 MMOL/L (ref 3.4–5)
PROT SERPL-MCNC: 7.2 G/DL (ref 6.3–8.6)
PROT UR QL STRIP: ABNORMAL
RBC # BLD AUTO: 5.3 10*6/MM3 (ref 4.14–5.8)
RBC # UR: ABNORMAL /HPF
REF LAB TEST METHOD: ABNORMAL
SCAN SLIDE: NORMAL
SMALL PLATELETS BLD QL SMEAR: ADEQUATE
SODIUM SERPL-SCNC: 136 MMOL/L (ref 137–145)
SP GR UR STRIP: >=1.03 (ref 1–1.03)
SQUAMOUS #/AREA URNS HPF: ABNORMAL /HPF
UROBILINOGEN UR QL STRIP: ABNORMAL
WBC # BLD AUTO: 13.64 10*3/MM3 (ref 3.4–10.8)
WBC MORPH BLD: NORMAL
WBC UR QL AUTO: ABNORMAL /HPF

## 2021-04-26 PROCEDURE — 36415 COLL VENOUS BLD VENIPUNCTURE: CPT | Performed by: INTERNAL MEDICINE

## 2021-04-26 PROCEDURE — 83721 ASSAY OF BLOOD LIPOPROTEIN: CPT | Performed by: INTERNAL MEDICINE

## 2021-04-26 PROCEDURE — 84439 ASSAY OF FREE THYROXINE: CPT | Performed by: INTERNAL MEDICINE

## 2021-04-26 PROCEDURE — 85025 COMPLETE CBC W/AUTO DIFF WBC: CPT | Performed by: INTERNAL MEDICINE

## 2021-04-26 PROCEDURE — 80307 DRUG TEST PRSMV CHEM ANLYZR: CPT | Performed by: INTERNAL MEDICINE

## 2021-04-26 PROCEDURE — 84443 ASSAY THYROID STIM HORMONE: CPT | Performed by: INTERNAL MEDICINE

## 2021-04-26 PROCEDURE — 80053 COMPREHEN METABOLIC PANEL: CPT | Performed by: INTERNAL MEDICINE

## 2021-04-26 PROCEDURE — 83036 HEMOGLOBIN GLYCOSYLATED A1C: CPT | Performed by: INTERNAL MEDICINE

## 2021-04-26 PROCEDURE — 81001 URINALYSIS AUTO W/SCOPE: CPT | Performed by: INTERNAL MEDICINE

## 2021-04-26 PROCEDURE — G0481 DRUG TEST DEF 8-14 CLASSES: HCPCS | Performed by: INTERNAL MEDICINE

## 2021-04-26 PROCEDURE — G0103 PSA SCREENING: HCPCS | Performed by: INTERNAL MEDICINE

## 2021-04-27 LAB
ARTICHOKE IGE QN: 64 MG/DL (ref 0–100)
HBA1C MFR BLD: 5.4 % (ref 4.8–5.6)
PSA SERPL-MCNC: 3.58 NG/ML (ref 0–4)
T4 FREE SERPL-MCNC: 1.6 NG/DL (ref 0.93–1.7)
TSH SERPL DL<=0.05 MIU/L-ACNC: 0.44 UIU/ML (ref 0.27–4.2)

## 2021-04-30 ENCOUNTER — OFFICE VISIT (OUTPATIENT)
Dept: FAMILY MEDICINE CLINIC | Facility: CLINIC | Age: 66
End: 2021-04-30

## 2021-04-30 VITALS
SYSTOLIC BLOOD PRESSURE: 122 MMHG | HEIGHT: 71 IN | TEMPERATURE: 97.6 F | DIASTOLIC BLOOD PRESSURE: 72 MMHG | OXYGEN SATURATION: 99 % | WEIGHT: 174 LBS | BODY MASS INDEX: 24.36 KG/M2 | HEART RATE: 75 BPM

## 2021-04-30 DIAGNOSIS — L98.9 SKIN LESION: ICD-10-CM

## 2021-04-30 DIAGNOSIS — I10 ESSENTIAL HYPERTENSION: Chronic | ICD-10-CM

## 2021-04-30 DIAGNOSIS — G63 POLYNEUROPATHY ASSOCIATED WITH UNDERLYING DISEASE (HCC): Chronic | ICD-10-CM

## 2021-04-30 DIAGNOSIS — R73.02 IMPAIRED GLUCOSE TOLERANCE: Primary | Chronic | ICD-10-CM

## 2021-04-30 DIAGNOSIS — M15.9 PRIMARY OSTEOARTHRITIS INVOLVING MULTIPLE JOINTS: Chronic | ICD-10-CM

## 2021-04-30 DIAGNOSIS — F33.41 MAJOR DEPRESSIVE DISORDER, RECURRENT EPISODE, IN PARTIAL REMISSION (HCC): Chronic | ICD-10-CM

## 2021-04-30 DIAGNOSIS — E78.2 MIXED HYPERLIPIDEMIA: Chronic | ICD-10-CM

## 2021-04-30 DIAGNOSIS — G25.81 RESTLESS LEG SYNDROME: Chronic | ICD-10-CM

## 2021-04-30 DIAGNOSIS — J44.9 COPD, MILD (HCC): Chronic | ICD-10-CM

## 2021-04-30 DIAGNOSIS — F41.1 GENERALIZED ANXIETY DISORDER: Chronic | ICD-10-CM

## 2021-04-30 PROCEDURE — 99214 OFFICE O/P EST MOD 30 MIN: CPT | Performed by: INTERNAL MEDICINE

## 2021-04-30 RX ORDER — GABAPENTIN 600 MG/1
600 TABLET ORAL 2 TIMES DAILY
Qty: 60 TABLET | Refills: 0 | Status: SHIPPED | OUTPATIENT
Start: 2021-04-30 | End: 2021-05-28 | Stop reason: SDUPTHER

## 2021-04-30 RX ORDER — HYDROCODONE BITARTRATE AND ACETAMINOPHEN 10; 325 MG/1; MG/1
1 TABLET ORAL EVERY 6 HOURS PRN
Qty: 120 TABLET | Refills: 0 | Status: SHIPPED | OUTPATIENT
Start: 2021-04-30 | End: 2021-05-28 | Stop reason: SDUPTHER

## 2021-04-30 NOTE — PROGRESS NOTES
Chief Complaint   Patient presents with   • Osteoarthritis     1 month f/u    • Cyst     patient has a spot on the right side of the coller bone that has been there for a while     Subjective   Osvaldo Conley . is a 65 y.o. male who presents to the office for follow-up and review of labs.  He has impaired glucose tolerance, and has been monitoring his dietary intake of sugar and carbohydrates.  He has hypertension and his blood pressure has been controlled.  He has hyperlipidemia and takes simvastatin, 40 mg daily.  He has restless leg syndrome and takes Requip, 0.5 mg nightly.  He has anxiety and depression, and takes Celexa 40 mg daily.  His anxiety has been doing well.  His depression has also improved over the past couple of months.  He has osteoarthritis which causes chronic back and joint pain.  He takes Norco 10/325 mg 4 times a day for treatment of the chronic pain. He also takes gabapentin 600 mg twice daily to help with the neuropathic pain. He had an aberrant urine drug screen on 2/4/2020.  As a result, he is now being seen monthly for closer monitoring.  He has leukocytosis and polycythemia.  He follows with oncology for this, and it has been stable.  He has COPD, and his breathing has been baseline.  He has an occasional cough.  He also notes some bony prominence just below his collarbone on the right side of his chest.  This is not tender or painful.  He also has several skin lesions and is requesting referral back to dermatology.  He saw a dermatologist in [place] last year, but he does not recall her name.    History of Present Illness has been reviewed and validated on 04/30/2021and updated with any changes.    The following portions of the patient's history were reviewed and updated as appropriate: allergies, current medications, past family history, past medical history, past social history, past surgical history and problem list.    Review of Systems   Constitutional: Negative for  "chills, fatigue and fever.   HENT: Negative for congestion, sneezing, sore throat and trouble swallowing.    Eyes: Negative for visual disturbance.   Respiratory: Negative for cough, chest tightness, shortness of breath and wheezing.    Cardiovascular: Negative for chest pain, palpitations and leg swelling.   Gastrointestinal: Negative for abdominal pain, constipation, diarrhea, nausea and vomiting.   Genitourinary: Negative for dysuria, frequency and urgency.   Musculoskeletal: Positive for arthralgias and back pain. Negative for neck pain.   Skin: Negative for rash.   Neurological: Negative for dizziness, weakness and headaches.   Psychiatric/Behavioral:        Patient denies any feelings of depression and has not felt down, hopeless or lost interest in any activities.   All other systems reviewed and are negative.  Review of systems has been reviewed and validated on 04/30/2021and updated with any changes.      Objective   Vitals:    04/30/21 1111   BP: 122/72   Pulse: 75   Temp: 97.6 °F (36.4 °C)   TempSrc: Oral   SpO2: 99%   Weight: 78.9 kg (174 lb)   Height: 180.3 cm (71\")   PainSc:   4   PainLoc: Generalized      Body mass index is 24.27 kg/m².    Physical Exam   Constitutional: He is oriented to person, place, and time. He appears well-developed. No distress.   HENT:   Head: Normocephalic and atraumatic.   Nose: Nose normal.   Eyes: Pupils are equal, round, and reactive to light. Conjunctivae are normal. No scleral icterus.   Cardiovascular: Normal rate, regular rhythm and normal heart sounds. Exam reveals no gallop and no friction rub.   No murmur heard.  Pulmonary/Chest: Effort normal. No respiratory distress. He has decreased breath sounds. He has no wheezes. He has no rales.   Musculoskeletal: Normal range of motion.      Lumbar back: He exhibits pain.   Neurological: He is alert and oriented to person, place, and time. No cranial nerve deficit.   Skin: Skin is warm and dry. No rash noted.   He has a " small lipoma on the left side of his back.  He has several skin lesions on his arms.   Psychiatric: His behavior is normal. Judgment and thought content normal.   Nursing note and vitals reviewed.  Physical exam has been reviewed and validated on 04/30/2021and updated with any changes.      Assessment/Plan   Diagnoses and all orders for this visit:    1. Impaired glucose tolerance (Primary)  -     Hemoglobin A1c; Future    2. Essential hypertension  -     CBC & Differential; Future  -     Comprehensive Metabolic Panel; Future  -     T4, Free; Future  -     TSH; Future  -     Urinalysis With Culture If Indicated -; Future    3. Mixed hyperlipidemia  -     Lipid Panel; Future    4. Primary osteoarthritis involving multiple joints  -     HYDROcodone-acetaminophen (NORCO)  MG per tablet; Take 1 tablet by mouth Every 6 (Six) Hours As Needed for Moderate Pain . Fill when due  Dispense: 120 tablet; Refill: 0    5. Polyneuropathy associated with underlying disease (CMS/HCC)  -     gabapentin (NEURONTIN) 600 MG tablet; Take 1 tablet by mouth 2 (Two) Times a Day.  Dispense: 60 tablet; Refill: 0    6. Restless leg syndrome    7. COPD, mild (CMS/HCC)  -     XR Chest 2 View; Future    8. Major depressive disorder, recurrent episode, in partial remission (CMS/HCC)    9. Generalized anxiety disorder    10. Skin lesion  -     Ambulatory Referral to Dermatology         Labs are reviewed with patient.  Patient's glucose is elevated at 125.  His hemoglobin A1c is 5.40.  Patient will continue to watch diet to help maintain control of the glucose.  Patient understands that there is an increased risk of developing diabetes in the future.  His WBC is elevated but stable at 13.64.  His hemoglobin is 16.8 and hematocrit 45.1.  He will continue to follow with oncology due to the leukocytosis and polycythemia.  His LDL is at goal at 64.  He will continue with simvastatin 40 mg daily for treatment of hyperlipidemia.  His blood pressure  is controlled, and he will continue with his current blood pressure medication.  He will continue with Requip nightly for treatment of restless leg syndrome.  He will continue with Celexa 40 mg daily for treatment of anxiety and depression.  He will continue with Norco 10/325 mg 4 times a day for treatment of his chronic arthritic pain.  He will continue with gabapentin 600 mg twice a day for treatment of peripheral neuropathy.  His PSA is normal at 3.580.    I will refer him back to dermatology for follow-up skin exam and evaluation of the skin lesions.  I will obtain a chest x-ray due to his COPD and to further evaluate the bony prominence on the right chest just below the clavicle.  This is most likely more noticeable since he has lost weight.    Patient understands the risks associated with this controlled medication, including tolerance and addiction.  Patient also agrees to only obtain this medication from me, and not from a another provider, unless that provider is covering for me in my absence.  Patient also agrees to be compliant in dosing, and not self adjust the dose of medication.  A signed controlled substance agreement is on file, and the patient has received a controlled substance education sheet at this a previous visit.  The patient has also signed a consent for treatment with a controlled substance as per [facility] policy. SANDRA was obtained.  Random urine drug screen was obtained on 4/26/2021, and results are currently pending.      PHQ-2/PHQ-9 Depression Screening 4/1/2021   Little interest or pleasure in doing things 0   Feeling down, depressed, or hopeless 0   Trouble falling or staying asleep, or sleeping too much 3   Feeling tired or having little energy 1   Poor appetite or overeating 0   Feeling bad about yourself - or that you are a failure or have let yourself or your family down 0   Trouble concentrating on things, such as reading the newspaper or watching television 0   Moving or  speaking so slowly that other people could have noticed. Or the opposite - being so fidgety or restless that you have been moving around a lot more than usual 0   Thoughts that you would be better off dead, or of hurting yourself in some way 0   Total Score 4   If you checked off any problems, how difficult have these problems made it for you to do your work, take care of things at home, or get along with other people? Somewhat difficult         Lab on 04/26/2021   Component Date Value Ref Range Status   • Glucose 04/26/2021 125* 70 - 99 mg/dL Final   • BUN 04/26/2021 18  7 - 23 mg/dL Final   • Creatinine 04/26/2021 0.90  0.70 - 1.30 mg/dL Final   • Sodium 04/26/2021 136* 137 - 145 mmol/L Final   • Potassium 04/26/2021 3.4  3.4 - 5.0 mmol/L Final   • Chloride 04/26/2021 95* 101 - 112 mmol/L Final   • CO2 04/26/2021 34.0* 22.0 - 30.0 mmol/L Final   • Calcium 04/26/2021 9.3  8.4 - 10.2 mg/dL Final   • Total Protein 04/26/2021 7.2  6.3 - 8.6 g/dL Final   • Albumin 04/26/2021 4.20  3.50 - 5.00 g/dL Final   • ALT (SGPT) 04/26/2021 25  <=50 U/L Final   • AST (SGOT) 04/26/2021 34  17 - 59 U/L Final   • Alkaline Phosphatase 04/26/2021 121  38 - 126 U/L Final   • Total Bilirubin 04/26/2021 0.6  0.2 - 1.3 mg/dL Final   • eGFR Non African Amer 04/26/2021 85  49 - 113 mL/min/1.73 Final   • Globulin 04/26/2021 3.0  2.3 - 3.5 gm/dL Final   • A/G Ratio 04/26/2021 1.4  1.1 - 1.8 g/dL Final   • BUN/Creatinine Ratio 04/26/2021 20.0  7.0 - 25.0 Final   • Anion Gap 04/26/2021 7.0  5.0 - 15.0 mmol/L Final   • Free T4 04/26/2021 1.60  0.93 - 1.70 ng/dL Final   • TSH 04/26/2021 0.441  0.270 - 4.200 uIU/mL Final   • Color, UA 04/26/2021 Yellow  Yellow, Straw Final   • Appearance, UA 04/26/2021 Clear  Clear Final   • pH, UA 04/26/2021 6.0  5.5 - 8.0 Final   • Specific Gravity, UA 04/26/2021 >=1.030  1.005 - 1.030 Final   • Glucose, UA 04/26/2021 100 mg/dL (Trace)* Negative Final   • Ketones, UA 04/26/2021 Negative  Negative Final   •  Bilirubin, UA 04/26/2021 Negative  Negative Final   • Blood, UA 04/26/2021 Negative  Negative Final   • Protein, UA 04/26/2021 >=300 mg/dL (3+)* Negative Final   • Leuk Esterase, UA 04/26/2021 Negative  Negative Final   • Nitrite, UA 04/26/2021 Negative  Negative Final   • Urobilinogen, UA 04/26/2021 0.2 E.U./dL  0.2 - 1.0 E.U./dL Final   • Hemoglobin A1C 04/26/2021 5.40  4.80 - 5.60 % Final   • LDL Cholesterol  04/26/2021 64  0 - 100 mg/dL Final   • PSA 04/26/2021 3.580  0.000 - 4.000 ng/mL Final   • WBC 04/26/2021 13.64* 3.40 - 10.80 10*3/mm3 Final   • RBC 04/26/2021 5.30  4.14 - 5.80 10*6/mm3 Final   • Hemoglobin 04/26/2021 16.8  13.0 - 17.7 g/dL Final   • Hematocrit 04/26/2021 45.1  37.5 - 51.0 % Final   • MCV 04/26/2021 85.1  79.0 - 97.0 fL Final   • MCH 04/26/2021 31.7  26.6 - 33.0 pg Final   • MCHC 04/26/2021 37.3* 31.5 - 35.7 g/dL Final   • RDW 04/26/2021 13.1  12.3 - 15.4 % Final   • RDW-SD 04/26/2021 40.7  37.0 - 54.0 fl Final   • MPV 04/26/2021 10.8  6.0 - 12.0 fL Final   • Platelets 04/26/2021 349  140 - 450 10*3/mm3 Final   • RBC, UA 04/26/2021 0-2* None Seen /HPF Final   • WBC, UA 04/26/2021 3-5* None Seen /HPF Final   • Bacteria, UA 04/26/2021 Trace* None Seen /HPF Final   • Squamous Epithelial Cells, UA 04/26/2021 0-2  None Seen, 0-2 /HPF Final   • Hyaline Casts, UA 04/26/2021 0-2  None Seen /LPF Final   • Mucus, UA 04/26/2021 Trace  None Seen, Trace /HPF Final   • Methodology 04/26/2021 Manual Light Microscopy   Final   • Scan Slide 04/26/2021    Final    See Manual Differential Results   • Neutrophil % 04/26/2021 60.0  42.7 - 76.0 % Final   • Lymphocyte % 04/26/2021 27.0  19.6 - 45.3 % Final   • Monocyte % 04/26/2021 8.0  5.0 - 12.0 % Final   • Eosinophil % 04/26/2021 5.0  0.3 - 6.2 % Final   • Neutrophils Absolute 04/26/2021 8.18* 1.70 - 7.00 10*3/mm3 Final   • Lymphocytes Absolute 04/26/2021 3.68* 0.70 - 3.10 10*3/mm3 Final   • Monocytes Absolute 04/26/2021 1.09* 0.10 - 0.90 10*3/mm3 Final   •  Eosinophils Absolute 04/26/2021 0.68* 0.00 - 0.40 10*3/mm3 Final   • Anisocytosis 04/26/2021 Slight/1+  None Seen Final   • WBC Morphology 04/26/2021 Normal  Normal Final   • Platelet Estimate 04/26/2021 Adequate  Normal Final   ]

## 2021-05-01 LAB
6MAM UR QL CFM: NEGATIVE
6MAM/CREAT UR: NOT DETECTED NG/MG CREAT
7AMINOCLONAZEPAM/CREAT UR: NOT DETECTED NG/MG CREAT
A-OH ALPRAZ/CREAT UR: NOT DETECTED NG/MG CREAT
A-OH-TRIAZOLAM/CREAT UR CFM: NOT DETECTED NG/MG CREAT
ALFENTANIL/CREAT UR CFM: NOT DETECTED NG/MG CREAT
ALPHA-HYDROXYMIDAZOLAM, URINE: NOT DETECTED NG/MG CREAT
ALPRAZ/CREAT UR CFM: NOT DETECTED NG/MG CREAT
AMOBARBITAL UR QL CFM: NOT DETECTED
AMPHET/CREAT UR: NOT DETECTED NG/MG CREAT
AMPHETAMINES UR QL CFM: NEGATIVE
BARBITAL UR QL CFM: NOT DETECTED
BARBITURATES UR QL CFM: NEGATIVE
BENZODIAZ UR QL CFM: NEGATIVE
BUPRENORPHINE UR QL CFM: NEGATIVE
BUPRENORPHINE/CREAT UR: NOT DETECTED NG/MG CREAT
BUTABARBITAL UR QL CFM: NOT DETECTED
BUTALBITAL UR QL CFM: NOT DETECTED
BZE/CREAT UR: NOT DETECTED NG/MG CREAT
CANNABINOIDS UR QL CFM: NEGATIVE
CARBOXYTHC/CREAT UR: NOT DETECTED NG/MG CREAT
CLONAZEPAM/CREAT UR CFM: NOT DETECTED NG/MG CREAT
COCAETHYLENE/CREAT UR CFM: NOT DETECTED NG/MG CREAT
COCAINE UR QL CFM: NEGATIVE
COCAINE/CREAT UR CFM: NOT DETECTED NG/MG CREAT
CODEINE/CREAT UR: NOT DETECTED NG/MG CREAT
CREAT UR-MCNC: 84 MG/DL
DESALKYLFLURAZ/CREAT UR: NOT DETECTED NG/MG CREAT
DESMETHYLFLUNITRAZEPAM: NOT DETECTED NG/MG CREAT
DHC/CREAT UR: 73 NG/MG CREAT
DIAZEPAM/CREAT UR: NOT DETECTED NG/MG CREAT
DRUGS UR: NORMAL
EDDP/CREAT UR: NOT DETECTED NG/MG CREAT
ETHANOL UR CFM-MCNC: NOT DETECTED G/DL
ETHANOL UR QL CFM: NEGATIVE
FENTANYL UR QL CFM: NEGATIVE
FENTANYL/CREAT UR: NOT DETECTED NG/MG CREAT
FLUNITRAZEPAM UR QL CFM: NOT DETECTED NG/MG CREAT
HYDROCODONE/CREAT UR: 1657 NG/MG CREAT
HYDROMORPHONE/CREAT UR: 73 NG/MG CREAT
LEVEL OF DETECTION:: NORMAL
LORAZEPAM/CREAT UR: NOT DETECTED NG/MG CREAT
MDA/CREAT UR: NOT DETECTED NG/MG CREAT
MDMA/CREAT UR: NOT DETECTED NG/MG CREAT
MEPHOBARBITAL UR QL CFM: NOT DETECTED
METHADONE UR QL CFM: NEGATIVE
METHADONE/CREAT UR: NOT DETECTED NG/MG CREAT
METHAMPHET/CREAT UR: NOT DETECTED NG/MG CREAT
MIDAZOLAM/CREAT UR CFM: NOT DETECTED NG/MG CREAT
MORPHINE/CREAT UR: NOT DETECTED NG/MG CREAT
N-NORTRAMADOL/CREAT UR CFM: NOT DETECTED NG/MG CREAT
NARCOTICS UR: NEGATIVE
NORBUPRENORPHINE/CREAT UR: NOT DETECTED NG/MG CREAT
NORCODEINE/CREAT UR CFM: NOT DETECTED NG/MG CREAT
NORDIAZEPAM/CREAT UR: NOT DETECTED NG/MG CREAT
NORFENTANYL/CREAT UR: NOT DETECTED NG/MG CREAT
NORHYDROCODONE/CREAT UR: 340 NG/MG CREAT
NORMORPHINE UR-MCNC: NOT DETECTED NG/MG CREAT
NOROXYCODONE/CREAT UR: NOT DETECTED NG/MG CREAT
NOROXYMORPHONE/CREAT UR CFM: NOT DETECTED NG/MG CREAT
O-NORTRAMADOL UR CFM-MCNC: NOT DETECTED NG/MG CREAT
OPIATES UR QL CFM: NORMAL
OXAZEPAM/CREAT UR: NOT DETECTED NG/MG CREAT
OXYCODONE UR QL CFM: NEGATIVE
OXYCODONE/CREAT UR: NOT DETECTED NG/MG CREAT
OXYMORPHONE/CREAT UR: NOT DETECTED NG/MG CREAT
PENTOBARB UR QL CFM: NOT DETECTED
PHENOBARB UR QL CFM: NOT DETECTED
SECOBARBITAL UR QL CFM: NOT DETECTED
SUFENTANIL/CREAT UR CFM: NOT DETECTED NG/MG CREAT
TAPENTADOL UR QL CFM: NEGATIVE
TAPENTADOL/CREAT UR: NOT DETECTED NG/MG CREAT
TEMAZEPAM/CREAT UR: NOT DETECTED NG/MG CREAT
THIOPENTAL UR QL CFM: NOT DETECTED
TRAMADOL UR QL CFM: NOT DETECTED NG/MG CREAT

## 2021-05-27 NOTE — PROGRESS NOTES
Chief Complaint   Patient presents with   • Hypertension   • Osteoarthritis     1 month follow-up, chronic pain     Subjective   Osvaldo Conley Sr. is a 65 y.o. male who presents to the office for follow-up.  He has hypertension, and his blood pressure has been controlled.  He has osteoarthritis which causes chronic back and joint pain.  He takes Norco 10/325 mg 4 times a day for treatment of the chronic arthritic pain. He takes gabapentin 600 mg twice daily for treatment of neuropathic pain.  He has been compliant with these medications.  His pain has been controlled to a tolerable level.  He had an aberrant urine drug screen on 2/4/2020.  As a result, he is now being seen monthly for closer monitoring of his medications.  He has anxiety and depression and takes Celexa 40 mg daily.  His anxiety and depression are well managed with this medication.    He complains of muscle spasm in the left side of his neck.  This has been present for a couple of weeks.  It was present 1 morning upon awakening.  He denies any injury to the neck.    History of Present Illness has been reviewed and validated on 05/28/2021and updated with any changes.    The following portions of the patient's history were reviewed and updated as appropriate: allergies, current medications, past family history, past medical history, past social history, past surgical history and problem list.    Review of Systems   Constitutional: Negative for chills, fatigue and fever.   HENT: Negative for congestion, sneezing, sore throat and trouble swallowing.    Eyes: Negative for visual disturbance.   Respiratory: Negative for cough, chest tightness, shortness of breath and wheezing.    Cardiovascular: Negative for chest pain, palpitations and leg swelling.   Gastrointestinal: Negative for abdominal pain, constipation, diarrhea, nausea and vomiting.   Genitourinary: Negative for dysuria, frequency and urgency.   Musculoskeletal: Positive for arthralgias,  "back pain, gait problem and neck pain.   Skin: Negative for rash.   Neurological: Negative for dizziness, weakness and headaches.   All other systems reviewed and are negative.  Review of systems has been reviewed and validated on 05/28/2021and updated with any changes.      Objective    Vitals:    05/28/21 1106   BP: 124/82   Pulse: 96   SpO2: 98%   Weight: 78.9 kg (174 lb)   Height: 180.3 cm (71\")   PainSc:   6   PainLoc: Neck  Comment: left side     Body mass index is 24.27 kg/m².    Physical Exam   Constitutional: He is oriented to person, place, and time. He appears well-developed. No distress.   HENT:   Head: Normocephalic and atraumatic.   Eyes: Pupils are equal, round, and reactive to light. Conjunctivae are normal. No scleral icterus.   Neck:   There is some muscle spasm noted in the left posterior neck musculature.   Cardiovascular: Normal rate, regular rhythm and normal heart sounds. Exam reveals no gallop and no friction rub.   No murmur heard.  Pulmonary/Chest: Effort normal. No respiratory distress. He has decreased breath sounds. He has no wheezes. He has no rales.   Musculoskeletal: Normal range of motion.   Neurological: He is alert and oriented to person, place, and time. No cranial nerve deficit. Gait abnormal.   He is walking with the aid of a cane due to his knee pain.   Skin: Skin is warm and dry. No rash noted.   Psychiatric: His behavior is normal. Judgment and thought content normal.   Nursing note and vitals reviewed.  Physical exam has been reviewed and validated on 05/28/2021and updated with any changes.      Assessment/Plan        Diagnoses and all orders for this visit:    1. Essential hypertension (Primary)    2. Primary osteoarthritis involving multiple joints  -     HYDROcodone-acetaminophen (NORCO)  MG per tablet; Take 1 tablet by mouth Every 6 (Six) Hours As Needed for Moderate Pain . Fill when due  Dispense: 120 tablet; Refill: 0    3. Polyneuropathy associated with " underlying disease (CMS/HCC)  -     gabapentin (NEURONTIN) 600 MG tablet; Take 1 tablet by mouth 2 (Two) Times a Day.  Dispense: 60 tablet; Refill: 0    4. Neck muscle spasm  -     tiZANidine (ZANAFLEX) 2 MG tablet; Take 1 tablet by mouth Every 8 (Eight) Hours As Needed for Muscle Spasms.  Dispense: 90 tablet; Refill: 0    Other orders  -     Cancel: HYDROcodone-acetaminophen (NORCO)  MG per tablet; Take 1 tablet by mouth Every 6 (Six) Hours As Needed for Moderate Pain . Fill when due  Dispense: 120 tablet; Refill: 0  -     Cancel: gabapentin (NEURONTIN) 600 MG tablet; Take 1 tablet by mouth 2 (Two) Times a Day.  Dispense: 60 tablet; Refill: 0      His blood pressure is controlled, and he will continue with his current blood pressure medication.  He will continue with gabapentin 600 mg twice a day for treatment of his peripheral neuropathy.  He will continue with Norco 10/325 mg 4 times a day for treatment of the chronic arthritic pain.  He will continue with Celexa 40 mg daily for treatment of anxiety and depression.  He is given Zanaflex to use up to 3 times a day as needed for muscle spasm in his neck.  He will let me know if this does not improve.    Patient understands the risks associated with this controlled medication, including tolerance and addiction.  Patient also agrees to only obtain this medication from me, and not from a another provider, unless that provider is covering for me in my absence.  Patient also agrees to be compliant in dosing, and not self adjust the dose of medication.  A signed controlled substance agreement is on file, and the patient has received a controlled substance education sheet at this or a previous visit.  The patient has also signed a consent for treatment with a controlled substance as per [facility] policy. SANDRA  is obtained.         PHQ-2/PHQ-9 Depression Screening 5/28/2021   Little interest or pleasure in doing things 0   Feeling down, depressed, or hopeless  0   Trouble falling or staying asleep, or sleeping too much -   Feeling tired or having little energy -   Poor appetite or overeating -   Feeling bad about yourself - or that you are a failure or have let yourself or your family down -   Trouble concentrating on things, such as reading the newspaper or watching television -   Moving or speaking so slowly that other people could have noticed. Or the opposite - being so fidgety or restless that you have been moving around a lot more than usual -   Thoughts that you would be better off dead, or of hurting yourself in some way -   Total Score 0   If you checked off any problems, how difficult have these problems made it for you to do your work, take care of things at home, or get along with other people? -           .

## 2021-05-28 ENCOUNTER — OFFICE VISIT (OUTPATIENT)
Dept: FAMILY MEDICINE CLINIC | Facility: CLINIC | Age: 66
End: 2021-05-28

## 2021-05-28 VITALS
BODY MASS INDEX: 24.36 KG/M2 | DIASTOLIC BLOOD PRESSURE: 82 MMHG | WEIGHT: 174 LBS | HEIGHT: 71 IN | HEART RATE: 96 BPM | OXYGEN SATURATION: 98 % | SYSTOLIC BLOOD PRESSURE: 124 MMHG

## 2021-05-28 DIAGNOSIS — G63 POLYNEUROPATHY ASSOCIATED WITH UNDERLYING DISEASE (HCC): Chronic | ICD-10-CM

## 2021-05-28 DIAGNOSIS — M15.9 PRIMARY OSTEOARTHRITIS INVOLVING MULTIPLE JOINTS: Chronic | ICD-10-CM

## 2021-05-28 DIAGNOSIS — M62.838 NECK MUSCLE SPASM: ICD-10-CM

## 2021-05-28 DIAGNOSIS — I10 ESSENTIAL HYPERTENSION: Primary | Chronic | ICD-10-CM

## 2021-05-28 PROCEDURE — 99214 OFFICE O/P EST MOD 30 MIN: CPT | Performed by: INTERNAL MEDICINE

## 2021-05-28 RX ORDER — TIZANIDINE 2 MG/1
2 TABLET ORAL EVERY 8 HOURS PRN
Qty: 90 TABLET | Refills: 0 | Status: SHIPPED | OUTPATIENT
Start: 2021-05-28 | End: 2021-06-17

## 2021-05-28 RX ORDER — HYDROCODONE BITARTRATE AND ACETAMINOPHEN 10; 325 MG/1; MG/1
1 TABLET ORAL EVERY 6 HOURS PRN
Qty: 120 TABLET | Refills: 0 | Status: SHIPPED | OUTPATIENT
Start: 2021-05-28 | End: 2021-06-28 | Stop reason: SDUPTHER

## 2021-05-28 RX ORDER — GABAPENTIN 600 MG/1
600 TABLET ORAL 2 TIMES DAILY
Qty: 60 TABLET | Refills: 0 | Status: SHIPPED | OUTPATIENT
Start: 2021-05-28 | End: 2021-06-28 | Stop reason: SDUPTHER

## 2021-05-28 RX ORDER — GABAPENTIN 600 MG/1
600 TABLET ORAL 2 TIMES DAILY
Qty: 60 TABLET | Refills: 0 | Status: CANCELLED | OUTPATIENT
Start: 2021-05-28

## 2021-05-28 RX ORDER — HYDROCODONE BITARTRATE AND ACETAMINOPHEN 10; 325 MG/1; MG/1
1 TABLET ORAL EVERY 6 HOURS PRN
Qty: 120 TABLET | Refills: 0 | Status: CANCELLED | OUTPATIENT
Start: 2021-05-28

## 2021-06-07 DIAGNOSIS — Z96.651 PRESENCE OF TOTAL RIGHT KNEE JOINT PROSTHESIS: Primary | ICD-10-CM

## 2021-06-08 ENCOUNTER — OFFICE VISIT (OUTPATIENT)
Dept: ORTHOPEDIC SURGERY | Facility: CLINIC | Age: 66
End: 2021-06-08

## 2021-06-08 VITALS
TEMPERATURE: 97 F | RESPIRATION RATE: 16 BRPM | HEART RATE: 78 BPM | WEIGHT: 171 LBS | BODY MASS INDEX: 23.94 KG/M2 | HEIGHT: 71 IN | DIASTOLIC BLOOD PRESSURE: 70 MMHG | SYSTOLIC BLOOD PRESSURE: 124 MMHG | OXYGEN SATURATION: 98 %

## 2021-06-08 DIAGNOSIS — M17.11 PRIMARY OSTEOARTHRITIS OF RIGHT KNEE: ICD-10-CM

## 2021-06-08 DIAGNOSIS — I10 ESSENTIAL HYPERTENSION: ICD-10-CM

## 2021-06-08 DIAGNOSIS — Z96.651 PRESENCE OF TOTAL RIGHT KNEE JOINT PROSTHESIS: Primary | ICD-10-CM

## 2021-06-08 DIAGNOSIS — F41.1 GENERALIZED ANXIETY DISORDER: ICD-10-CM

## 2021-06-08 PROCEDURE — 99213 OFFICE O/P EST LOW 20 MIN: CPT | Performed by: ORTHOPAEDIC SURGERY

## 2021-06-08 NOTE — PROGRESS NOTES
"Osvaldo Conley Sr. is a 65 y.o. male returns for     Chief Complaint   Patient presents with   • Right Knee - Follow-up        HISTORY OF PRESENT ILLNESS:  Patient in for follow up on right knee pain  Xray completed upon arrival.   Pain is much better than before surgery  No new complaints  No fever or chills       CONCURRENT MEDICAL HISTORY:    The following portions of the patient's history were reviewed and updated as appropriate: allergies, current medications, past family history, past medical history, past social history, past surgical history and problem list.     ROS  No fevers or chills.  No chest pain or shortness of air.  No GI or  disturbances.    PHYSICAL EXAMINATION:       /70   Pulse 78   Temp 97 °F (36.1 °C)   Resp 16   Ht 180.3 cm (71\")   Wt 77.6 kg (171 lb)   SpO2 98%   BMI 23.85 kg/m²     Physical Exam  Constitutional:       General: He is not in acute distress.     Appearance: Normal appearance.   Pulmonary:      Effort: Pulmonary effort is normal. No respiratory distress.   Neurological:      Mental Status: He is alert and oriented to person, place, and time.         GAIT:     [x]  Normal  []  Antalgic    Assistive device: [x]  None  []  Walker     []  Crutches  []  Cane     []  Wheelchair  []  Stretcher    Right Knee Exam     Tenderness   The patient is experiencing no tenderness.     Range of Motion   Extension: 0   Flexion: 130     Tests   Varus: negative Valgus: negative    Other   Scars: present  Sensation: normal  Pulse: present  Swelling: none      Left Knee Exam     Muscle Strength   The patient has normal left knee strength.    Tenderness   Left knee tenderness location: diffuse.    Range of Motion   Extension: 0   Flexion: 120     Tests   Varus: negative Valgus: negative  Drawer:  Anterior - negative     Posterior - negative    Other   Sensation: normal  Pulse: present  Swelling: none    Comments:  Crepitation with motion  Mild pain through arc of " motion              XR Knee 1 or 2 View Right    Result Date: 6/9/2021  Narrative: Ordering Provider:  Jamarcus Weems MD Ordering Diagnosis/Indication:  Presence of total right knee joint prosthesis Procedure:  XR KNEE 1 OR 2 VW RIGHT Exam Date:  6/8/21 COMPARISON:  Todays X-rays were compared to previous images dated October 8, 2020.     Impression:  AP bilateral standing of the knees with lateral of the right knee show acceptable position alignment of a right total knee arthroplasty with no sign of implant loosening or failure.  Appropriate sizing is noted on each view.  Left knee shows moderate to severe osteoarthritic changes especially in the medial compartment.  Tricompartmental osteoarthritic changes noted in the left knee.  No significant interval change noted in comparison to prior x-ray. Jamarcus Weems MD 6/8/21     XR Knee Bilateral AP Standing    Result Date: 6/9/2021  Narrative: Ordering Provider:  Jamarcus Weems MD Ordering Diagnosis/Indication:  Presence of total right knee joint prosthesis Procedure:  XR KNEE BILATERAL AP STANDING Exam Date:  6/8/21 COMPARISON:  Todays X-rays were compared to previous images dated October 8, 2020.     Impression:  AP bilateral standing of the knees with lateral of the right knee show acceptable position alignment of a right total knee arthroplasty with no sign of implant loosening or failure.  Appropriate sizing is noted on each view.  Left knee shows moderate to severe osteoarthritic changes especially in the medial compartment.  Tricompartmental osteoarthritic changes noted in the left knee.  No significant interval change noted in comparison to prior x-ray. Jamarcus Weems MD 6/8/21             ASSESSMENT:    Diagnoses and all orders for this visit:    Presence of total right knee joint prosthesis    Primary osteoarthritis of right knee    Essential hypertension    Generalized anxiety disorder          PLAN    Activity as  tolerated.  No restrictions.  Slowly progress as pain allows.    Continue general strength and conditioning exercises.    Follow-up as needed.    Return if symptoms worsen or fail to improve, for recheck.    Jamarcus Weems MD

## 2021-06-15 DIAGNOSIS — M62.838 NECK MUSCLE SPASM: ICD-10-CM

## 2021-06-16 ENCOUNTER — OFFICE VISIT (OUTPATIENT)
Dept: OTOLARYNGOLOGY | Facility: CLINIC | Age: 66
End: 2021-06-16

## 2021-06-16 VITALS — OXYGEN SATURATION: 98 % | BODY MASS INDEX: 23.52 KG/M2 | HEIGHT: 71 IN | WEIGHT: 168 LBS

## 2021-06-16 DIAGNOSIS — J34.89 NASAL SEPTAL PERFORATION: ICD-10-CM

## 2021-06-16 DIAGNOSIS — Z85.828 PERSONAL HISTORY OF MALIGNANT NEOPLASM OF SKIN: ICD-10-CM

## 2021-06-16 DIAGNOSIS — Z86.018 HISTORY OF INVERTED PAPILLOMA: ICD-10-CM

## 2021-06-16 DIAGNOSIS — J37.0 CHRONIC LARYNGITIS: ICD-10-CM

## 2021-06-16 DIAGNOSIS — M95.0 ACQUIRED NASAL DEFORMITY: Primary | ICD-10-CM

## 2021-06-16 PROCEDURE — 99213 OFFICE O/P EST LOW 20 MIN: CPT | Performed by: OTOLARYNGOLOGY

## 2021-06-16 PROCEDURE — 31575 DIAGNOSTIC LARYNGOSCOPY: CPT | Performed by: OTOLARYNGOLOGY

## 2021-06-16 NOTE — PROGRESS NOTES
Subjective   Osvaldo Conley . is a 65 y.o. male.       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  Also has a large septal perforation that was presumably due to resection of tumor.  Has been followed for many years with no evidence of recurrence.  Subsequently developed a basal cell carcinoma of the nose that penetrated full-thickness and required a flap for reconstruction and has subsequently resulted in an acquired nasal deformity.  He saw  for consideration of reconstruction but decided not to proceed at this point.  Says he is still very congested in his nose.  Continues to smoke.  Still feels like he has something in his throat at times.  He is concerned because a friend of his was diagnosed with some type of upper aerodigestive tract cancer    The following portions of the patient's history were reviewed and updated as appropriate: allergies, current medications, past family history, past medical history, past social history, past surgical history and problem list.     reports that he has been smoking cigarettes. He has a 50.00 pack-year smoking history. He has never used smokeless tobacco. He reports current alcohol use. He reports that he does not use drugs.   Patient is a tobacco user and has been counseled for use of tobacco products      Review of Systems        Objective   Physical Exam  Ears: External ears no deformity, canals no discharge, tympanic membranes intact clear and mobile bilaterally.  Nose: Collapse of the left external nose causing narrowing of the nasal os.  No evidence of recurrent tumor.  Intranasally there is a septal perforation with no obvious evidence of tumor  Oral cavity: Lips and gums without lesions.  Tongue and floor of mouth without lesions.  Parotid and submandibular ducts unobstructed.  No mucosal lesions on the buccal mucosa or vestibule of the mouth.  Pharynx: No  erythema or exudate  Neck: No lymphadenopathy.  No thyromegaly.  Trachea and larynx midline.  No masses in the parotid or submandibular glands.    Procedure Note    Pre-operative Diagnosis:   Chief Complaint   Patient presents with   • Follow-up       Post-operative Diagnosis: No evidence of recurrent papilloma; septal perforation; chronic laryngitis    Anesthesia: topical with xylocaine and neosynephrine    Endoscopy Type:  Flexible Laryngoscopy    Procedure Details:    The patient was placed in the sitting position.  After topical anesthesia and decongestion, the 4 mm laryngoscope was passed.  The nasal cavities, nasopharynx, oropharynx, hypopharynx, and larynx were all examined.  Vocal cords were examined during respiration and phonation.  The following findings were noted:    Findings: Intranasally there is a large septal perforation with no evidence of recurrent neoplasm.  The maxillary sinus is also without evidence of recurrent.  Scope was flexed into the sinus itself so that all the mucosa could be seen.  Nasopharynx and tongue base are without mass.  Endolarynx shows chronic appearing edema and erythema consistent with cigarette smoking but no evidence of neoplasm    Condition:  Stable.  Patient tolerated procedure well.    Complications:  None      Assessment/Plan   Diagnoses and all orders for this visit:    1. Acquired nasal deformity (Primary)    2. History of inverted papilloma    3. Chronic laryngitis    4. Personal history of malignant neoplasm of skin    5. Nasal septal perforation        Plan: Reassured the patient I saw no evidence of recurrence of his inverted papilloma or any evidence of recurrence of his skin cancer.  I also reassured him I saw no evidence of cancer in his throat.  I strongly urged smoking cessation.  I also told him that if he changed his mind and wanted to go back to Dr. Patel I would make arrangements for him to be seen again.  Otherwise return in 6 months, call sooner for  problems.

## 2021-06-17 RX ORDER — TIZANIDINE 2 MG/1
TABLET ORAL
Qty: 90 TABLET | Refills: 3 | Status: SHIPPED | OUTPATIENT
Start: 2021-06-17 | End: 2021-07-26

## 2021-06-28 ENCOUNTER — OFFICE VISIT (OUTPATIENT)
Dept: FAMILY MEDICINE CLINIC | Facility: CLINIC | Age: 66
End: 2021-06-28

## 2021-06-28 VITALS
WEIGHT: 173 LBS | BODY MASS INDEX: 24.22 KG/M2 | SYSTOLIC BLOOD PRESSURE: 128 MMHG | HEART RATE: 81 BPM | HEIGHT: 71 IN | DIASTOLIC BLOOD PRESSURE: 66 MMHG

## 2021-06-28 DIAGNOSIS — M15.9 PRIMARY OSTEOARTHRITIS INVOLVING MULTIPLE JOINTS: Primary | Chronic | ICD-10-CM

## 2021-06-28 DIAGNOSIS — M25.512 ACUTE PAIN OF LEFT SHOULDER: ICD-10-CM

## 2021-06-28 DIAGNOSIS — J44.9 COPD, MILD (HCC): Chronic | ICD-10-CM

## 2021-06-28 DIAGNOSIS — G63 POLYNEUROPATHY ASSOCIATED WITH UNDERLYING DISEASE (HCC): Chronic | ICD-10-CM

## 2021-06-28 DIAGNOSIS — Z87.891 PERSONAL HISTORY OF TOBACCO USE, PRESENTING HAZARDS TO HEALTH: ICD-10-CM

## 2021-06-28 DIAGNOSIS — I10 ESSENTIAL HYPERTENSION: Chronic | ICD-10-CM

## 2021-06-28 DIAGNOSIS — Z72.0 TOBACCO ABUSE: ICD-10-CM

## 2021-06-28 PROCEDURE — 99214 OFFICE O/P EST MOD 30 MIN: CPT | Performed by: INTERNAL MEDICINE

## 2021-06-28 RX ORDER — HYDROCODONE BITARTRATE AND ACETAMINOPHEN 10; 325 MG/1; MG/1
1 TABLET ORAL EVERY 6 HOURS PRN
Qty: 120 TABLET | Refills: 0 | Status: SHIPPED | OUTPATIENT
Start: 2021-06-28 | End: 2021-07-27 | Stop reason: SDUPTHER

## 2021-06-28 RX ORDER — GABAPENTIN 600 MG/1
600 TABLET ORAL 2 TIMES DAILY
Qty: 60 TABLET | Refills: 0 | Status: SHIPPED | OUTPATIENT
Start: 2021-06-28 | End: 2021-07-27 | Stop reason: SDUPTHER

## 2021-06-28 NOTE — PROGRESS NOTES
Chief Complaint   Patient presents with   • Osteoarthritis     1 mo f/u     Subjective   Osvaldo Conley Sr. is a 65 y.o. male who presents to the office for follow-up.  He has hypertension, and his blood pressure has been controlled.  He has osteoarthritis which causes chronic back and joint pain.  He takes Norco 10/325 mg 4 times a day for treatment of the chronic arthritic pain.  He takes gabapentin 600 mg twice a day for treatment of neuropathic pain. He has been compliant with these medications.  His pain has been controlled to a tolerable level.  He had an aberrant urine drug screen on 2/4/2020.  As a result, he is now being seen monthly for closer monitoring of his medications.  He has anxiety and depression and takes Celexa 40 mg daily.  His anxiety and depression are well managed with this medication.  When I saw him last month, he was having increased muscle spasm in his neck and shoulder.  He was given Zanaflex, and this helped with his neck spasm but he is still having quite a bit of pain in the posterior left shoulder.  He had a low-dose chest CT for lung cancer screening in February 2020.  This is now due for a 1 year follow-up.    History of Present Illness has been reviewed and validated on 06/28/2021 and updated with any changes.    The following portions of the patient's history were reviewed and updated as appropriate: allergies, current medications, past family history, past medical history, past social history, past surgical history and problem list.    Review of Systems   Constitutional: Negative for chills, fatigue and fever.   HENT: Negative for congestion, sneezing, sore throat and trouble swallowing.    Eyes: Negative for visual disturbance.   Respiratory: Negative for cough, chest tightness, shortness of breath and wheezing.    Cardiovascular: Negative for chest pain, palpitations and leg swelling.   Gastrointestinal: Negative for abdominal pain, constipation, diarrhea, nausea and  "vomiting.   Genitourinary: Negative for dysuria, frequency and urgency.   Musculoskeletal: Positive for arthralgias, back pain, gait problem and neck pain.   Skin: Negative for rash.   Neurological: Negative for dizziness, weakness and headaches.   All other systems reviewed and are negative.  Review of systems has been reviewed and validated on 06/28/2021 and updated with any changes.      Objective    Vitals:    06/28/21 0844   BP: 128/66   BP Location: Left arm   Patient Position: Sitting   Cuff Size: Adult   Pulse: 81   Weight: 78.5 kg (173 lb)   Height: 180.3 cm (71\")   PainSc:   8   PainLoc: Shoulder  Comment: left     Body mass index is 24.13 kg/m².    Physical Exam   Constitutional: He is oriented to person, place, and time. He appears well-developed. No distress.   HENT:   Head: Normocephalic and atraumatic.   Eyes: Pupils are equal, round, and reactive to light. Conjunctivae are normal. No scleral icterus.   Neck:   There is some muscle spasm noted in the left posterior neck musculature.   Cardiovascular: Normal rate, regular rhythm and normal heart sounds. Exam reveals no gallop and no friction rub.   No murmur heard.  Pulmonary/Chest: Effort normal. No respiratory distress. He has decreased breath sounds. He has no wheezes. He has no rales.   Musculoskeletal: Normal range of motion.      Comments: He has tenderness to palpation of the posterior upper part of his shoulder musculature.   Neurological: He is alert and oriented to person, place, and time. No cranial nerve deficit. Gait abnormal.   He is walking with the aid of a cane due to his knee pain.   Skin: Skin is warm and dry. No rash noted.   Psychiatric: His behavior is normal. Judgment and thought content normal.   Nursing note and vitals reviewed.  Physical exam has been reviewed and validated on 06/28/2021 and updated with any changes.      Assessment/Plan        Diagnoses and all orders for this visit:    1. Primary osteoarthritis involving " multiple joints (Primary)  -     HYDROcodone-acetaminophen (NORCO)  MG per tablet; Take 1 tablet by mouth Every 6 (Six) Hours As Needed for Moderate Pain . Fill when due  Dispense: 120 tablet; Refill: 0    2. Polyneuropathy associated with underlying disease (CMS/HCC)  -     gabapentin (NEURONTIN) 600 MG tablet; Take 1 tablet by mouth 2 (Two) Times a Day.  Dispense: 60 tablet; Refill: 0    3. COPD, mild (CMS/HCC)    4. Essential hypertension    5. Personal history of tobacco use, presenting hazards to health  -      CT Chest Low Dose Cancer Screening WO; Future    6. Tobacco abuse  -      CT Chest Low Dose Cancer Screening WO; Future    7. Acute pain of left shoulder  Comments:  2 months  Orders:  -     XR Shoulder 2+ View Left; Future  -     Ambulatory Referral to Orthopedic Surgery      His blood pressure is controlled, and he will continue with his current blood pressure medication.  He will continue with gabapentin 600 mg twice a day for treatment of his peripheral neuropathy.  He will continue with Norco 10/325 mg 4 times a day for treatment of the chronic arthritic pain.  He will continue with Celexa 40 mg daily for treatment of anxiety and depression.  He is given Zanaflex to use up to 3 times a day as needed for muscle spasm in his neck.  He will let me know if this does not improve.    He has a 50-pack-year history of smoking cigarettes.  I discussed having a 1 year follow-up low-dose CT lung cancer screening with him today.  He would like to have this done and participated in shared decision making.  He continues to smoke 1 pack of cigarettes per day.  I will order a low-dose CT lung cancer screening.    I will obtain an x-ray of the left shoulder for further evaluation of his pain.  I will also refer him to orthopedics for consult regarding the increased shoulder pain.    Patient understands the risks associated with this controlled medication, including tolerance and addiction.  Patient also agrees  to only obtain this medication from me, and not from a another provider, unless that provider is covering for me in my absence.  Patient also agrees to be compliant in dosing, and not self adjust the dose of medication.  A signed controlled substance agreement is on file, and the patient has received a controlled substance education sheet at this or a previous visit.  The patient has also signed a consent for treatment with a controlled substance as per [facility] policy. SANDRA  is obtained.    Low-Dose CT: Lung Cancer Screening  Criteria:  • Age 55-77 years of age (CMS) , 55-80 years of age (Commercial) and;  o Patient Age: 65 y.o.  • 30 pack-year smoking history (# yrs smoked X avg #ppd = pack/yrs); if  pt has quit smoking it must be within <15yrs and;  • Asymptomatic for lung cancer  ICD-10 Codes:  • History of smoking  • Tobacco abuse/addiction  ? Z72.0 (current smoker)  ? Z87.891 (personal history of smoking/nicotine dependence) use with CMS   • Patient Smoking History  Social History     Tobacco Use   Smoking Status Current Every Day Smoker   • Packs/day: 1.00   • Years: 50.00   • Pack years: 50.00   • Types: Cigarettes     CPT Codes:  • 95461 low dose (must say low dose otherwise is CT without contrast)  / (for patients with [facility] and CMS)               .

## 2021-07-26 ENCOUNTER — OFFICE VISIT (OUTPATIENT)
Dept: FAMILY MEDICINE CLINIC | Facility: CLINIC | Age: 66
End: 2021-07-26

## 2021-07-26 VITALS
HEIGHT: 71 IN | WEIGHT: 176 LBS | BODY MASS INDEX: 24.64 KG/M2 | SYSTOLIC BLOOD PRESSURE: 130 MMHG | HEART RATE: 101 BPM | DIASTOLIC BLOOD PRESSURE: 72 MMHG | TEMPERATURE: 98.2 F | OXYGEN SATURATION: 99 %

## 2021-07-26 DIAGNOSIS — M25.512 ACUTE PAIN OF LEFT SHOULDER: ICD-10-CM

## 2021-07-26 DIAGNOSIS — M15.9 PRIMARY OSTEOARTHRITIS INVOLVING MULTIPLE JOINTS: Chronic | ICD-10-CM

## 2021-07-26 DIAGNOSIS — G63 POLYNEUROPATHY ASSOCIATED WITH UNDERLYING DISEASE (HCC): Chronic | ICD-10-CM

## 2021-07-26 DIAGNOSIS — I10 ESSENTIAL HYPERTENSION: Primary | Chronic | ICD-10-CM

## 2021-07-26 PROCEDURE — 99214 OFFICE O/P EST MOD 30 MIN: CPT | Performed by: INTERNAL MEDICINE

## 2021-07-26 RX ORDER — TIZANIDINE 4 MG/1
TABLET ORAL
COMMUNITY
Start: 2021-07-06 | End: 2021-08-27 | Stop reason: DRUGHIGH

## 2021-07-26 RX ORDER — OXYCODONE AND ACETAMINOPHEN 7.5; 325 MG/1; MG/1
7.5 TABLET ORAL
COMMUNITY
End: 2021-07-26

## 2021-07-26 NOTE — PROGRESS NOTES
Chief Complaint   Patient presents with   • Osteoarthritis     1 mo f/u     Subjective   Osvaldo Conley Sr. is a 65 y.o. male who presents to the office for follow-up.  He has hypertension, and his blood pressure has been controlled.  He has osteoarthritis which causes chronic back and joint pain.  He takes Norco 10/325 mg 4 times a day for treatment of the chronic arthritic pain.  He takes gabapentin 600 mg twice a day for treatment of neuropathic pain. He has been compliant with these medications.  His pain has been controlled to a tolerable level.  He had an aberrant urine drug screen on 2/4/2020.  As a result, he is now being seen monthly for closer monitoring of his medications.    He has been having increased muscle spasm and pain in his neck and left shoulder.  He has been taking Zanaflex as needed for the muscle spasm.  At the last visit, I referred him to orthopedics for evaluation of the shoulder pain.  He has an appointment with orthopedics later this week.  He was seen in the emergency room on 7/5/2021 with complaints of increased neck pain and spasm.  He was given a Medrol Dosepak and the Zanaflex was increased to a 4 mg dose.    History of Present Illness has been reviewed and validated on 07/26/2021 and updated with any changes.    The following portions of the patient's history were reviewed and updated as appropriate: allergies, current medications, past family history, past medical history, past social history, past surgical history and problem list.    Review of Systems   Constitutional: Negative for chills, fatigue and fever.   HENT: Negative for congestion, sneezing, sore throat and trouble swallowing.    Eyes: Negative for visual disturbance.   Respiratory: Negative for cough, chest tightness, shortness of breath and wheezing.    Cardiovascular: Negative for chest pain, palpitations and leg swelling.   Gastrointestinal: Negative for abdominal pain, constipation, diarrhea, nausea and  "vomiting.   Genitourinary: Negative for dysuria, frequency and urgency.   Musculoskeletal: Positive for arthralgias, back pain, gait problem and neck pain.   Skin: Negative for rash.   Neurological: Negative for dizziness, weakness and headaches.   All other systems reviewed and are negative.  Review of systems has been reviewed and validated on 07/26/2021 and updated with any changes.      Objective    Vitals:    07/26/21 0858   BP: 130/72   BP Location: Left arm   Patient Position: Sitting   Cuff Size: Adult   Pulse: 101   Temp: 98.2 °F (36.8 °C)   TempSrc: Oral   SpO2: 99%   Weight: 79.8 kg (176 lb)   Height: 180.3 cm (71\")   PainSc:   7   PainLoc: Shoulder     Body mass index is 24.55 kg/m².    Physical Exam   Constitutional: He is oriented to person, place, and time. He appears well-developed. No distress.   HENT:   Head: Normocephalic and atraumatic.   Eyes: Pupils are equal, round, and reactive to light. Conjunctivae are normal. No scleral icterus.   Cardiovascular: Normal rate, regular rhythm and normal heart sounds. Exam reveals no gallop and no friction rub.   No murmur heard.  Pulmonary/Chest: Effort normal. No respiratory distress. He has decreased breath sounds. He has no wheezes. He has no rales.   Musculoskeletal: Normal range of motion.   Neurological: He is alert and oriented to person, place, and time. No cranial nerve deficit. Gait abnormal.   He is walking with the aid of a cane due to his knee pain.   Skin: Skin is warm and dry. No rash noted.   Psychiatric: His behavior is normal. Judgment and thought content normal.   Nursing note and vitals reviewed.  Physical exam has been reviewed and validated on 07/26/2021 and updated with any changes.      Assessment/Plan        Diagnoses and all orders for this visit:    1. Essential hypertension (Primary)    2. Primary osteoarthritis involving multiple joints    3. Polyneuropathy associated with underlying disease (CMS/HCC)    4. Acute pain of left " shoulder      His blood pressure is controlled, and he will continue with his current blood pressure medication.  He will continue with gabapentin 600 mg twice a day for treatment of his peripheral neuropathy.  He will continue with Norco 10/325 mg 4 times a day for treatment of the chronic arthritic pain.  He will continue with Zanaflex 4 mg to use up to 3 times a day as needed for muscle spasm in his neck and shoulder.  He will follow up with orthopedics as scheduled for further evaluation of the shoulder pain.    Patient understands the risks associated with this controlled medication, including tolerance and addiction.  Patient also agrees to only obtain this medication from me, and not from a another provider, unless that provider is covering for me in my absence.  Patient also agrees to be compliant in dosing, and not self adjust the dose of medication.  A signed controlled substance agreement is on file, and the patient has received a controlled substance education sheet at this or a previous visit.  The patient has also signed a consent for treatment with a controlled substance as per [facility] policy. SANDRA  is obtained. SANDRA # [id number]

## 2021-07-27 DIAGNOSIS — G63 POLYNEUROPATHY ASSOCIATED WITH UNDERLYING DISEASE (HCC): Chronic | ICD-10-CM

## 2021-07-27 DIAGNOSIS — M15.9 PRIMARY OSTEOARTHRITIS INVOLVING MULTIPLE JOINTS: Chronic | ICD-10-CM

## 2021-07-27 RX ORDER — GABAPENTIN 600 MG/1
600 TABLET ORAL 2 TIMES DAILY
Qty: 60 TABLET | Refills: 0 | Status: SHIPPED | OUTPATIENT
Start: 2021-07-27 | End: 2021-08-27 | Stop reason: SDUPTHER

## 2021-07-27 RX ORDER — HYDROCODONE BITARTRATE AND ACETAMINOPHEN 10; 325 MG/1; MG/1
1 TABLET ORAL EVERY 6 HOURS PRN
Qty: 120 TABLET | Refills: 0 | Status: SHIPPED | OUTPATIENT
Start: 2021-07-27 | End: 2021-08-27 | Stop reason: SDUPTHER

## 2021-07-29 ENCOUNTER — OFFICE VISIT (OUTPATIENT)
Dept: ORTHOPEDIC SURGERY | Facility: CLINIC | Age: 66
End: 2021-07-29

## 2021-07-29 VITALS
WEIGHT: 176.8 LBS | SYSTOLIC BLOOD PRESSURE: 136 MMHG | DIASTOLIC BLOOD PRESSURE: 80 MMHG | BODY MASS INDEX: 24.75 KG/M2 | HEIGHT: 71 IN

## 2021-07-29 DIAGNOSIS — G89.29 CHRONIC PAIN OF BOTH KNEES: ICD-10-CM

## 2021-07-29 DIAGNOSIS — M25.512 ACUTE PAIN OF LEFT SHOULDER: ICD-10-CM

## 2021-07-29 DIAGNOSIS — M54.12 LEFT CERVICAL RADICULOPATHY: Primary | ICD-10-CM

## 2021-07-29 DIAGNOSIS — M25.561 CHRONIC PAIN OF BOTH KNEES: ICD-10-CM

## 2021-07-29 DIAGNOSIS — I10 ESSENTIAL HYPERTENSION: ICD-10-CM

## 2021-07-29 DIAGNOSIS — M25.562 CHRONIC PAIN OF BOTH KNEES: ICD-10-CM

## 2021-07-29 DIAGNOSIS — F41.1 GENERALIZED ANXIETY DISORDER: ICD-10-CM

## 2021-07-29 DIAGNOSIS — J44.9 COPD, MILD (HCC): ICD-10-CM

## 2021-07-29 DIAGNOSIS — F17.210 HEAVY CIGARETTE SMOKER (20-39 PER DAY): ICD-10-CM

## 2021-07-29 PROCEDURE — 99213 OFFICE O/P EST LOW 20 MIN: CPT | Performed by: ORTHOPAEDIC SURGERY

## 2021-07-29 RX ORDER — METHYLPREDNISOLONE 4 MG/1
TABLET ORAL
Qty: 21 TABLET | Refills: 0 | Status: SHIPPED | OUTPATIENT
Start: 2021-07-29 | End: 2021-08-27

## 2021-08-05 ENCOUNTER — HOSPITAL ENCOUNTER (OUTPATIENT)
Dept: MRI IMAGING | Facility: HOSPITAL | Age: 66
Discharge: HOME | End: 2021-08-05
Admitting: ORTHOPAEDIC SURGERY

## 2021-08-05 DIAGNOSIS — M54.12 LEFT CERVICAL RADICULOPATHY: ICD-10-CM

## 2021-08-05 PROCEDURE — 72141 MRI NECK SPINE W/O DYE: CPT

## 2021-08-06 DIAGNOSIS — M54.12 LEFT CERVICAL RADICULOPATHY: Primary | ICD-10-CM

## 2021-08-26 NOTE — PROGRESS NOTES
Chief Complaint   Patient presents with   • Peripheral Neuropathy     1 month follow up    • Osteoarthritis   • Anxiety   • Depression   • Hypertension     Subjective   Osvaldo Conley Sr. is a 66 y.o. male who presents to the office for follow-up.  He has hypertension, and his blood pressure has been controlled.  He has osteoarthritis which causes chronic back and joint pain.  He takes Norco 10/325 mg 4 times a day for treatment of the chronic arthritic pain.  He takes gabapentin 600 mg twice a day for treatment of neuropathic pain. He has been compliant with these medications.  He reports that his pain has been controlled with these medications.  He had an aberrant urine drug screen on 2/4/2020.  As a result, he is now being seen monthly for closer monitoring of his medications.    He recently saw dermatology, and is in the process of having a couple of skin lesions removed.  He also saw urology and was started on Flomax for treatment of urinary retention/BPH.    History of Present Illness has been reviewed and validated on 08/27/2021 and updated with any changes.    The following portions of the patient's history were reviewed and updated as appropriate: allergies, current medications, past family history, past medical history, past social history, past surgical history and problem list.    Review of Systems   Constitutional: Negative for chills, fatigue and fever.   HENT: Negative for congestion, sneezing, sore throat and trouble swallowing.    Eyes: Negative for visual disturbance.   Respiratory: Negative for cough, chest tightness, shortness of breath and wheezing.    Cardiovascular: Negative for chest pain, palpitations and leg swelling.   Gastrointestinal: Negative for abdominal pain, constipation, diarrhea, nausea and vomiting.   Genitourinary: Negative for dysuria, frequency and urgency.   Musculoskeletal: Positive for arthralgias, back pain, gait problem and neck pain.   Skin: Negative for rash.  "  Neurological: Negative for dizziness, weakness and headaches.   All other systems reviewed and are negative.  Review of systems has been reviewed and validated on 08/27/2021 and updated with any changes.      Objective    Vitals:    08/27/21 0923   BP: 122/70   BP Location: Left arm   Patient Position: Sitting   Cuff Size: Adult   Pulse: 83  Comment: regular   Temp: 97.8 °F (36.6 °C)   TempSrc: Tympanic   SpO2: 97%   Weight: 79.4 kg (175 lb)   Height: 180.3 cm (71\")   PainSc:   4   PainLoc: Arm  Comment: right     Body mass index is 24.41 kg/m².    Physical Exam   Constitutional: He is oriented to person, place, and time. He appears well-developed. No distress.   HENT:   Head: Normocephalic and atraumatic.   Eyes: Pupils are equal, round, and reactive to light. Conjunctivae are normal. No scleral icterus.   Cardiovascular: Normal rate, regular rhythm and normal heart sounds. Exam reveals no gallop and no friction rub.   No murmur heard.  Pulmonary/Chest: Effort normal. No respiratory distress. He has decreased breath sounds. He has no wheezes. He has no rales.   Musculoskeletal: Normal range of motion.   Neurological: He is alert and oriented to person, place, and time. No cranial nerve deficit. Gait abnormal.   Skin: Skin is warm and dry. No rash noted.   Psychiatric: His behavior is normal. Judgment and thought content normal.   Nursing note and vitals reviewed.  Physical exam has been reviewed and validated on 08/27/2021 and updated with any changes.      Assessment/Plan        Diagnoses and all orders for this visit:    1. Primary osteoarthritis involving multiple joints (Primary)  -     HYDROcodone-acetaminophen (NORCO)  MG per tablet; Take 1 tablet by mouth Every 6 (Six) Hours As Needed for Moderate Pain . Fill when due  Dispense: 120 tablet; Refill: 0    2. Essential hypertension    3. Polyneuropathy associated with underlying disease (CMS/HCC)  -     gabapentin (NEURONTIN) 600 MG tablet; Take 1 " tablet by mouth 2 (Two) Times a Day.  Dispense: 60 tablet; Refill: 0    4. Generalized anxiety disorder    5. Major depressive disorder, recurrent episode, in partial remission (CMS/HCC)    6. Benign prostatic hyperplasia without lower urinary tract symptoms      His blood pressure is controlled, and he will continue with his current blood pressure medication.  He will continue with gabapentin 600 mg twice a day for treatment of his peripheral neuropathy.  He will continue with Norco 10/325 mg 4 times a day for treatment of the chronic arthritic pain.  He will continue with Zanaflex 4 mg 3 times daily as needed for muscle spasm in his neck and shoulder.  He will continue with Flomax 0.4 mg daily for treatment of his urinary retention/BPH.    Patient understands the risks associated with this controlled medication, including tolerance and addiction.  Patient also agrees to only obtain this medication from me, and not from a another provider, unless that provider is covering for me in my absence.  Patient also agrees to be compliant in dosing, and not self adjust the dose of medication.  A signed controlled substance agreement is on file, and the patient has received a controlled substance education sheet at this or a previous visit.  The patient has also signed a consent for treatment with a controlled substance as per [facility] policy. SANDRA  is obtained. [facility]# [id number]

## 2021-08-27 ENCOUNTER — OFFICE VISIT (OUTPATIENT)
Dept: FAMILY MEDICINE CLINIC | Facility: CLINIC | Age: 66
End: 2021-08-27

## 2021-08-27 VITALS
DIASTOLIC BLOOD PRESSURE: 70 MMHG | TEMPERATURE: 97.8 F | BODY MASS INDEX: 24.5 KG/M2 | SYSTOLIC BLOOD PRESSURE: 122 MMHG | OXYGEN SATURATION: 97 % | HEIGHT: 71 IN | WEIGHT: 175 LBS | HEART RATE: 83 BPM

## 2021-08-27 DIAGNOSIS — N40.0 BENIGN PROSTATIC HYPERPLASIA WITHOUT LOWER URINARY TRACT SYMPTOMS: Chronic | ICD-10-CM

## 2021-08-27 DIAGNOSIS — I10 ESSENTIAL HYPERTENSION: Chronic | ICD-10-CM

## 2021-08-27 DIAGNOSIS — F41.1 GENERALIZED ANXIETY DISORDER: Chronic | ICD-10-CM

## 2021-08-27 DIAGNOSIS — G63 POLYNEUROPATHY ASSOCIATED WITH UNDERLYING DISEASE (HCC): Chronic | ICD-10-CM

## 2021-08-27 DIAGNOSIS — M15.9 PRIMARY OSTEOARTHRITIS INVOLVING MULTIPLE JOINTS: Primary | Chronic | ICD-10-CM

## 2021-08-27 DIAGNOSIS — F33.41 MAJOR DEPRESSIVE DISORDER, RECURRENT EPISODE, IN PARTIAL REMISSION (HCC): Chronic | ICD-10-CM

## 2021-08-27 PROCEDURE — 99214 OFFICE O/P EST MOD 30 MIN: CPT | Performed by: INTERNAL MEDICINE

## 2021-08-27 RX ORDER — HYDROCODONE BITARTRATE AND ACETAMINOPHEN 10; 325 MG/1; MG/1
1 TABLET ORAL EVERY 6 HOURS PRN
Qty: 120 TABLET | Refills: 0 | Status: SHIPPED | OUTPATIENT
Start: 2021-08-27 | End: 2021-09-27 | Stop reason: SDUPTHER

## 2021-08-27 RX ORDER — GABAPENTIN 600 MG/1
600 TABLET ORAL 2 TIMES DAILY
Qty: 60 TABLET | Refills: 0 | Status: SHIPPED | OUTPATIENT
Start: 2021-08-27 | End: 2021-09-27 | Stop reason: SDUPTHER

## 2021-08-27 RX ORDER — TIZANIDINE 2 MG/1
TABLET ORAL
COMMUNITY
Start: 2021-08-17 | End: 2022-01-20

## 2021-08-27 RX ORDER — TAMSULOSIN HYDROCHLORIDE 0.4 MG/1
CAPSULE ORAL
COMMUNITY
Start: 2021-08-05

## 2021-09-27 ENCOUNTER — OFFICE VISIT (OUTPATIENT)
Dept: FAMILY MEDICINE CLINIC | Facility: CLINIC | Age: 66
End: 2021-09-27

## 2021-09-27 VITALS
HEART RATE: 70 BPM | OXYGEN SATURATION: 97 % | HEIGHT: 71 IN | DIASTOLIC BLOOD PRESSURE: 82 MMHG | BODY MASS INDEX: 24.22 KG/M2 | WEIGHT: 173 LBS | SYSTOLIC BLOOD PRESSURE: 136 MMHG | TEMPERATURE: 96.7 F

## 2021-09-27 DIAGNOSIS — F33.41 MAJOR DEPRESSIVE DISORDER, RECURRENT EPISODE, IN PARTIAL REMISSION (HCC): Chronic | ICD-10-CM

## 2021-09-27 DIAGNOSIS — I10 ESSENTIAL HYPERTENSION: Primary | Chronic | ICD-10-CM

## 2021-09-27 DIAGNOSIS — G63 POLYNEUROPATHY ASSOCIATED WITH UNDERLYING DISEASE (HCC): Chronic | ICD-10-CM

## 2021-09-27 DIAGNOSIS — F41.1 GENERALIZED ANXIETY DISORDER: Chronic | ICD-10-CM

## 2021-09-27 DIAGNOSIS — M15.9 PRIMARY OSTEOARTHRITIS INVOLVING MULTIPLE JOINTS: Chronic | ICD-10-CM

## 2021-09-27 PROCEDURE — 99214 OFFICE O/P EST MOD 30 MIN: CPT | Performed by: INTERNAL MEDICINE

## 2021-09-27 RX ORDER — HYDROCODONE BITARTRATE AND ACETAMINOPHEN 10; 325 MG/1; MG/1
1 TABLET ORAL EVERY 6 HOURS PRN
Qty: 120 TABLET | Refills: 0 | Status: SHIPPED | OUTPATIENT
Start: 2021-09-27 | End: 2021-10-26 | Stop reason: SDUPTHER

## 2021-09-27 RX ORDER — GABAPENTIN 600 MG/1
600 TABLET ORAL 2 TIMES DAILY
Qty: 60 TABLET | Refills: 0 | Status: SHIPPED | OUTPATIENT
Start: 2021-09-27 | End: 2021-10-26 | Stop reason: SDUPTHER

## 2021-09-27 NOTE — PROGRESS NOTES
Chief Complaint   Patient presents with   • Hypertension   • Anxiety   • Depression   • Osteoarthritis   • Peripheral Neuropathy     Subjective   Osvaldo Conley Sr. is a 66 y.o. male who presents to the office for follow-up.  He has hypertension, and his blood pressure has been controlled.  He has osteoarthritis which causes chronic back and joint pain.  He takes Norco 10/325 mg 4 times a day for treatment of the chronic arthritic pain.  He takes gabapentin 600 mg twice a day for treatment of neuropathic pain. He has been compliant with these medications.  He reports that his pain has been controlled with these medications.  He had an aberrant urine drug screen on 2/4/2020.  As a result, he is now being seen monthly for closer monitoring of his medications.  He has not yet received the COVID-19 vaccine, but plans to do that within the next few days.    History of Present Illness has been reviewed and validated on 09/27/2021 and updated with any changes.    The following portions of the patient's history were reviewed and updated as appropriate: allergies, current medications, past family history, past medical history, past social history, past surgical history and problem list.    Review of Systems   Constitutional: Negative for chills, fatigue and fever.   HENT: Negative for congestion, sneezing, sore throat and trouble swallowing.    Eyes: Negative for visual disturbance.   Respiratory: Negative for cough, chest tightness, shortness of breath and wheezing.    Cardiovascular: Negative for chest pain, palpitations and leg swelling.   Gastrointestinal: Negative for abdominal pain, constipation, diarrhea, nausea and vomiting.   Genitourinary: Negative for dysuria, frequency and urgency.   Musculoskeletal: Positive for arthralgias, back pain, gait problem and neck pain.   Skin: Negative for rash.   Neurological: Negative for dizziness, weakness and headaches.   All other systems reviewed and are negative.  Review  "of systems has been reviewed and validated on 09/27/2021 and updated with any changes.      Objective    Vitals:    09/27/21 0853   BP: 136/82   BP Location: Left arm   Patient Position: Sitting   Cuff Size: Adult   Pulse: 70  Comment: regular   Temp: 96.7 °F (35.9 °C)   TempSrc: Tympanic   SpO2: 97%   Weight: 78.5 kg (173 lb)   Height: 180.3 cm (71\")   PainSc:   4   PainLoc: Back  Comment: lower     Body mass index is 24.13 kg/m².    Physical Exam   Constitutional: He is oriented to person, place, and time. He appears well-developed. No distress.   HENT:   Head: Normocephalic and atraumatic.   Eyes: Pupils are equal, round, and reactive to light. Conjunctivae are normal. No scleral icterus.   Cardiovascular: Normal rate, regular rhythm and normal heart sounds. Exam reveals no gallop and no friction rub.   No murmur heard.  Pulmonary/Chest: Effort normal. No respiratory distress. He has decreased breath sounds. He has no wheezes. He has no rales.   Musculoskeletal: Normal range of motion.   Neurological: He is alert and oriented to person, place, and time. No cranial nerve deficit. Gait abnormal.   Skin: Skin is warm and dry. No rash noted.   Psychiatric: His behavior is normal. Judgment and thought content normal.   Nursing note and vitals reviewed.  Physical exam has been reviewed and validated on 09/27/2021 and updated with any changes.      Assessment/Plan        Diagnoses and all orders for this visit:    1. Essential hypertension (Primary)    2. Primary osteoarthritis involving multiple joints  -     HYDROcodone-acetaminophen (NORCO)  MG per tablet; Take 1 tablet by mouth Every 6 (Six) Hours As Needed for Moderate Pain . Fill when due  Dispense: 120 tablet; Refill: 0    3. Polyneuropathy associated with underlying disease (CMS/HCC)  -     gabapentin (NEURONTIN) 600 MG tablet; Take 1 tablet by mouth 2 (Two) Times a Day.  Dispense: 60 tablet; Refill: 0    4. Major depressive disorder, recurrent episode, in " partial remission (CMS/HCC)    5. Generalized anxiety disorder    6. Polyneuropathy associated with underlying disease (HCC)  -     gabapentin (NEURONTIN) 600 MG tablet; Take 1 tablet by mouth 2 (Two) Times a Day.  Dispense: 60 tablet; Refill: 0      His blood pressure is controlled, and he will continue with his current blood pressure medication.  He will continue with gabapentin 600 mg twice a day for treatment of his peripheral neuropathy.  He will continue with Norco 10/325 mg 4 times a day for treatment of the chronic arthritic pain.  He will continue with Zanaflex 4 mg 3 times daily as needed for muscle spasm in his neck and shoulder.      Patient understands the risks associated with this controlled medication, including tolerance and addiction.  Patient also agrees to only obtain this medication from me, and not from a another provider, unless that provider is covering for me in my absence.  Patient also agrees to be compliant in dosing, and not self adjust the dose of medication.  A signed controlled substance agreement is on file, and the patient has received a controlled substance education sheet at this or a previous visit.  The patient has also signed a consent for treatment with a controlled substance as per [facility] policy. SANDRA  is obtained.

## 2021-10-25 ENCOUNTER — LAB (OUTPATIENT)
Dept: LAB | Facility: OTHER | Age: 66
End: 2021-10-25

## 2021-10-25 DIAGNOSIS — I10 ESSENTIAL HYPERTENSION: ICD-10-CM

## 2021-10-25 DIAGNOSIS — E78.2 MIXED HYPERLIPIDEMIA: Chronic | ICD-10-CM

## 2021-10-25 DIAGNOSIS — R73.02 IMPAIRED GLUCOSE TOLERANCE: Chronic | ICD-10-CM

## 2021-10-25 LAB
ALBUMIN SERPL-MCNC: 4.3 G/DL (ref 3.5–5)
ALBUMIN/GLOB SERPL: 1.4 G/DL (ref 1.1–1.8)
ALP SERPL-CCNC: 116 U/L (ref 38–126)
ALT SERPL W P-5'-P-CCNC: 39 U/L
ANION GAP SERPL CALCULATED.3IONS-SCNC: 5 MMOL/L (ref 5–15)
AST SERPL-CCNC: 43 U/L (ref 17–59)
BACTERIA UR QL AUTO: ABNORMAL /HPF
BASOPHILS # BLD AUTO: 0.03 10*3/MM3 (ref 0–0.2)
BASOPHILS NFR BLD AUTO: 0.3 % (ref 0–1.5)
BILIRUB SERPL-MCNC: 0.6 MG/DL (ref 0.2–1.3)
BILIRUB UR QL STRIP: NEGATIVE
BUN SERPL-MCNC: 15 MG/DL (ref 7–23)
BUN/CREAT SERPL: 18.3 (ref 7–25)
CALCIUM SPEC-SCNC: 9.6 MG/DL (ref 8.4–10.2)
CHLORIDE SERPL-SCNC: 102 MMOL/L (ref 101–112)
CHOLEST SERPL-MCNC: 173 MG/DL (ref 150–200)
CLARITY UR: CLEAR
CO2 SERPL-SCNC: 29 MMOL/L (ref 22–30)
COLOR UR: YELLOW
CREAT SERPL-MCNC: 0.82 MG/DL (ref 0.7–1.3)
DEPRECATED RDW RBC AUTO: 42.6 FL (ref 37–54)
EOSINOPHIL # BLD AUTO: 0.98 10*3/MM3 (ref 0–0.4)
EOSINOPHIL NFR BLD AUTO: 9.1 % (ref 0.3–6.2)
ERYTHROCYTE [DISTWIDTH] IN BLOOD BY AUTOMATED COUNT: 13.1 % (ref 12.3–15.4)
GFR SERPL CREATININE-BSD FRML MDRD: 94 ML/MIN/1.73 (ref 49–113)
GLOBULIN UR ELPH-MCNC: 3.1 GM/DL (ref 2.3–3.5)
GLUCOSE SERPL-MCNC: 109 MG/DL (ref 70–99)
GLUCOSE UR STRIP-MCNC: ABNORMAL MG/DL
HCT VFR BLD AUTO: 45.8 % (ref 37.5–51)
HDLC SERPL-MCNC: 73 MG/DL (ref 40–59)
HGB BLD-MCNC: 16.1 G/DL (ref 13–17.7)
HGB UR QL STRIP.AUTO: ABNORMAL
HYALINE CASTS UR QL AUTO: ABNORMAL /LPF
KETONES UR QL STRIP: NEGATIVE
LDLC SERPL CALC-MCNC: 82 MG/DL
LDLC/HDLC SERPL: 1.1 {RATIO} (ref 0–3.55)
LEUKOCYTE ESTERASE UR QL STRIP.AUTO: NEGATIVE
LYMPHOCYTES # BLD AUTO: 3.59 10*3/MM3 (ref 0.7–3.1)
LYMPHOCYTES NFR BLD AUTO: 33.5 % (ref 19.6–45.3)
MCH RBC QN AUTO: 31.8 PG (ref 26.6–33)
MCHC RBC AUTO-ENTMCNC: 35.2 G/DL (ref 31.5–35.7)
MCV RBC AUTO: 90.3 FL (ref 79–97)
MONOCYTES # BLD AUTO: 0.82 10*3/MM3 (ref 0.1–0.9)
MONOCYTES NFR BLD AUTO: 7.6 % (ref 5–12)
MUCOUS THREADS URNS QL MICRO: ABNORMAL /HPF
NEUTROPHILS NFR BLD AUTO: 49.5 % (ref 42.7–76)
NEUTROPHILS NFR BLD AUTO: 5.31 10*3/MM3 (ref 1.7–7)
NITRITE UR QL STRIP: NEGATIVE
PH UR STRIP.AUTO: 5.5 [PH] (ref 5.5–8)
PLATELET # BLD AUTO: 239 10*3/MM3 (ref 140–450)
PMV BLD AUTO: 11.6 FL (ref 6–12)
POTASSIUM SERPL-SCNC: 3.3 MMOL/L (ref 3.4–5)
PROT SERPL-MCNC: 7.4 G/DL (ref 6.3–8.6)
PROT UR QL STRIP: ABNORMAL
RBC # BLD AUTO: 5.07 10*6/MM3 (ref 4.14–5.8)
RBC # UR: ABNORMAL /HPF
REF LAB TEST METHOD: ABNORMAL
SODIUM SERPL-SCNC: 136 MMOL/L (ref 137–145)
SP GR UR STRIP: 1.02 (ref 1–1.03)
SQUAMOUS #/AREA URNS HPF: ABNORMAL /HPF
TRIGL SERPL-MCNC: 99 MG/DL
UROBILINOGEN UR QL STRIP: ABNORMAL
VLDLC SERPL-MCNC: 18 MG/DL (ref 5–40)
WBC # BLD AUTO: 10.73 10*3/MM3 (ref 3.4–10.8)
WBC UR QL AUTO: ABNORMAL /HPF

## 2021-10-25 PROCEDURE — 83036 HEMOGLOBIN GLYCOSYLATED A1C: CPT | Performed by: INTERNAL MEDICINE

## 2021-10-25 PROCEDURE — 84439 ASSAY OF FREE THYROXINE: CPT | Performed by: INTERNAL MEDICINE

## 2021-10-25 PROCEDURE — 81001 URINALYSIS AUTO W/SCOPE: CPT | Performed by: INTERNAL MEDICINE

## 2021-10-25 PROCEDURE — 80061 LIPID PANEL: CPT | Performed by: INTERNAL MEDICINE

## 2021-10-25 PROCEDURE — 80050 GENERAL HEALTH PANEL: CPT | Performed by: INTERNAL MEDICINE

## 2021-10-26 ENCOUNTER — OFFICE VISIT (OUTPATIENT)
Dept: FAMILY MEDICINE CLINIC | Facility: CLINIC | Age: 66
End: 2021-10-26

## 2021-10-26 VITALS
BODY MASS INDEX: 24.92 KG/M2 | OXYGEN SATURATION: 95 % | HEIGHT: 71 IN | WEIGHT: 178 LBS | HEART RATE: 79 BPM | DIASTOLIC BLOOD PRESSURE: 74 MMHG | SYSTOLIC BLOOD PRESSURE: 118 MMHG | TEMPERATURE: 97.6 F

## 2021-10-26 DIAGNOSIS — E78.2 MIXED HYPERLIPIDEMIA: Chronic | ICD-10-CM

## 2021-10-26 DIAGNOSIS — Z79.899 DRUG THERAPY: ICD-10-CM

## 2021-10-26 DIAGNOSIS — J44.9 COPD, MILD (HCC): Chronic | ICD-10-CM

## 2021-10-26 DIAGNOSIS — I10 ESSENTIAL HYPERTENSION: Chronic | ICD-10-CM

## 2021-10-26 DIAGNOSIS — G63 POLYNEUROPATHY ASSOCIATED WITH UNDERLYING DISEASE (HCC): Chronic | ICD-10-CM

## 2021-10-26 DIAGNOSIS — F33.42 MAJOR DEPRESSIVE DISORDER, RECURRENT, IN FULL REMISSION (HCC): Chronic | ICD-10-CM

## 2021-10-26 DIAGNOSIS — F41.1 GENERALIZED ANXIETY DISORDER: Chronic | ICD-10-CM

## 2021-10-26 DIAGNOSIS — Z12.5 SCREENING FOR PROSTATE CANCER: ICD-10-CM

## 2021-10-26 DIAGNOSIS — D75.1 POLYCYTHEMIA: Chronic | ICD-10-CM

## 2021-10-26 DIAGNOSIS — Z23 INFLUENZA VACCINATION ADMINISTERED AT CURRENT VISIT: ICD-10-CM

## 2021-10-26 DIAGNOSIS — F33.41 MAJOR DEPRESSIVE DISORDER, RECURRENT EPISODE, IN PARTIAL REMISSION (HCC): Chronic | ICD-10-CM

## 2021-10-26 DIAGNOSIS — D72.829 LEUKOCYTOSIS, UNSPECIFIED TYPE: Chronic | ICD-10-CM

## 2021-10-26 DIAGNOSIS — M15.9 PRIMARY OSTEOARTHRITIS INVOLVING MULTIPLE JOINTS: Chronic | ICD-10-CM

## 2021-10-26 DIAGNOSIS — R73.02 IMPAIRED GLUCOSE TOLERANCE: Primary | Chronic | ICD-10-CM

## 2021-10-26 LAB
HBA1C MFR BLD: 5.41 % (ref 4.8–5.6)
T4 FREE SERPL-MCNC: 1.45 NG/DL (ref 0.93–1.7)
TSH SERPL DL<=0.05 MIU/L-ACNC: 0.67 UIU/ML (ref 0.27–4.2)

## 2021-10-26 PROCEDURE — 90471 IMMUNIZATION ADMIN: CPT | Performed by: INTERNAL MEDICINE

## 2021-10-26 PROCEDURE — 99214 OFFICE O/P EST MOD 30 MIN: CPT | Performed by: INTERNAL MEDICINE

## 2021-10-26 PROCEDURE — 90662 IIV NO PRSV INCREASED AG IM: CPT | Performed by: INTERNAL MEDICINE

## 2021-10-26 RX ORDER — HYDROCODONE BITARTRATE AND ACETAMINOPHEN 10; 325 MG/1; MG/1
1 TABLET ORAL EVERY 6 HOURS PRN
Qty: 120 TABLET | Refills: 0 | Status: SHIPPED | OUTPATIENT
Start: 2021-10-26 | End: 2021-11-23 | Stop reason: SDUPTHER

## 2021-10-26 RX ORDER — CITALOPRAM 40 MG/1
40 TABLET ORAL DAILY
Qty: 30 TABLET | Refills: 5 | Status: SHIPPED | OUTPATIENT
Start: 2021-10-26 | End: 2022-01-20

## 2021-10-26 RX ORDER — GABAPENTIN 600 MG/1
600 TABLET ORAL 2 TIMES DAILY
Qty: 60 TABLET | Refills: 2 | Status: SHIPPED | OUTPATIENT
Start: 2021-10-26 | End: 2022-01-24 | Stop reason: SDUPTHER

## 2021-10-26 NOTE — PROGRESS NOTES
Chief Complaint   Patient presents with   • Hypertension   • Hyperlipidemia   • Impaired glucose tolerance   • COPD   • Anxiety   • Depression   • Osteoarthritis   • Peripheral Neuropathy   • Leukocytosis     Subjective   Osvaldo Conley Sr. is a 66 y.o. male who presents to the office for follow-up and review of labs.  He has impaired glucose tolerance, and has been monitoring his dietary intake of sugar and carbohydrates.  He has hypertension and his blood pressure has been controlled.  He has hyperlipidemia and takes simvastatin, 40 mg daily.  He has restless leg syndrome and takes Requip, 0.5 mg nightly.  He has anxiety and depression, and takes Celexa 40 mg daily.  His anxiety has been doing well.  His depression is in full remission.  He has osteoarthritis which causes chronic back and joint pain.  He takes Norco 10/325 mg 4 times a day for treatment of the chronic pain. He also takes gabapentin 600 mg twice daily to help with the neuropathic pain. He has leukocytosis and polycythemia.  He follows with oncology for this, and it has been stable.  He has COPD, and his breathing has been baseline.  He has an occasional cough.     History of Present Illness has been reviewed and validated on 10/26/2021 and updated with any changes.    The following portions of the patient's history were reviewed and updated as appropriate: allergies, current medications, past family history, past medical history, past social history, past surgical history and problem list.    Review of Systems   Constitutional: Negative for chills, fatigue and fever.   HENT: Negative for congestion, sneezing, sore throat and trouble swallowing.    Eyes: Negative for visual disturbance.   Respiratory: Negative for cough, chest tightness, shortness of breath and wheezing.    Cardiovascular: Negative for chest pain, palpitations and leg swelling.   Gastrointestinal: Negative for abdominal pain, constipation, diarrhea, nausea and vomiting.  "  Genitourinary: Negative for dysuria, frequency and urgency.   Musculoskeletal: Positive for arthralgias and back pain. Negative for neck pain.   Skin: Negative for rash.   Neurological: Negative for dizziness, weakness and headaches.   Psychiatric/Behavioral:        Patient denies any feelings of depression and has not felt down, hopeless or lost interest in any activities.   All other systems reviewed and are negative.  Review of systems has been reviewed and validated on 10/26/2021 and updated with any changes.      Objective   Vitals:    10/26/21 0849   BP: 118/74   BP Location: Left arm   Patient Position: Sitting   Cuff Size: Large Adult   Pulse: 79  Comment: regular   Temp: 97.6 °F (36.4 °C)   TempSrc: Tympanic   SpO2: 95%   Weight: 80.7 kg (178 lb)   Height: 180.3 cm (71\")   PainSc:   3   PainLoc: Leg  Comment: right      Body mass index is 24.83 kg/m².    Physical Exam   Constitutional: He is oriented to person, place, and time. He appears well-developed. No distress.   HENT:   Head: Normocephalic and atraumatic.   Nose: Nose normal.   Eyes: Pupils are equal, round, and reactive to light. Conjunctivae are normal. No scleral icterus.   Cardiovascular: Normal rate, regular rhythm and normal heart sounds. Exam reveals no gallop and no friction rub.   No murmur heard.  Pulmonary/Chest: Effort normal. No respiratory distress. He has decreased breath sounds. He has no wheezes. He has no rales.   Musculoskeletal: Normal range of motion.      Lumbar back: He exhibits pain.   Neurological: He is alert and oriented to person, place, and time. No cranial nerve deficit.   Skin: Skin is warm and dry. No rash noted.   Psychiatric: His behavior is normal. Judgment and thought content normal.   Nursing note and vitals reviewed.  Physical exam has been reviewed and validated on 10/26/2021 and updated with any changes.      Assessment/Plan   Diagnoses and all orders for this visit:    1. Impaired glucose tolerance " (Primary)    2. Essential hypertension  -     CBC & Differential; Future  -     Comprehensive Metabolic Panel; Future  -     T4, Free; Future  -     TSH; Future  -     Urinalysis With Culture If Indicated -; Future    3. Mixed hyperlipidemia  -     LDL Cholesterol, Direct; Future    4. Primary osteoarthritis involving multiple joints  -     HYDROcodone-acetaminophen (NORCO)  MG per tablet; Take 1 tablet by mouth Every 6 (Six) Hours As Needed for Moderate Pain . Fill when due  Dispense: 120 tablet; Refill: 0    5. Polyneuropathy associated with underlying disease (HCC)  -     gabapentin (NEURONTIN) 600 MG tablet; Take 1 tablet by mouth 2 (Two) Times a Day.  Dispense: 60 tablet; Refill: 2    6. COPD, mild (HCC)    7. Generalized anxiety disorder  -     citalopram (CeleXA) 40 MG tablet; Take 1 tablet by mouth Daily.  Dispense: 30 tablet; Refill: 5    8. Major depressive disorder, recurrent, in full remission (HCC)    9. Drug therapy  -     ToxASSURE Select 13 Discrete -; Future    10. Screening for prostate cancer  -     PSA Screen; Future    11. Leukocytosis, unspecified type    12. Polycythemia    13. Major depressive disorder, recurrent episode, in partial remission (HCC)  -     citalopram (CeleXA) 40 MG tablet; Take 1 tablet by mouth Daily.  Dispense: 30 tablet; Refill: 5    14. Influenza vaccination administered at current visit  -     Fluzone High-Dose 65+yrs         Labs are reviewed with patient.  Patient's glucose is slightly elevated at 109.  His hemoglobin A1c is 5.41.  Patient will continue to watch diet to help maintain control of the glucose.  Patient understands that there is an increased risk of developing diabetes in the future.  In the past, his WBC has been elevated.  It is currently normal at 10.73. His hemoglobin is 16.1 and hematocrit 45.8.  His RBC is normal at 5.07.  He will continue to follow with oncology due to the diagnosis of leukocytosis and polycythemia.  His total cholesterol is  173, LDL 82 and triglycerides 99.  His HDL is 73.  He will continue with simvastatin 40 mg daily for treatment of hyperlipidemia.  His blood pressure is controlled, and he will continue with his current blood pressure medication.  He will continue with Requip 0.5 mg nightly for treatment of restless leg syndrome.  He will continue with Celexa 40 mg daily for treatment of anxiety and depression.  He will continue with Norco 10/325 mg 4 times a day for treatment of his chronic arthritic pain.  He will continue with gabapentin 600 mg twice a day for treatment of peripheral neuropathy.  I will change his follow-up visit interval to every 3 months instead of seeing him monthly.    Patient understands the risks associated with this controlled medication, including tolerance and addiction.  Patient also agrees to only obtain this medication from me, and not from a another provider, unless that provider is covering for me in my absence.  Patient also agrees to be compliant in dosing, and not self adjust the dose of medication.  A signed controlled substance agreement is on file, and the patient has received a controlled substance education sheet at this a previous visit.  The patient has also signed a consent for treatment with a controlled substance as per [facility] policy. SANDRA was obtained.      Patient is given a flu shot today.    PHQ-2/PHQ-9 Depression Screening 10/26/2021   Little interest or pleasure in doing things 0   Feeling down, depressed, or hopeless 0   Trouble falling or staying asleep, or sleeping too much -   Feeling tired or having little energy -   Poor appetite or overeating -   Feeling bad about yourself - or that you are a failure or have let yourself or your family down -   Trouble concentrating on things, such as reading the newspaper or watching television -   Moving or speaking so slowly that other people could have noticed. Or the opposite - being so fidgety or restless that you have been  moving around a lot more than usual -   Thoughts that you would be better off dead, or of hurting yourself in some way -   Total Score 0   If you checked off any problems, how difficult have these problems made it for you to do your work, take care of things at home, or get along with other people? -         Lab on 10/25/2021   Component Date Value Ref Range Status   • Glucose 10/25/2021 109* 70 - 99 mg/dL Final   • BUN 10/25/2021 15  7 - 23 mg/dL Final   • Creatinine 10/25/2021 0.82  0.70 - 1.30 mg/dL Final   • Sodium 10/25/2021 136* 137 - 145 mmol/L Final   • Potassium 10/25/2021 3.3* 3.4 - 5.0 mmol/L Final   • Chloride 10/25/2021 102  101 - 112 mmol/L Final   • CO2 10/25/2021 29.0  22.0 - 30.0 mmol/L Final   • Calcium 10/25/2021 9.6  8.4 - 10.2 mg/dL Final   • Total Protein 10/25/2021 7.4  6.3 - 8.6 g/dL Final   • Albumin 10/25/2021 4.30  3.50 - 5.00 g/dL Final   • ALT (SGPT) 10/25/2021 39  <=50 U/L Final   • AST (SGOT) 10/25/2021 43  17 - 59 U/L Final   • Alkaline Phosphatase 10/25/2021 116  38 - 126 U/L Final   • Total Bilirubin 10/25/2021 0.6  0.2 - 1.3 mg/dL Final   • eGFR Non  Amer 10/25/2021 94  49 - 113 mL/min/1.73 Final   • Globulin 10/25/2021 3.1  2.3 - 3.5 gm/dL Final   • A/G Ratio 10/25/2021 1.4  1.1 - 1.8 g/dL Final   • BUN/Creatinine Ratio 10/25/2021 18.3  7.0 - 25.0 Final   • Anion Gap 10/25/2021 5.0  5.0 - 15.0 mmol/L Final   • Free T4 10/25/2021 1.45  0.93 - 1.70 ng/dL Final   • TSH 10/25/2021 0.667  0.270 - 4.200 uIU/mL Final   • Color, UA 10/25/2021 Yellow  Yellow, Straw Final   • Appearance, UA 10/25/2021 Clear  Clear Final   • pH, UA 10/25/2021 5.5  5.5 - 8.0 Final   • Specific Gravity, UA 10/25/2021 1.025  1.005 - 1.030 Final   • Glucose, UA 10/25/2021 250 mg/dL (1+)* Negative Final   • Ketones, UA 10/25/2021 Negative  Negative Final   • Bilirubin, UA 10/25/2021 Negative  Negative Final   • Blood, UA 10/25/2021 Trace* Negative Final   • Protein, UA 10/25/2021 >=300 mg/dL (3+)* Negative  Final   • Leuk Esterase, UA 10/25/2021 Negative  Negative Final   • Nitrite, UA 10/25/2021 Negative  Negative Final   • Urobilinogen, UA 10/25/2021 0.2 E.U./dL  0.2 - 1.0 E.U./dL Final   • Hemoglobin A1C 10/25/2021 5.41  4.80 - 5.60 % Final   • Total Cholesterol 10/25/2021 173  150 - 200 mg/dL Final   • Triglycerides 10/25/2021 99  <=150 mg/dL Final   • HDL Cholesterol 10/25/2021 73* 40 - 59 mg/dL Final   • LDL Cholesterol  10/25/2021 82  <=100 mg/dL Final   • VLDL Cholesterol 10/25/2021 18  5 - 40 mg/dL Final   • LDL/HDL Ratio 10/25/2021 1.10  0.00 - 3.55 Final   • WBC 10/25/2021 10.73  3.40 - 10.80 10*3/mm3 Final   • RBC 10/25/2021 5.07  4.14 - 5.80 10*6/mm3 Final   • Hemoglobin 10/25/2021 16.1  13.0 - 17.7 g/dL Final   • Hematocrit 10/25/2021 45.8  37.5 - 51.0 % Final   • MCV 10/25/2021 90.3  79.0 - 97.0 fL Final   • MCH 10/25/2021 31.8  26.6 - 33.0 pg Final   • MCHC 10/25/2021 35.2  31.5 - 35.7 g/dL Final   • RDW 10/25/2021 13.1  12.3 - 15.4 % Final   • RDW-SD 10/25/2021 42.6  37.0 - 54.0 fl Final   • MPV 10/25/2021 11.6  6.0 - 12.0 fL Final   • Platelets 10/25/2021 239  140 - 450 10*3/mm3 Final   • Neutrophil % 10/25/2021 49.5  42.7 - 76.0 % Final   • Lymphocyte % 10/25/2021 33.5  19.6 - 45.3 % Final   • Monocyte % 10/25/2021 7.6  5.0 - 12.0 % Final   • Eosinophil % 10/25/2021 9.1* 0.3 - 6.2 % Final   • Basophil % 10/25/2021 0.3  0.0 - 1.5 % Final   • Neutrophils, Absolute 10/25/2021 5.31  1.70 - 7.00 10*3/mm3 Final   • Lymphocytes, Absolute 10/25/2021 3.59* 0.70 - 3.10 10*3/mm3 Final   • Monocytes, Absolute 10/25/2021 0.82  0.10 - 0.90 10*3/mm3 Final   • Eosinophils, Absolute 10/25/2021 0.98* 0.00 - 0.40 10*3/mm3 Final   • Basophils, Absolute 10/25/2021 0.03  0.00 - 0.20 10*3/mm3 Final   • RBC, UA 10/25/2021 0-2* None Seen /HPF Final   • WBC, UA 10/25/2021 0-2* None Seen /HPF Final   • Bacteria, UA 10/25/2021 Trace* None Seen /HPF Final   • Squamous Epithelial Cells, UA 10/25/2021 3-6* None Seen, 0-2 /HPF  Final   • Hyaline Casts, UA 10/25/2021 None Seen  None Seen /LPF Final   • Mucus, UA 10/25/2021 Trace  None Seen, Trace /HPF Final   • Methodology 10/25/2021 Manual Light Microscopy   Final   ]

## 2021-11-22 DIAGNOSIS — M15.9 PRIMARY OSTEOARTHRITIS INVOLVING MULTIPLE JOINTS: Chronic | ICD-10-CM

## 2021-11-22 NOTE — TELEPHONE ENCOUNTER
Needs refill on HYDROcodone-acetaminophen (NORCO)  MG, due Wednesday to [facility] [place].

## 2021-11-23 RX ORDER — HYDROCODONE BITARTRATE AND ACETAMINOPHEN 10; 325 MG/1; MG/1
1 TABLET ORAL EVERY 6 HOURS PRN
Qty: 120 TABLET | Refills: 0 | Status: SHIPPED | OUTPATIENT
Start: 2021-11-23 | End: 2021-12-16 | Stop reason: SDUPTHER

## 2021-12-13 NOTE — TELEPHONE ENCOUNTER
----- Message from Ni Hilliard M.D. sent at 12/13/2021  8:15 AM PST -----  Please let family know that urine culture was negative   Rachael Armstrong Quin'Dee Marie, LPN  Phone Number: [phone]          Needs refill on HYDROcodone-acetaminophen (NORCO)  MG, due tomorrow to [facility] [place].

## 2021-12-16 DIAGNOSIS — M15.9 PRIMARY OSTEOARTHRITIS INVOLVING MULTIPLE JOINTS: Chronic | ICD-10-CM

## 2021-12-17 RX ORDER — HYDROCODONE BITARTRATE AND ACETAMINOPHEN 10; 325 MG/1; MG/1
1 TABLET ORAL EVERY 6 HOURS PRN
Qty: 120 TABLET | Refills: 0 | Status: SHIPPED | OUTPATIENT
Start: 2021-12-17 | End: 2022-01-24 | Stop reason: SDUPTHER

## 2022-01-19 DIAGNOSIS — F41.1 GENERALIZED ANXIETY DISORDER: Chronic | ICD-10-CM

## 2022-01-19 DIAGNOSIS — I10 ESSENTIAL HYPERTENSION: Chronic | ICD-10-CM

## 2022-01-19 DIAGNOSIS — F33.41 MAJOR DEPRESSIVE DISORDER, RECURRENT EPISODE, IN PARTIAL REMISSION: Chronic | ICD-10-CM

## 2022-01-19 DIAGNOSIS — E78.2 MIXED HYPERLIPIDEMIA: Chronic | ICD-10-CM

## 2022-01-20 RX ORDER — LOSARTAN POTASSIUM 50 MG/1
50 TABLET ORAL DAILY
Qty: 30 TABLET | Refills: 11 | Status: SHIPPED | OUTPATIENT
Start: 2022-01-20

## 2022-01-20 RX ORDER — CITALOPRAM 40 MG/1
40 TABLET ORAL DAILY
Qty: 30 TABLET | Refills: 5 | Status: SHIPPED | OUTPATIENT
Start: 2022-01-20 | End: 2022-07-19 | Stop reason: SDUPTHER

## 2022-01-20 RX ORDER — TIZANIDINE 2 MG/1
TABLET ORAL
Qty: 90 TABLET | Refills: 3 | Status: SHIPPED | OUTPATIENT
Start: 2022-01-20 | End: 2022-06-13

## 2022-01-20 RX ORDER — HYDROCHLOROTHIAZIDE 25 MG/1
25 TABLET ORAL DAILY
Qty: 30 TABLET | Refills: 11 | Status: SHIPPED | OUTPATIENT
Start: 2022-01-20

## 2022-01-20 RX ORDER — SIMVASTATIN 40 MG
40 TABLET ORAL NIGHTLY
Qty: 30 TABLET | Refills: 11 | Status: SHIPPED | OUTPATIENT
Start: 2022-01-20

## 2022-01-21 NOTE — PROGRESS NOTES
Chief Complaint   Patient presents with   • Hypertension   • Osteoarthritis   • Polyneuropathy   • COPD   • Depression   • Anxiety     Subjective   Osvaldo Conley Sr. is a 66 y.o. male who presents to the office for follow-up. He has hypertension and his blood pressure has been controlled. He has anxiety and depression, and takes Celexa 40 mg daily.  His anxiety has been doing well.  His depression is in full remission.  He has osteoarthritis which causes chronic back and joint pain.  He takes Norco 10/325 mg 4 times a day for treatment of the chronic pain. He also takes gabapentin 600 mg twice daily to help with the neuropathic pain. He has COPD, and his breathing has been baseline.  He has an occasional cough.     History of Present Illness has been reviewed and validated on 01/24/2022 and updated with any changes.    The following portions of the patient's history were reviewed and updated as appropriate: allergies, current medications, past family history, past medical history, past social history, past surgical history and problem list.    Review of Systems   Constitutional: Negative for chills, fatigue and fever.   HENT: Negative for congestion, sneezing, sore throat and trouble swallowing.    Eyes: Negative for visual disturbance.   Respiratory: Negative for cough, chest tightness, shortness of breath and wheezing.    Cardiovascular: Negative for chest pain, palpitations and leg swelling.   Gastrointestinal: Negative for abdominal pain, constipation, diarrhea, nausea and vomiting.   Genitourinary: Negative for dysuria, frequency and urgency.   Musculoskeletal: Positive for arthralgias and back pain. Negative for neck pain.   Skin: Negative for rash.   Neurological: Negative for dizziness, weakness and headaches.   Psychiatric/Behavioral:        Patient denies any feelings of depression and has not felt down, hopeless or lost interest in any activities.   All other systems reviewed and are  "negative.  Review of systems has been reviewed and validated on 01/24/2022 and updated with any changes.      Objective   Vitals:    01/24/22 0904   BP: 130/82   BP Location: Left arm   Patient Position: Sitting   Cuff Size: Adult   Pulse: 96  Comment: regular   Temp: 97.1 °F (36.2 °C)   TempSrc: Tympanic   SpO2: 97%   Weight: 74.4 kg (164 lb)   Height: 180.3 cm (71\")   PainSc:   3   PainLoc: Back  Comment: low back and right knee      Body mass index is 22.87 kg/m².    Physical Exam   Constitutional: He is oriented to person, place, and time. He appears well-developed. No distress.   HENT:   Head: Normocephalic and atraumatic.   Nose: Nose normal.   Eyes: Pupils are equal, round, and reactive to light. Conjunctivae are normal. No scleral icterus.   Cardiovascular: Normal rate, regular rhythm and normal heart sounds. Exam reveals no gallop and no friction rub.   No murmur heard.  Pulmonary/Chest: Effort normal. No respiratory distress. He has decreased breath sounds. He has no wheezes. He has no rales.   Musculoskeletal: Normal range of motion.      Lumbar back: He exhibits pain.   Neurological: He is alert and oriented to person, place, and time. No cranial nerve deficit.   Skin: Skin is warm and dry. No rash noted.   Psychiatric: His behavior is normal. Judgment and thought content normal.   Nursing note and vitals reviewed.  Physical exam has been reviewed and validated on 01/24/2022 and updated with any changes.      Assessment/Plan   Diagnoses and all orders for this visit:    1. Essential hypertension (Primary)    2. Primary osteoarthritis involving multiple joints  -     HYDROcodone-acetaminophen (NORCO)  MG per tablet; Take 1 tablet by mouth Every 6 (Six) Hours As Needed for Moderate Pain . Fill when due  Dispense: 120 tablet; Refill: 0    3. Polyneuropathy associated with underlying disease (HCC)  -     gabapentin (NEURONTIN) 600 MG tablet; Take 1 tablet by mouth 2 (Two) Times a Day.  Dispense: 60 " tablet; Refill: 2    4. COPD, mild (HCC)    5. Major depressive disorder, recurrent, in full remission (HCC)    6. Generalized anxiety disorder         His blood pressure is controlled, and he will continue with his current blood pressure medication. He will continue with Celexa 40 mg daily for treatment of anxiety and depression.  He will continue with Norco 10/325 mg 4 times a day for treatment of his chronic arthritic pain.  He will continue with gabapentin 600 mg twice a day for treatment of peripheral neuropathy.  His COPD is baseline, and he does not currently require the use of any inhalers.    Patient understands the risks associated with this controlled medication, including tolerance and addiction.  Patient also agrees to only obtain this medication from me, and not from a another provider, unless that provider is covering for me in my absence.  Patient also agrees to be compliant in dosing, and not self adjust the dose of medication.  A signed controlled substance agreement is on file, and the patient has received a controlled substance education sheet at this a previous visit.  The patient has also signed a consent for treatment with a controlled substance as per [facility] policy. SANDRA was obtained.          PHQ-2/PHQ-9 Depression Screening 1/24/2022   Little interest or pleasure in doing things 0   Feeling down, depressed, or hopeless 0   Trouble falling or staying asleep, or sleeping too much -   Feeling tired or having little energy -   Poor appetite or overeating -   Feeling bad about yourself - or that you are a failure or have let yourself or your family down -   Trouble concentrating on things, such as reading the newspaper or watching television -   Moving or speaking so slowly that other people could have noticed. Or the opposite - being so fidgety or restless that you have been moving around a lot more than usual -   Thoughts that you would be better off dead, or of hurting yourself in  some way -   Total Score 0   If you checked off any problems, how difficult have these problems made it for you to do your work, take care of things at home, or get along with other people? -

## 2022-01-24 ENCOUNTER — OFFICE VISIT (OUTPATIENT)
Dept: FAMILY MEDICINE CLINIC | Facility: CLINIC | Age: 67
End: 2022-01-24

## 2022-01-24 VITALS
BODY MASS INDEX: 22.96 KG/M2 | OXYGEN SATURATION: 97 % | TEMPERATURE: 97.1 F | WEIGHT: 164 LBS | SYSTOLIC BLOOD PRESSURE: 130 MMHG | HEART RATE: 96 BPM | DIASTOLIC BLOOD PRESSURE: 82 MMHG | HEIGHT: 71 IN

## 2022-01-24 DIAGNOSIS — G63 POLYNEUROPATHY ASSOCIATED WITH UNDERLYING DISEASE: Chronic | ICD-10-CM

## 2022-01-24 DIAGNOSIS — J44.9 COPD, MILD: Chronic | ICD-10-CM

## 2022-01-24 DIAGNOSIS — I10 ESSENTIAL HYPERTENSION: Primary | Chronic | ICD-10-CM

## 2022-01-24 DIAGNOSIS — M15.9 PRIMARY OSTEOARTHRITIS INVOLVING MULTIPLE JOINTS: Chronic | ICD-10-CM

## 2022-01-24 DIAGNOSIS — F33.42 MAJOR DEPRESSIVE DISORDER, RECURRENT, IN FULL REMISSION: Chronic | ICD-10-CM

## 2022-01-24 DIAGNOSIS — F41.1 GENERALIZED ANXIETY DISORDER: Chronic | ICD-10-CM

## 2022-01-24 PROCEDURE — 99214 OFFICE O/P EST MOD 30 MIN: CPT | Performed by: INTERNAL MEDICINE

## 2022-01-24 RX ORDER — HYDROCODONE BITARTRATE AND ACETAMINOPHEN 10; 325 MG/1; MG/1
1 TABLET ORAL EVERY 6 HOURS PRN
Qty: 120 TABLET | Refills: 0 | Status: SHIPPED | OUTPATIENT
Start: 2022-01-24 | End: 2022-02-22 | Stop reason: SDUPTHER

## 2022-01-24 RX ORDER — GABAPENTIN 600 MG/1
600 TABLET ORAL 2 TIMES DAILY
Qty: 60 TABLET | Refills: 2 | Status: SHIPPED | OUTPATIENT
Start: 2022-01-24 | End: 2022-04-22 | Stop reason: SDUPTHER

## 2022-02-22 DIAGNOSIS — M15.9 PRIMARY OSTEOARTHRITIS INVOLVING MULTIPLE JOINTS: Chronic | ICD-10-CM

## 2022-02-22 RX ORDER — HYDROCODONE BITARTRATE AND ACETAMINOPHEN 10; 325 MG/1; MG/1
1 TABLET ORAL EVERY 6 HOURS PRN
Qty: 120 TABLET | Refills: 0 | Status: SHIPPED | OUTPATIENT
Start: 2022-02-22 | End: 2022-03-22 | Stop reason: SDUPTHER

## 2022-03-21 DIAGNOSIS — M15.9 PRIMARY OSTEOARTHRITIS INVOLVING MULTIPLE JOINTS: Chronic | ICD-10-CM

## 2022-03-22 RX ORDER — HYDROCODONE BITARTRATE AND ACETAMINOPHEN 10; 325 MG/1; MG/1
1 TABLET ORAL EVERY 6 HOURS PRN
Qty: 120 TABLET | Refills: 0 | Status: SHIPPED | OUTPATIENT
Start: 2022-03-22 | End: 2022-04-22 | Stop reason: SDUPTHER

## 2022-04-21 ENCOUNTER — LAB (OUTPATIENT)
Dept: LAB | Facility: OTHER | Age: 67
End: 2022-04-21

## 2022-04-21 DIAGNOSIS — Z79.899 DRUG THERAPY: ICD-10-CM

## 2022-04-21 DIAGNOSIS — Z12.5 SCREENING FOR PROSTATE CANCER: ICD-10-CM

## 2022-04-21 DIAGNOSIS — E78.2 MIXED HYPERLIPIDEMIA: Chronic | ICD-10-CM

## 2022-04-21 DIAGNOSIS — I10 ESSENTIAL HYPERTENSION: ICD-10-CM

## 2022-04-21 LAB
ALBUMIN SERPL-MCNC: 4.2 G/DL (ref 3.5–5)
ALBUMIN/GLOB SERPL: 1.4 G/DL (ref 1.1–1.8)
ALP SERPL-CCNC: 120 U/L (ref 38–126)
ALT SERPL W P-5'-P-CCNC: 36 U/L
ANION GAP SERPL CALCULATED.3IONS-SCNC: 4 MMOL/L (ref 5–15)
AST SERPL-CCNC: 79 U/L (ref 17–59)
BACTERIA UR QL AUTO: ABNORMAL /HPF
BASOPHILS # BLD AUTO: 0.02 10*3/MM3 (ref 0–0.2)
BASOPHILS NFR BLD AUTO: 0.2 % (ref 0–1.5)
BILIRUB SERPL-MCNC: 0.7 MG/DL (ref 0.2–1.3)
BILIRUB UR QL STRIP: NEGATIVE
BUN SERPL-MCNC: 14 MG/DL (ref 7–23)
BUN/CREAT SERPL: 16.9 (ref 7–25)
CALCIUM SPEC-SCNC: 9.1 MG/DL (ref 8.4–10.2)
CHLORIDE SERPL-SCNC: 103 MMOL/L (ref 101–112)
CLARITY UR: CLEAR
CO2 SERPL-SCNC: 29 MMOL/L (ref 22–30)
COLOR UR: YELLOW
CREAT SERPL-MCNC: 0.83 MG/DL (ref 0.7–1.3)
DEPRECATED RDW RBC AUTO: 43.6 FL (ref 37–54)
EGFRCR SERPLBLD CKD-EPI 2021: 96.5 ML/MIN/1.73
EOSINOPHIL # BLD AUTO: 0.39 10*3/MM3 (ref 0–0.4)
EOSINOPHIL NFR BLD AUTO: 3.7 % (ref 0.3–6.2)
ERYTHROCYTE [DISTWIDTH] IN BLOOD BY AUTOMATED COUNT: 13.5 % (ref 12.3–15.4)
GLOBULIN UR ELPH-MCNC: 3 GM/DL (ref 2.3–3.5)
GLUCOSE SERPL-MCNC: 112 MG/DL (ref 70–99)
GLUCOSE UR STRIP-MCNC: NEGATIVE MG/DL
HCT VFR BLD AUTO: 43.7 % (ref 37.5–51)
HGB BLD-MCNC: 15 G/DL (ref 13–17.7)
HGB UR QL STRIP.AUTO: ABNORMAL
HYALINE CASTS UR QL AUTO: ABNORMAL /LPF
KETONES UR QL STRIP: NEGATIVE
LEUKOCYTE ESTERASE UR QL STRIP.AUTO: NEGATIVE
LYMPHOCYTES # BLD AUTO: 1.84 10*3/MM3 (ref 0.7–3.1)
LYMPHOCYTES NFR BLD AUTO: 17.6 % (ref 19.6–45.3)
MCH RBC QN AUTO: 30.9 PG (ref 26.6–33)
MCHC RBC AUTO-ENTMCNC: 34.3 G/DL (ref 31.5–35.7)
MCV RBC AUTO: 90.1 FL (ref 79–97)
MONOCYTES # BLD AUTO: 0.93 10*3/MM3 (ref 0.1–0.9)
MONOCYTES NFR BLD AUTO: 8.9 % (ref 5–12)
NEUTROPHILS NFR BLD AUTO: 69.6 % (ref 42.7–76)
NEUTROPHILS NFR BLD AUTO: 7.29 10*3/MM3 (ref 1.7–7)
NITRITE UR QL STRIP: NEGATIVE
PH UR STRIP.AUTO: 6 [PH] (ref 5.5–8)
PLATELET # BLD AUTO: 215 10*3/MM3 (ref 140–450)
PMV BLD AUTO: 11.2 FL (ref 6–12)
POTASSIUM SERPL-SCNC: 3.8 MMOL/L (ref 3.4–5)
PROT SERPL-MCNC: 7.2 G/DL (ref 6.3–8.6)
PROT UR QL STRIP: NEGATIVE
RBC # BLD AUTO: 4.85 10*6/MM3 (ref 4.14–5.8)
RBC # UR STRIP: ABNORMAL /HPF
REF LAB TEST METHOD: ABNORMAL
SODIUM SERPL-SCNC: 136 MMOL/L (ref 137–145)
SP GR UR STRIP: 1.02 (ref 1–1.03)
SQUAMOUS #/AREA URNS HPF: ABNORMAL /HPF
UROBILINOGEN UR QL STRIP: ABNORMAL
WBC # UR STRIP: ABNORMAL /HPF
WBC NRBC COR # BLD: 10.47 10*3/MM3 (ref 3.4–10.8)

## 2022-04-21 PROCEDURE — 80307 DRUG TEST PRSMV CHEM ANLYZR: CPT | Performed by: INTERNAL MEDICINE

## 2022-04-21 PROCEDURE — 80050 GENERAL HEALTH PANEL: CPT | Performed by: INTERNAL MEDICINE

## 2022-04-21 PROCEDURE — G0103 PSA SCREENING: HCPCS | Performed by: INTERNAL MEDICINE

## 2022-04-21 PROCEDURE — 84439 ASSAY OF FREE THYROXINE: CPT | Performed by: INTERNAL MEDICINE

## 2022-04-21 PROCEDURE — 36415 COLL VENOUS BLD VENIPUNCTURE: CPT | Performed by: INTERNAL MEDICINE

## 2022-04-21 PROCEDURE — 83721 ASSAY OF BLOOD LIPOPROTEIN: CPT | Performed by: INTERNAL MEDICINE

## 2022-04-21 PROCEDURE — G0481 DRUG TEST DEF 8-14 CLASSES: HCPCS | Performed by: INTERNAL MEDICINE

## 2022-04-21 PROCEDURE — 81001 URINALYSIS AUTO W/SCOPE: CPT | Performed by: INTERNAL MEDICINE

## 2022-04-21 NOTE — PROGRESS NOTES
Chief Complaint   Patient presents with   • Medicare Wellness-subsequent   • Hyperlipidemia   • Hypertension   • Impaired glucose tolerance   • Benign prostatic hyperplasia without lower urinary tract sy   • Anxiety   • Depression   • Primary osteoarthritis involving multiple joints   • Restless Legs Syndrome   • Polyneuropathy associated with underlying disease      Subjective   Osvaldo Conley Sr. is a 66 y.o. male who presents to the office for follow-up and review of labs. He has impaired glucose tolerance, and has been monitoring his dietary intake of sugar and carbohydrates.  He has hypertension and his blood pressure has been controlled.  He has hyperlipidemia and takes simvastatin, 40 mg nightly.  He has restless leg syndrome and takes Requip, 0.5 mg nightly.  He has anxiety and depression, and takes Celexa 40 mg daily.  He has been having increased symptoms of anxiety and depression.  He has not been sleeping as well at night.  He denies any suicidal thoughts.  He has osteoarthritis which causes chronic back and joint pain.  He takes Norco 10/325 mg 4 times a day for treatment of the chronic pain. He also takes gabapentin 600 mg twice daily to help with the neuropathic pain. He has leukocytosis and polycythemia.  He follows with oncology for these, and both conditions have been stable.  He has COPD, and his breathing has been baseline.  He has an occasional cough.  He complains of erectile dysfunction and is requesting medication for this.    History of Present Illness has been reviewed and validated on 04/22/2022 and updated with any changes.    The following portions of the patient's history were reviewed and updated as appropriate: allergies, current medications, past family history, past medical history, past social history, past surgical history and problem list.    Review of Systems   Constitutional: Negative for chills, fatigue and fever.   HENT: Negative for congestion, sneezing, sore throat and  "trouble swallowing.    Eyes: Negative for visual disturbance.   Respiratory: Negative for cough, chest tightness, shortness of breath and wheezing.    Cardiovascular: Negative for chest pain, palpitations and leg swelling.   Gastrointestinal: Negative for abdominal pain, constipation, diarrhea, nausea and vomiting.   Genitourinary: Negative for dysuria, frequency and urgency.   Musculoskeletal: Positive for arthralgias and back pain. Negative for neck pain.   Skin: Negative for rash.   Neurological: Negative for dizziness, weakness and headaches.   Psychiatric/Behavioral:        Patient denies any feelings of depression and has not felt down, hopeless or lost interest in any activities.   All other systems reviewed and are negative.  Review of systems has been reviewed and validated on 04/22/2022 and updated with any changes.      Objective   Vitals:    04/22/22 0921   BP: 118/70   BP Location: Left arm   Patient Position: Sitting   Cuff Size: Adult   Pulse: 89  Comment: regular   Temp: 98 °F (36.7 °C)   TempSrc: Temporal   SpO2: 96%   Weight: 76.7 kg (169 lb)   Height: 180.3 cm (71\")   PainSc:   4   PainLoc: Generalized      Body mass index is 23.57 kg/m².    Physical Exam   Constitutional: He is oriented to person, place, and time. He appears well-developed. No distress.   HENT:   Head: Normocephalic and atraumatic.   Nose: Nose normal.   Eyes: Pupils are equal, round, and reactive to light. Conjunctivae are normal. No scleral icterus.   Cardiovascular: Normal rate, regular rhythm and normal heart sounds. Exam reveals no gallop and no friction rub.   No murmur heard.  Pulmonary/Chest: Effort normal. No respiratory distress. He has decreased breath sounds. He has no wheezes. He has no rales.   Musculoskeletal: Normal range of motion.      Lumbar back: He exhibits pain.   Neurological: He is alert and oriented to person, place, and time. No cranial nerve deficit.   Skin: Skin is warm and dry. No rash noted. "   Psychiatric: His behavior is normal. Judgment and thought content normal.   Nursing note and vitals reviewed.  Physical exam has been reviewed and validated on 04/22/2022 and updated with any changes.      Assessment/Plan   Diagnoses and all orders for this visit:    1. Medicare annual wellness visit, subsequent (Primary)    2. Impaired glucose tolerance  -     Hemoglobin A1c; Future    3. Essential hypertension  -     CBC & Differential; Future  -     Comprehensive Metabolic Panel; Future  -     T4, Free; Future  -     TSH; Future    4. Mixed hyperlipidemia  -     Lipid Panel; Future    5. Polyneuropathy associated with underlying disease (HCC)  -     gabapentin (NEURONTIN) 600 MG tablet; Take 1 tablet by mouth 2 (Two) Times a Day.  Dispense: 60 tablet; Refill: 2    6. COPD, mild (HCC)    7. Major depressive disorder, recurrent, in full remission ([facility])  -     buPROPion XL (WELLBUTRIN XL) 150 MG 24 hr tablet; Take 1 tablet by mouth Daily.  Dispense: 30 tablet; Refill: 5    8. Generalized anxiety disorder    9. Primary osteoarthritis involving multiple joints  -     HYDROcodone-acetaminophen (NORCO)  MG per tablet; Take 1 tablet by mouth Every 6 (Six) Hours As Needed for Moderate Pain .  Dispense: 120 tablet; Refill: 0    10. Restless leg syndrome  -     rOPINIRole (REQUIP) 0.5 MG tablet; Take 1 tablet by mouth Every Night. Take 1 hour before bedtime  Dispense: 30 tablet; Refill: 11    11. Benign prostatic hyperplasia without lower urinary tract symptoms    12. Elevated PSA  -     PSA DIAGNOSTIC; Future    13. Primary erectile dysfunction  -     sildenafil (VIAGRA) 100 MG tablet; Take 1 tablet by mouth Daily As Needed for Erectile Dysfunction.  Dispense: 30 tablet; Refill: 3         Labs are reviewed with patient.  His glucose is slightly elevated at 112.  Patient will continue to watch diet to help maintain control of the glucose.  Patient understands that there is an increased risk of developing diabetes in  the future. He will continue to follow with oncology due to the diagnosis of leukocytosis and polycythemia.  His LDL cholesterol is 59.  He will continue with simvastatin 40 mg nightly for treatment of hyperlipidemia.  His blood pressure is controlled, and he will continue with his current blood pressure medication.  He will continue with Requip 0.5 mg nightly for treatment of restless leg syndrome.  He will continue with Celexa 40 mg daily for treatment of anxiety and depression.  I will start Wellbutrin  mg daily to help with his depression symptoms.  He will continue with Norco 10/325 mg 4 times a day for treatment of his chronic arthritic pain.  He will continue with gabapentin 600 mg twice a day for treatment of peripheral neuropathy.  His PSA is mildly elevated at 4.050.  This is up from 3.580 last year.  I discussed referral to urology versus monitoring this for now.  He would like to hold off on urology referral.  I will check a diagnostic PSA in 6 months.  He is given sildenafil 100 mg as needed for treatment of erectile dysfunction.  He will take 1/2 to 1 tablet, and he is instructed to not take more than a single dose in a 24-hour period of time.    Patient understands the risks associated with this controlled medication, including tolerance and addiction.  Patient also agrees to only obtain this medication from me, and not from a another provider, unless that provider is covering for me in my absence.  Patient also agrees to be compliant in dosing, and not self adjust the dose of medication.  A signed controlled substance agreement is on file, and the patient has received a controlled substance education sheet at this a previous visit.  The patient has also signed a consent for treatment with a controlled substance as per [facility] policy. SANDRA was obtained.        PHQ-2/PHQ-9 Depression Screening 4/22/2022   Retired PHQ-9 Total Score -   Retired Total Score -   Little Interest or Pleasure  in Doing Things 0-->not at all   Feeling Down, Depressed or Hopeless 0-->not at all   PHQ-9: Brief Depression Severity Measure Score 0

## 2022-04-21 NOTE — PROGRESS NOTES
The ABCs of the Annual Wellness Visit  Subsequent Medicare Wellness Visit    Chief Complaint   Patient presents with   • Medicare Wellness-subsequent   • Hyperlipidemia   • Hypertension   • Impaired glucose tolerance   • Benign prostatic hyperplasia without lower urinary tract sy   • Anxiety   • Depression   • Primary osteoarthritis involving multiple joints   • Restless Legs Syndrome   • Polyneuropathy associated with underlying disease        Subjective   History of Present Illness:  Osvaldo Conley Sr. is a 66 y.o. male who presents for a subsequent Medicare Wellness Visit.    HEALTH RISK ASSESSMENT    Recent Hospitalizations:  No hospitalization(s) within the last year.    Current Medical Providers:  Patient Care Team:  Ruiz Ayon MD as PCP - General  FalconYury christensen MD as Surgeon (General Surgery)  Enmanuel Fontanez MD as Surgeon (Otolaryngology)  Kishor Cruz MD as Consulting Physician (Hematology and Oncology)    Smoking Status:  Social History     Tobacco Use   Smoking Status Current Every Day Smoker   • Packs/day: 1.00   • Years: 50.00   • Pack years: 50.00   • Types: Cigarettes   Smokeless Tobacco Never Used       Alcohol Consumption:  Social History     Substance and Sexual Activity   Alcohol Use Yes    Comment: occasional beer- 2or 3 per month       Depression Screen:   PHQ-2/PHQ-9 Depression Screening 4/22/2022   Retired PHQ-9 Total Score -   Retired Total Score -   Little Interest or Pleasure in Doing Things 0-->not at all   Feeling Down, Depressed or Hopeless 0-->not at all   PHQ-9: Brief Depression Severity Measure Score 0       Fall Risk Screen:  ELLA Fall Risk Assessment was completed, and patient is at LOW risk for falls.Assessment completed on:4/22/2022    Health Habits and Functional and Cognitive Screening:  Functional & Cognitive Status 4/22/2022   Do you have difficulty preparing food and eating? No   Do you have difficulty bathing yourself, getting dressed or  grooming yourself? No   Do you have difficulty using the toilet? No   Do you have difficulty moving around from place to place? No   Do you have trouble with steps or getting out of a bed or a chair? No   Current Diet Well Balanced Diet   Dental Exam Not up to date   Eye Exam Not up to date   Exercise (times per week) 0 times per week   Current Exercises Include No Regular Exercise   Current Exercise Activities Include -   Do you need help using the phone?  No   Are you deaf or do you have serious difficulty hearing?  No   Do you need help with transportation? No   Do you need help shopping? No   Do you need help preparing meals?  No   Do you need help with housework?  No   Do you need help with laundry? No   Do you need help taking your medications? No   Do you need help managing money? No   Do you ever drive or ride in a car without wearing a seat belt? No   Have you felt unusual stress, anger or loneliness in the last month? No   Who do you live with? Alone   If you need help, do you have trouble finding someone available to you? No   Have you been bothered in the last four weeks by sexual problems? No   Do you have difficulty concentrating, remembering or making decisions? Yes         Does the patient have evidence of cognitive impairment? No    Asprin use counseling:Does not take aspirin    Age-appropriate Screening Schedule:  Refer to the list below for future screening recommendations based on patient's age, sex and/or medical conditions. Orders for these recommended tests are listed in the plan section. The patient has been provided with a written plan.    Health Maintenance   Topic Date Due   • ZOSTER VACCINE (1 of 2) Never done   • INFLUENZA VACCINE  08/01/2022   • PROSTATE CANCER SCREENING  04/21/2023   • LIPID PANEL  04/21/2023   • TDAP/TD VACCINES (3 - Td or Tdap) 08/06/2023          The following portions of the patient's history were reviewed and updated as appropriate: allergies, current medications,  past family history, past medical history, past social history, past surgical history and problem list.    Outpatient Medications Prior to Visit   Medication Sig Dispense Refill   • citalopram (CeleXA) 40 MG tablet TAKE 1 TABLET BY MOUTH DAILY. 30 tablet 5   • hydroCHLOROthiazide (HYDRODIURIL) 25 MG tablet TAKE 1 TABLET BY MOUTH DAILY 30 tablet 11   • losartan (COZAAR) 50 MG tablet TAKE 1 TABLET BY MOUTH DAILY 30 tablet 11   • simvastatin (ZOCOR) 40 MG tablet TAKE 1 TABLET BY MOUTH EVERY NIGHT 30 tablet 11   • tamsulosin (FLOMAX) 0.4 MG capsule 24 hr capsule TAKE 1 CAPSULE (0.4 MG TOTAL) BY MOUTH DAILY.     • tiZANidine (ZANAFLEX) 2 MG tablet TAKE 1 TABLET BY MOUTH EVERY 8 HOURS AS NEEDED FOR MUSCLE SPASMS 90 tablet 3   • gabapentin (NEURONTIN) 600 MG tablet Take 1 tablet by mouth 2 (Two) Times a Day. 60 tablet 2   • HYDROcodone-acetaminophen (NORCO)  MG per tablet Take 1 tablet by mouth Every 6 (Six) Hours As Needed for Moderate Pain . Fill when due 120 tablet 0   • rOPINIRole (REQUIP) 0.5 MG tablet TAKE 1 TABLET BY MOUTH EVERY NIGHT 1 HOUR BEFORE BEDTIME 30 tablet 11     No facility-administered medications prior to visit.       Patient Active Problem List   Diagnosis   • Restless leg syndrome   • Polyneuropathy associated with underlying disease (HCC)   • Primary osteoarthritis involving multiple joints   • Mixed hyperlipidemia   • Generalized anxiety disorder   • Essential hypertension   • Peripheral edema   • Impaired glucose tolerance   • COPD, mild (HCC)   • Primary osteoarthritis of both knees   • Leukocytosis   • Polycythemia   • Encounter for tobacco use cessation counseling   • Chronic pain of both knees   • Internal derangement of left knee   • Degenerative tear of posterior horn of lateral meniscus of left knee   • Status post right knee replacement   • Presence of total right knee joint prosthesis   • Primary osteoarthritis of right knee   • Major depressive disorder, recurrent, in full remission  "(HCC)   • Benign prostatic hyperplasia without lower urinary tract symptoms   • Elevated PSA       Advanced Care Planning:  ACP discussion was held with the patient during this visit. Patient does not have an advance directive, declines further assistance.    Review of Systems    Compared to one year ago, the patient feels his physical health is better.  Compared to one year ago, the patient feels his mental health is worse.    Reviewed chart for potential of high risk medication in the elderly: yes  Reviewed chart for potential of harmful drug interactions in the elderly:yes    Objective         Vitals:    04/22/22 0921   BP: 118/70   BP Location: Left arm   Patient Position: Sitting   Cuff Size: Adult   Pulse: 89  Comment: regular   Temp: 98 °F (36.7 °C)   TempSrc: Temporal   SpO2: 96%   Weight: 76.7 kg (169 lb)   Height: 180.3 cm (71\")   PainSc:   4   PainLoc: Generalized       Body mass index is 23.57 kg/m².  Discussed the patient's BMI with him. The BMI is in the acceptable range.    Physical Exam    Lab Results   Component Value Date    LDL 59 04/21/2022        Assessment/Plan   Medicare Risks and Personalized Health Plan  CMS Preventative Services Quick Reference  Chronic Pain   Colon Cancer Screening  Diabetic Lab Screening   Immunizations Discussed/Encouraged (specific immunizations; Influenza, Shingrix and COVID19 )  Prostate Cancer Screening   Tobacco Use/Dependance (use dotphrase .tobaccocessation for documentation)    The above risks/problems have been discussed with the patient.  Pertinent information has been shared with the patient in the After Visit Summary.  Follow up plans and orders are seen below in the Assessment/Plan Section.    Diagnoses and all orders for this visit:    1. Medicare annual wellness visit, subsequent (Primary)    2. Impaired glucose tolerance  -     Hemoglobin A1c; Future    3. Essential hypertension  -     CBC & Differential; Future  -     Comprehensive Metabolic Panel; " Future  -     T4, Free; Future  -     TSH; Future    4. Mixed hyperlipidemia  -     Lipid Panel; Future    5. Polyneuropathy associated with underlying disease (HCC)  -     gabapentin (NEURONTIN) 600 MG tablet; Take 1 tablet by mouth 2 (Two) Times a Day.  Dispense: 60 tablet; Refill: 2    6. COPD, mild (HCC)    7. Major depressive disorder, recurrent, in full remission (HCC)  -     buPROPion XL (WELLBUTRIN XL) 150 MG 24 hr tablet; Take 1 tablet by mouth Daily.  Dispense: 30 tablet; Refill: 5    8. Generalized anxiety disorder    9. Primary osteoarthritis involving multiple joints  -     HYDROcodone-acetaminophen (NORCO)  MG per tablet; Take 1 tablet by mouth Every 6 (Six) Hours As Needed for Moderate Pain .  Dispense: 120 tablet; Refill: 0    10. Restless leg syndrome  -     rOPINIRole (REQUIP) 0.5 MG tablet; Take 1 tablet by mouth Every Night. Take 1 hour before bedtime  Dispense: 30 tablet; Refill: 11    11. Benign prostatic hyperplasia without lower urinary tract symptoms    12. Elevated PSA  -     PSA DIAGNOSTIC; Future    13. Primary erectile dysfunction  -     sildenafil (VIAGRA) 100 MG tablet; Take 1 tablet by mouth Daily As Needed for Erectile Dysfunction.  Dispense: 30 tablet; Refill: 3      Follow Up:  Return in about 3 months (around 7/22/2022) for Next scheduled follow up, Or sooner as needed.     An After Visit Summary and PPPS were given to the patient.

## 2022-04-22 ENCOUNTER — OFFICE VISIT (OUTPATIENT)
Dept: FAMILY MEDICINE CLINIC | Facility: CLINIC | Age: 67
End: 2022-04-22

## 2022-04-22 ENCOUNTER — PRIOR AUTHORIZATION (OUTPATIENT)
Dept: FAMILY MEDICINE CLINIC | Facility: CLINIC | Age: 67
End: 2022-04-22

## 2022-04-22 VITALS
BODY MASS INDEX: 23.66 KG/M2 | DIASTOLIC BLOOD PRESSURE: 70 MMHG | HEART RATE: 89 BPM | TEMPERATURE: 98 F | HEIGHT: 71 IN | SYSTOLIC BLOOD PRESSURE: 118 MMHG | WEIGHT: 169 LBS | OXYGEN SATURATION: 96 %

## 2022-04-22 DIAGNOSIS — N52.9 PRIMARY ERECTILE DYSFUNCTION: ICD-10-CM

## 2022-04-22 DIAGNOSIS — J44.9 COPD, MILD: Chronic | ICD-10-CM

## 2022-04-22 DIAGNOSIS — G63 POLYNEUROPATHY ASSOCIATED WITH UNDERLYING DISEASE: Chronic | ICD-10-CM

## 2022-04-22 DIAGNOSIS — F41.1 GENERALIZED ANXIETY DISORDER: Chronic | ICD-10-CM

## 2022-04-22 DIAGNOSIS — Z00.00 MEDICARE ANNUAL WELLNESS VISIT, SUBSEQUENT: Primary | ICD-10-CM

## 2022-04-22 DIAGNOSIS — G25.81 RESTLESS LEG SYNDROME: Chronic | ICD-10-CM

## 2022-04-22 DIAGNOSIS — N40.0 BENIGN PROSTATIC HYPERPLASIA WITHOUT LOWER URINARY TRACT SYMPTOMS: Chronic | ICD-10-CM

## 2022-04-22 DIAGNOSIS — M15.9 PRIMARY OSTEOARTHRITIS INVOLVING MULTIPLE JOINTS: Chronic | ICD-10-CM

## 2022-04-22 DIAGNOSIS — I10 ESSENTIAL HYPERTENSION: Chronic | ICD-10-CM

## 2022-04-22 DIAGNOSIS — R97.20 ELEVATED PSA: ICD-10-CM

## 2022-04-22 DIAGNOSIS — R73.02 IMPAIRED GLUCOSE TOLERANCE: Chronic | ICD-10-CM

## 2022-04-22 DIAGNOSIS — E78.2 MIXED HYPERLIPIDEMIA: Chronic | ICD-10-CM

## 2022-04-22 DIAGNOSIS — F33.42 MAJOR DEPRESSIVE DISORDER, RECURRENT, IN FULL REMISSION: Chronic | ICD-10-CM

## 2022-04-22 LAB
ARTICHOKE IGE QN: 59 MG/DL (ref 0–100)
PSA SERPL-MCNC: 4.05 NG/ML (ref 0–4)
T4 FREE SERPL-MCNC: 1.63 NG/DL (ref 0.93–1.7)
TSH SERPL DL<=0.05 MIU/L-ACNC: 0.98 UIU/ML (ref 0.27–4.2)

## 2022-04-22 PROCEDURE — G0439 PPPS, SUBSEQ VISIT: HCPCS | Performed by: INTERNAL MEDICINE

## 2022-04-22 PROCEDURE — 99214 OFFICE O/P EST MOD 30 MIN: CPT | Performed by: INTERNAL MEDICINE

## 2022-04-22 PROCEDURE — 1159F MED LIST DOCD IN RCRD: CPT | Performed by: INTERNAL MEDICINE

## 2022-04-22 RX ORDER — GABAPENTIN 600 MG/1
600 TABLET ORAL 2 TIMES DAILY
Qty: 60 TABLET | Refills: 2 | Status: SHIPPED | OUTPATIENT
Start: 2022-04-22 | End: 2022-04-22 | Stop reason: SDUPTHER

## 2022-04-22 RX ORDER — BUPROPION HYDROCHLORIDE 150 MG/1
150 TABLET ORAL DAILY
Qty: 30 TABLET | Refills: 5 | Status: SHIPPED | OUTPATIENT
Start: 2022-04-22

## 2022-04-22 RX ORDER — ROPINIROLE 0.5 MG/1
0.5 TABLET, FILM COATED ORAL NIGHTLY
Qty: 30 TABLET | Refills: 11 | Status: SHIPPED | OUTPATIENT
Start: 2022-04-22

## 2022-04-22 RX ORDER — GABAPENTIN 600 MG/1
600 TABLET ORAL 2 TIMES DAILY
Qty: 60 TABLET | Refills: 2 | Status: SHIPPED | OUTPATIENT
Start: 2022-04-22 | End: 2022-07-19 | Stop reason: SDUPTHER

## 2022-04-22 RX ORDER — HYDROCODONE BITARTRATE AND ACETAMINOPHEN 10; 325 MG/1; MG/1
1 TABLET ORAL EVERY 6 HOURS PRN
Qty: 120 TABLET | Refills: 0 | Status: CANCELLED | OUTPATIENT
Start: 2022-04-22

## 2022-04-22 RX ORDER — HYDROCODONE BITARTRATE AND ACETAMINOPHEN 10; 325 MG/1; MG/1
1 TABLET ORAL EVERY 6 HOURS PRN
Qty: 120 TABLET | Refills: 0 | Status: SHIPPED | OUTPATIENT
Start: 2022-04-22 | End: 2022-05-23

## 2022-04-22 RX ORDER — SILDENAFIL 100 MG/1
100 TABLET, FILM COATED ORAL DAILY PRN
Qty: 30 TABLET | Refills: 3 | Status: SHIPPED | OUTPATIENT
Start: 2022-04-22

## 2022-04-30 LAB
6MAM UR QL CFM: NEGATIVE
6MAM/CREAT UR: NOT DETECTED NG/MG CREAT
7AMINOCLONAZEPAM/CREAT UR: NOT DETECTED NG/MG CREAT
A-OH ALPRAZ/CREAT UR: NOT DETECTED NG/MG CREAT
A-OH-TRIAZOLAM/CREAT UR CFM: NOT DETECTED NG/MG CREAT
ALFENTANIL/CREAT UR CFM: NOT DETECTED NG/MG CREAT
ALPHA-HYDROXYMIDAZOLAM, URINE: NOT DETECTED NG/MG CREAT
ALPRAZ/CREAT UR CFM: NOT DETECTED NG/MG CREAT
AMOBARBITAL UR QL CFM: NOT DETECTED
AMPHET/CREAT UR: NOT DETECTED NG/MG CREAT
AMPHETAMINES UR QL CFM: NEGATIVE
BARBITAL UR QL CFM: NOT DETECTED
BARBITURATES UR QL CFM: NEGATIVE
BENZODIAZ UR QL CFM: NEGATIVE
BUPRENORPHINE UR QL CFM: NEGATIVE
BUPRENORPHINE/CREAT UR: NOT DETECTED NG/MG CREAT
BUTABARBITAL UR QL CFM: NOT DETECTED
BUTALBITAL UR QL CFM: NOT DETECTED
BZE/CREAT UR: NOT DETECTED NG/MG CREAT
CANNABINOIDS UR QL CFM: NEGATIVE
CARBOXYTHC/CREAT UR: NOT DETECTED NG/MG CREAT
CLONAZEPAM/CREAT UR CFM: NOT DETECTED NG/MG CREAT
COCAETHYLENE/CREAT UR CFM: NOT DETECTED NG/MG CREAT
COCAINE UR QL CFM: NEGATIVE
COCAINE/CREAT UR CFM: NOT DETECTED NG/MG CREAT
CODEINE/CREAT UR: NOT DETECTED NG/MG CREAT
CREAT UR-MCNC: 158 MG/DL
DESALKYLFLURAZ/CREAT UR: NOT DETECTED NG/MG CREAT
DESMETHYLFLUNITRAZEPAM: NOT DETECTED NG/MG CREAT
DHC/CREAT UR: NOT DETECTED NG/MG CREAT
DIAZEPAM/CREAT UR: NOT DETECTED NG/MG CREAT
DRUGS UR: NORMAL
EDDP/CREAT UR: NOT DETECTED NG/MG CREAT
ETHANOL UR CFM-MCNC: NOT DETECTED G/DL
ETHANOL UR QL CFM: NEGATIVE
FENTANYL UR QL CFM: NEGATIVE
FENTANYL/CREAT UR: NOT DETECTED NG/MG CREAT
FLUNITRAZEPAM UR QL CFM: NOT DETECTED NG/MG CREAT
HYDROCODONE/CREAT UR: NOT DETECTED NG/MG CREAT
HYDROMORPHONE/CREAT UR: NOT DETECTED NG/MG CREAT
LEVEL OF DETECTION:: NORMAL
LORAZEPAM/CREAT UR: NOT DETECTED NG/MG CREAT
MDA/CREAT UR: NOT DETECTED NG/MG CREAT
MDMA/CREAT UR: NOT DETECTED NG/MG CREAT
MEPHOBARBITAL UR QL CFM: NOT DETECTED
METHADONE UR QL CFM: NEGATIVE
METHADONE/CREAT UR: NOT DETECTED NG/MG CREAT
METHAMPHET/CREAT UR: NOT DETECTED NG/MG CREAT
MIDAZOLAM/CREAT UR CFM: NOT DETECTED NG/MG CREAT
MORPHINE/CREAT UR: NOT DETECTED NG/MG CREAT
N-NORTRAMADOL/CREAT UR CFM: NOT DETECTED NG/MG CREAT
NARCOTICS UR: NEGATIVE
NORBUPRENORPHINE/CREAT UR: NOT DETECTED NG/MG CREAT
NORCODEINE/CREAT UR CFM: NOT DETECTED NG/MG CREAT
NORDIAZEPAM/CREAT UR: NOT DETECTED NG/MG CREAT
NORFENTANYL/CREAT UR: NOT DETECTED NG/MG CREAT
NORHYDROCODONE/CREAT UR: NOT DETECTED NG/MG CREAT
NORMORPHINE UR-MCNC: NOT DETECTED NG/MG CREAT
NOROXYCODONE/CREAT UR: NOT DETECTED NG/MG CREAT
NOROXYMORPHONE/CREAT UR CFM: NOT DETECTED NG/MG CREAT
O-NORTRAMADOL UR CFM-MCNC: NOT DETECTED NG/MG CREAT
OPIATES UR QL CFM: NEGATIVE
OXAZEPAM/CREAT UR: NOT DETECTED NG/MG CREAT
OXYCODONE UR QL CFM: NEGATIVE
OXYCODONE/CREAT UR: NOT DETECTED NG/MG CREAT
OXYMORPHONE/CREAT UR: NOT DETECTED NG/MG CREAT
PENTOBARB UR QL CFM: NOT DETECTED
PHENOBARB UR QL CFM: NOT DETECTED
SECOBARBITAL UR QL CFM: NOT DETECTED
SUFENTANIL/CREAT UR CFM: NOT DETECTED NG/MG CREAT
TAPENTADOL UR QL CFM: NEGATIVE
TAPENTADOL/CREAT UR: NOT DETECTED NG/MG CREAT
TEMAZEPAM/CREAT UR: NOT DETECTED NG/MG CREAT
THIOPENTAL UR QL CFM: NOT DETECTED
TRAMADOL UR QL CFM: NOT DETECTED NG/MG CREAT

## 2022-05-13 RX ORDER — TIZANIDINE 2 MG/1
TABLET ORAL
Qty: 90 TABLET | Refills: 3 | OUTPATIENT
Start: 2022-05-13

## 2022-05-23 DIAGNOSIS — M15.9 PRIMARY OSTEOARTHRITIS INVOLVING MULTIPLE JOINTS: Chronic | ICD-10-CM

## 2022-05-23 RX ORDER — HYDROCODONE BITARTRATE AND ACETAMINOPHEN 10; 325 MG/1; MG/1
1 TABLET ORAL EVERY 6 HOURS PRN
Qty: 120 TABLET | Refills: 0 | Status: SHIPPED | OUTPATIENT
Start: 2022-05-23 | End: 2022-06-21 | Stop reason: SDUPTHER

## 2022-06-13 RX ORDER — TIZANIDINE 2 MG/1
TABLET ORAL
Qty: 90 TABLET | Refills: 3 | Status: SHIPPED | OUTPATIENT
Start: 2022-06-13

## 2022-06-21 DIAGNOSIS — M15.9 PRIMARY OSTEOARTHRITIS INVOLVING MULTIPLE JOINTS: Chronic | ICD-10-CM

## 2022-06-21 RX ORDER — HYDROCODONE BITARTRATE AND ACETAMINOPHEN 10; 325 MG/1; MG/1
1 TABLET ORAL EVERY 6 HOURS PRN
Qty: 120 TABLET | Refills: 0 | Status: SHIPPED | OUTPATIENT
Start: 2022-06-21 | End: 2022-07-19 | Stop reason: SDUPTHER

## 2022-06-23 ENCOUNTER — PRIOR AUTHORIZATION (OUTPATIENT)
Dept: FAMILY MEDICINE CLINIC | Facility: CLINIC | Age: 67
End: 2022-06-23

## 2022-06-23 NOTE — TELEPHONE ENCOUNTER
Approved today  CaseId:[id number];Status:Approved;Review Type:Prior Auth;Coverage Start Date:06/01/2022;Coverage End Date:06/23/2023;

## 2022-06-23 NOTE — TELEPHONE ENCOUNTER
PA for Hydrocodone was submitted through Cover my Meds. Ref PA key [id number].    Also note new insurance for the patient. [employer]   Bin: [id number]  PCN: [facility]  Group: [id number]

## 2022-07-18 NOTE — PATIENT INSTRUCTIONS
Dr. Ruiz Ayon will be leaving the clinic and retiring from clinical practice on 08/31/2022. You will need to establish care with a new primary care provider prior to that date. You should do this as soon as possible so you can schedule your next follow up appointment with a new provider.

## 2022-07-18 NOTE — PROGRESS NOTES
Chief Complaint   Patient presents with   • Essential hypertension   • Primary osteoarthritis involving multiple joints   • Polyneuropathy associated with underlying disease (HCC)   • COPD, mild (HCC)   • Major depressive disorder, recurrent, in full remission ([facility]   • Generalized anxiety disorder     Subjective   Osvaldo Conley Sr. is a 66 y.o. male who presents to the office for follow-up. He has hypertension and his blood pressure has been controlled. He has anxiety and depression, and takes Celexa 40 mg daily.  His anxiety has been doing well.  His depression is in full remission.  He has osteoarthritis which causes chronic back and joint pain.  He takes Norco 10/325 mg 4 times a day for treatment of the chronic pain. He also takes gabapentin 600 mg twice daily to help with the neuropathic pain. He has COPD, and his breathing has been baseline.  He has an occasional cough.     History of Present Illness has been reviewed and validated on 07/19/2022 and updated with any changes.    The following portions of the patient's history were reviewed and updated as appropriate: allergies, current medications, past family history, past medical history, past social history, past surgical history and problem list.    Review of Systems   Constitutional: Negative for chills, fatigue and fever.   HENT: Negative for congestion, sneezing, sore throat and trouble swallowing.    Eyes: Negative for visual disturbance.   Respiratory: Negative for cough, chest tightness, shortness of breath and wheezing.    Cardiovascular: Negative for chest pain, palpitations and leg swelling.   Gastrointestinal: Negative for abdominal pain, constipation, diarrhea, nausea and vomiting.   Genitourinary: Negative for dysuria, frequency and urgency.   Musculoskeletal: Positive for arthralgias and back pain. Negative for neck pain.   Skin: Negative for rash.   Neurological: Negative for dizziness, weakness and headaches.   Psychiatric/Behavioral:      "   Patient denies any feelings of depression and has not felt down, hopeless or lost interest in any activities.   All other systems reviewed and are negative.  Review of systems has been reviewed and validated on 07/19/2022 and updated with any changes.      Objective   Vitals:    07/19/22 0941   BP: 108/64   BP Location: Left arm   Patient Position: Sitting   Cuff Size: Large Adult   Pulse: 87   Temp: 99.5 °F (37.5 °C)   TempSrc: Temporal   SpO2: 97%   Weight: 66.3 kg (146 lb 3.2 oz)   Height: 180.3 cm (70.98\")   PainSc:   3   PainLoc: Knee  Comment: right      Body mass index is 20.4 kg/m².    Physical Exam   Constitutional: He is oriented to person, place, and time. He appears well-developed. No distress.   HENT:   Head: Normocephalic and atraumatic.   Nose: Nose normal.   Eyes: Pupils are equal, round, and reactive to light. Conjunctivae are normal. No scleral icterus.   Cardiovascular: Normal rate, regular rhythm and normal heart sounds. Exam reveals no gallop and no friction rub.   No murmur heard.  Pulmonary/Chest: Effort normal. No respiratory distress. He has decreased breath sounds. He has no wheezes. He has no rales.   Musculoskeletal: Normal range of motion.      Lumbar back: He exhibits pain.   Neurological: He is alert and oriented to person, place, and time. No cranial nerve deficit.   Skin: Skin is warm and dry. No rash noted.   Psychiatric: His behavior is normal. Judgment and thought content normal.   Nursing note and vitals reviewed.  Physical exam has been reviewed and validated on 07/19/2022 and updated with any changes.      Assessment & Plan   Diagnoses and all orders for this visit:    1. Essential hypertension (Primary)    2. Primary osteoarthritis involving multiple joints  -     HYDROcodone-acetaminophen (NORCO)  MG per tablet; Take 1 tablet by mouth Every 6 (Six) Hours As Needed for Moderate Pain .  Dispense: 120 tablet; Refill: 0    3. Polyneuropathy associated with underlying " disease (HCC)  -     gabapentin (NEURONTIN) 600 MG tablet; Take 1 tablet by mouth 2 (Two) Times a Day.  Dispense: 60 tablet; Refill: 2    4. COPD, mild (HCC)    5. Major depressive disorder, recurrent, in full remission (HCC)    6. Generalized anxiety disorder  -     citalopram (CeleXA) 40 MG tablet; Take 1 tablet by mouth Daily.  Dispense: 30 tablet; Refill: 5    7. Major depressive disorder, recurrent episode, in partial remission (HCC)  -     citalopram (CeleXA) 40 MG tablet; Take 1 tablet by mouth Daily.  Dispense: 30 tablet; Refill: 5         His blood pressure is controlled, and he will continue with his current blood pressure medication. He will continue with Celexa 40 mg daily for treatment of anxiety and depression.  He will continue with Norco 10/325 mg 4 times a day for treatment of his chronic arthritic pain.  He will continue with gabapentin 600 mg twice a day for treatment of peripheral neuropathy.  His COPD is baseline, and he does not currently require the use of any inhalers.    Patient understands the risks associated with this controlled medication, including tolerance and addiction.  Patient also agrees to only obtain this medication from me, and not from a another provider, unless that provider is covering for me in my absence.  Patient also agrees to be compliant in dosing, and not self adjust the dose of medication.  A signed controlled substance agreement is on file, and the patient has received a controlled substance education sheet at this a previous visit.  The patient has also signed a consent for treatment with a controlled substance as per [facility] policy. SANDRA was obtained.          PHQ-2/PHQ-9 Depression Screening 7/19/2022   Retired PHQ-9 Total Score -   Retired Total Score -   Little Interest or Pleasure in Doing Things 0-->not at all   Feeling Down, Depressed or Hopeless 0-->not at all   PHQ-9: Brief Depression Severity Measure Score 0

## 2022-07-19 ENCOUNTER — OFFICE VISIT (OUTPATIENT)
Dept: FAMILY MEDICINE CLINIC | Facility: CLINIC | Age: 67
End: 2022-07-19

## 2022-07-19 VITALS
TEMPERATURE: 99.5 F | DIASTOLIC BLOOD PRESSURE: 64 MMHG | WEIGHT: 146.2 LBS | BODY MASS INDEX: 20.47 KG/M2 | HEIGHT: 71 IN | HEART RATE: 87 BPM | SYSTOLIC BLOOD PRESSURE: 108 MMHG | OXYGEN SATURATION: 97 %

## 2022-07-19 DIAGNOSIS — F41.1 GENERALIZED ANXIETY DISORDER: Chronic | ICD-10-CM

## 2022-07-19 DIAGNOSIS — I10 ESSENTIAL HYPERTENSION: Primary | Chronic | ICD-10-CM

## 2022-07-19 DIAGNOSIS — F33.41 MAJOR DEPRESSIVE DISORDER, RECURRENT EPISODE, IN PARTIAL REMISSION: Chronic | ICD-10-CM

## 2022-07-19 DIAGNOSIS — F33.42 MAJOR DEPRESSIVE DISORDER, RECURRENT, IN FULL REMISSION: Chronic | ICD-10-CM

## 2022-07-19 DIAGNOSIS — J44.9 COPD, MILD: Chronic | ICD-10-CM

## 2022-07-19 DIAGNOSIS — M15.9 PRIMARY OSTEOARTHRITIS INVOLVING MULTIPLE JOINTS: Chronic | ICD-10-CM

## 2022-07-19 DIAGNOSIS — G63 POLYNEUROPATHY ASSOCIATED WITH UNDERLYING DISEASE: Chronic | ICD-10-CM

## 2022-07-19 PROCEDURE — 99214 OFFICE O/P EST MOD 30 MIN: CPT | Performed by: INTERNAL MEDICINE

## 2022-07-19 RX ORDER — GABAPENTIN 600 MG/1
600 TABLET ORAL 2 TIMES DAILY
Qty: 60 TABLET | Refills: 2 | Status: SHIPPED | OUTPATIENT
Start: 2022-07-19

## 2022-07-19 RX ORDER — HYDROCODONE BITARTRATE AND ACETAMINOPHEN 10; 325 MG/1; MG/1
1 TABLET ORAL EVERY 6 HOURS PRN
Qty: 120 TABLET | Refills: 0 | Status: SHIPPED | OUTPATIENT
Start: 2022-07-19 | End: 2022-08-23

## 2022-07-19 RX ORDER — CITALOPRAM 40 MG/1
40 TABLET ORAL DAILY
Qty: 30 TABLET | Refills: 5 | Status: SHIPPED | OUTPATIENT
Start: 2022-07-19

## 2022-08-23 DIAGNOSIS — M15.9 PRIMARY OSTEOARTHRITIS INVOLVING MULTIPLE JOINTS: Chronic | ICD-10-CM

## 2022-08-23 RX ORDER — HYDROCODONE BITARTRATE AND ACETAMINOPHEN 10; 325 MG/1; MG/1
1 TABLET ORAL EVERY 6 HOURS PRN
Qty: 120 TABLET | Refills: 0 | Status: SHIPPED | OUTPATIENT
Start: 2022-08-23 | End: 2022-09-21 | Stop reason: SDUPTHER

## 2022-09-21 DIAGNOSIS — M15.9 PRIMARY OSTEOARTHRITIS INVOLVING MULTIPLE JOINTS: Chronic | ICD-10-CM

## 2022-09-21 RX ORDER — HYDROCODONE BITARTRATE AND ACETAMINOPHEN 10; 325 MG/1; MG/1
1 TABLET ORAL EVERY 6 HOURS PRN
Qty: 120 TABLET | Refills: 0 | Status: SHIPPED | OUTPATIENT
Start: 2022-09-21

## 2022-09-21 NOTE — TELEPHONE ENCOUNTER
Needs refill on HYDROcodone-acetaminophen (NORCO)  MG, due [facility] . Patient stated he has an appointment with a provider next month at [place].

## 2022-10-19 DIAGNOSIS — F33.42 MAJOR DEPRESSIVE DISORDER, RECURRENT, IN FULL REMISSION: Chronic | ICD-10-CM

## 2022-10-20 RX ORDER — BUPROPION HYDROCHLORIDE 150 MG/1
150 TABLET ORAL DAILY
Qty: 30 TABLET | Refills: 5 | OUTPATIENT
Start: 2022-10-20

## 2023-04-26 NOTE — DISCHARGE INSTR - OTHER ORDERS
[facility] will deliver the walker to the hospital for you    Arrangements made with [facility] outpt therapy department  1st appointment is this Friday, June 5th at 9:30   Please call them at  and they will explain how to enter the building.  ([facility] did not accept your insurance)   Is the patient currently in the state of [place]? YES    Visit mode:VIDEO    If the visit is dropped, the patient can be reconnected by: VIDEO VISIT: Text to cell phone: [phone]    Will anyone else be joining the visit? NO      How would you like to obtain your AVS? MyChart    Are changes needed to the allergy or medication list? NO    Reason for visit: RECHECK (Follow up)

## 2023-10-21 NOTE — PROGRESS NOTES
"Osvaldo Conley . is a 64 y.o. male returns for     Chief Complaint   Patient presents with   • Left Knee - Follow-up, Pain   • Results       HISTORY OF PRESENT ILLNESS: f/u left knee pain, mri done on 10/1/2019  Still having pain in both knees left worse than right  Pain worse with activity           CONCURRENT MEDICAL HISTORY:    The following portions of the patient's history were reviewed and updated as appropriate: allergies, current medications, past family history, past medical history, past social history, past surgical history and problem list.     ROS  No fevers or chills.  No chest pain or shortness of air.  No GI or  disturbances.    PHYSICAL EXAMINATION:       Ht 180.3 cm (71\")   Wt 80.7 kg (178 lb)   BMI 24.83 kg/m²     Physical Exam   Constitutional: He is oriented to person, place, and time. He appears well-developed and well-nourished.   Neurological: He is alert and oriented to person, place, and time.   Psychiatric: He has a normal mood and affect. His behavior is normal. Judgment and thought content normal.       GAIT:     []  Normal  [x]  Antalgic    Assistive device: [x]  None  []  Walker     []  Crutches  []  Cane     []  Wheelchair  []  Stretcher    Right Knee Exam     Muscle Strength   The patient has normal right knee strength.    Tenderness   Right knee tenderness location: diffuse.    Range of Motion   Extension: -5   Flexion: 120     Tests   Varus: negative Valgus: negative  Drawer:  Anterior - negative    Posterior - negative    Other   Sensation: normal  Pulse: present  Swelling: mild    Comments:  Crepitation on motion.  Mild to moderate pain through arc of motion      Left Knee Exam     Muscle Strength   The patient has normal left knee strength.    Tenderness   The patient is experiencing tenderness in the medial joint line.    Range of Motion   Extension: 0   Flexion: 120     Tests   Varus: negative Valgus: negative  Drawer:  Anterior - negative         Other   Erythema: " absent  Sensation: normal  Pulse: present  Swelling: none              Mri Knee Left Without Contrast    Result Date: 10/1/2019  Narrative: [facility] [place] Patient Name: DENISE HITCHCOCK ORDERING: MARYANN MOORE ATTENDING: REFERRING: MARYANN MOORE ----------------------- PROCEDURE: MR left knee without contrast CLINICAL HISTORY: Internal derangement of left knee, pain, chronic left knee pain and swelling, arthritis COMPARISON: None currently available TECHNIQUE:  Multiplanar and multispin echo MRI sequences of the left knee were performed without the use of IV or intra-articular contrast. FINDINGS: Menisci:  The posterior horn and body of the lateral meniscus are diffusely attenuated, suggesting chronic attritional tearing. Similar but less pronounced changes are seen in the medial meniscus. There is no definite fluid signal intensity seen in the substance of the menisci to suggest unstable meniscal tears. Osseous structures and cartilage:  There is full-thickness cartilage loss in the lateral compartment. There are areas of full-thickness cartilage loss in the medial compartment. The patellofemoral articular cartilage appears mildly heterogeneous, but intact. Tricompartmental osteophytes noted. No fracture or osteochondral defect. Ligaments:  Increased normal intensity in the anterior cruciate ligament suggests degeneration, without tear. The posterior cruciate ligament is intact. The medial and lateral collateral ligaments are intact. Extensor mechanism:  The quadriceps and patellar tendons are intact. Additional findings:  No joint effusion. There is a small lobulated Baker's cyst which measures approximately 1.5 x 1.2 x 3.2 cm. The soft tissues are within normal limits.     Impression: 1. Attenuated lateral meniscus, suggesting chronic attritional tearing. Similar but less pronounced changes are seen in the medial meniscus. 2. Full-thickness cartilage loss in the lateral and medial compartments. 3.  Small Baker's cyst. Electronically signed by:  Priscila Powers MD  10/1/2019 4:28 PM CDT Workstation: [id number]            ASSESSMENT:    Diagnoses and all orders for this visit:    Degenerative tear of posterior horn of lateral meniscus of left knee    Chronic pain of both knees    Internal derangement of left knee    Essential hypertension    Primary osteoarthritis of right knee    Other orders  -     Large Joint Arthrocentesis: L knee      Large Joint Arthrocentesis: L knee  Date/Time: 10/25/2019 8:38 AM  Consent given by: patient  Site marked: site marked  Timeout: Immediately prior to procedure a time out was called to verify the correct patient, procedure, equipment, support staff and site/side marked as required   Supporting Documentation  Indications: pain   Procedure Details  Location: knee - L knee  Preparation: Patient was prepped and draped in the usual sterile fashion  Needle size: 22 G  Approach: anteromedial  Medications administered: 2 mL lidocaine PF 1% 1 %; 40 mg triamcinolone acetonide 40 MG/ML  Patient tolerance: patient tolerated the procedure well with no immediate complications          PLAN    Injection left knee.  Will see how symptoms change  Discussed probable eventual scope in left knee    Also discussed eventual TKA vs UKA in right knee.    Activity as tolerated.      Return if symptoms worsen or fail to improve, for recheck.    Jamarcus Weems MD     Daily Assessment

## 2025-03-29 NOTE — TELEPHONE ENCOUNTER
----- Message from Ruiz Ayon MD sent at 5/1/2020  9:06 AM CDT -----  Refill gabapentin (along with [place]) to [facility].    The catheter was inserted into the proximal   right coronary artery.

## (undated) DEVICE — SUT ETHIB 0/0 CT1 30IN X424H

## (undated) DEVICE — SPNG DRN AMD EXCILON 6PLY 4X4IN PK/2

## (undated) DEVICE — SOL IRR NACL 0.9PCT BT 1000ML

## (undated) DEVICE — GLV SURG TRIUMPH LT PF LTX 8 STRL

## (undated) DEVICE — ANTIBACTERIAL UNDYED BRAIDED (POLYGLACTIN 910), SYNTHETIC ABSORBABLE SUTURE: Brand: COATED VICRYL

## (undated) DEVICE — PAD GRND E/S W/CORD SPLT A/

## (undated) DEVICE — 3 BONE CEMENT MIXER: Brand: MIXEVAC

## (undated) DEVICE — STERILE POLYISOPRENE POWDER-FREE SURGICAL GLOVES WITH EMOLLIENT COATING: Brand: PROTEXIS

## (undated) DEVICE — SUT VIC 0 CT1 36IN J946H

## (undated) DEVICE — NO-SCRATCH ™ SMALL WHITNEY CURETTE ™ IS A SINGLE-USE, PLASTIC CURETTE FOR QUICKLY APPLYING, MANIPULATING AND REMOVING BONE CEMENT DURING HIP AND KNEE REPLACEMENT SURGERY. THE PLASTIC IS SOFTER THAN STEEL INSTRUMENTS, REDUCING THE RISK OF DAMAGING THE PROSTHESIS WITH METAL INSTRUMENTS.  THE CURETTE’S 6MM TIP REMOVES EXCESS CEMENT FROM REPLACEMENT HIPS AND KNEES. EASY-TO-MANEUVER, THE SMALL BLUE CURETTE LETS YOU REMOVE CEMENT FROM ALL EDGES OF THE PROSTHESIS.NO-SCRATCH WHITNEY SMALL CURETTE FEATURES:SAFER THAN STEEL- MADE OF PLASTIC - STURDY YET SOFTER THAN SURGICAL STEEL.HANDIER- EACH TOOL HAS A MOLDED-IN THUMB INDENTATION INSTANTLY ORIENTING THE TOOL.- EASIER TO MANEUVER IN HARD TO SEE PLACES.- COLOR-CODED FOR EASY IDENTIFICATION.FASTER- COMES INDIVIDUALLY PACKAGED IN STERILE, PEEL OPEN POUCH, READY TO GO.- APPLIES, MANIPULATES, OR REMOVES CEMENT WITH FINGERTIP PRECISION.ECONOMICAL- THE COST OF A SINGLE REVISION DWARFS THE COST OF A SINGLE-USE CURETTE. - DISPOSABLE – THERE’S NO NEED TO WASTE TIME REMOVING HARDENED CEMENT OR RE-STERILIZING TOOLS.- LESS EXPENSIVE TO BUY AND INVENTORY - ORDER ONLY THE TOOL YOU USE.- PACKAGED 25 INDIVIDUALLY WRAPPED TOOLS TO A CARTON FOR CONVENIENT SHELF STORAGE.: Brand: WHITNEY NO-SCRATCH CURETTE (SMALL)

## (undated) DEVICE — SPNG LAP 18X18IN LF STRL PK/5

## (undated) DEVICE — GLV SURG TRIUMPH PF LTX 7 STRL

## (undated) DEVICE — RECIPROCATING BLADE, DOUBLE SIDED, OFFSET  (70.0 X 1.0 X 12.5MM)

## (undated) DEVICE — HANDPIECE SET WITH COAXIAL HIGH FLOW TIP AND SUCTION TUBE: Brand: INTERPULSE

## (undated) DEVICE — DRSNG GZ CURAD XEROFORM NONADHS 5X9IN STRL

## (undated) DEVICE — DRSNG WND BORDR/ADHS NONADHR/GZ LF 4X10IN STRL

## (undated) DEVICE — SUT ETHIB 0 CT1 CR8 18IN CX21D

## (undated) DEVICE — TRAY,FOLEY INSERTION,PVP,10ML SYRINGE: Brand: MEDLINE

## (undated) DEVICE — STRYKER PERFORMANCE SERIES SAGITTAL BLADE: Brand: STRYKER PERFORMANCE SERIES

## (undated) DEVICE — GOWN,AURORA,NOREINF,RAGLAN,XL,STERILE: Brand: MEDLINE

## (undated) DEVICE — SOL IRR NACL 0.9PCT 3000ML

## (undated) DEVICE — STPLR SKIN VISISTAT WD 35CT

## (undated) DEVICE — CATHETER,URETHRAL,REDRUBBER,STRL,16FR: Brand: MEDLINE

## (undated) DEVICE — KT DRN EVAC WND PVC PCH WTROC RND 10F400

## (undated) DEVICE — PK KN TOTL LF 60

## (undated) DEVICE — 3M™ DURAPORE™ SURGICAL TAPE 1538-3, 3 INCH X 10 YARD (7,5CM X 9,1M), 4 ROLLS/BOX: Brand: 3M™ DURAPORE™

## (undated) DEVICE — HOOD, PEEL-AWAY: Brand: FLYTE

## (undated) DEVICE — GLV SURG SENSICARE POLYISPRN W/ALOE PF LF 6.5 GRN STRL

## (undated) DEVICE — SOL IRR H2O BG 1000ML STRL